# Patient Record
Sex: FEMALE | Race: WHITE | NOT HISPANIC OR LATINO | Employment: OTHER
[De-identification: names, ages, dates, MRNs, and addresses within clinical notes are randomized per-mention and may not be internally consistent; named-entity substitution may affect disease eponyms.]

---

## 2016-06-23 LAB — HBA1C MFR BLD: 6.9 % (ref 0–5.6)

## 2017-08-08 ENCOUNTER — RECORDS - HEALTHEAST (OUTPATIENT)
Dept: ADMINISTRATIVE | Facility: OTHER | Age: 62
End: 2017-08-08

## 2017-08-08 LAB — HBA1C MFR BLD: 6.9 % (ref 0–5.6)

## 2017-08-23 ENCOUNTER — RECORDS - HEALTHEAST (OUTPATIENT)
Dept: ADMINISTRATIVE | Facility: OTHER | Age: 62
End: 2017-08-23

## 2018-01-15 ENCOUNTER — RECORDS - HEALTHEAST (OUTPATIENT)
Dept: ADMINISTRATIVE | Facility: OTHER | Age: 63
End: 2018-01-15

## 2018-02-22 ENCOUNTER — RECORDS - HEALTHEAST (OUTPATIENT)
Dept: ADMINISTRATIVE | Facility: OTHER | Age: 63
End: 2018-02-22

## 2018-03-12 ENCOUNTER — RECORDS - HEALTHEAST (OUTPATIENT)
Dept: ADMINISTRATIVE | Facility: OTHER | Age: 63
End: 2018-03-12

## 2018-03-12 LAB — HBA1C MFR BLD: 7.1 % (ref 0–5.6)

## 2018-03-26 ENCOUNTER — RECORDS - HEALTHEAST (OUTPATIENT)
Dept: ADMINISTRATIVE | Facility: OTHER | Age: 63
End: 2018-03-26

## 2018-03-27 ENCOUNTER — RECORDS - HEALTHEAST (OUTPATIENT)
Dept: ADMINISTRATIVE | Facility: OTHER | Age: 63
End: 2018-03-27

## 2018-03-28 ENCOUNTER — RECORDS - HEALTHEAST (OUTPATIENT)
Dept: ADMINISTRATIVE | Facility: OTHER | Age: 63
End: 2018-03-28

## 2018-04-24 ENCOUNTER — RECORDS - HEALTHEAST (OUTPATIENT)
Dept: ADMINISTRATIVE | Facility: OTHER | Age: 63
End: 2018-04-24

## 2018-05-03 ENCOUNTER — RECORDS - HEALTHEAST (OUTPATIENT)
Dept: ADMINISTRATIVE | Facility: OTHER | Age: 63
End: 2018-05-03

## 2018-05-15 ENCOUNTER — RECORDS - HEALTHEAST (OUTPATIENT)
Dept: ADMINISTRATIVE | Facility: OTHER | Age: 63
End: 2018-05-15

## 2018-09-18 ENCOUNTER — RECORDS - HEALTHEAST (OUTPATIENT)
Dept: ADMINISTRATIVE | Facility: OTHER | Age: 63
End: 2018-09-18

## 2018-10-01 ENCOUNTER — RECORDS - HEALTHEAST (OUTPATIENT)
Dept: ADMINISTRATIVE | Facility: OTHER | Age: 63
End: 2018-10-01

## 2018-10-02 ENCOUNTER — RECORDS - HEALTHEAST (OUTPATIENT)
Dept: ADMINISTRATIVE | Facility: OTHER | Age: 63
End: 2018-10-02

## 2018-12-06 ENCOUNTER — RECORDS - HEALTHEAST (OUTPATIENT)
Dept: ADMINISTRATIVE | Facility: OTHER | Age: 63
End: 2018-12-06

## 2018-12-06 LAB
CHLAMYDIA_EXT- HISTORICAL: NEGATIVE
HPV_EXT - HISTORICAL: ABNORMAL
PAP SMEAR - HIM PATIENT REPORTED: NORMAL
SPECIMEN DESCRIPTION_EXT (HISTORICAL CONVERSION): NORMAL

## 2018-12-10 ENCOUNTER — RECORDS - HEALTHEAST (OUTPATIENT)
Dept: ADMINISTRATIVE | Facility: OTHER | Age: 63
End: 2018-12-10

## 2018-12-12 ENCOUNTER — RECORDS - HEALTHEAST (OUTPATIENT)
Dept: ADMINISTRATIVE | Facility: OTHER | Age: 63
End: 2018-12-12

## 2019-01-15 ENCOUNTER — RECORDS - HEALTHEAST (OUTPATIENT)
Dept: ADMINISTRATIVE | Facility: OTHER | Age: 64
End: 2019-01-15

## 2019-01-15 LAB — HBA1C MFR BLD: 8.4 % (ref 0–5.6)

## 2019-01-22 ENCOUNTER — RECORDS - HEALTHEAST (OUTPATIENT)
Dept: ADMINISTRATIVE | Facility: OTHER | Age: 64
End: 2019-01-22

## 2019-01-23 ENCOUNTER — RECORDS - HEALTHEAST (OUTPATIENT)
Dept: ADMINISTRATIVE | Facility: OTHER | Age: 64
End: 2019-01-23

## 2019-02-20 ENCOUNTER — RECORDS - HEALTHEAST (OUTPATIENT)
Dept: ADMINISTRATIVE | Facility: OTHER | Age: 64
End: 2019-02-20

## 2019-02-25 ENCOUNTER — RECORDS - HEALTHEAST (OUTPATIENT)
Dept: ADMINISTRATIVE | Facility: OTHER | Age: 64
End: 2019-02-25

## 2019-02-27 ENCOUNTER — TRANSFERRED RECORDS (OUTPATIENT)
Dept: HEALTH INFORMATION MANAGEMENT | Facility: CLINIC | Age: 64
End: 2019-02-27

## 2019-02-27 ENCOUNTER — RECORDS - HEALTHEAST (OUTPATIENT)
Dept: ADMINISTRATIVE | Facility: OTHER | Age: 64
End: 2019-02-27

## 2019-02-27 LAB — HBA1C MFR BLD: 7.9 % (ref 0–5.6)

## 2019-02-28 ENCOUNTER — RECORDS - HEALTHEAST (OUTPATIENT)
Dept: ADMINISTRATIVE | Facility: OTHER | Age: 64
End: 2019-02-28

## 2019-04-15 ENCOUNTER — RECORDS - HEALTHEAST (OUTPATIENT)
Dept: HEALTH INFORMATION MANAGEMENT | Facility: CLINIC | Age: 64
End: 2019-04-15

## 2019-04-23 ENCOUNTER — RECORDS - HEALTHEAST (OUTPATIENT)
Dept: ADMINISTRATIVE | Facility: OTHER | Age: 64
End: 2019-04-23

## 2019-05-16 ENCOUNTER — RECORDS - HEALTHEAST (OUTPATIENT)
Dept: ADMINISTRATIVE | Facility: OTHER | Age: 64
End: 2019-05-16

## 2019-06-14 ENCOUNTER — RECORDS - HEALTHEAST (OUTPATIENT)
Dept: ADMINISTRATIVE | Facility: OTHER | Age: 64
End: 2019-06-14

## 2019-07-01 ENCOUNTER — RECORDS - HEALTHEAST (OUTPATIENT)
Dept: ADMINISTRATIVE | Facility: OTHER | Age: 64
End: 2019-07-01

## 2019-08-30 ENCOUNTER — RECORDS - HEALTHEAST (OUTPATIENT)
Dept: HEALTH INFORMATION MANAGEMENT | Facility: CLINIC | Age: 64
End: 2019-08-30

## 2019-09-23 ENCOUNTER — OFFICE VISIT - HEALTHEAST (OUTPATIENT)
Dept: INTERNAL MEDICINE | Facility: CLINIC | Age: 64
End: 2019-09-23

## 2019-09-23 ENCOUNTER — COMMUNICATION - HEALTHEAST (OUTPATIENT)
Dept: SCHEDULING | Facility: CLINIC | Age: 64
End: 2019-09-23

## 2019-09-23 DIAGNOSIS — E11.8 TYPE 2 DIABETES MELLITUS WITH COMPLICATION, WITH LONG-TERM CURRENT USE OF INSULIN (H): ICD-10-CM

## 2019-09-23 DIAGNOSIS — Z79.4 TYPE 2 DIABETES MELLITUS WITH COMPLICATION, WITH LONG-TERM CURRENT USE OF INSULIN (H): ICD-10-CM

## 2019-09-23 DIAGNOSIS — J44.89 CHRONIC OBSTRUCTIVE AIRWAY DISEASE WITH ASTHMA (H): ICD-10-CM

## 2019-09-23 DIAGNOSIS — M54.42 CHRONIC BILATERAL LOW BACK PAIN WITH LEFT-SIDED SCIATICA: ICD-10-CM

## 2019-09-23 DIAGNOSIS — F31.81 BIPOLAR 2 DISORDER (H): ICD-10-CM

## 2019-09-23 DIAGNOSIS — L84 CALLUS OF FOOT: ICD-10-CM

## 2019-09-23 DIAGNOSIS — N31.8 HYPERACTIVITY OF BLADDER: ICD-10-CM

## 2019-09-23 DIAGNOSIS — Z00.00 HEALTH MAINTENANCE EXAMINATION: ICD-10-CM

## 2019-09-23 DIAGNOSIS — G89.29 CHRONIC BILATERAL LOW BACK PAIN WITH LEFT-SIDED SCIATICA: ICD-10-CM

## 2019-09-23 DIAGNOSIS — R87.820 CERVICAL LOW RISK HUMAN PAPILLOMAVIRUS (HPV) DNA TEST POSITIVE: ICD-10-CM

## 2019-09-23 DIAGNOSIS — H40.039 ANATOMICAL NARROW ANGLE GLAUCOMA: ICD-10-CM

## 2019-09-23 DIAGNOSIS — I10 ESSENTIAL HYPERTENSION, BENIGN: ICD-10-CM

## 2019-09-23 DIAGNOSIS — E78.2 MIXED HYPERLIPIDEMIA: ICD-10-CM

## 2019-09-23 LAB
ALBUMIN SERPL-MCNC: 4.1 G/DL (ref 3.5–5)
ALP SERPL-CCNC: 89 U/L (ref 45–120)
ALT SERPL W P-5'-P-CCNC: 62 U/L (ref 0–45)
ANION GAP SERPL CALCULATED.3IONS-SCNC: 7 MMOL/L (ref 5–18)
AST SERPL W P-5'-P-CCNC: 50 U/L (ref 0–40)
BILIRUB SERPL-MCNC: 0.4 MG/DL (ref 0–1)
BUN SERPL-MCNC: 13 MG/DL (ref 8–22)
CALCIUM SERPL-MCNC: 10.5 MG/DL (ref 8.5–10.5)
CHLORIDE BLD-SCNC: 104 MMOL/L (ref 98–107)
CO2 SERPL-SCNC: 29 MMOL/L (ref 22–31)
CREAT SERPL-MCNC: 0.69 MG/DL (ref 0.6–1.1)
CREAT UR-MCNC: 33.5 MG/DL
ERYTHROCYTE [DISTWIDTH] IN BLOOD BY AUTOMATED COUNT: 12.3 % (ref 11–14.5)
GFR SERPL CREATININE-BSD FRML MDRD: >60 ML/MIN/1.73M2
GLUCOSE BLD-MCNC: 135 MG/DL (ref 70–125)
HBA1C MFR BLD: 7.4 % (ref 3.5–6)
HCT VFR BLD AUTO: 38 % (ref 35–47)
HGB BLD-MCNC: 12.8 G/DL (ref 12–16)
LDLC SERPL CALC-MCNC: 48 MG/DL
MCH RBC QN AUTO: 31.8 PG (ref 27–34)
MCHC RBC AUTO-ENTMCNC: 33.6 G/DL (ref 32–36)
MCV RBC AUTO: 95 FL (ref 80–100)
MICROALBUMIN UR-MCNC: <0.5 MG/DL (ref 0–1.99)
MICROALBUMIN/CREAT UR: NORMAL MG/G{CREAT}
PLATELET # BLD AUTO: 265 THOU/UL (ref 140–440)
PMV BLD AUTO: 8.7 FL (ref 7–10)
POTASSIUM BLD-SCNC: 4.3 MMOL/L (ref 3.5–5)
PROT SERPL-MCNC: 7.3 G/DL (ref 6–8)
RBC # BLD AUTO: 4.02 MILL/UL (ref 3.8–5.4)
SODIUM SERPL-SCNC: 140 MMOL/L (ref 136–145)
WBC: 5.9 THOU/UL (ref 4–11)

## 2019-09-23 ASSESSMENT — MIFFLIN-ST. JEOR: SCORE: 1289.78

## 2019-09-25 ENCOUNTER — COMMUNICATION - HEALTHEAST (OUTPATIENT)
Dept: INTERNAL MEDICINE | Facility: CLINIC | Age: 64
End: 2019-09-25

## 2019-09-26 ENCOUNTER — COMMUNICATION - HEALTHEAST (OUTPATIENT)
Dept: INTERNAL MEDICINE | Facility: CLINIC | Age: 64
End: 2019-09-26

## 2019-09-27 ENCOUNTER — COMMUNICATION - HEALTHEAST (OUTPATIENT)
Dept: INTERNAL MEDICINE | Facility: CLINIC | Age: 64
End: 2019-09-27

## 2019-09-30 ENCOUNTER — AMBULATORY - HEALTHEAST (OUTPATIENT)
Dept: INTERNAL MEDICINE | Facility: CLINIC | Age: 64
End: 2019-09-30

## 2019-09-30 ENCOUNTER — COMMUNICATION - HEALTHEAST (OUTPATIENT)
Dept: INTERNAL MEDICINE | Facility: CLINIC | Age: 64
End: 2019-09-30

## 2019-09-30 DIAGNOSIS — J44.9 CHRONIC OBSTRUCTIVE PULMONARY DISEASE, UNSPECIFIED COPD TYPE (H): ICD-10-CM

## 2019-09-30 DIAGNOSIS — E11.8 TYPE 2 DIABETES MELLITUS WITH COMPLICATION, WITH LONG-TERM CURRENT USE OF INSULIN (H): ICD-10-CM

## 2019-09-30 DIAGNOSIS — Z79.4 TYPE 2 DIABETES MELLITUS WITH COMPLICATION, WITH LONG-TERM CURRENT USE OF INSULIN (H): ICD-10-CM

## 2019-10-01 ENCOUNTER — COMMUNICATION - HEALTHEAST (OUTPATIENT)
Dept: SCHEDULING | Facility: CLINIC | Age: 64
End: 2019-10-01

## 2019-10-01 DIAGNOSIS — J44.9 CHRONIC OBSTRUCTIVE PULMONARY DISEASE, UNSPECIFIED COPD TYPE (H): ICD-10-CM

## 2019-10-02 ENCOUNTER — COMMUNICATION - HEALTHEAST (OUTPATIENT)
Dept: INTERNAL MEDICINE | Facility: CLINIC | Age: 64
End: 2019-10-02

## 2019-10-21 ENCOUNTER — OFFICE VISIT - HEALTHEAST (OUTPATIENT)
Dept: PHYSICAL THERAPY | Facility: REHABILITATION | Age: 64
End: 2019-10-21

## 2019-10-21 DIAGNOSIS — M79.605 LEFT LEG PAIN: ICD-10-CM

## 2019-10-21 DIAGNOSIS — M54.42 CHRONIC LEFT-SIDED LOW BACK PAIN WITH LEFT-SIDED SCIATICA: ICD-10-CM

## 2019-10-21 DIAGNOSIS — G89.29 CHRONIC LEFT-SIDED LOW BACK PAIN WITH LEFT-SIDED SCIATICA: ICD-10-CM

## 2019-10-29 ENCOUNTER — OFFICE VISIT - HEALTHEAST (OUTPATIENT)
Dept: PHYSICAL THERAPY | Facility: REHABILITATION | Age: 64
End: 2019-10-29

## 2019-10-29 DIAGNOSIS — M54.42 CHRONIC LEFT-SIDED LOW BACK PAIN WITH LEFT-SIDED SCIATICA: ICD-10-CM

## 2019-10-29 DIAGNOSIS — M79.605 LEFT LEG PAIN: ICD-10-CM

## 2019-10-29 DIAGNOSIS — G89.29 CHRONIC LEFT-SIDED LOW BACK PAIN WITH LEFT-SIDED SCIATICA: ICD-10-CM

## 2019-10-31 ENCOUNTER — COMMUNICATION - HEALTHEAST (OUTPATIENT)
Dept: INTERNAL MEDICINE | Facility: CLINIC | Age: 64
End: 2019-10-31

## 2019-11-04 ENCOUNTER — RECORDS - HEALTHEAST (OUTPATIENT)
Dept: ADMINISTRATIVE | Facility: OTHER | Age: 64
End: 2019-11-04

## 2019-11-04 LAB — RETINOPATHY: NEGATIVE

## 2019-11-05 ENCOUNTER — AMBULATORY - HEALTHEAST (OUTPATIENT)
Dept: OTHER | Facility: CLINIC | Age: 64
End: 2019-11-05

## 2019-11-14 ENCOUNTER — COMMUNICATION - HEALTHEAST (OUTPATIENT)
Dept: INTERNAL MEDICINE | Facility: CLINIC | Age: 64
End: 2019-11-14

## 2019-11-14 DIAGNOSIS — E11.9 TYPE 2 DIABETES MELLITUS WITHOUT COMPLICATION, WITH LONG-TERM CURRENT USE OF INSULIN (H): ICD-10-CM

## 2019-11-14 DIAGNOSIS — Z79.4 TYPE 2 DIABETES MELLITUS WITHOUT COMPLICATION, WITH LONG-TERM CURRENT USE OF INSULIN (H): ICD-10-CM

## 2019-11-15 ENCOUNTER — RECORDS - HEALTHEAST (OUTPATIENT)
Dept: RADIOLOGY | Facility: CLINIC | Age: 64
End: 2019-11-15

## 2019-11-19 ENCOUNTER — COMMUNICATION - HEALTHEAST (OUTPATIENT)
Dept: INTERNAL MEDICINE | Facility: CLINIC | Age: 64
End: 2019-11-19

## 2019-11-19 DIAGNOSIS — Z79.4 TYPE 2 DIABETES MELLITUS WITHOUT COMPLICATION, WITH LONG-TERM CURRENT USE OF INSULIN (H): ICD-10-CM

## 2019-11-19 DIAGNOSIS — E11.9 TYPE 2 DIABETES MELLITUS WITHOUT COMPLICATION, WITH LONG-TERM CURRENT USE OF INSULIN (H): ICD-10-CM

## 2019-11-25 ENCOUNTER — HOSPITAL ENCOUNTER (OUTPATIENT)
Dept: MAMMOGRAPHY | Facility: CLINIC | Age: 64
Discharge: HOME | End: 2019-11-25
Attending: INTERNAL MEDICINE

## 2019-11-25 ENCOUNTER — TRANSFERRED RECORDS (OUTPATIENT)
Dept: HEALTH INFORMATION MANAGEMENT | Facility: CLINIC | Age: 64
End: 2019-11-25
Payer: COMMERCIAL

## 2019-11-25 DIAGNOSIS — Z12.31 VISIT FOR SCREENING MAMMOGRAM: ICD-10-CM

## 2019-11-25 LAB — NEGATIVE: NORMAL

## 2019-11-26 ENCOUNTER — OFFICE VISIT - HEALTHEAST (OUTPATIENT)
Dept: INTERNAL MEDICINE | Facility: CLINIC | Age: 64
End: 2019-11-26

## 2019-11-26 ENCOUNTER — TRANSFERRED RECORDS (OUTPATIENT)
Dept: HEALTH INFORMATION MANAGEMENT | Facility: CLINIC | Age: 64
End: 2019-11-26

## 2019-11-26 DIAGNOSIS — F31.81 BIPOLAR 2 DISORDER (H): ICD-10-CM

## 2019-11-26 DIAGNOSIS — Z79.4 TYPE 2 DIABETES MELLITUS WITH COMPLICATION, WITH LONG-TERM CURRENT USE OF INSULIN (H): ICD-10-CM

## 2019-11-26 DIAGNOSIS — Z00.00 HEALTH MAINTENANCE EXAMINATION: ICD-10-CM

## 2019-11-26 DIAGNOSIS — Z11.59 ENCOUNTER FOR SCREENING FOR OTHER VIRAL DISEASES: ICD-10-CM

## 2019-11-26 DIAGNOSIS — B36.9 FUNGAL SKIN DISEASE: ICD-10-CM

## 2019-11-26 DIAGNOSIS — I10 ESSENTIAL HYPERTENSION, BENIGN: ICD-10-CM

## 2019-11-26 DIAGNOSIS — R79.89 ELEVATED LIVER FUNCTION TESTS: ICD-10-CM

## 2019-11-26 DIAGNOSIS — N31.8 HYPERACTIVITY OF BLADDER: ICD-10-CM

## 2019-11-26 DIAGNOSIS — J44.9 CHRONIC OBSTRUCTIVE PULMONARY DISEASE, UNSPECIFIED COPD TYPE (H): ICD-10-CM

## 2019-11-26 DIAGNOSIS — J44.89 CHRONIC OBSTRUCTIVE AIRWAY DISEASE WITH ASTHMA (H): ICD-10-CM

## 2019-11-26 DIAGNOSIS — Z12.11 SCREEN FOR COLON CANCER: ICD-10-CM

## 2019-11-26 DIAGNOSIS — E11.8 TYPE 2 DIABETES MELLITUS WITH COMPLICATION, WITH LONG-TERM CURRENT USE OF INSULIN (H): ICD-10-CM

## 2019-11-26 LAB
ALT SERPL W P-5'-P-CCNC: 93 U/L (ref 0–45)
AST SERPL W P-5'-P-CCNC: 90 U/L (ref 0–40)
HBA1C MFR BLD: 8.4 % (ref 3.5–6)

## 2019-11-26 ASSESSMENT — MIFFLIN-ST. JEOR: SCORE: 1294.32

## 2019-11-27 ENCOUNTER — COMMUNICATION - HEALTHEAST (OUTPATIENT)
Dept: INTERNAL MEDICINE | Facility: CLINIC | Age: 64
End: 2019-11-27

## 2019-11-27 LAB
HBV SURFACE AG SERPL QL IA: NEGATIVE
HCV AB SERPL QL IA: NEGATIVE

## 2019-12-09 ENCOUNTER — COMMUNICATION - HEALTHEAST (OUTPATIENT)
Dept: INTERNAL MEDICINE | Facility: CLINIC | Age: 64
End: 2019-12-09

## 2019-12-09 ENCOUNTER — COMMUNICATION - HEALTHEAST (OUTPATIENT)
Dept: SCHEDULING | Facility: CLINIC | Age: 64
End: 2019-12-09

## 2019-12-09 DIAGNOSIS — L30.9 ECZEMA, UNSPECIFIED TYPE: ICD-10-CM

## 2019-12-09 DIAGNOSIS — Z79.4 TYPE 2 DIABETES MELLITUS WITHOUT COMPLICATION, WITH LONG-TERM CURRENT USE OF INSULIN (H): ICD-10-CM

## 2019-12-09 DIAGNOSIS — Z79.4 TYPE 2 DIABETES MELLITUS WITH COMPLICATION, WITH LONG-TERM CURRENT USE OF INSULIN (H): ICD-10-CM

## 2019-12-09 DIAGNOSIS — E11.8 TYPE 2 DIABETES MELLITUS WITH COMPLICATION, WITH LONG-TERM CURRENT USE OF INSULIN (H): ICD-10-CM

## 2019-12-09 DIAGNOSIS — E11.9 TYPE 2 DIABETES MELLITUS WITHOUT COMPLICATION, WITH LONG-TERM CURRENT USE OF INSULIN (H): ICD-10-CM

## 2019-12-10 ENCOUNTER — COMMUNICATION - HEALTHEAST (OUTPATIENT)
Dept: SCHEDULING | Facility: CLINIC | Age: 64
End: 2019-12-10

## 2019-12-16 ENCOUNTER — OFFICE VISIT - HEALTHEAST (OUTPATIENT)
Dept: INTERNAL MEDICINE | Facility: CLINIC | Age: 64
End: 2019-12-16

## 2019-12-16 ENCOUNTER — TRANSFERRED RECORDS (OUTPATIENT)
Dept: HEALTH INFORMATION MANAGEMENT | Facility: CLINIC | Age: 64
End: 2019-12-16

## 2019-12-16 DIAGNOSIS — K75.81 NASH (NONALCOHOLIC STEATOHEPATITIS): ICD-10-CM

## 2019-12-16 DIAGNOSIS — F31.81 BIPOLAR 2 DISORDER (H): ICD-10-CM

## 2019-12-16 DIAGNOSIS — L84 CALLUS OF FOOT: ICD-10-CM

## 2019-12-16 DIAGNOSIS — G89.29 CHRONIC BILATERAL LOW BACK PAIN WITH LEFT-SIDED SCIATICA: ICD-10-CM

## 2019-12-16 DIAGNOSIS — Z00.00 HEALTH MAINTENANCE EXAMINATION: ICD-10-CM

## 2019-12-16 DIAGNOSIS — E11.9 TYPE 2 DIABETES MELLITUS WITHOUT COMPLICATION, WITH LONG-TERM CURRENT USE OF INSULIN (H): ICD-10-CM

## 2019-12-16 DIAGNOSIS — M54.42 CHRONIC BILATERAL LOW BACK PAIN WITH LEFT-SIDED SCIATICA: ICD-10-CM

## 2019-12-16 DIAGNOSIS — Z01.411 ENCOUNTER FOR GYNECOLOGICAL EXAMINATION (GENERAL) (ROUTINE) WITH ABNORMAL FINDINGS: ICD-10-CM

## 2019-12-16 DIAGNOSIS — Z79.4 TYPE 2 DIABETES MELLITUS WITHOUT COMPLICATION, WITH LONG-TERM CURRENT USE OF INSULIN (H): ICD-10-CM

## 2019-12-16 DIAGNOSIS — Z00.00 ROUTINE HISTORY AND PHYSICAL EXAMINATION OF ADULT: ICD-10-CM

## 2019-12-16 ASSESSMENT — MIFFLIN-ST. JEOR: SCORE: 1294.32

## 2019-12-17 LAB
HPV SOURCE: ABNORMAL
HUMAN PAPILLOMA VIRUS 16 DNA: NEGATIVE
HUMAN PAPILLOMA VIRUS 18 DNA: POSITIVE
HUMAN PAPILLOMA VIRUS FINAL DIAGNOSIS: ABNORMAL
HUMAN PAPILLOMA VIRUS OTHER HR: NEGATIVE
SPECIMEN DESCRIPTION: ABNORMAL

## 2019-12-20 ENCOUNTER — COMMUNICATION - HEALTHEAST (OUTPATIENT)
Dept: INTERNAL MEDICINE | Facility: CLINIC | Age: 64
End: 2019-12-20

## 2019-12-20 DIAGNOSIS — R87.810 CERVICAL HIGH RISK HPV (HUMAN PAPILLOMAVIRUS) TEST POSITIVE: ICD-10-CM

## 2019-12-24 ENCOUNTER — RECORDS - HEALTHEAST (OUTPATIENT)
Dept: HEALTH INFORMATION MANAGEMENT | Facility: CLINIC | Age: 64
End: 2019-12-24

## 2019-12-26 ENCOUNTER — COMMUNICATION - HEALTHEAST (OUTPATIENT)
Dept: INTERNAL MEDICINE | Facility: CLINIC | Age: 64
End: 2019-12-26

## 2019-12-26 ENCOUNTER — COMMUNICATION - HEALTHEAST (OUTPATIENT)
Dept: SCHEDULING | Facility: CLINIC | Age: 64
End: 2019-12-26

## 2019-12-26 DIAGNOSIS — Z79.4 TYPE 2 DIABETES MELLITUS WITH COMPLICATION, WITH LONG-TERM CURRENT USE OF INSULIN (H): ICD-10-CM

## 2019-12-26 DIAGNOSIS — E11.8 TYPE 2 DIABETES MELLITUS WITH COMPLICATION, WITH LONG-TERM CURRENT USE OF INSULIN (H): ICD-10-CM

## 2020-01-03 ENCOUNTER — COMMUNICATION - HEALTHEAST (OUTPATIENT)
Dept: SCHEDULING | Facility: CLINIC | Age: 65
End: 2020-01-03

## 2020-01-03 DIAGNOSIS — J44.9 CHRONIC OBSTRUCTIVE PULMONARY DISEASE, UNSPECIFIED COPD TYPE (H): ICD-10-CM

## 2020-01-06 ENCOUNTER — MEDICAL CORRESPONDENCE (OUTPATIENT)
Dept: HEALTH INFORMATION MANAGEMENT | Facility: CLINIC | Age: 65
End: 2020-01-06

## 2020-01-06 ENCOUNTER — HOSPITAL ENCOUNTER (OUTPATIENT)
Dept: ULTRASOUND IMAGING | Facility: CLINIC | Age: 65
Discharge: HOME | End: 2020-01-06
Attending: INTERNAL MEDICINE

## 2020-01-06 ENCOUNTER — TRANSFERRED RECORDS (OUTPATIENT)
Dept: HEALTH INFORMATION MANAGEMENT | Facility: CLINIC | Age: 65
End: 2020-01-06

## 2020-01-06 DIAGNOSIS — Z79.4 TYPE 2 DIABETES MELLITUS WITHOUT COMPLICATION, WITH LONG-TERM CURRENT USE OF INSULIN (H): ICD-10-CM

## 2020-01-06 DIAGNOSIS — E11.9 TYPE 2 DIABETES MELLITUS WITHOUT COMPLICATION, WITH LONG-TERM CURRENT USE OF INSULIN (H): ICD-10-CM

## 2020-01-07 ENCOUNTER — COMMUNICATION - HEALTHEAST (OUTPATIENT)
Dept: SCHEDULING | Facility: CLINIC | Age: 65
End: 2020-01-07

## 2020-01-07 ENCOUNTER — COMMUNICATION - HEALTHEAST (OUTPATIENT)
Dept: INTERNAL MEDICINE | Facility: CLINIC | Age: 65
End: 2020-01-07

## 2020-01-10 ENCOUNTER — TELEPHONE (OUTPATIENT)
Dept: NEUROSURGERY | Facility: CLINIC | Age: 65
End: 2020-01-10

## 2020-01-10 NOTE — TELEPHONE ENCOUNTER
Phone call to patient in response to in-basket message requesting call back in regards to surgical procedure..     No answer at given number.  Left VM with contact info and instructions to return call at patient's convenience.     Background/Plan: Patient with questions regarding superion back surgery (Veriflex system).  [facility] does not use the implant at this time.   Will await return call from patient.

## 2020-01-13 ENCOUNTER — TELEPHONE (OUTPATIENT)
Dept: NEUROSURGERY | Facility: CLINIC | Age: 65
End: 2020-01-13

## 2020-01-13 ENCOUNTER — AMBULATORY - HEALTHEAST (OUTPATIENT)
Dept: INTERNAL MEDICINE | Facility: CLINIC | Age: 65
End: 2020-01-13

## 2020-01-13 DIAGNOSIS — J44.9 CHRONIC OBSTRUCTIVE PULMONARY DISEASE, UNSPECIFIED COPD TYPE (H): ICD-10-CM

## 2020-01-13 NOTE — TELEPHONE ENCOUNTER
Phone call to patient in response to voice mail message requesting call back in regards to appointment scheduling.     No answer at given number.  Left VM with contact info and instructions to return call at patient's convenience.     Background/Plan:  See previous note dated 1/10/2020.    Will await return call from patient.

## 2020-01-14 ENCOUNTER — COMMUNICATION - HEALTHEAST (OUTPATIENT)
Dept: INTERNAL MEDICINE | Facility: CLINIC | Age: 65
End: 2020-01-14

## 2020-01-14 DIAGNOSIS — J41.0 SIMPLE CHRONIC BRONCHITIS (H): ICD-10-CM

## 2020-01-15 ENCOUNTER — COMMUNICATION - HEALTHEAST (OUTPATIENT)
Dept: INTERNAL MEDICINE | Facility: CLINIC | Age: 65
End: 2020-01-15

## 2020-01-17 ENCOUNTER — COMMUNICATION - HEALTHEAST (OUTPATIENT)
Dept: PHARMACY | Facility: CLINIC | Age: 65
End: 2020-01-17

## 2020-01-17 DIAGNOSIS — Z79.4 TYPE 2 DIABETES MELLITUS WITH COMPLICATION, WITH LONG-TERM CURRENT USE OF INSULIN (H): ICD-10-CM

## 2020-01-17 DIAGNOSIS — E11.8 TYPE 2 DIABETES MELLITUS WITH COMPLICATION, WITH LONG-TERM CURRENT USE OF INSULIN (H): ICD-10-CM

## 2020-01-27 ENCOUNTER — APPOINTMENT (OUTPATIENT)
Dept: LAB | Facility: CLINIC | Age: 65
End: 2020-01-27
Payer: COMMERCIAL

## 2020-01-27 ENCOUNTER — OFFICE VISIT (OUTPATIENT)
Dept: FAMILY MEDICINE | Facility: CLINIC | Age: 65
End: 2020-01-27
Payer: COMMERCIAL

## 2020-01-27 VITALS
OXYGEN SATURATION: 94 % | SYSTOLIC BLOOD PRESSURE: 131 MMHG | DIASTOLIC BLOOD PRESSURE: 84 MMHG | WEIGHT: 174.3 LBS | BODY MASS INDEX: 30.88 KG/M2 | HEIGHT: 63 IN | HEART RATE: 85 BPM

## 2020-01-27 DIAGNOSIS — E11.65 TYPE 2 DIABETES MELLITUS WITH HYPERGLYCEMIA, WITH LONG-TERM CURRENT USE OF INSULIN (H): ICD-10-CM

## 2020-01-27 DIAGNOSIS — K80.20 GALLSTONES: ICD-10-CM

## 2020-01-27 DIAGNOSIS — R87.810 CERVICAL HIGH RISK HUMAN PAPILLOMAVIRUS (HPV) DNA TEST POSITIVE: ICD-10-CM

## 2020-01-27 DIAGNOSIS — Z76.89 ENCOUNTER TO ESTABLISH CARE: Primary | ICD-10-CM

## 2020-01-27 DIAGNOSIS — Z79.4 TYPE 2 DIABETES MELLITUS WITH HYPERGLYCEMIA, WITH LONG-TERM CURRENT USE OF INSULIN (H): ICD-10-CM

## 2020-01-27 LAB
ALBUMIN SERPL-MCNC: 4.2 G/DL (ref 3.4–5)
ALP SERPL-CCNC: 93 U/L (ref 40–150)
ALT SERPL W P-5'-P-CCNC: 111 U/L (ref 0–50)
ANION GAP SERPL CALCULATED.3IONS-SCNC: 6 MMOL/L (ref 3–14)
AST SERPL W P-5'-P-CCNC: 176 U/L (ref 0–45)
BILIRUB SERPL-MCNC: 0.5 MG/DL (ref 0.2–1.3)
BUN SERPL-MCNC: 7 MG/DL (ref 7–30)
CALCIUM SERPL-MCNC: 9.9 MG/DL (ref 8.5–10.1)
CHLORIDE SERPL-SCNC: 103 MMOL/L (ref 94–109)
CO2 SERPL-SCNC: 29 MMOL/L (ref 20–32)
CREAT SERPL-MCNC: 0.52 MG/DL (ref 0.52–1.04)
GFR SERPL CREATININE-BSD FRML MDRD: >90 ML/MIN/{1.73_M2}
GLUCOSE SERPL-MCNC: 104 MG/DL (ref 70–99)
HBA1C MFR BLD: 8.4 % (ref 0–5.6)
POTASSIUM SERPL-SCNC: 3.9 MMOL/L (ref 3.4–5.3)
PROT SERPL-MCNC: 7.7 G/DL (ref 6.8–8.8)
SODIUM SERPL-SCNC: 138 MMOL/L (ref 133–144)

## 2020-01-27 RX ORDER — LAMOTRIGINE 150 MG/1
150 TABLET ORAL
COMMUNITY
Start: 2018-08-01 | End: 2022-12-06 | Stop reason: ALTCHOICE

## 2020-01-27 RX ORDER — MULTIVITAMIN WITH IRON
1 TABLET ORAL DAILY
COMMUNITY
End: 2022-12-06

## 2020-01-27 RX ORDER — BIOTIN 10000 MCG
1 CAPSULE ORAL DAILY
COMMUNITY
End: 2024-02-13

## 2020-01-27 RX ORDER — BLOOD-GLUCOSE METER
EACH MISCELLANEOUS
COMMUNITY
Start: 2020-01-03 | End: 2022-12-08

## 2020-01-27 RX ORDER — SODIUM CHLORIDE 5 %
OINTMENT (GRAM) OPHTHALMIC (EYE)
COMMUNITY
Start: 2019-11-05 | End: 2023-05-14

## 2020-01-27 RX ORDER — MONTELUKAST SODIUM 10 MG/1
10 TABLET ORAL AT BEDTIME
COMMUNITY
Start: 2018-07-28 | End: 2023-01-10

## 2020-01-27 RX ORDER — LOSARTAN POTASSIUM 25 MG/1
25 TABLET ORAL
COMMUNITY
Start: 2018-08-01 | End: 2021-07-14

## 2020-01-27 RX ORDER — ARIPIPRAZOLE 30 MG/1
30 TABLET ORAL
COMMUNITY
Start: 2018-08-01 | End: 2021-07-16

## 2020-01-27 RX ORDER — KETOCONAZOLE 20 MG/G
CREAM TOPICAL
COMMUNITY
Start: 2019-11-26 | End: 2023-01-09

## 2020-01-27 RX ORDER — METFORMIN HCL 500 MG
2000 TABLET, EXTENDED RELEASE 24 HR ORAL
COMMUNITY
Start: 2019-12-09 | End: 2021-08-13

## 2020-01-27 RX ORDER — BLOOD SUGAR DIAGNOSTIC
STRIP MISCELLANEOUS
COMMUNITY
Start: 2020-01-03 | End: 2021-07-16

## 2020-01-27 RX ORDER — BENZOCAINE/MENTHOL 6 MG-10 MG
1 LOZENGE MUCOUS MEMBRANE
COMMUNITY
End: 2022-12-06

## 2020-01-27 RX ORDER — ASCORBIC ACID 125 MG
TABLET,CHEWABLE ORAL
COMMUNITY
End: 2022-12-06

## 2020-01-27 RX ORDER — ROSUVASTATIN CALCIUM 10 MG/1
10 TABLET, COATED ORAL
COMMUNITY
Start: 2019-11-26 | End: 2023-01-05

## 2020-01-27 RX ORDER — NYSTATIN 100000 U/G
1 OINTMENT TOPICAL
COMMUNITY
Start: 2019-08-08 | End: 2022-12-06

## 2020-01-27 RX ORDER — MUPIROCIN CALCIUM 20 MG/G
1 CREAM TOPICAL 3 TIMES DAILY
COMMUNITY
End: 2023-01-09

## 2020-01-27 RX ORDER — METRONIDAZOLE 7.5 MG/G
1 GEL TOPICAL
COMMUNITY
End: 2022-12-06

## 2020-01-27 RX ORDER — LANCETS
EACH MISCELLANEOUS
COMMUNITY
Start: 2020-01-03 | End: 2022-12-08

## 2020-01-27 RX ORDER — MULTIVITAMIN
1 TABLET ORAL DAILY
COMMUNITY

## 2020-01-27 RX ORDER — TIOTROPIUM BROMIDE 18 UG/1
CAPSULE ORAL; RESPIRATORY (INHALATION)
COMMUNITY
Start: 2020-01-16 | End: 2022-12-06 | Stop reason: ALTCHOICE

## 2020-01-27 RX ORDER — ALBUTEROL SULFATE 90 UG/1
2 AEROSOL, METERED RESPIRATORY (INHALATION) EVERY 4 HOURS PRN
COMMUNITY
Start: 2019-11-26 | End: 2023-03-29

## 2020-01-27 RX ORDER — GLIPIZIDE 10 MG/1
10 TABLET ORAL
COMMUNITY
Start: 2018-08-01 | End: 2022-12-06

## 2020-01-27 RX ORDER — PEN NEEDLE, DIABETIC 32GX 5/32"
NEEDLE, DISPOSABLE MISCELLANEOUS
COMMUNITY
Start: 2020-01-21 | End: 2023-01-17

## 2020-01-27 ASSESSMENT — PAIN SCALES - GENERAL: PAINLEVEL: MODERATE PAIN (5)

## 2020-01-27 ASSESSMENT — MIFFLIN-ST. JEOR: SCORE: 1309.75

## 2020-01-27 NOTE — PROGRESS NOTES
HPI       Annel Stoddard is a 64 year old female with PMHx of type 2 diabetes, positive HPV, bipolar disorder, low back pain with left sided sciatica, hyperlipidemia, chronically elevated ALT/AST who presents for   Chief Complaint   Patient presents with     Establish Care     Would like to discuss several items at establish care visit:     Type 2 diabetes: diagnosed several years ago. Has been monitoring glucose at home and has noted improvement with recent dietary changes. Is on Trulicity, tresiba, metformin and glipizide to control. Last A1c was in 11/2019 at [facility] and was 8.4%. Had previously been referred to see diabetic education within [facility], did not go and is opting to establish care here at  of [facility] clinic. Has not made significant dietary changes up until the last few days and does not believe there will be significant difference with her A1c.     Positive HPV on last pap; last pap completed within [facility] system 12/2019 and positive for HPV-18 with negative cervical cytology. Has had positive HPV for the past 9 years and colposcopy last year in 2019 that was negative for malignancy. Would like OB/GYN referral for this. Recently moved to [place] from [place], [place] and does not have established OB/GYN provider in the [facility] area. Prefer's to have a female provider. Denies any abnormal bleeding. No vaginal irritation, itching, or drainage. Is not currently sexually active.     Bipolar disorder: diagnosed several years ago and endorses in remission today with current medications. Controlled with lamictal and Abilify. Has not been had to establish with a therapist or a psychiatrist in [place] since moving here 5 months ago and does not eel     Persistently elevated ALT/AST: believed in part to be from chronic gallstones. Is without abdominal pain, nausea, or vomiting. Does have possible fatty liver disease as well, uncertain if it is attributing to her ongoing levation LFTs. Has been on statin  therapy for several years and denies any myalgias, does not believe it is contributory.     Endorses history of chronic low back pain with left sided sciatica that radiates down into her leg/left foot.     Has 's care plan. Is NOT an insurance plan but a forgiveness plan. [facility] clinical pharmacy will need to be called for refill on [facility] when it is due.     Problem, Medication and Allergy Lists were       Current Outpatient Medications   Medication Sig Dispense Refill     ACCU-CHEK GUIDE test strip        albuterol (VENTOLIN HFA) 108 (90 Base) MCG/ACT inhaler Inhale 1 puff into the lungs       ARIPiprazole (ABILIFY) 30 MG tablet Take 30 mg by mouth       Aspirin-Calcium Carbonate  MG TABS Take 81 mg by mouth       BD GERARDO U/F 32G X 4 MM insulin pen needle        Biotin 10 MG CAPS Take 1 capsule by mouth       blood glucose (FREESTYLE INSULINX) test strip 1 each       blood glucose monitoring (ACCU-CHEK FASTCLIX) lancets TESTING TID       Blood Glucose Monitoring Suppl (ACCU-CHEK GUIDE) w/Device KIT USE AS DIRECTD       dulaglutide (TRULICITY) 1.5 MG/0.5ML pen Inject 1.5 mg Subcutaneous       glipiZIDE (GLUCOTROL) 10 MG tablet Take 10 mg by mouth       hydrocortisone (CORTAID) 1 % external cream Apply 1 Application topically       insulin degludec (TRESIBA) 100 UNIT/ML pen Inject 60 Units Subcutaneous       insulin pen needle (BD GERARDO U/F) 32G X 4 MM miscellaneous U WITH INSULIN PEN ONCE D       ketoconazole (NIZORAL) 2 % external cream Twice a day for two weeks       lamoTRIgine (LAMICTAL) 150 MG tablet Take 150 mg by mouth       losartan (COZAAR) 25 MG tablet Take 25 mg by mouth       magnesium 250 MG tablet Take 1 tablet by mouth daily       Magnesium Citrate 125 MG CAPS        metFORMIN (GLUCOPHAGE-XR) 500 MG 24 hr tablet Take 2,000 mg by mouth       metroNIDAZOLE (METROGEL) 0.75 % external gel Apply 1 Application topically       montelukast (SINGULAIR) 10 MG tablet Take 10 mg by mouth        multivitamin (ONE-DAILY) tablet Take 1 tablet by mouth       mupirocin (BACTROBAN) 2 % external cream Apply 1 Application topically       nystatin (MYCOSTATIN) 694393 UNIT/GM external ointment Apply 1 Application topically       rosuvastatin (CRESTOR) 10 MG tablet Take 10 mg by mouth       sodium chloride (MILAN 128) 5 % ophthalmic ointment APPLY A SMALL AMOUNT IN OU QPM       SPIRIVA HANDIHALER 18 MCG inhaled capsule            Allergies   Allergen Reactions     Desmopressin Swelling     LE  LE  LE       Estrogens Other (See Comments)     Hydrochlorothiazide Other (See Comments) and Unknown     Patient reports can't take this med due to increased urine output  Patient reports can't take this med due to increased urine output  Patient reports can't take this med due to increased urine output  Patient reports can't take this med due to increased urine output       Hydrocodone-Acetaminophen      Justin change  Justin change  Justin change  Justin change       Lithium Diarrhea     Diabetes insipidus  Diabetes insipidus  Causing DI  Causing DI  Causing DI       Penicillins Unknown     Risperidone      Shellfish-Derived Products      Latex Other (See Comments) and Rash     rash       Valacyclovir Rash     Patient is a new patient to this clinic and so  I reviewed/updated the Past Medical History, the Family History and the Social History .         Review of Systems:   Review of Systems   Constitutional: Negative for activity change, fatigue and fever.   HENT: Negative for congestion.    Respiratory: Negative for cough, choking, shortness of breath and wheezing.    Cardiovascular: Negative for chest pain, palpitations and leg swelling.   Gastrointestinal: Negative for diarrhea, nausea and vomiting.   Endocrine: Negative for polydipsia, polyphagia and polyuria.   Musculoskeletal: Positive for back pain, gait problem and myalgias.   Skin: Negative for color change, pallor, rash and wound.   Neurological: Negative for  "dizziness, tremors, seizures, weakness, numbness and headaches.   All other systems reviewed and are negative.           Physical Exam:     Vitals:    01/27/20 0823   BP: 131/84   Pulse: 85   SpO2: 94%   Weight: 79.1 kg (174 lb 4.8 oz)   Height: 1.6 m (5' 3\")     Body mass index is 30.88 kg/m .  Vitals were reviewed and were normal     Physical Exam  Vitals signs and nursing note reviewed.   Constitutional:       General: She is not in acute distress.     Appearance: Normal appearance. She is obese. She is not ill-appearing, toxic-appearing or diaphoretic.   HENT:      Head: Normocephalic and atraumatic.      Right Ear: Tympanic membrane, ear canal and external ear normal. There is no impacted cerumen.      Left Ear: Tympanic membrane, ear canal and external ear normal. There is no impacted cerumen.      Nose: Nose normal.      Mouth/Throat:      Mouth: Mucous membranes are moist.      Pharynx: Oropharynx is clear. No oropharyngeal exudate or posterior oropharyngeal erythema.   Eyes:      General:         Right eye: No discharge.         Left eye: No discharge.      Conjunctiva/sclera: Conjunctivae normal.   Neck:      Musculoskeletal: Normal range of motion and neck supple. No neck rigidity or muscular tenderness.   Cardiovascular:      Rate and Rhythm: Normal rate and regular rhythm.      Pulses: Normal pulses.      Heart sounds: Normal heart sounds. No murmur. No friction rub. No gallop.    Pulmonary:      Effort: Pulmonary effort is normal. No respiratory distress.      Breath sounds: Normal breath sounds. No stridor. No wheezing, rhonchi or rales.   Chest:      Chest wall: No tenderness.   Abdominal:      General: Abdomen is flat. Bowel sounds are normal. There is no distension.      Palpations: Abdomen is soft. There is no mass.      Tenderness: There is no abdominal tenderness. There is no right CVA tenderness, left CVA tenderness, guarding or rebound.      Hernia: No hernia is present.   Lymphadenopathy:     "  Cervical: No cervical adenopathy.   Skin:     General: Skin is warm and dry.      Capillary Refill: Capillary refill takes less than 2 seconds.      Coloration: Skin is not jaundiced or pale.      Findings: No bruising, erythema, lesion or rash.   Neurological:      General: No focal deficit present.      Mental Status: She is alert and oriented to person, place, and time.   Psychiatric:         Mood and Affect: Mood normal.         Thought Content: Thought content normal.           Results:      Results from this visit  Results for orders placed or performed in visit on 01/27/20   Comprehensive metabolic panel - Results > 1hr     Status: Abnormal   Result Value Ref Range    Sodium 138 133 - 144 mmol/L    Potassium 3.9 3.4 - 5.3 mmol/L    Chloride 103 94 - 109 mmol/L    Carbon Dioxide 29 20 - 32 mmol/L    Anion Gap 6 3 - 14 mmol/L    Glucose 104 (H) 70 - 99 mg/dL    Urea Nitrogen 7 7 - 30 mg/dL    Creatinine 0.52 0.52 - 1.04 mg/dL    GFR Estimate >90 >60 mL/min/[1.73_m2]    GFR Estimate If Black >90 >60 mL/min/[1.73_m2]    Calcium 9.9 8.5 - 10.1 mg/dL    Bilirubin Total 0.5 0.2 - 1.3 mg/dL    Albumin 4.2 3.4 - 5.0 g/dL    Protein Total 7.7 6.8 - 8.8 g/dL    Alkaline Phosphatase 93 40 - 150 U/L     (H) 0 - 50 U/L     (H) 0 - 45 U/L   Hemoglobin A1c     Status: Abnormal   Result Value Ref Range    Hemoglobin A1C 8.4 (H) 0 - 5.6 %       Assessment and Plan        1. Encounter to establish care  - Hemoglobin A1c    2. Type 2 diabetes mellitus with hyperglycemia, with long-term current use of insulin (H)  Chronic condition: Longstanding history of diabetes that has not been at goal. Last PCP increased Tresiba from 54 units to 60 units at last A1c check and has had no change in results. Did not follow through with diabetic education.   - Hemoglobin A1c (AP P NP CLINIC)  - AMBULATORY ADULT DIABETES EDUCATOR REFERRAL  - Comprehensive metabolic panel - Results > 1hr    3. Gallstones  Annel brought with her  today ultrasound from [facility] on 1/7/2020 that demonstrated presence of non-obstructing gallstones. Annel believes presence of stones is attributing to her ongoing elevated ALT/AST.   - GENERAL SURG ADULT REFERRAL    4. Cervical high risk human papillomavirus (HPV) DNA test positive  Chronic condition: Annel reports ongoing presence of HPV for the past several years. Had colposcopy while living in [place] which was negative. Is interested in seeking OB/GYN referral to see what can be done as she is tired of having ongoing positive findings and annual paps.   - OB/GYN REFERRAL       There are no discontinued medications.    Options for treatment and follow-up care were reviewed with the patient. Annel Stoddard  engaged in the decision making process and verbalized understanding of the options discussed and agreed with the final plan.    RIVKA Cho CNP

## 2020-01-27 NOTE — PATIENT INSTRUCTIONS
[facility] Medication Refill Request Information:  * Please contact your pharmacy regarding ANY request for medication refills.  ** NP Clinic Prescription Fax = [phone]  * Please allow 3 business days for routine medication refills.  * Please allow 5 business days for controlled substance medication refills.     [facility] Test Result notification information:  *You will be notified with in 7-10 days of your appointment day regarding the results of your test.  If you are on MyChart you will be notified as soon as the provider has reviewed the results and signed off on them.    [facility]: [phone]       Angelica Mclaughlin NP is a full time provider that can continue to see you here at the [facility] after I leave. Please follow up with her regarding your  Back and next steps.     I will contact you about your DEXA scan and what imaging will look like for that.

## 2020-01-27 NOTE — NURSING NOTE
"64 year old  Chief Complaint   Patient presents with     Establish Care       Blood pressure 131/84, pulse 85, height 1.6 m (5' 3\"), weight 79.1 kg (174 lb 4.8 oz), SpO2 94 %. Body mass index is 30.88 kg/m .  BP completed using cuff size:      Morena Burton, A  January 27, 2020 8:30 AM  "

## 2020-01-28 ENCOUNTER — TELEPHONE (OUTPATIENT)
Dept: FAMILY MEDICINE | Facility: CLINIC | Age: 65
End: 2020-01-28

## 2020-01-28 DIAGNOSIS — Z13.820 SCREENING FOR OSTEOPOROSIS: Primary | ICD-10-CM

## 2020-01-28 NOTE — TELEPHONE ENCOUNTER
"Called Annel and reviewed with her lab results from yesterday's office visit. Informed A1c remains at 8.4% and provider would like for her to meet with clinical pharmacist Franchesca Mathew, PharmD to review medications and next best steps for managing her diabetes with her current medications. Annel agreed, informed her to bring all of her current medications, and glucometer with fasting and post-prandial readings with her.     Informed Annel DEXA scan is not similar to MRI where she will be scanned in a \"tube\", would like to proceed with osteoporosis screening. Order placed and patient given phone number to schedule.   Patricia Pearce, APRN CNP  January 28, 2020    "

## 2020-01-30 ENCOUNTER — MEDICAL CORRESPONDENCE (OUTPATIENT)
Dept: HEALTH INFORMATION MANAGEMENT | Facility: CLINIC | Age: 65
End: 2020-01-30

## 2020-01-30 ASSESSMENT — ENCOUNTER SYMPTOMS
WOUND: 0
COLOR CHANGE: 0
CHOKING: 0
HEADACHES: 0
DIZZINESS: 0
MYALGIAS: 1
DIARRHEA: 0
WHEEZING: 0
VOMITING: 0
PALPITATIONS: 0
ACTIVITY CHANGE: 0
WEAKNESS: 0
FATIGUE: 0
SHORTNESS OF BREATH: 0
POLYDIPSIA: 0
NUMBNESS: 0
SEIZURES: 0
NAUSEA: 0
COUGH: 0
TREMORS: 0
FEVER: 0
BACK PAIN: 1
POLYPHAGIA: 0

## 2020-01-31 ENCOUNTER — DOCUMENTATION ONLY (OUTPATIENT)
Dept: CARE COORDINATION | Facility: CLINIC | Age: 65
End: 2020-01-31

## 2020-02-04 ENCOUNTER — OFFICE VISIT (OUTPATIENT)
Dept: OBGYN | Facility: CLINIC | Age: 65
End: 2020-02-04
Attending: NURSE PRACTITIONER
Payer: MEDICARE

## 2020-02-04 VITALS
SYSTOLIC BLOOD PRESSURE: 127 MMHG | BODY MASS INDEX: 30.21 KG/M2 | HEIGHT: 63 IN | HEART RATE: 76 BPM | WEIGHT: 170.5 LBS | DIASTOLIC BLOOD PRESSURE: 74 MMHG

## 2020-02-04 DIAGNOSIS — N87.0 MILD DYSPLASIA OF CERVIX: Primary | ICD-10-CM

## 2020-02-04 ASSESSMENT — MIFFLIN-ST. JEOR: SCORE: 1292.51

## 2020-02-04 NOTE — PROGRESS NOTES
"[facility]  Gynecology Consult     HPI:    Annel Stoddard is a 64 year old  here to discuss HPV 18 positive pap smear. Cotesting was performed on 2019 HPV18 positive, cytology NILM. Reports ongoing pap smears that have been positive for HPV but negative cytology. Most of these pap smears were performed in [place].    - HPV positive negative cytology   - HPV negative   - HPV positive, negative cytology.   Reports a colposcopy with biopsies while living in [place] in  that was negative.   She is getting tired of having HPV positive test results. She would like to get  and is worried about passing it along to her future partner. Has a latex allergy and cannot use condoms. Someone told her she may need a hysterectomy, but she does not want one.     Medical history is pertinent for type 2 diabetes, bipolar disorder, low back pain with sciatica, chronically elevated LFTs.      GYN History  - Menopause at age 40 years old  - Pap Smears: See above  - Sexual Activity: not currently    OBHx     x4 at full term  One vaginal  delivery with infant demise, reports she had botulism during this pregnancy and this caused the  delivery and infant death     PMHx:   Past Medical History:   Diagnosis Date     Bipolar disorder (H)      Cervical high risk HPV (human papillomavirus) test positive      Colon polyps      Diabetes type 2, controlled (H)      Gallstones      Hyperlipidemia      Hypertension      Low back pain with left-sided sciatica      Urinary incontinence        PSHx:   Past Surgical History:   Procedure Laterality Date     ABDOMINOPLASTY       EYE SURGERY      bilateral with flap     HAND SURGERY Left     \"chipped off bone\"      trachestomy       TUBAL LIGATION         Meds:   Current Outpatient Medications   Medication     ACCU-CHEK GUIDE test strip     albuterol (VENTOLIN HFA) 108 (90 Base) MCG/ACT inhaler     ARIPiprazole (ABILIFY) 30 MG tablet     Aspirin-Calcium " Carbonate  MG TABS     BD GERARDO U/F 32G X 4 MM insulin pen needle     Biotin 10 MG CAPS     blood glucose (FREESTYLE INSULINX) test strip     blood glucose monitoring (ACCU-CHEK FASTCLIX) lancets     Blood Glucose Monitoring Suppl (ACCU-CHEK GUIDE) w/Device KIT     dulaglutide (TRULICITY) 1.5 MG/0.5ML pen     glipiZIDE (GLUCOTROL) 10 MG tablet     hydrocortisone (CORTAID) 1 % external cream     insulin degludec (TRESIBA) 100 UNIT/ML pen     insulin pen needle (BD GERARDO U/F) 32G X 4 MM miscellaneous     ketoconazole (NIZORAL) 2 % external cream     lamoTRIgine (LAMICTAL) 150 MG tablet     losartan (COZAAR) 25 MG tablet     magnesium 250 MG tablet     Magnesium Citrate 125 MG CAPS     metFORMIN (GLUCOPHAGE-XR) 500 MG 24 hr tablet     metroNIDAZOLE (METROGEL) 0.75 % external gel     montelukast (SINGULAIR) 10 MG tablet     multivitamin (ONE-DAILY) tablet     mupirocin (BACTROBAN) 2 % external cream     nystatin (MYCOSTATIN) 831479 UNIT/GM external ointment     rosuvastatin (CRESTOR) 10 MG tablet     sodium chloride (MILAN 128) 5 % ophthalmic ointment     SPIRIVA HANDIHALER 18 MCG inhaled capsule     No current facility-administered medications for this visit.        Allergies:    Allergies   Allergen Reactions     Desmopressin Swelling     LE  LE  LE       Estrogens Other (See Comments)     Hydrochlorothiazide Other (See Comments) and Unknown     Patient reports can't take this med due to increased urine output  Patient reports can't take this med due to increased urine output  Patient reports can't take this med due to increased urine output  Patient reports can't take this med due to increased urine output       Hydrocodone-Acetaminophen      Justin change  Justin change  Justin change  Justin change       Lithium Diarrhea     Diabetes insipidus  Diabetes insipidus  Causing DI  Causing DI  Causing DI       Penicillins Unknown     Risperidone      Shellfish-Derived Products      Latex Other (See Comments) and Rash      "rash       Valacyclovir Rash         SocHx: Retired violinist. No tobacco, alcohol or recreational drug use.    ROS: A 14 point review of systems was completed and was negative except for points mentioned in the HPI.     Physical Exam  /74   Pulse 76   Ht 1.6 m (5' 3\")   Wt 77.3 kg (170 lb 8 oz)   BMI 30.20 kg/m    Gen: Well-appearing, NAD  HEENT: Normocephalic, atraumatic  CV:  Well perfused  Pulm: No increased work of breathing  Ext: No edema, extremities warm and well perfused    --Ideal BMI: 18.5-24.9  Current BMI: Body mass index is 30.2 kg/m .  --Underweight = <18.5  --Normal weight = 18.5-24.9  --Overweight = 25-29.9  --Obesity = >30    Assessment/Plan  Annel Stoddard is a 64 year old  female here for a consult for persistent HPV positive cotesting. No records from [place] available for review. Cotesting results and colposcopy are based on patient report. Based on HPV 18 positive cotesting in 12/2019, we recommend colposcopy with cervical biopsies. Based on those results, we can make better recommendations. If the repeat cotesting and colposcopies are bothersome, we could consider a LEEP. Annel was agreeable to this plan. She requested antibiotics for colposcopy due to concern for healing in the setting of type 2 diabetes. Explained that antibiotics are not indicated. She was accepting of this recommendation.     Follow Up: Colposcopy     Discussed with Dr. Te Smith MD PhD  Ob/Gyn PGY-4  2/4/2020 3:11 PM    The Patient was seen in [facility] by ODALIS SMITH.  I reviewed the history & exam. Assessment and plan were jointly made.    Trisha Tiwari MD          "

## 2020-02-14 ENCOUNTER — TELEPHONE (OUTPATIENT)
Dept: SURGERY | Facility: CLINIC | Age: 65
End: 2020-02-14

## 2020-02-14 NOTE — TELEPHONE ENCOUNTER
"Pre Visit Call and Assessment    Date of call:  02/14/2020    Phone numbers:  Home number on file [phone] (home)    Reached patient/confirmed appointment:  No - left message:   on voicemail  Patient care team/Primary provider:  No primary care provider on file.  Preferred outpatient pharmacy:    [facility] - [place], [place] - [address]  [address]  [address]  [place] [address]  Phone: [phone] Fax: [phone]    Referred to:  Dr. Travis Davey    Reason for visit:  Gallstone consult    Problem List:  There is no problem list on file for this patient.    Allergies:     Allergies   Allergen Reactions     Desmopressin Swelling     LE  LE  LE       Estrogens Other (See Comments)     Hydrochlorothiazide Other (See Comments) and Unknown     Patient reports can't take this med due to increased urine output  Patient reports can't take this med due to increased urine output  Patient reports can't take this med due to increased urine output  Patient reports can't take this med due to increased urine output       Hydrocodone-Acetaminophen      Justin change  Justin change  Justin change  Justin change       Lithium Diarrhea     Diabetes insipidus  Diabetes insipidus  Causing DI  Causing DI  Causing DI       Penicillins Unknown     Risperidone      Shellfish-Derived Products      Latex Other (See Comments) and Rash     rash       Valacyclovir Rash     History:    Past Medical History:   Diagnosis Date     Bipolar disorder (H)      Cervical high risk HPV (human papillomavirus) test positive      Colon polyps      Diabetes type 2, controlled (H)      Gallstones      Hyperlipidemia      Hypertension      Low back pain with left-sided sciatica      Urinary incontinence      Past Surgical History:   Procedure Laterality Date     ABDOMINOPLASTY       EYE SURGERY      bilateral with flap     HAND SURGERY Left     \"chipped off bone\"      trachestomy       TUBAL LIGATION       Family History "   Problem Relation Age of Onset     Other - See Comments Mother         severe edema in leg, millroid's disease     Cerebrovascular Disease Father      No Known Problems Maternal Grandmother      No Known Problems Maternal Grandfather      Cerebrovascular Disease Other      Lung Cancer Half-Sister         smoking related     Myocardial Infarction Paternal Half-Brother      Social History     Tobacco Use     Smoking status: Never Smoker     Smokeless tobacco: Never Used   Substance Use Topics     Alcohol use: Not Currently     Patient instructions:    *  Bring outside medical records, images/disc, and/or studies

## 2020-03-09 ENCOUNTER — COMMUNICATION - HEALTHEAST (OUTPATIENT)
Dept: INTERNAL MEDICINE | Facility: CLINIC | Age: 65
End: 2020-03-09

## 2020-03-09 ENCOUNTER — AMBULATORY - HEALTHEAST (OUTPATIENT)
Dept: INTERNAL MEDICINE | Facility: CLINIC | Age: 65
End: 2020-03-09

## 2020-03-09 DIAGNOSIS — T14.8XXA LOCAL INFECTION OF WOUND: ICD-10-CM

## 2020-03-09 DIAGNOSIS — L08.9 LOCAL INFECTION OF WOUND: ICD-10-CM

## 2020-04-02 ENCOUNTER — COMMUNICATION - HEALTHEAST (OUTPATIENT)
Dept: SCHEDULING | Facility: CLINIC | Age: 65
End: 2020-04-02

## 2020-04-02 ENCOUNTER — COMMUNICATION - HEALTHEAST (OUTPATIENT)
Dept: INTERNAL MEDICINE | Facility: CLINIC | Age: 65
End: 2020-04-02

## 2020-04-03 ENCOUNTER — AMBULATORY - HEALTHEAST (OUTPATIENT)
Dept: INTERNAL MEDICINE | Facility: CLINIC | Age: 65
End: 2020-04-03

## 2020-04-03 ENCOUNTER — COMMUNICATION - HEALTHEAST (OUTPATIENT)
Dept: PHARMACY | Facility: CLINIC | Age: 65
End: 2020-04-03

## 2020-04-03 DIAGNOSIS — E11.9 TYPE 2 DIABETES MELLITUS WITHOUT COMPLICATION, WITH LONG-TERM CURRENT USE OF INSULIN (H): ICD-10-CM

## 2020-04-03 DIAGNOSIS — Z79.4 TYPE 2 DIABETES MELLITUS WITH COMPLICATION, WITH LONG-TERM CURRENT USE OF INSULIN (H): ICD-10-CM

## 2020-04-03 DIAGNOSIS — B37.31 YEAST VAGINITIS: ICD-10-CM

## 2020-04-03 DIAGNOSIS — Z79.4 TYPE 2 DIABETES MELLITUS WITHOUT COMPLICATION, WITH LONG-TERM CURRENT USE OF INSULIN (H): ICD-10-CM

## 2020-04-03 DIAGNOSIS — E11.8 TYPE 2 DIABETES MELLITUS WITH COMPLICATION, WITH LONG-TERM CURRENT USE OF INSULIN (H): ICD-10-CM

## 2020-04-06 ENCOUNTER — COMMUNICATION - HEALTHEAST (OUTPATIENT)
Dept: INTERNAL MEDICINE | Facility: CLINIC | Age: 65
End: 2020-04-06

## 2020-04-06 DIAGNOSIS — E11.8 TYPE 2 DIABETES MELLITUS WITH COMPLICATION, WITH LONG-TERM CURRENT USE OF INSULIN (H): ICD-10-CM

## 2020-04-06 DIAGNOSIS — Z79.4 TYPE 2 DIABETES MELLITUS WITH COMPLICATION, WITH LONG-TERM CURRENT USE OF INSULIN (H): ICD-10-CM

## 2020-04-13 ENCOUNTER — COMMUNICATION - HEALTHEAST (OUTPATIENT)
Dept: PHARMACY | Facility: CLINIC | Age: 65
End: 2020-04-13

## 2020-04-22 ENCOUNTER — RECORDS - HEALTHEAST (OUTPATIENT)
Dept: ADMINISTRATIVE | Facility: OTHER | Age: 65
End: 2020-04-22

## 2020-04-22 ENCOUNTER — COMMUNICATION - HEALTHEAST (OUTPATIENT)
Dept: INTERNAL MEDICINE | Facility: CLINIC | Age: 65
End: 2020-04-22

## 2020-04-28 ENCOUNTER — RECORDS - HEALTHEAST (OUTPATIENT)
Dept: ADMINISTRATIVE | Facility: OTHER | Age: 65
End: 2020-04-28

## 2020-04-28 ENCOUNTER — COMMUNICATION - HEALTHEAST (OUTPATIENT)
Dept: INTERNAL MEDICINE | Facility: CLINIC | Age: 65
End: 2020-04-28

## 2020-04-28 ENCOUNTER — COMMUNICATION - HEALTHEAST (OUTPATIENT)
Dept: SCHEDULING | Facility: CLINIC | Age: 65
End: 2020-04-28

## 2020-04-29 ENCOUNTER — OFFICE VISIT - HEALTHEAST (OUTPATIENT)
Dept: INTERNAL MEDICINE | Facility: CLINIC | Age: 65
End: 2020-04-29

## 2020-04-29 DIAGNOSIS — Z72.51 HIGH RISK HETEROSEXUAL BEHAVIOR: ICD-10-CM

## 2020-04-29 DIAGNOSIS — Z11.3 SCREEN FOR STD (SEXUALLY TRANSMITTED DISEASE): ICD-10-CM

## 2020-04-29 DIAGNOSIS — Z11.59 ENCOUNTER FOR SCREENING FOR OTHER VIRAL DISEASES: ICD-10-CM

## 2020-04-29 DIAGNOSIS — Z00.00 HEALTH MAINTENANCE EXAMINATION: ICD-10-CM

## 2020-04-29 DIAGNOSIS — F31.81 BIPOLAR 2 DISORDER (H): ICD-10-CM

## 2020-04-29 DIAGNOSIS — Z79.4 TYPE 2 DIABETES MELLITUS WITHOUT COMPLICATION, WITH LONG-TERM CURRENT USE OF INSULIN (H): ICD-10-CM

## 2020-04-29 DIAGNOSIS — E11.9 TYPE 2 DIABETES MELLITUS WITHOUT COMPLICATION, WITH LONG-TERM CURRENT USE OF INSULIN (H): ICD-10-CM

## 2020-05-05 ENCOUNTER — COMMUNICATION - HEALTHEAST (OUTPATIENT)
Dept: INTERNAL MEDICINE | Facility: CLINIC | Age: 65
End: 2020-05-05

## 2020-05-06 ENCOUNTER — AMBULATORY - HEALTHEAST (OUTPATIENT)
Dept: LAB | Facility: CLINIC | Age: 65
End: 2020-05-06

## 2020-05-06 DIAGNOSIS — Z11.3 SCREEN FOR STD (SEXUALLY TRANSMITTED DISEASE): ICD-10-CM

## 2020-05-06 DIAGNOSIS — Z79.4 TYPE 2 DIABETES MELLITUS WITHOUT COMPLICATION, WITH LONG-TERM CURRENT USE OF INSULIN (H): ICD-10-CM

## 2020-05-06 DIAGNOSIS — Z11.59 ENCOUNTER FOR SCREENING FOR OTHER VIRAL DISEASES: ICD-10-CM

## 2020-05-06 DIAGNOSIS — Z72.51 HIGH RISK HETEROSEXUAL BEHAVIOR: ICD-10-CM

## 2020-05-06 DIAGNOSIS — E11.9 TYPE 2 DIABETES MELLITUS WITHOUT COMPLICATION, WITH LONG-TERM CURRENT USE OF INSULIN (H): ICD-10-CM

## 2020-05-06 LAB
ALBUMIN SERPL-MCNC: 4 G/DL (ref 3.5–5)
ALP SERPL-CCNC: 109 U/L (ref 45–120)
ALT SERPL W P-5'-P-CCNC: 106 U/L (ref 0–45)
ANION GAP SERPL CALCULATED.3IONS-SCNC: 12 MMOL/L (ref 5–18)
AST SERPL W P-5'-P-CCNC: 106 U/L (ref 0–40)
BILIRUB SERPL-MCNC: 0.4 MG/DL (ref 0–1)
BUN SERPL-MCNC: 13 MG/DL (ref 8–22)
CALCIUM SERPL-MCNC: 10 MG/DL (ref 8.5–10.5)
CHLORIDE BLD-SCNC: 99 MMOL/L (ref 98–107)
CO2 SERPL-SCNC: 27 MMOL/L (ref 22–31)
CREAT SERPL-MCNC: 0.78 MG/DL (ref 0.6–1.1)
CREAT UR-MCNC: 88.3 MG/DL
GFR SERPL CREATININE-BSD FRML MDRD: >60 ML/MIN/1.73M2
GLUCOSE BLD-MCNC: 215 MG/DL (ref 70–125)
HBA1C MFR BLD: 8.6 % (ref 3.5–6)
HIV 1+2 AB+HIV1 P24 AG SERPL QL IA: NEGATIVE
MICROALBUMIN UR-MCNC: 1.06 MG/DL (ref 0–1.99)
MICROALBUMIN/CREAT UR: 12 MG/G
POTASSIUM BLD-SCNC: 4.6 MMOL/L (ref 3.5–5)
PROT SERPL-MCNC: 7.5 G/DL (ref 6–8)
SODIUM SERPL-SCNC: 138 MMOL/L (ref 136–145)

## 2020-05-07 LAB
HBV SURFACE AG SERPL QL IA: NEGATIVE
HCV AB SERPL QL IA: NEGATIVE

## 2020-05-08 ENCOUNTER — COMMUNICATION - HEALTHEAST (OUTPATIENT)
Dept: INTERNAL MEDICINE | Facility: CLINIC | Age: 65
End: 2020-05-08

## 2020-05-08 LAB
C TRACH DNA SPEC QL PROBE+SIG AMP: NEGATIVE
N GONORRHOEA DNA SPEC QL NAA+PROBE: NEGATIVE

## 2020-05-12 ENCOUNTER — COMMUNICATION - HEALTHEAST (OUTPATIENT)
Dept: SCHEDULING | Facility: CLINIC | Age: 65
End: 2020-05-12

## 2020-05-18 ENCOUNTER — OFFICE VISIT - HEALTHEAST (OUTPATIENT)
Dept: INTERNAL MEDICINE | Facility: CLINIC | Age: 65
End: 2020-05-18

## 2020-05-18 DIAGNOSIS — Z79.4 TYPE 2 DIABETES MELLITUS WITHOUT COMPLICATION, WITH LONG-TERM CURRENT USE OF INSULIN (H): ICD-10-CM

## 2020-05-18 DIAGNOSIS — Z79.4 TYPE 2 DIABETES MELLITUS WITH COMPLICATION, WITH LONG-TERM CURRENT USE OF INSULIN (H): ICD-10-CM

## 2020-05-18 DIAGNOSIS — E11.9 TYPE 2 DIABETES MELLITUS WITHOUT COMPLICATION, WITH LONG-TERM CURRENT USE OF INSULIN (H): ICD-10-CM

## 2020-05-18 DIAGNOSIS — J44.89 CHRONIC OBSTRUCTIVE AIRWAY DISEASE WITH ASTHMA (H): ICD-10-CM

## 2020-05-18 DIAGNOSIS — N76.0 ACUTE VAGINITIS: ICD-10-CM

## 2020-05-18 DIAGNOSIS — E11.8 TYPE 2 DIABETES MELLITUS WITH COMPLICATION, WITH LONG-TERM CURRENT USE OF INSULIN (H): ICD-10-CM

## 2020-05-26 ENCOUNTER — COMMUNICATION - HEALTHEAST (OUTPATIENT)
Dept: PHARMACY | Facility: CLINIC | Age: 65
End: 2020-05-26

## 2020-05-27 ENCOUNTER — COMMUNICATION - HEALTHEAST (OUTPATIENT)
Dept: INTERNAL MEDICINE | Facility: CLINIC | Age: 65
End: 2020-05-27

## 2020-05-28 ENCOUNTER — COMMUNICATION - HEALTHEAST (OUTPATIENT)
Dept: INTERNAL MEDICINE | Facility: CLINIC | Age: 65
End: 2020-05-28

## 2020-05-28 ENCOUNTER — COMMUNICATION - HEALTHEAST (OUTPATIENT)
Dept: ADMINISTRATIVE | Facility: CLINIC | Age: 65
End: 2020-05-28

## 2020-06-03 ENCOUNTER — COMMUNICATION - HEALTHEAST (OUTPATIENT)
Dept: PHARMACY | Facility: CLINIC | Age: 65
End: 2020-06-03

## 2020-06-03 ENCOUNTER — COMMUNICATION - HEALTHEAST (OUTPATIENT)
Dept: INTERNAL MEDICINE | Facility: CLINIC | Age: 65
End: 2020-06-03

## 2020-06-03 ENCOUNTER — AMBULATORY - HEALTHEAST (OUTPATIENT)
Dept: EDUCATION SERVICES | Facility: CLINIC | Age: 65
End: 2020-06-03

## 2020-06-03 DIAGNOSIS — J41.0 SIMPLE CHRONIC BRONCHITIS (H): ICD-10-CM

## 2020-06-03 DIAGNOSIS — E11.9 TYPE 2 DIABETES MELLITUS WITHOUT COMPLICATION, WITH LONG-TERM CURRENT USE OF INSULIN (H): ICD-10-CM

## 2020-06-03 DIAGNOSIS — Z79.4 TYPE 2 DIABETES MELLITUS WITHOUT COMPLICATION, WITH LONG-TERM CURRENT USE OF INSULIN (H): ICD-10-CM

## 2020-06-04 ENCOUNTER — COMMUNICATION - HEALTHEAST (OUTPATIENT)
Dept: NURSING | Facility: CLINIC | Age: 65
End: 2020-06-04

## 2020-06-05 ENCOUNTER — COMMUNICATION - HEALTHEAST (OUTPATIENT)
Dept: INTERNAL MEDICINE | Facility: CLINIC | Age: 65
End: 2020-06-05

## 2020-06-05 DIAGNOSIS — Z79.4 TYPE 2 DIABETES MELLITUS WITHOUT COMPLICATION, WITH LONG-TERM CURRENT USE OF INSULIN (H): ICD-10-CM

## 2020-06-05 DIAGNOSIS — E11.9 TYPE 2 DIABETES MELLITUS WITHOUT COMPLICATION, WITH LONG-TERM CURRENT USE OF INSULIN (H): ICD-10-CM

## 2020-06-08 ENCOUNTER — COMMUNICATION - HEALTHEAST (OUTPATIENT)
Dept: INTERNAL MEDICINE | Facility: CLINIC | Age: 65
End: 2020-06-08

## 2020-06-08 DIAGNOSIS — E11.8 TYPE 2 DIABETES MELLITUS WITH COMPLICATION, WITH LONG-TERM CURRENT USE OF INSULIN (H): ICD-10-CM

## 2020-06-08 DIAGNOSIS — Z79.4 TYPE 2 DIABETES MELLITUS WITH COMPLICATION, WITH LONG-TERM CURRENT USE OF INSULIN (H): ICD-10-CM

## 2020-06-10 ENCOUNTER — COMMUNICATION - HEALTHEAST (OUTPATIENT)
Dept: NURSING | Facility: CLINIC | Age: 65
End: 2020-06-10

## 2020-06-10 ASSESSMENT — ACTIVITIES OF DAILY LIVING (ADL): DEPENDENT_IADLS:: INDEPENDENT

## 2020-06-12 ENCOUNTER — COMMUNICATION - HEALTHEAST (OUTPATIENT)
Dept: INTERNAL MEDICINE | Facility: CLINIC | Age: 65
End: 2020-06-12

## 2020-06-12 DIAGNOSIS — Z79.4 TYPE 2 DIABETES MELLITUS WITH COMPLICATION, WITH LONG-TERM CURRENT USE OF INSULIN (H): ICD-10-CM

## 2020-06-12 DIAGNOSIS — E11.8 TYPE 2 DIABETES MELLITUS WITH COMPLICATION, WITH LONG-TERM CURRENT USE OF INSULIN (H): ICD-10-CM

## 2020-06-16 ENCOUNTER — OFFICE VISIT - HEALTHEAST (OUTPATIENT)
Dept: INTERNAL MEDICINE | Facility: CLINIC | Age: 65
End: 2020-06-16

## 2020-06-16 ENCOUNTER — COMMUNICATION - HEALTHEAST (OUTPATIENT)
Dept: ADMINISTRATIVE | Facility: CLINIC | Age: 65
End: 2020-06-16

## 2020-06-16 DIAGNOSIS — R87.810 PAPANICOLAOU SMEAR OF CERVIX WITH POSITIVE HIGH RISK HUMAN PAPILLOMA VIRUS (HPV) TEST: ICD-10-CM

## 2020-06-16 DIAGNOSIS — J43.9 PULMONARY EMPHYSEMA, UNSPECIFIED EMPHYSEMA TYPE (H): ICD-10-CM

## 2020-06-16 DIAGNOSIS — Z79.4 TYPE 2 DIABETES MELLITUS WITH COMPLICATION, WITH LONG-TERM CURRENT USE OF INSULIN (H): ICD-10-CM

## 2020-06-16 DIAGNOSIS — Z12.31 ENCOUNTER FOR SCREENING MAMMOGRAM FOR BREAST CANCER: ICD-10-CM

## 2020-06-16 DIAGNOSIS — E11.8 TYPE 2 DIABETES MELLITUS WITH COMPLICATION, WITH LONG-TERM CURRENT USE OF INSULIN (H): ICD-10-CM

## 2020-06-16 DIAGNOSIS — M85.821 OSTEOPENIA OF BOTH UPPER ARMS: ICD-10-CM

## 2020-06-16 DIAGNOSIS — M85.822 OSTEOPENIA OF BOTH UPPER ARMS: ICD-10-CM

## 2020-06-16 DIAGNOSIS — J41.0 SIMPLE CHRONIC BRONCHITIS (H): ICD-10-CM

## 2020-06-17 ENCOUNTER — COMMUNICATION - HEALTHEAST (OUTPATIENT)
Dept: NURSING | Facility: CLINIC | Age: 65
End: 2020-06-17

## 2020-06-18 ENCOUNTER — COMMUNICATION - HEALTHEAST (OUTPATIENT)
Dept: NURSING | Facility: CLINIC | Age: 65
End: 2020-06-18

## 2020-06-23 ENCOUNTER — COMMUNICATION - HEALTHEAST (OUTPATIENT)
Dept: NURSING | Facility: CLINIC | Age: 65
End: 2020-06-23

## 2020-06-23 ENCOUNTER — COMMUNICATION - HEALTHEAST (OUTPATIENT)
Dept: CARE COORDINATION | Facility: CLINIC | Age: 65
End: 2020-06-23

## 2020-06-23 ENCOUNTER — COMMUNICATION - HEALTHEAST (OUTPATIENT)
Dept: INTERNAL MEDICINE | Facility: CLINIC | Age: 65
End: 2020-06-23

## 2020-06-23 DIAGNOSIS — Z59.9 FINANCIAL DIFFICULTIES: ICD-10-CM

## 2020-06-23 SDOH — ECONOMIC STABILITY - INCOME SECURITY: PROBLEM RELATED TO HOUSING AND ECONOMIC CIRCUMSTANCES, UNSPECIFIED: Z59.9

## 2020-06-24 ENCOUNTER — COMMUNICATION - HEALTHEAST (OUTPATIENT)
Dept: CARE COORDINATION | Facility: CLINIC | Age: 65
End: 2020-06-24

## 2020-06-24 ENCOUNTER — AMBULATORY - HEALTHEAST (OUTPATIENT)
Dept: EDUCATION SERVICES | Facility: CLINIC | Age: 65
End: 2020-06-24

## 2020-06-24 DIAGNOSIS — E11.8 TYPE 2 DIABETES MELLITUS WITH COMPLICATION, WITH LONG-TERM CURRENT USE OF INSULIN (H): ICD-10-CM

## 2020-06-24 DIAGNOSIS — Z79.4 TYPE 2 DIABETES MELLITUS WITH COMPLICATION, WITH LONG-TERM CURRENT USE OF INSULIN (H): ICD-10-CM

## 2020-06-25 ENCOUNTER — AMBULATORY - HEALTHEAST (OUTPATIENT)
Dept: NURSING | Facility: CLINIC | Age: 65
End: 2020-06-25

## 2020-06-29 ENCOUNTER — COMMUNICATION - HEALTHEAST (OUTPATIENT)
Dept: CARE COORDINATION | Facility: CLINIC | Age: 65
End: 2020-06-29

## 2020-06-30 ENCOUNTER — COMMUNICATION - HEALTHEAST (OUTPATIENT)
Dept: EDUCATION SERVICES | Facility: CLINIC | Age: 65
End: 2020-06-30

## 2020-06-30 ENCOUNTER — AMBULATORY - HEALTHEAST (OUTPATIENT)
Dept: EDUCATION SERVICES | Facility: CLINIC | Age: 65
End: 2020-06-30

## 2020-07-06 ENCOUNTER — COMMUNICATION - HEALTHEAST (OUTPATIENT)
Dept: INTERNAL MEDICINE | Facility: CLINIC | Age: 65
End: 2020-07-06

## 2020-07-06 DIAGNOSIS — E11.9 TYPE 2 DIABETES MELLITUS WITHOUT COMPLICATION, WITH LONG-TERM CURRENT USE OF INSULIN (H): ICD-10-CM

## 2020-07-06 DIAGNOSIS — Z79.4 TYPE 2 DIABETES MELLITUS WITHOUT COMPLICATION, WITH LONG-TERM CURRENT USE OF INSULIN (H): ICD-10-CM

## 2020-07-06 DIAGNOSIS — I10 ESSENTIAL HYPERTENSION, BENIGN: ICD-10-CM

## 2020-07-06 DIAGNOSIS — F31.81 BIPOLAR 2 DISORDER (H): ICD-10-CM

## 2020-08-04 ENCOUNTER — RECORDS - HEALTHEAST (OUTPATIENT)
Dept: ADMINISTRATIVE | Facility: OTHER | Age: 65
End: 2020-08-04

## 2020-08-10 ENCOUNTER — RECORDS - HEALTHEAST (OUTPATIENT)
Dept: ADMINISTRATIVE | Facility: OTHER | Age: 65
End: 2020-08-10

## 2020-08-14 ENCOUNTER — RECORDS - HEALTHEAST (OUTPATIENT)
Dept: ADMINISTRATIVE | Facility: OTHER | Age: 65
End: 2020-08-14

## 2020-08-14 LAB
HPV_EXT - HISTORICAL: NORMAL
PAP SMEAR - HIM PATIENT REPORTED: NORMAL

## 2020-08-18 ENCOUNTER — RECORDS - HEALTHEAST (OUTPATIENT)
Dept: ADMINISTRATIVE | Facility: OTHER | Age: 65
End: 2020-08-18

## 2020-08-18 LAB
CHLAMYDIA_EXT- HISTORICAL: NEGATIVE
SPECIMEN DESCRIPTION_EXT (HISTORICAL CONVERSION): NORMAL

## 2020-08-24 ENCOUNTER — COMMUNICATION - HEALTHEAST (OUTPATIENT)
Dept: INTERNAL MEDICINE | Facility: CLINIC | Age: 65
End: 2020-08-24

## 2020-08-24 LAB — OCCULT BLOOD (FIT)_EXT (HISTORICAL CONVERSION): NEGATIVE

## 2020-08-25 ENCOUNTER — RECORDS - HEALTHEAST (OUTPATIENT)
Dept: ADMINISTRATIVE | Facility: OTHER | Age: 65
End: 2020-08-25

## 2020-08-27 ENCOUNTER — TRANSFERRED RECORDS (OUTPATIENT)
Dept: HEALTH INFORMATION MANAGEMENT | Facility: CLINIC | Age: 65
End: 2020-08-27

## 2020-08-28 ENCOUNTER — RECORDS - HEALTHEAST (OUTPATIENT)
Dept: ADMINISTRATIVE | Facility: OTHER | Age: 65
End: 2020-08-28

## 2020-09-10 ENCOUNTER — COMMUNICATION - HEALTHEAST (OUTPATIENT)
Dept: SCHEDULING | Facility: CLINIC | Age: 65
End: 2020-09-10

## 2020-09-21 ENCOUNTER — COMMUNICATION - HEALTHEAST (OUTPATIENT)
Dept: INTERNAL MEDICINE | Facility: CLINIC | Age: 65
End: 2020-09-21

## 2020-09-21 ENCOUNTER — OFFICE VISIT - HEALTHEAST (OUTPATIENT)
Dept: INTERNAL MEDICINE | Facility: CLINIC | Age: 65
End: 2020-09-21

## 2020-09-21 ENCOUNTER — AMBULATORY - HEALTHEAST (OUTPATIENT)
Dept: INTERNAL MEDICINE | Facility: CLINIC | Age: 65
End: 2020-09-21

## 2020-09-21 DIAGNOSIS — Z12.31 ENCOUNTER FOR SCREENING MAMMOGRAM FOR BREAST CANCER: ICD-10-CM

## 2020-09-21 DIAGNOSIS — M81.0 AGE-RELATED OSTEOPOROSIS WITHOUT CURRENT PATHOLOGICAL FRACTURE: ICD-10-CM

## 2020-09-21 DIAGNOSIS — M54.42 ACUTE LEFT-SIDED LOW BACK PAIN WITH LEFT-SIDED SCIATICA: ICD-10-CM

## 2020-09-21 DIAGNOSIS — E11.9 TYPE 2 DIABETES MELLITUS WITHOUT COMPLICATION, WITH LONG-TERM CURRENT USE OF INSULIN (H): ICD-10-CM

## 2020-09-21 DIAGNOSIS — Z86.72 PERSONAL HISTORY OF THROMBOPHLEBITIS: ICD-10-CM

## 2020-09-21 DIAGNOSIS — K75.81 NASH (NONALCOHOLIC STEATOHEPATITIS): ICD-10-CM

## 2020-09-21 DIAGNOSIS — K43.9 VENTRAL HERNIA WITHOUT OBSTRUCTION OR GANGRENE: ICD-10-CM

## 2020-09-21 DIAGNOSIS — M89.9 OSTEOPATHY: ICD-10-CM

## 2020-09-21 DIAGNOSIS — F31.81 BIPOLAR 2 DISORDER (H): ICD-10-CM

## 2020-09-21 DIAGNOSIS — Z79.4 TYPE 2 DIABETES MELLITUS WITHOUT COMPLICATION, WITH LONG-TERM CURRENT USE OF INSULIN (H): ICD-10-CM

## 2020-09-21 DIAGNOSIS — Z12.11 SCREEN FOR COLON CANCER: ICD-10-CM

## 2020-09-21 DIAGNOSIS — J41.0 SIMPLE CHRONIC BRONCHITIS (H): ICD-10-CM

## 2020-09-21 LAB — INR PPP: 0.99 (ref 0.9–1.1)

## 2020-09-21 ASSESSMENT — MIFFLIN-ST. JEOR: SCORE: 1317

## 2020-09-22 ENCOUNTER — COMMUNICATION - HEALTHEAST (OUTPATIENT)
Dept: INTERNAL MEDICINE | Facility: CLINIC | Age: 65
End: 2020-09-22

## 2020-09-23 ENCOUNTER — AMBULATORY - HEALTHEAST (OUTPATIENT)
Dept: INTERNAL MEDICINE | Facility: CLINIC | Age: 65
End: 2020-09-23

## 2020-09-23 ENCOUNTER — COMMUNICATION - HEALTHEAST (OUTPATIENT)
Dept: SCHEDULING | Facility: CLINIC | Age: 65
End: 2020-09-23

## 2020-09-23 DIAGNOSIS — R87.619 ABNORMAL CERVICAL PAPANICOLAOU SMEAR, UNSPECIFIED ABNORMAL PAP FINDING: ICD-10-CM

## 2020-09-23 DIAGNOSIS — E11.8 TYPE 2 DIABETES MELLITUS WITH COMPLICATION, WITH LONG-TERM CURRENT USE OF INSULIN (H): ICD-10-CM

## 2020-09-23 DIAGNOSIS — Z79.4 TYPE 2 DIABETES MELLITUS WITH COMPLICATION, WITH LONG-TERM CURRENT USE OF INSULIN (H): ICD-10-CM

## 2020-09-28 ENCOUNTER — COMMUNICATION - HEALTHEAST (OUTPATIENT)
Dept: INTERNAL MEDICINE | Facility: CLINIC | Age: 65
End: 2020-09-28

## 2020-10-01 ENCOUNTER — COMMUNICATION - HEALTHEAST (OUTPATIENT)
Dept: INTERNAL MEDICINE | Facility: CLINIC | Age: 65
End: 2020-10-01

## 2020-10-01 ENCOUNTER — AMBULATORY - HEALTHEAST (OUTPATIENT)
Dept: EDUCATION SERVICES | Facility: CLINIC | Age: 65
End: 2020-10-01

## 2020-10-01 DIAGNOSIS — E11.9 TYPE 2 DIABETES MELLITUS WITHOUT COMPLICATION, WITH LONG-TERM CURRENT USE OF INSULIN (H): ICD-10-CM

## 2020-10-01 DIAGNOSIS — Z79.4 TYPE 2 DIABETES MELLITUS WITHOUT COMPLICATION, WITH LONG-TERM CURRENT USE OF INSULIN (H): ICD-10-CM

## 2020-10-02 ENCOUNTER — COMMUNICATION - HEALTHEAST (OUTPATIENT)
Dept: INTERNAL MEDICINE | Facility: CLINIC | Age: 65
End: 2020-10-02

## 2020-10-05 ENCOUNTER — COMMUNICATION - HEALTHEAST (OUTPATIENT)
Dept: ADMINISTRATIVE | Facility: CLINIC | Age: 65
End: 2020-10-05

## 2020-10-05 LAB
ALBUMIN SERPL-MCNC: 4.4 G/DL (ref 3.5–5)
ALP SERPL-CCNC: 99 U/L (ref 45–120)
ALT SERPL W P-5'-P-CCNC: 80 U/L (ref 0–45)
ANION GAP SERPL CALCULATED.3IONS-SCNC: 12 MMOL/L (ref 5–18)
AST SERPL W P-5'-P-CCNC: 79 U/L (ref 0–40)
BILIRUB SERPL-MCNC: 0.4 MG/DL (ref 0–1)
BUN SERPL-MCNC: 15 MG/DL (ref 8–22)
CALCIUM SERPL-MCNC: 10.3 MG/DL (ref 8.5–10.5)
CHLORIDE BLD-SCNC: 101 MMOL/L (ref 98–107)
CO2 SERPL-SCNC: 25 MMOL/L (ref 22–31)
CREAT SERPL-MCNC: 0.67 MG/DL (ref 0.6–1.1)
GFR SERPL CREATININE-BSD FRML MDRD: >60 ML/MIN/1.73M2
GLUCOSE BLD-MCNC: 160 MG/DL (ref 70–125)
POTASSIUM BLD-SCNC: 4.4 MMOL/L (ref 3.5–5)
PROT SERPL-MCNC: 7.9 G/DL (ref 6–8)
SODIUM SERPL-SCNC: 138 MMOL/L (ref 136–145)

## 2020-10-06 ENCOUNTER — HOSPITAL ENCOUNTER (OUTPATIENT)
Dept: PHYSICAL MEDICINE AND REHAB | Facility: CLINIC | Age: 65
Discharge: HOME | End: 2020-10-06
Attending: INTERNAL MEDICINE

## 2020-10-06 DIAGNOSIS — M79.18 MYOFASCIAL PAIN: ICD-10-CM

## 2020-10-06 DIAGNOSIS — M54.16 LUMBAR RADICULAR PAIN: ICD-10-CM

## 2020-10-06 DIAGNOSIS — M43.17 SPONDYLOLISTHESIS AT L5-S1 LEVEL: ICD-10-CM

## 2020-10-08 ENCOUNTER — HOSPITAL ENCOUNTER (OUTPATIENT)
Dept: RESPIRATORY THERAPY | Facility: CLINIC | Age: 65
Discharge: HOME | End: 2020-10-08
Attending: INTERNAL MEDICINE

## 2020-10-08 ENCOUNTER — COMMUNICATION - HEALTHEAST (OUTPATIENT)
Dept: INTERNAL MEDICINE | Facility: CLINIC | Age: 65
End: 2020-10-08

## 2020-10-08 DIAGNOSIS — E11.8 TYPE 2 DIABETES MELLITUS WITH COMPLICATION, WITH LONG-TERM CURRENT USE OF INSULIN (H): ICD-10-CM

## 2020-10-08 DIAGNOSIS — J41.0 SIMPLE CHRONIC BRONCHITIS (H): ICD-10-CM

## 2020-10-08 DIAGNOSIS — Z79.4 TYPE 2 DIABETES MELLITUS WITH COMPLICATION, WITH LONG-TERM CURRENT USE OF INSULIN (H): ICD-10-CM

## 2020-10-08 LAB — HGB BLD-MCNC: 11.7 G/DL (ref 12–16)

## 2020-10-12 ENCOUNTER — RECORDS - HEALTHEAST (OUTPATIENT)
Dept: BONE DENSITY | Facility: CLINIC | Age: 65
End: 2020-10-12

## 2020-10-12 ENCOUNTER — RECORDS - HEALTHEAST (OUTPATIENT)
Dept: ADMINISTRATIVE | Facility: OTHER | Age: 65
End: 2020-10-12

## 2020-10-12 ENCOUNTER — COMMUNICATION - HEALTHEAST (OUTPATIENT)
Dept: PHYSICAL MEDICINE AND REHAB | Facility: CLINIC | Age: 65
End: 2020-10-12

## 2020-10-12 DIAGNOSIS — M89.9 DISORDER OF BONE, UNSPECIFIED: ICD-10-CM

## 2020-10-12 DIAGNOSIS — M81.0 AGE-RELATED OSTEOPOROSIS WITHOUT CURRENT PATHOLOGICAL FRACTURE: ICD-10-CM

## 2020-10-14 ENCOUNTER — HOSPITAL ENCOUNTER (OUTPATIENT)
Dept: CT IMAGING | Facility: CLINIC | Age: 65
Discharge: HOME | End: 2020-10-14
Attending: PHYSICAL MEDICINE & REHABILITATION

## 2020-10-14 ENCOUNTER — COMMUNICATION - HEALTHEAST (OUTPATIENT)
Dept: PHYSICAL MEDICINE AND REHAB | Facility: CLINIC | Age: 65
End: 2020-10-14

## 2020-10-14 DIAGNOSIS — M43.17 SPONDYLOLISTHESIS AT L5-S1 LEVEL: ICD-10-CM

## 2020-10-14 DIAGNOSIS — M54.16 LUMBAR RADICULAR PAIN: ICD-10-CM

## 2020-10-14 DIAGNOSIS — M79.18 MYOFASCIAL PAIN: ICD-10-CM

## 2020-10-15 ENCOUNTER — COMMUNICATION - HEALTHEAST (OUTPATIENT)
Dept: PHYSICAL MEDICINE AND REHAB | Facility: CLINIC | Age: 65
End: 2020-10-15

## 2020-10-15 ENCOUNTER — RECORDS - HEALTHEAST (OUTPATIENT)
Dept: ADMINISTRATIVE | Facility: OTHER | Age: 65
End: 2020-10-15

## 2020-10-19 ENCOUNTER — COMMUNICATION - HEALTHEAST (OUTPATIENT)
Dept: INTERNAL MEDICINE | Facility: CLINIC | Age: 65
End: 2020-10-19

## 2020-10-19 ENCOUNTER — RECORDS - HEALTHEAST (OUTPATIENT)
Dept: HEALTH INFORMATION MANAGEMENT | Facility: CLINIC | Age: 65
End: 2020-10-19

## 2020-10-20 ENCOUNTER — HOSPITAL ENCOUNTER (OUTPATIENT)
Dept: PHYSICAL MEDICINE AND REHAB | Facility: CLINIC | Age: 65
Discharge: HOME | End: 2020-10-20
Attending: PHYSICAL MEDICINE & REHABILITATION

## 2020-10-20 DIAGNOSIS — M43.16 SPONDYLOLISTHESIS OF LUMBAR REGION: ICD-10-CM

## 2020-10-20 DIAGNOSIS — M54.16 LUMBAR RADICULAR PAIN: ICD-10-CM

## 2020-10-20 DIAGNOSIS — M79.18 MYOFASCIAL PAIN: ICD-10-CM

## 2020-10-20 DIAGNOSIS — M48.062 SPINAL STENOSIS OF LUMBAR REGION WITH NEUROGENIC CLAUDICATION: ICD-10-CM

## 2020-10-22 ENCOUNTER — COMMUNICATION - HEALTHEAST (OUTPATIENT)
Dept: PHYSICAL MEDICINE AND REHAB | Facility: CLINIC | Age: 65
End: 2020-10-22

## 2020-10-22 DIAGNOSIS — M54.50 LUMBAR SPINE PAIN: ICD-10-CM

## 2020-10-23 ENCOUNTER — COMMUNICATION - HEALTHEAST (OUTPATIENT)
Dept: PHYSICAL MEDICINE AND REHAB | Facility: CLINIC | Age: 65
End: 2020-10-23

## 2020-10-26 ENCOUNTER — COMMUNICATION - HEALTHEAST (OUTPATIENT)
Dept: PHYSICAL MEDICINE AND REHAB | Facility: CLINIC | Age: 65
End: 2020-10-26

## 2020-10-29 ENCOUNTER — COMMUNICATION - HEALTHEAST (OUTPATIENT)
Dept: PHYSICAL MEDICINE AND REHAB | Facility: CLINIC | Age: 65
End: 2020-10-29

## 2020-10-29 ENCOUNTER — AMBULATORY - HEALTHEAST (OUTPATIENT)
Dept: EDUCATION SERVICES | Facility: CLINIC | Age: 65
End: 2020-10-29

## 2020-10-29 ENCOUNTER — COMMUNICATION - HEALTHEAST (OUTPATIENT)
Dept: ADMINISTRATIVE | Facility: CLINIC | Age: 65
End: 2020-10-29

## 2020-10-29 DIAGNOSIS — Z79.4 TYPE 2 DIABETES MELLITUS WITH COMPLICATION, WITH LONG-TERM CURRENT USE OF INSULIN (H): ICD-10-CM

## 2020-10-29 DIAGNOSIS — M54.50 LUMBAR SPINE PAIN: ICD-10-CM

## 2020-10-29 DIAGNOSIS — E11.8 TYPE 2 DIABETES MELLITUS WITH COMPLICATION, WITH LONG-TERM CURRENT USE OF INSULIN (H): ICD-10-CM

## 2020-11-03 ENCOUNTER — COMMUNICATION - HEALTHEAST (OUTPATIENT)
Dept: INTERNAL MEDICINE | Facility: CLINIC | Age: 65
End: 2020-11-03

## 2020-11-04 ENCOUNTER — COMMUNICATION - HEALTHEAST (OUTPATIENT)
Dept: INTERNAL MEDICINE | Facility: CLINIC | Age: 65
End: 2020-11-04

## 2020-11-04 ENCOUNTER — COMMUNICATION - HEALTHEAST (OUTPATIENT)
Dept: ADMINISTRATIVE | Facility: CLINIC | Age: 65
End: 2020-11-04

## 2020-11-04 DIAGNOSIS — J41.0 SIMPLE CHRONIC BRONCHITIS (H): ICD-10-CM

## 2020-11-04 DIAGNOSIS — E11.8 TYPE 2 DIABETES MELLITUS WITH COMPLICATION, WITH LONG-TERM CURRENT USE OF INSULIN (H): ICD-10-CM

## 2020-11-04 DIAGNOSIS — Z79.4 TYPE 2 DIABETES MELLITUS WITH COMPLICATION, WITH LONG-TERM CURRENT USE OF INSULIN (H): ICD-10-CM

## 2020-11-05 ENCOUNTER — COMMUNICATION - HEALTHEAST (OUTPATIENT)
Dept: INTERNAL MEDICINE | Facility: CLINIC | Age: 65
End: 2020-11-05

## 2020-11-05 DIAGNOSIS — E11.8 TYPE 2 DIABETES MELLITUS WITH COMPLICATION, WITH LONG-TERM CURRENT USE OF INSULIN (H): ICD-10-CM

## 2020-11-05 DIAGNOSIS — Z79.4 TYPE 2 DIABETES MELLITUS WITH COMPLICATION, WITH LONG-TERM CURRENT USE OF INSULIN (H): ICD-10-CM

## 2020-11-06 ENCOUNTER — COMMUNICATION - HEALTHEAST (OUTPATIENT)
Dept: INTERNAL MEDICINE | Facility: CLINIC | Age: 65
End: 2020-11-06

## 2020-11-06 DIAGNOSIS — Z79.4 TYPE 2 DIABETES MELLITUS WITH COMPLICATION, WITH LONG-TERM CURRENT USE OF INSULIN (H): ICD-10-CM

## 2020-11-06 DIAGNOSIS — E11.8 TYPE 2 DIABETES MELLITUS WITH COMPLICATION, WITH LONG-TERM CURRENT USE OF INSULIN (H): ICD-10-CM

## 2020-11-16 ENCOUNTER — OFFICE VISIT - HEALTHEAST (OUTPATIENT)
Dept: PHYSICAL THERAPY | Facility: REHABILITATION | Age: 65
End: 2020-11-16

## 2020-11-16 ENCOUNTER — COMMUNICATION - HEALTHEAST (OUTPATIENT)
Dept: PHYSICAL MEDICINE AND REHAB | Facility: CLINIC | Age: 65
End: 2020-11-16

## 2020-11-16 DIAGNOSIS — M79.605 LEFT LEG PAIN: ICD-10-CM

## 2020-11-16 DIAGNOSIS — M54.42 CHRONIC LEFT-SIDED LOW BACK PAIN WITH LEFT-SIDED SCIATICA: ICD-10-CM

## 2020-11-16 DIAGNOSIS — G89.29 CHRONIC LEFT-SIDED LOW BACK PAIN WITH LEFT-SIDED SCIATICA: ICD-10-CM

## 2020-11-16 DIAGNOSIS — M54.50 LUMBAR SPINE PAIN: ICD-10-CM

## 2020-11-18 ENCOUNTER — RECORDS - HEALTHEAST (OUTPATIENT)
Dept: HEALTH INFORMATION MANAGEMENT | Facility: CLINIC | Age: 65
End: 2020-11-18

## 2020-11-18 ENCOUNTER — OFFICE VISIT - HEALTHEAST (OUTPATIENT)
Dept: INTERNAL MEDICINE | Facility: CLINIC | Age: 65
End: 2020-11-18

## 2020-11-18 DIAGNOSIS — Z79.4 TYPE 2 DIABETES MELLITUS WITH COMPLICATION, WITH LONG-TERM CURRENT USE OF INSULIN (H): ICD-10-CM

## 2020-11-18 DIAGNOSIS — Z76.89 ENCOUNTER TO ESTABLISH CARE: ICD-10-CM

## 2020-11-18 DIAGNOSIS — R87.619 ABNORMAL CERVICAL PAPANICOLAOU SMEAR, UNSPECIFIED ABNORMAL PAP FINDING: ICD-10-CM

## 2020-11-18 DIAGNOSIS — E66.811 CLASS 1 OBESITY WITH SERIOUS COMORBIDITY AND BODY MASS INDEX (BMI) OF 31.0 TO 31.9 IN ADULT, UNSPECIFIED OBESITY TYPE: ICD-10-CM

## 2020-11-18 DIAGNOSIS — M81.0 AGE-RELATED OSTEOPOROSIS WITHOUT CURRENT PATHOLOGICAL FRACTURE: ICD-10-CM

## 2020-11-18 DIAGNOSIS — K43.9 VENTRAL HERNIA WITHOUT OBSTRUCTION OR GANGRENE: ICD-10-CM

## 2020-11-18 DIAGNOSIS — J44.9 CHRONIC OBSTRUCTIVE PULMONARY DISEASE, UNSPECIFIED COPD TYPE (H): ICD-10-CM

## 2020-11-18 DIAGNOSIS — E11.8 TYPE 2 DIABETES MELLITUS WITH COMPLICATION, WITH LONG-TERM CURRENT USE OF INSULIN (H): ICD-10-CM

## 2020-11-18 DIAGNOSIS — Z86.72 PERSONAL HISTORY OF THROMBOPHLEBITIS: ICD-10-CM

## 2020-11-18 DIAGNOSIS — K75.81 NASH (NONALCOHOLIC STEATOHEPATITIS): ICD-10-CM

## 2020-11-18 DIAGNOSIS — I10 ESSENTIAL HYPERTENSION, BENIGN: ICD-10-CM

## 2020-11-18 DIAGNOSIS — F31.81 BIPOLAR 2 DISORDER (H): ICD-10-CM

## 2020-11-18 LAB
ALBUMIN SERPL-MCNC: 4.3 G/DL (ref 3.5–5)
ALP SERPL-CCNC: 96 U/L (ref 45–120)
ALT SERPL W P-5'-P-CCNC: 54 U/L (ref 0–45)
ANION GAP SERPL CALCULATED.3IONS-SCNC: 9 MMOL/L (ref 5–18)
AST SERPL W P-5'-P-CCNC: 38 U/L (ref 0–40)
BASOPHILS # BLD AUTO: 0.1 THOU/UL (ref 0–0.2)
BASOPHILS NFR BLD AUTO: 1 % (ref 0–2)
BILIRUB SERPL-MCNC: 0.4 MG/DL (ref 0–1)
BUN SERPL-MCNC: 12 MG/DL (ref 8–22)
CALCIUM SERPL-MCNC: 9.7 MG/DL (ref 8.5–10.5)
CHLORIDE BLD-SCNC: 103 MMOL/L (ref 98–107)
CHOLEST SERPL-MCNC: 115 MG/DL
CO2 SERPL-SCNC: 27 MMOL/L (ref 22–31)
CREAT SERPL-MCNC: 0.65 MG/DL (ref 0.6–1.1)
CREAT UR-MCNC: 20.8 MG/DL
EOSINOPHIL # BLD AUTO: 0.3 THOU/UL (ref 0–0.4)
EOSINOPHIL NFR BLD AUTO: 4 % (ref 0–6)
ERYTHROCYTE [DISTWIDTH] IN BLOOD BY AUTOMATED COUNT: 11.5 % (ref 11–14.5)
FASTING STATUS PATIENT QL REPORTED: YES
GFR SERPL CREATININE-BSD FRML MDRD: >60 ML/MIN/1.73M2
GLUCOSE BLD-MCNC: 148 MG/DL (ref 70–125)
HBA1C MFR BLD: 7.2 %
HCT VFR BLD AUTO: 37.1 % (ref 35–47)
HDLC SERPL-MCNC: 48 MG/DL
HGB BLD-MCNC: 12.7 G/DL (ref 12–16)
LDLC SERPL CALC-MCNC: 47 MG/DL
LYMPHOCYTES # BLD AUTO: 2.1 THOU/UL (ref 0.8–4.4)
LYMPHOCYTES NFR BLD AUTO: 30 % (ref 20–40)
MCH RBC QN AUTO: 32.2 PG (ref 27–34)
MCHC RBC AUTO-ENTMCNC: 34.1 G/DL (ref 32–36)
MCV RBC AUTO: 94 FL (ref 80–100)
MICROALBUMIN UR-MCNC: 1.26 MG/DL (ref 0–1.99)
MICROALBUMIN/CREAT UR: 60.6 MG/G
MONOCYTES # BLD AUTO: 0.6 THOU/UL (ref 0–0.9)
MONOCYTES NFR BLD AUTO: 8 % (ref 2–10)
NEUTROPHILS # BLD AUTO: 4 THOU/UL (ref 2–7.7)
NEUTROPHILS NFR BLD AUTO: 57 % (ref 50–70)
PLATELET # BLD AUTO: 242 THOU/UL (ref 140–440)
PMV BLD AUTO: 8.6 FL (ref 7–10)
POTASSIUM BLD-SCNC: 4.4 MMOL/L (ref 3.5–5)
PROT SERPL-MCNC: 7.5 G/DL (ref 6–8)
RBC # BLD AUTO: 3.93 MILL/UL (ref 3.8–5.4)
SODIUM SERPL-SCNC: 139 MMOL/L (ref 136–145)
TRIGL SERPL-MCNC: 102 MG/DL
WBC: 6.9 THOU/UL (ref 4–11)

## 2020-11-18 ASSESSMENT — MIFFLIN-ST. JEOR: SCORE: 1321.53

## 2020-11-19 ENCOUNTER — COMMUNICATION - HEALTHEAST (OUTPATIENT)
Dept: INTERNAL MEDICINE | Facility: CLINIC | Age: 65
End: 2020-11-19

## 2020-11-19 DIAGNOSIS — E11.8 TYPE 2 DIABETES MELLITUS WITH COMPLICATION, WITH LONG-TERM CURRENT USE OF INSULIN (H): ICD-10-CM

## 2020-11-19 DIAGNOSIS — Z79.4 TYPE 2 DIABETES MELLITUS WITH COMPLICATION, WITH LONG-TERM CURRENT USE OF INSULIN (H): ICD-10-CM

## 2020-11-20 ENCOUNTER — OFFICE VISIT - HEALTHEAST (OUTPATIENT)
Dept: INTERNAL MEDICINE | Facility: CLINIC | Age: 65
End: 2020-11-20

## 2020-11-20 DIAGNOSIS — Z01.82 ENCOUNTER FOR ALLERGY TESTING: ICD-10-CM

## 2020-11-20 DIAGNOSIS — E11.8 TYPE 2 DIABETES MELLITUS WITH COMPLICATION, WITH LONG-TERM CURRENT USE OF INSULIN (H): ICD-10-CM

## 2020-11-20 DIAGNOSIS — Z79.4 TYPE 2 DIABETES MELLITUS WITH COMPLICATION, WITH LONG-TERM CURRENT USE OF INSULIN (H): ICD-10-CM

## 2020-11-20 DIAGNOSIS — Z98.890 HISTORY OF TRACHEOSTOMY: ICD-10-CM

## 2020-11-20 DIAGNOSIS — Z00.00 ROUTINE GENERAL MEDICAL EXAMINATION AT A HEALTH CARE FACILITY: ICD-10-CM

## 2020-11-20 LAB
ATRIAL RATE - MUSE: 87 BPM
DIASTOLIC BLOOD PRESSURE - MUSE: NORMAL
INTERPRETATION ECG - MUSE: NORMAL
P AXIS - MUSE: 48 DEGREES
PR INTERVAL - MUSE: 202 MS
QRS DURATION - MUSE: 86 MS
QT - MUSE: 368 MS
QTC - MUSE: 442 MS
R AXIS - MUSE: 9 DEGREES
SYSTOLIC BLOOD PRESSURE - MUSE: NORMAL
T AXIS - MUSE: 32 DEGREES
VENTRICULAR RATE- MUSE: 87 BPM

## 2020-11-20 ASSESSMENT — MIFFLIN-ST. JEOR: SCORE: 1318.81

## 2020-11-23 ENCOUNTER — COMMUNICATION - HEALTHEAST (OUTPATIENT)
Dept: INTERNAL MEDICINE | Facility: CLINIC | Age: 65
End: 2020-11-23

## 2020-11-25 ENCOUNTER — HOSPITAL ENCOUNTER (OUTPATIENT)
Dept: MAMMOGRAPHY | Facility: CLINIC | Age: 65
Discharge: HOME | End: 2020-11-25
Attending: INTERNAL MEDICINE

## 2020-11-25 ENCOUNTER — COMMUNICATION - HEALTHEAST (OUTPATIENT)
Dept: INTERNAL MEDICINE | Facility: CLINIC | Age: 65
End: 2020-11-25

## 2020-11-25 ENCOUNTER — COMMUNICATION - HEALTHEAST (OUTPATIENT)
Dept: PHYSICAL MEDICINE AND REHAB | Facility: CLINIC | Age: 65
End: 2020-11-25

## 2020-11-25 DIAGNOSIS — Z12.31 ENCOUNTER FOR SCREENING MAMMOGRAM FOR BREAST CANCER: ICD-10-CM

## 2020-11-25 DIAGNOSIS — Z79.4 TYPE 2 DIABETES MELLITUS WITH COMPLICATION, WITH LONG-TERM CURRENT USE OF INSULIN (H): ICD-10-CM

## 2020-11-25 DIAGNOSIS — E11.8 TYPE 2 DIABETES MELLITUS WITH COMPLICATION, WITH LONG-TERM CURRENT USE OF INSULIN (H): ICD-10-CM

## 2020-11-30 ENCOUNTER — COMMUNICATION - HEALTHEAST (OUTPATIENT)
Dept: INTERNAL MEDICINE | Facility: CLINIC | Age: 65
End: 2020-11-30

## 2020-11-30 ENCOUNTER — OFFICE VISIT - HEALTHEAST (OUTPATIENT)
Dept: PHYSICAL THERAPY | Facility: REHABILITATION | Age: 65
End: 2020-11-30

## 2020-11-30 DIAGNOSIS — G89.29 CHRONIC LEFT-SIDED LOW BACK PAIN WITH LEFT-SIDED SCIATICA: ICD-10-CM

## 2020-11-30 DIAGNOSIS — Z79.4 TYPE 2 DIABETES MELLITUS WITH COMPLICATION, WITH LONG-TERM CURRENT USE OF INSULIN (H): ICD-10-CM

## 2020-11-30 DIAGNOSIS — E11.8 TYPE 2 DIABETES MELLITUS WITH COMPLICATION, WITH LONG-TERM CURRENT USE OF INSULIN (H): ICD-10-CM

## 2020-11-30 DIAGNOSIS — M79.605 LEFT LEG PAIN: ICD-10-CM

## 2020-11-30 DIAGNOSIS — M54.42 CHRONIC LEFT-SIDED LOW BACK PAIN WITH LEFT-SIDED SCIATICA: ICD-10-CM

## 2020-12-01 ENCOUNTER — COMMUNICATION - HEALTHEAST (OUTPATIENT)
Dept: INTERNAL MEDICINE | Facility: CLINIC | Age: 65
End: 2020-12-01

## 2020-12-03 ENCOUNTER — OFFICE VISIT - HEALTHEAST (OUTPATIENT)
Dept: PHYSICAL THERAPY | Facility: REHABILITATION | Age: 65
End: 2020-12-03

## 2020-12-03 DIAGNOSIS — M54.42 CHRONIC LEFT-SIDED LOW BACK PAIN WITH LEFT-SIDED SCIATICA: ICD-10-CM

## 2020-12-03 DIAGNOSIS — M79.605 LEFT LEG PAIN: ICD-10-CM

## 2020-12-03 DIAGNOSIS — G89.29 CHRONIC LEFT-SIDED LOW BACK PAIN WITH LEFT-SIDED SCIATICA: ICD-10-CM

## 2020-12-07 ENCOUNTER — OFFICE VISIT - HEALTHEAST (OUTPATIENT)
Dept: PHYSICAL THERAPY | Facility: REHABILITATION | Age: 65
End: 2020-12-07

## 2020-12-07 DIAGNOSIS — M54.42 CHRONIC BILATERAL LOW BACK PAIN WITH LEFT-SIDED SCIATICA: ICD-10-CM

## 2020-12-07 DIAGNOSIS — G89.29 CHRONIC BILATERAL LOW BACK PAIN WITH LEFT-SIDED SCIATICA: ICD-10-CM

## 2020-12-08 ENCOUNTER — OFFICE VISIT - HEALTHEAST (OUTPATIENT)
Dept: OTOLARYNGOLOGY | Facility: CLINIC | Age: 65
End: 2020-12-08

## 2020-12-08 DIAGNOSIS — H91.90 HEARING DIFFICULTY, UNSPECIFIED LATERALITY: ICD-10-CM

## 2020-12-08 DIAGNOSIS — K21.9 LPRD (LARYNGOPHARYNGEAL REFLUX DISEASE): ICD-10-CM

## 2020-12-08 DIAGNOSIS — J44.9 CHRONIC OBSTRUCTIVE PULMONARY DISEASE, UNSPECIFIED COPD TYPE (H): ICD-10-CM

## 2020-12-09 ENCOUNTER — OFFICE VISIT - HEALTHEAST (OUTPATIENT)
Dept: INTERNAL MEDICINE | Facility: CLINIC | Age: 65
End: 2020-12-09

## 2020-12-09 DIAGNOSIS — Z79.4 TYPE 2 DIABETES MELLITUS WITH COMPLICATION, WITH LONG-TERM CURRENT USE OF INSULIN (H): ICD-10-CM

## 2020-12-09 DIAGNOSIS — E11.8 TYPE 2 DIABETES MELLITUS WITH COMPLICATION, WITH LONG-TERM CURRENT USE OF INSULIN (H): ICD-10-CM

## 2020-12-09 ASSESSMENT — MIFFLIN-ST. JEOR: SCORE: 1344.21

## 2020-12-11 ENCOUNTER — COMMUNICATION - HEALTHEAST (OUTPATIENT)
Dept: ADMINISTRATIVE | Facility: CLINIC | Age: 65
End: 2020-12-11

## 2020-12-11 ENCOUNTER — COMMUNICATION - HEALTHEAST (OUTPATIENT)
Dept: INTERNAL MEDICINE | Facility: CLINIC | Age: 65
End: 2020-12-11

## 2020-12-11 ENCOUNTER — OFFICE VISIT - HEALTHEAST (OUTPATIENT)
Dept: EDUCATION SERVICES | Facility: CLINIC | Age: 65
End: 2020-12-11

## 2020-12-11 DIAGNOSIS — E11.9 DIABETES MELLITUS, TYPE 2 (H): ICD-10-CM

## 2020-12-14 ENCOUNTER — COMMUNICATION - HEALTHEAST (OUTPATIENT)
Dept: ADMINISTRATIVE | Facility: CLINIC | Age: 65
End: 2020-12-14

## 2020-12-15 ENCOUNTER — OFFICE VISIT - HEALTHEAST (OUTPATIENT)
Dept: PHYSICAL THERAPY | Facility: REHABILITATION | Age: 65
End: 2020-12-15

## 2020-12-15 DIAGNOSIS — M79.605 LEFT LEG PAIN: ICD-10-CM

## 2020-12-15 DIAGNOSIS — G89.29 CHRONIC LEFT-SIDED LOW BACK PAIN WITH LEFT-SIDED SCIATICA: ICD-10-CM

## 2020-12-15 DIAGNOSIS — M54.42 CHRONIC LEFT-SIDED LOW BACK PAIN WITH LEFT-SIDED SCIATICA: ICD-10-CM

## 2020-12-15 DIAGNOSIS — G89.29 CHRONIC BILATERAL LOW BACK PAIN WITH LEFT-SIDED SCIATICA: ICD-10-CM

## 2020-12-15 DIAGNOSIS — M54.42 CHRONIC BILATERAL LOW BACK PAIN WITH LEFT-SIDED SCIATICA: ICD-10-CM

## 2020-12-16 ENCOUNTER — COMMUNICATION - HEALTHEAST (OUTPATIENT)
Dept: INTERNAL MEDICINE | Facility: CLINIC | Age: 65
End: 2020-12-16

## 2020-12-16 DIAGNOSIS — J44.89 CHRONIC OBSTRUCTIVE AIRWAY DISEASE WITH ASTHMA (H): ICD-10-CM

## 2020-12-17 ENCOUNTER — OFFICE VISIT - HEALTHEAST (OUTPATIENT)
Dept: ALLERGY | Facility: CLINIC | Age: 65
End: 2020-12-17

## 2020-12-17 DIAGNOSIS — T78.40XD ALLERGIC REACTION, SUBSEQUENT ENCOUNTER: ICD-10-CM

## 2020-12-17 ASSESSMENT — MIFFLIN-ST. JEOR: SCORE: 1344.21

## 2020-12-21 ENCOUNTER — OFFICE VISIT - HEALTHEAST (OUTPATIENT)
Dept: EDUCATION SERVICES | Facility: CLINIC | Age: 65
End: 2020-12-21

## 2020-12-21 ENCOUNTER — OFFICE VISIT - HEALTHEAST (OUTPATIENT)
Dept: PHYSICAL THERAPY | Facility: REHABILITATION | Age: 65
End: 2020-12-21

## 2020-12-21 DIAGNOSIS — M54.42 CHRONIC BILATERAL LOW BACK PAIN WITH LEFT-SIDED SCIATICA: ICD-10-CM

## 2020-12-21 DIAGNOSIS — E11.8 TYPE 2 DIABETES MELLITUS WITH COMPLICATION, WITH LONG-TERM CURRENT USE OF INSULIN (H): ICD-10-CM

## 2020-12-21 DIAGNOSIS — Z79.4 TYPE 2 DIABETES MELLITUS WITH COMPLICATION, WITH LONG-TERM CURRENT USE OF INSULIN (H): ICD-10-CM

## 2020-12-21 DIAGNOSIS — M54.42 CHRONIC LEFT-SIDED LOW BACK PAIN WITH LEFT-SIDED SCIATICA: ICD-10-CM

## 2020-12-21 DIAGNOSIS — G89.29 CHRONIC LEFT-SIDED LOW BACK PAIN WITH LEFT-SIDED SCIATICA: ICD-10-CM

## 2020-12-21 DIAGNOSIS — G89.29 CHRONIC BILATERAL LOW BACK PAIN WITH LEFT-SIDED SCIATICA: ICD-10-CM

## 2020-12-23 ENCOUNTER — COMMUNICATION - HEALTHEAST (OUTPATIENT)
Dept: PHYSICAL MEDICINE AND REHAB | Facility: CLINIC | Age: 65
End: 2020-12-23

## 2020-12-23 ENCOUNTER — OFFICE VISIT - HEALTHEAST (OUTPATIENT)
Dept: INTERNAL MEDICINE | Facility: CLINIC | Age: 65
End: 2020-12-23

## 2020-12-23 DIAGNOSIS — I10 ESSENTIAL HYPERTENSION, BENIGN: ICD-10-CM

## 2020-12-23 DIAGNOSIS — F31.81 BIPOLAR 2 DISORDER (H): ICD-10-CM

## 2020-12-23 DIAGNOSIS — E11.8 TYPE 2 DIABETES MELLITUS WITH COMPLICATION, WITH LONG-TERM CURRENT USE OF INSULIN (H): ICD-10-CM

## 2020-12-23 DIAGNOSIS — M54.50 LUMBAR SPINE PAIN: ICD-10-CM

## 2020-12-23 DIAGNOSIS — Z79.4 TYPE 2 DIABETES MELLITUS WITH COMPLICATION, WITH LONG-TERM CURRENT USE OF INSULIN (H): ICD-10-CM

## 2020-12-29 ENCOUNTER — COMMUNICATION - HEALTHEAST (OUTPATIENT)
Dept: PHYSICAL MEDICINE AND REHAB | Facility: CLINIC | Age: 65
End: 2020-12-29

## 2020-12-30 ENCOUNTER — OFFICE VISIT - HEALTHEAST (OUTPATIENT)
Dept: FAMILY MEDICINE | Facility: CLINIC | Age: 65
End: 2020-12-30

## 2020-12-30 DIAGNOSIS — E11.8 TYPE 2 DIABETES MELLITUS WITH COMPLICATION, WITH LONG-TERM CURRENT USE OF INSULIN (H): ICD-10-CM

## 2020-12-30 DIAGNOSIS — F31.81 BIPOLAR 2 DISORDER (H): ICD-10-CM

## 2020-12-30 DIAGNOSIS — J43.9 PULMONARY EMPHYSEMA, UNSPECIFIED EMPHYSEMA TYPE (H): ICD-10-CM

## 2020-12-30 DIAGNOSIS — R60.0 LOWER EXTREMITY EDEMA: ICD-10-CM

## 2020-12-30 DIAGNOSIS — E78.2 MIXED HYPERLIPIDEMIA: ICD-10-CM

## 2020-12-30 DIAGNOSIS — Z76.89 ESTABLISHING CARE WITH NEW DOCTOR, ENCOUNTER FOR: ICD-10-CM

## 2020-12-30 DIAGNOSIS — I10 ESSENTIAL HYPERTENSION, BENIGN: ICD-10-CM

## 2020-12-30 DIAGNOSIS — Z79.4 TYPE 2 DIABETES MELLITUS WITH COMPLICATION, WITH LONG-TERM CURRENT USE OF INSULIN (H): ICD-10-CM

## 2020-12-30 DIAGNOSIS — Z98.890 HISTORY OF TRACHEOSTOMY: ICD-10-CM

## 2020-12-30 LAB
ANION GAP SERPL CALCULATED.3IONS-SCNC: 9 MMOL/L (ref 5–18)
BASOPHILS # BLD AUTO: 0.1 THOU/UL (ref 0–0.2)
BASOPHILS NFR BLD AUTO: 1 % (ref 0–2)
BUN SERPL-MCNC: 17 MG/DL (ref 8–22)
CALCIUM SERPL-MCNC: 10.4 MG/DL (ref 8.5–10.5)
CHLORIDE BLD-SCNC: 101 MMOL/L (ref 98–107)
CO2 SERPL-SCNC: 29 MMOL/L (ref 22–31)
CREAT SERPL-MCNC: 0.69 MG/DL (ref 0.6–1.1)
EOSINOPHIL # BLD AUTO: 0.2 THOU/UL (ref 0–0.4)
EOSINOPHIL NFR BLD AUTO: 3 % (ref 0–6)
ERYTHROCYTE [DISTWIDTH] IN BLOOD BY AUTOMATED COUNT: 11.7 % (ref 11–14.5)
GFR SERPL CREATININE-BSD FRML MDRD: >60 ML/MIN/1.73M2
GLUCOSE BLD-MCNC: 122 MG/DL (ref 70–125)
HCT VFR BLD AUTO: 37.8 % (ref 35–47)
HGB BLD-MCNC: 12.8 G/DL (ref 12–16)
LYMPHOCYTES # BLD AUTO: 2.3 THOU/UL (ref 0.8–4.4)
LYMPHOCYTES NFR BLD AUTO: 34 % (ref 20–40)
MCH RBC QN AUTO: 32.3 PG (ref 27–34)
MCHC RBC AUTO-ENTMCNC: 33.9 G/DL (ref 32–36)
MCV RBC AUTO: 95 FL (ref 80–100)
MONOCYTES # BLD AUTO: 0.5 THOU/UL (ref 0–0.9)
MONOCYTES NFR BLD AUTO: 8 % (ref 2–10)
NEUTROPHILS # BLD AUTO: 3.7 THOU/UL (ref 2–7.7)
NEUTROPHILS NFR BLD AUTO: 55 % (ref 50–70)
PLATELET # BLD AUTO: 251 THOU/UL (ref 140–440)
PMV BLD AUTO: 7.7 FL (ref 7–10)
POTASSIUM BLD-SCNC: 4.3 MMOL/L (ref 3.5–5)
RBC # BLD AUTO: 3.97 MILL/UL (ref 3.8–5.4)
SODIUM SERPL-SCNC: 139 MMOL/L (ref 136–145)
TSH SERPL DL<=0.005 MIU/L-ACNC: 0.95 UIU/ML (ref 0.3–5)
WBC: 6.8 THOU/UL (ref 4–11)

## 2020-12-31 ENCOUNTER — COMMUNICATION - HEALTHEAST (OUTPATIENT)
Dept: OTOLARYNGOLOGY | Facility: CLINIC | Age: 65
End: 2020-12-31

## 2020-12-31 LAB — BNP SERPL-MCNC: <10 PG/ML (ref 0–106)

## 2021-01-02 ENCOUNTER — COMMUNICATION - HEALTHEAST (OUTPATIENT)
Dept: FAMILY MEDICINE | Facility: CLINIC | Age: 66
End: 2021-01-02

## 2021-01-04 ENCOUNTER — OFFICE VISIT - HEALTHEAST (OUTPATIENT)
Dept: EDUCATION SERVICES | Facility: CLINIC | Age: 66
End: 2021-01-04

## 2021-01-04 ENCOUNTER — COMMUNICATION - HEALTHEAST (OUTPATIENT)
Dept: OTOLARYNGOLOGY | Facility: CLINIC | Age: 66
End: 2021-01-04

## 2021-01-04 ENCOUNTER — COMMUNICATION - HEALTHEAST (OUTPATIENT)
Dept: ADMINISTRATIVE | Facility: CLINIC | Age: 66
End: 2021-01-04

## 2021-01-04 ENCOUNTER — COMMUNICATION - HEALTHEAST (OUTPATIENT)
Dept: INTERNAL MEDICINE | Facility: CLINIC | Age: 66
End: 2021-01-04

## 2021-01-04 DIAGNOSIS — J44.9 CHRONIC OBSTRUCTIVE PULMONARY DISEASE, UNSPECIFIED COPD TYPE (H): ICD-10-CM

## 2021-01-04 DIAGNOSIS — K21.9 LPRD (LARYNGOPHARYNGEAL REFLUX DISEASE): ICD-10-CM

## 2021-01-04 DIAGNOSIS — E11.9 DIABETES MELLITUS, TYPE 2 (H): ICD-10-CM

## 2021-01-05 ENCOUNTER — OFFICE VISIT - HEALTHEAST (OUTPATIENT)
Dept: PHYSICAL THERAPY | Facility: REHABILITATION | Age: 66
End: 2021-01-05

## 2021-01-05 DIAGNOSIS — M54.42 CHRONIC LEFT-SIDED LOW BACK PAIN WITH LEFT-SIDED SCIATICA: ICD-10-CM

## 2021-01-05 DIAGNOSIS — G89.29 CHRONIC BILATERAL LOW BACK PAIN WITH LEFT-SIDED SCIATICA: ICD-10-CM

## 2021-01-05 DIAGNOSIS — G89.29 CHRONIC LEFT-SIDED LOW BACK PAIN WITH LEFT-SIDED SCIATICA: ICD-10-CM

## 2021-01-05 DIAGNOSIS — M54.42 CHRONIC BILATERAL LOW BACK PAIN WITH LEFT-SIDED SCIATICA: ICD-10-CM

## 2021-01-05 DIAGNOSIS — M79.605 LEFT LEG PAIN: ICD-10-CM

## 2021-01-08 ENCOUNTER — OFFICE VISIT - HEALTHEAST (OUTPATIENT)
Dept: FAMILY MEDICINE | Facility: CLINIC | Age: 66
End: 2021-01-08

## 2021-01-08 ENCOUNTER — COMMUNICATION - HEALTHEAST (OUTPATIENT)
Dept: ADMINISTRATIVE | Facility: CLINIC | Age: 66
End: 2021-01-08

## 2021-01-08 DIAGNOSIS — Z02.89 ENCOUNTER FOR COMPLETION OF FORM WITH PATIENT: ICD-10-CM

## 2021-01-11 ENCOUNTER — COMMUNICATION - HEALTHEAST (OUTPATIENT)
Dept: FAMILY MEDICINE | Facility: CLINIC | Age: 66
End: 2021-01-11

## 2021-01-19 ENCOUNTER — COMMUNICATION - HEALTHEAST (OUTPATIENT)
Dept: FAMILY MEDICINE | Facility: CLINIC | Age: 66
End: 2021-01-19

## 2021-01-19 DIAGNOSIS — Z59.71 INSURANCE COVERAGE PROBLEMS: ICD-10-CM

## 2021-01-20 ENCOUNTER — AMBULATORY - HEALTHEAST (OUTPATIENT)
Dept: EDUCATION SERVICES | Facility: CLINIC | Age: 66
End: 2021-01-20

## 2021-01-20 ENCOUNTER — COMMUNICATION - HEALTHEAST (OUTPATIENT)
Dept: NURSING | Facility: CLINIC | Age: 66
End: 2021-01-20

## 2021-01-20 DIAGNOSIS — E11.9 DIABETES MELLITUS, TYPE 2 (H): ICD-10-CM

## 2021-01-20 DIAGNOSIS — E11.9 DIABETES MELLITUS (H): ICD-10-CM

## 2021-01-21 ENCOUNTER — OFFICE VISIT - HEALTHEAST (OUTPATIENT)
Dept: PULMONOLOGY | Facility: OTHER | Age: 66
End: 2021-01-21

## 2021-01-21 ENCOUNTER — COMMUNICATION - HEALTHEAST (OUTPATIENT)
Dept: PHYSICAL MEDICINE AND REHAB | Facility: CLINIC | Age: 66
End: 2021-01-21

## 2021-01-21 DIAGNOSIS — J98.4 RESTRICTIVE LUNG DISEASE: ICD-10-CM

## 2021-01-21 DIAGNOSIS — R06.02 SOB (SHORTNESS OF BREATH): ICD-10-CM

## 2021-01-21 ASSESSMENT — MIFFLIN-ST. JEOR: SCORE: 1346.93

## 2021-01-25 ENCOUNTER — COMMUNICATION - HEALTHEAST (OUTPATIENT)
Dept: NURSING | Facility: CLINIC | Age: 66
End: 2021-01-25

## 2021-01-25 ENCOUNTER — COMMUNICATION - HEALTHEAST (OUTPATIENT)
Dept: PHYSICAL MEDICINE AND REHAB | Facility: CLINIC | Age: 66
End: 2021-01-25

## 2021-01-25 ENCOUNTER — COMMUNICATION - HEALTHEAST (OUTPATIENT)
Dept: CARE COORDINATION | Facility: CLINIC | Age: 66
End: 2021-01-25

## 2021-01-25 DIAGNOSIS — M79.18 MYOFASCIAL MUSCLE PAIN: ICD-10-CM

## 2021-01-25 ASSESSMENT — ACTIVITIES OF DAILY LIVING (ADL): DEPENDENT_IADLS:: INDEPENDENT

## 2021-01-26 ENCOUNTER — OFFICE VISIT - HEALTHEAST (OUTPATIENT)
Dept: FAMILY MEDICINE | Facility: CLINIC | Age: 66
End: 2021-01-26

## 2021-01-26 DIAGNOSIS — Z79.4 TYPE 2 DIABETES MELLITUS WITHOUT COMPLICATION, WITH LONG-TERM CURRENT USE OF INSULIN (H): ICD-10-CM

## 2021-01-26 DIAGNOSIS — T33.832A: ICD-10-CM

## 2021-01-26 DIAGNOSIS — E11.9 TYPE 2 DIABETES MELLITUS WITHOUT COMPLICATION, WITH LONG-TERM CURRENT USE OF INSULIN (H): ICD-10-CM

## 2021-01-26 DIAGNOSIS — R06.09 DYSPNEA ON EXERTION: ICD-10-CM

## 2021-01-27 ENCOUNTER — COMMUNICATION - HEALTHEAST (OUTPATIENT)
Dept: FAMILY MEDICINE | Facility: CLINIC | Age: 66
End: 2021-01-27

## 2021-01-28 ENCOUNTER — COMMUNICATION - HEALTHEAST (OUTPATIENT)
Dept: FAMILY MEDICINE | Facility: CLINIC | Age: 66
End: 2021-01-28

## 2021-01-28 ENCOUNTER — COMMUNICATION - HEALTHEAST (OUTPATIENT)
Dept: PULMONOLOGY | Facility: OTHER | Age: 66
End: 2021-01-28

## 2021-01-29 ENCOUNTER — COMMUNICATION - HEALTHEAST (OUTPATIENT)
Dept: FAMILY MEDICINE | Facility: CLINIC | Age: 66
End: 2021-01-29

## 2021-02-01 ENCOUNTER — COMMUNICATION - HEALTHEAST (OUTPATIENT)
Dept: NURSING | Facility: CLINIC | Age: 66
End: 2021-02-01

## 2021-02-01 ENCOUNTER — NURSE TRIAGE (OUTPATIENT)
Dept: NURSING | Facility: CLINIC | Age: 66
End: 2021-02-01

## 2021-02-01 ENCOUNTER — COMMUNICATION - HEALTHEAST (OUTPATIENT)
Dept: SCHEDULING | Facility: CLINIC | Age: 66
End: 2021-02-01

## 2021-02-01 NOTE — TELEPHONE ENCOUNTER
Triage Call:    Pt had a COVID vaccine on Friday. Initially she was fine. Saturday night, over 24 hours after injection, patient began to have SOB. Pt states she was up every hour due to her breathing.  Patient states she has breathing problems and always has some shortness of breath.   Breathing went back to baseline on Sunday.     Patient states SOB has been worse since her pulmonologist took her off some medications. Pt states she has restarted these mediation but SOB is still worse than normal.     Patient is wondering if she should take the second shot next month or not. RN will transfer information to HE chart so RN can route to PCP, Dr. Ibrahim's care team for second level triage. RN will close this encounter and will follow up with information in HE chart.     Adele Tavera RN 02/01/21 2:17 PM  [facility] Nurse Advisor    Reason for Disposition    COVID-19 vaccine, systemic reactions (e.g., fatigue, fever, muscle aches), questions about    [1] Typical COVID-19 symptoms AND [2] symptoms that are NOT expected from vaccine (e.g., cough, difficulty breathing, loss of taste or smell, runny nose, sore throat)    MILD difficulty breathing (e.g., minimal/no SOB at rest, SOB with walking, pulse <100)    Additional Information    Negative: [1] Difficulty breathing or swallowing AND [2] starts within 2 hours after injection    Negative: Sounds like a life-threatening emergency to the triager    Negative: Fever > 104 F (40 C)    Negative: Sounds like a severe, unusual reaction to the triager    Negative: [1] Redness or red streak around the injection site AND [2] started > 48 hours after getting vaccine AND [3] fever    Negative: [1] Fever > 101 F (38.3 C) AND [2] age > 60 AND [3] started > 48 hours after getting vaccine    Negative: [1] Fever > 100.0 F (37.8 C) AND [2] bedridden (e.g., nursing home patient, CVA, chronic illness, recovering from surgery) AND [3] started > 48 hours after getting vaccine     Negative: [1] Fever > 100.0 F (37.8 C) AND [2] diabetes mellitus or weak immune system (e.g., HIV positive, cancer chemo, splenectomy, organ transplant, chronic steroids) AND [3] started > 48 hours after getting vaccine    Negative: [1] Redness or red streak around the injection site AND [2] started > 48 hours after getting vaccine AND [3] no fever  (Exception: red area < 1 inch or 2.5 cm wide)    Negative: [1] Pain, tenderness, or swelling at the injection site AND [2] over 3 days (72 hours) since vaccine AND [3] getting worse    Negative: Fever > 100.0 F (37.8 C) present > 3 days (72 hours)    Negative: [1] Fever > 100.0 F (37.8 C) AND [2] healthcare worker    Negative: [1] Pain, tenderness, or swelling at the injection site AND [2] lasts > 7 days    Negative: [1] Requesting COVID-19 vaccine AND [2] healthcare worker (e.g., EMS first responders, doctors, nurses)    Negative: [1] Requesting COVID-19 vaccine AND [2] resident of a long-term care facility (e.g., nursing home)    Negative: [1] Requesting COVID-19 vaccine AND [2] vaccine available in the community for this patient group    Negative: COVID-19 vaccine, injection site reaction (e.g., pain, redness, swelling), question about    Negative: SEVERE difficulty breathing (e.g., struggling for each breath, speaks in single words)    Negative: [1] COVID-19 exposure AND [2] no symptoms    Negative: [1] Lives with someone known to have influenza (flu test positive) AND [2] flu-like symptoms (e.g., cough, runny nose, sore throat, SOB; with or without fever)    Negative: [1] Adult with possible COVID-19 symptoms AND [2] triager concerned about severity of symptoms or other causes    Negative: COVID-19 vaccine reaction suspected (e.g., fever, headache, muscle aches) occurring during days 1-3 after getting vaccine    Negative: COVID-19 vaccine, questions about    Negative: COVID-19 and breastfeeding, questions about    Negative: Difficult to awaken or acting confused  (e.g., disoriented, slurred speech)    Negative: Bluish (or gray) lips or face now    Negative: Shock suspected (e.g., cold/pale/clammy skin, too weak to stand, low BP, rapid pulse)    Negative: Sounds like a life-threatening emergency to the triager    Negative: SEVERE or constant chest pain or pressure (Exception: mild central chest pain, present only when coughing)    Negative: MODERATE difficulty breathing (e.g., speaks in phrases, SOB even at rest, pulse 100-120)    Negative: Chest pain or pressure    Negative: [1] Headache AND [2] stiff neck (can't touch chin to chest)    Protocols used: CORONAVIRUS (COVID-19) VACCINE QUESTIONS AND LUZMOQOYD-R-PD 1.3, CORONAVIRUS (COVID-19) DIAGNOSED OR YOSHGVQBG-R-LC 1.3

## 2021-02-02 ENCOUNTER — COMMUNICATION - HEALTHEAST (OUTPATIENT)
Dept: NURSING | Facility: CLINIC | Age: 66
End: 2021-02-02

## 2021-02-03 ENCOUNTER — COMMUNICATION - HEALTHEAST (OUTPATIENT)
Dept: OTOLARYNGOLOGY | Facility: CLINIC | Age: 66
End: 2021-02-03

## 2021-02-03 DIAGNOSIS — K21.9 LPRD (LARYNGOPHARYNGEAL REFLUX DISEASE): ICD-10-CM

## 2021-02-04 ENCOUNTER — COMMUNICATION - HEALTHEAST (OUTPATIENT)
Dept: NURSING | Facility: CLINIC | Age: 66
End: 2021-02-04

## 2021-02-06 ENCOUNTER — COMMUNICATION - HEALTHEAST (OUTPATIENT)
Dept: PHYSICAL MEDICINE AND REHAB | Facility: CLINIC | Age: 66
End: 2021-02-06

## 2021-02-06 DIAGNOSIS — M54.50 LUMBAR SPINE PAIN: ICD-10-CM

## 2021-02-09 ENCOUNTER — OFFICE VISIT - HEALTHEAST (OUTPATIENT)
Dept: AUDIOLOGY | Facility: CLINIC | Age: 66
End: 2021-02-09

## 2021-02-09 ENCOUNTER — COMMUNICATION - HEALTHEAST (OUTPATIENT)
Dept: NURSING | Facility: CLINIC | Age: 66
End: 2021-02-09

## 2021-02-09 ENCOUNTER — OFFICE VISIT - HEALTHEAST (OUTPATIENT)
Dept: OTOLARYNGOLOGY | Facility: CLINIC | Age: 66
End: 2021-02-09

## 2021-02-09 ENCOUNTER — COMMUNICATION - HEALTHEAST (OUTPATIENT)
Dept: INTERNAL MEDICINE | Facility: CLINIC | Age: 66
End: 2021-02-09

## 2021-02-09 DIAGNOSIS — H90.3 SENSORINEURAL HEARING LOSS (SNHL) OF BOTH EARS: ICD-10-CM

## 2021-02-09 DIAGNOSIS — H90.3 SENSORINEURAL HEARING LOSS, BILATERAL: ICD-10-CM

## 2021-02-09 DIAGNOSIS — K21.9 LPRD (LARYNGOPHARYNGEAL REFLUX DISEASE): ICD-10-CM

## 2021-02-10 ENCOUNTER — COMMUNICATION - HEALTHEAST (OUTPATIENT)
Dept: FAMILY MEDICINE | Facility: CLINIC | Age: 66
End: 2021-02-10

## 2021-02-10 DIAGNOSIS — E11.9 DIABETES MELLITUS, TYPE 2 (H): ICD-10-CM

## 2021-02-12 ENCOUNTER — COMMUNICATION - HEALTHEAST (OUTPATIENT)
Dept: NURSING | Facility: CLINIC | Age: 66
End: 2021-02-12

## 2021-02-15 ENCOUNTER — COMMUNICATION - HEALTHEAST (OUTPATIENT)
Dept: NURSING | Facility: CLINIC | Age: 66
End: 2021-02-15

## 2021-02-17 ENCOUNTER — HOSPITAL ENCOUNTER (OUTPATIENT)
Dept: CT IMAGING | Facility: HOSPITAL | Age: 66
Discharge: HOME | End: 2021-02-17
Attending: INTERNAL MEDICINE

## 2021-02-17 ENCOUNTER — AMBULATORY - HEALTHEAST (OUTPATIENT)
Dept: LAB | Facility: HOSPITAL | Age: 66
End: 2021-02-17

## 2021-02-17 ENCOUNTER — COMMUNICATION - HEALTHEAST (OUTPATIENT)
Dept: NURSING | Facility: CLINIC | Age: 66
End: 2021-02-17

## 2021-02-17 DIAGNOSIS — R06.02 SOB (SHORTNESS OF BREATH): ICD-10-CM

## 2021-02-17 DIAGNOSIS — J43.9 PULMONARY EMPHYSEMA (H): ICD-10-CM

## 2021-02-17 DIAGNOSIS — J98.4 RESTRICTIVE LUNG DISEASE: ICD-10-CM

## 2021-02-17 LAB
BASE EXCESS BLDA CALC-SCNC: 2.8 MMOL/L
COHGB MFR BLD: 98 % (ref 96–97)
HCO3, ARTERIAL CALC - HISTORICAL: 26.7 MMOL/L (ref 23–29)
O2/TOTAL GAS SETTING VFR VENT: ABNORMAL %
OXYHEMOGLOBIN - HISTORICAL: 96.6 % (ref 96–97)
PCO2 BLD: 41 MM HG (ref 35–45)
PH BLD: 7.43 [PH] (ref 7.37–7.44)
PO2 BLD: 91 MM HG (ref 80–90)
TEMPERATURE: 37 DEGREES C
VENTILATION MODE: ABNORMAL

## 2021-02-18 ENCOUNTER — COMMUNICATION - HEALTHEAST (OUTPATIENT)
Dept: NURSING | Facility: CLINIC | Age: 66
End: 2021-02-18

## 2021-02-19 ENCOUNTER — COMMUNICATION - HEALTHEAST (OUTPATIENT)
Dept: PULMONOLOGY | Facility: OTHER | Age: 66
End: 2021-02-19

## 2021-02-22 ENCOUNTER — COMMUNICATION - HEALTHEAST (OUTPATIENT)
Dept: NURSING | Facility: CLINIC | Age: 66
End: 2021-02-22

## 2021-02-24 ENCOUNTER — AMBULATORY - HEALTHEAST (OUTPATIENT)
Dept: EDUCATION SERVICES | Facility: CLINIC | Age: 66
End: 2021-02-24

## 2021-02-24 ENCOUNTER — COMMUNICATION - HEALTHEAST (OUTPATIENT)
Dept: EDUCATION SERVICES | Facility: CLINIC | Age: 66
End: 2021-02-24

## 2021-02-24 ENCOUNTER — COMMUNICATION - HEALTHEAST (OUTPATIENT)
Dept: FAMILY MEDICINE | Facility: CLINIC | Age: 66
End: 2021-02-24

## 2021-02-24 DIAGNOSIS — Z79.4 TYPE 2 DIABETES MELLITUS WITH COMPLICATION, WITH LONG-TERM CURRENT USE OF INSULIN (H): ICD-10-CM

## 2021-02-24 DIAGNOSIS — E11.9 DIABETES MELLITUS, TYPE 2 (H): ICD-10-CM

## 2021-02-24 DIAGNOSIS — E11.8 TYPE 2 DIABETES MELLITUS WITH COMPLICATION, WITH LONG-TERM CURRENT USE OF INSULIN (H): ICD-10-CM

## 2021-02-24 DIAGNOSIS — E11.9 DIABETES MELLITUS (H): ICD-10-CM

## 2021-02-24 LAB — HBA1C MFR BLD: 7.5 %

## 2021-02-25 ENCOUNTER — COMMUNICATION - HEALTHEAST (OUTPATIENT)
Dept: CARE COORDINATION | Facility: CLINIC | Age: 66
End: 2021-02-25

## 2021-02-25 ENCOUNTER — COMMUNICATION - HEALTHEAST (OUTPATIENT)
Dept: FAMILY MEDICINE | Facility: CLINIC | Age: 66
End: 2021-02-25

## 2021-02-25 ENCOUNTER — COMMUNICATION - HEALTHEAST (OUTPATIENT)
Dept: NURSING | Facility: CLINIC | Age: 66
End: 2021-02-25

## 2021-02-26 ENCOUNTER — COMMUNICATION - HEALTHEAST (OUTPATIENT)
Dept: FAMILY MEDICINE | Facility: CLINIC | Age: 66
End: 2021-02-26

## 2021-02-26 ENCOUNTER — COMMUNICATION - HEALTHEAST (OUTPATIENT)
Dept: INTERNAL MEDICINE | Facility: CLINIC | Age: 66
End: 2021-02-26

## 2021-02-26 DIAGNOSIS — Z79.4 TYPE 2 DIABETES MELLITUS WITH COMPLICATION, WITH LONG-TERM CURRENT USE OF INSULIN (H): ICD-10-CM

## 2021-02-26 DIAGNOSIS — E11.8 TYPE 2 DIABETES MELLITUS WITH COMPLICATION, WITH LONG-TERM CURRENT USE OF INSULIN (H): ICD-10-CM

## 2021-02-26 DIAGNOSIS — K21.9 LPRD (LARYNGOPHARYNGEAL REFLUX DISEASE): ICD-10-CM

## 2021-03-04 ENCOUNTER — COMMUNICATION - HEALTHEAST (OUTPATIENT)
Dept: FAMILY MEDICINE | Facility: CLINIC | Age: 66
End: 2021-03-04

## 2021-03-04 DIAGNOSIS — K21.9 LPRD (LARYNGOPHARYNGEAL REFLUX DISEASE): ICD-10-CM

## 2021-03-05 ENCOUNTER — COMMUNICATION - HEALTHEAST (OUTPATIENT)
Dept: FAMILY MEDICINE | Facility: CLINIC | Age: 66
End: 2021-03-05

## 2021-03-05 DIAGNOSIS — K21.9 LPRD (LARYNGOPHARYNGEAL REFLUX DISEASE): ICD-10-CM

## 2021-03-09 ENCOUNTER — OFFICE VISIT - HEALTHEAST (OUTPATIENT)
Dept: FAMILY MEDICINE | Facility: CLINIC | Age: 66
End: 2021-03-09

## 2021-03-09 DIAGNOSIS — F31.81 BIPOLAR 2 DISORDER (H): ICD-10-CM

## 2021-03-09 DIAGNOSIS — R60.0 LOWER EXTREMITY EDEMA: ICD-10-CM

## 2021-03-09 DIAGNOSIS — I10 ESSENTIAL HYPERTENSION, BENIGN: ICD-10-CM

## 2021-03-09 DIAGNOSIS — J43.9 PULMONARY EMPHYSEMA, UNSPECIFIED EMPHYSEMA TYPE (H): ICD-10-CM

## 2021-03-09 DIAGNOSIS — R06.09 DYSPNEA ON EXERTION: ICD-10-CM

## 2021-03-09 DIAGNOSIS — E11.9 TYPE 2 DIABETES MELLITUS WITHOUT COMPLICATION, WITH LONG-TERM CURRENT USE OF INSULIN (H): ICD-10-CM

## 2021-03-09 DIAGNOSIS — Z79.4 TYPE 2 DIABETES MELLITUS WITHOUT COMPLICATION, WITH LONG-TERM CURRENT USE OF INSULIN (H): ICD-10-CM

## 2021-03-09 DIAGNOSIS — T33.832D: ICD-10-CM

## 2021-03-10 ENCOUNTER — COMMUNICATION - HEALTHEAST (OUTPATIENT)
Dept: CARE COORDINATION | Facility: CLINIC | Age: 66
End: 2021-03-10

## 2021-03-10 ENCOUNTER — COMMUNICATION - HEALTHEAST (OUTPATIENT)
Dept: FAMILY MEDICINE | Facility: CLINIC | Age: 66
End: 2021-03-10

## 2021-03-15 ENCOUNTER — HOSPITAL ENCOUNTER (OUTPATIENT)
Dept: RESPIRATORY THERAPY | Facility: CLINIC | Age: 66
Discharge: HOME | End: 2021-03-15
Attending: INTERNAL MEDICINE

## 2021-03-15 DIAGNOSIS — J98.4 RESTRICTIVE LUNG DISEASE: ICD-10-CM

## 2021-03-15 DIAGNOSIS — R06.02 SOB (SHORTNESS OF BREATH): ICD-10-CM

## 2021-03-21 ENCOUNTER — COMMUNICATION - HEALTHEAST (OUTPATIENT)
Dept: PHYSICAL MEDICINE AND REHAB | Facility: CLINIC | Age: 66
End: 2021-03-21

## 2021-03-21 DIAGNOSIS — M54.50 LUMBAR SPINE PAIN: ICD-10-CM

## 2021-03-23 ENCOUNTER — COMMUNICATION - HEALTHEAST (OUTPATIENT)
Dept: PHYSICAL MEDICINE AND REHAB | Facility: CLINIC | Age: 66
End: 2021-03-23

## 2021-03-23 DIAGNOSIS — M79.18 MYOFASCIAL MUSCLE PAIN: ICD-10-CM

## 2021-03-24 ENCOUNTER — AMBULATORY - HEALTHEAST (OUTPATIENT)
Dept: EDUCATION SERVICES | Facility: CLINIC | Age: 66
End: 2021-03-24

## 2021-03-24 DIAGNOSIS — Z79.4 TYPE 2 DIABETES MELLITUS WITH COMPLICATION, WITH LONG-TERM CURRENT USE OF INSULIN (H): ICD-10-CM

## 2021-03-24 DIAGNOSIS — E11.8 TYPE 2 DIABETES MELLITUS WITH COMPLICATION, WITH LONG-TERM CURRENT USE OF INSULIN (H): ICD-10-CM

## 2021-03-26 ENCOUNTER — COMMUNICATION - HEALTHEAST (OUTPATIENT)
Dept: NURSING | Facility: CLINIC | Age: 66
End: 2021-03-26

## 2021-03-29 ENCOUNTER — OFFICE VISIT - HEALTHEAST (OUTPATIENT)
Dept: PULMONOLOGY | Facility: OTHER | Age: 66
End: 2021-03-29

## 2021-03-29 DIAGNOSIS — J45.20 MILD INTERMITTENT ASTHMA WITHOUT COMPLICATION: ICD-10-CM

## 2021-03-29 DIAGNOSIS — G47.30 SLEEP-DISORDERED BREATHING: ICD-10-CM

## 2021-03-29 DIAGNOSIS — R06.09 DYSPNEA ON EXERTION: ICD-10-CM

## 2021-03-29 ASSESSMENT — MIFFLIN-ST. JEOR: SCORE: 1366.89

## 2021-04-06 ENCOUNTER — OFFICE VISIT - HEALTHEAST (OUTPATIENT)
Dept: OTOLARYNGOLOGY | Facility: CLINIC | Age: 66
End: 2021-04-06

## 2021-04-06 DIAGNOSIS — Z97.4 WEARS HEARING AID IN BOTH EARS: ICD-10-CM

## 2021-04-06 DIAGNOSIS — K21.9 LPRD (LARYNGOPHARYNGEAL REFLUX DISEASE): ICD-10-CM

## 2021-04-09 ENCOUNTER — COMMUNICATION - HEALTHEAST (OUTPATIENT)
Dept: CARE COORDINATION | Facility: CLINIC | Age: 66
End: 2021-04-09

## 2021-04-12 ENCOUNTER — COMMUNICATION - HEALTHEAST (OUTPATIENT)
Dept: PULMONOLOGY | Facility: OTHER | Age: 66
End: 2021-04-12

## 2021-04-12 DIAGNOSIS — J45.909 ASTHMA: ICD-10-CM

## 2021-04-23 ENCOUNTER — COMMUNICATION - HEALTHEAST (OUTPATIENT)
Dept: CARE COORDINATION | Facility: CLINIC | Age: 66
End: 2021-04-23

## 2021-04-24 ENCOUNTER — COMMUNICATION - HEALTHEAST (OUTPATIENT)
Dept: PHYSICAL MEDICINE AND REHAB | Facility: CLINIC | Age: 66
End: 2021-04-24

## 2021-04-24 DIAGNOSIS — M79.18 MYOFASCIAL MUSCLE PAIN: ICD-10-CM

## 2021-04-26 ENCOUNTER — COMMUNICATION - HEALTHEAST (OUTPATIENT)
Dept: FAMILY MEDICINE | Facility: CLINIC | Age: 66
End: 2021-04-26

## 2021-04-26 ENCOUNTER — COMMUNICATION - HEALTHEAST (OUTPATIENT)
Dept: NURSING | Facility: CLINIC | Age: 66
End: 2021-04-26

## 2021-04-28 ENCOUNTER — COMMUNICATION - HEALTHEAST (OUTPATIENT)
Dept: NURSING | Facility: CLINIC | Age: 66
End: 2021-04-28

## 2021-04-29 ENCOUNTER — COMMUNICATION - HEALTHEAST (OUTPATIENT)
Dept: PULMONOLOGY | Facility: OTHER | Age: 66
End: 2021-04-29

## 2021-05-03 ENCOUNTER — COMMUNICATION - HEALTHEAST (OUTPATIENT)
Dept: FAMILY MEDICINE | Facility: CLINIC | Age: 66
End: 2021-05-03

## 2021-05-03 DIAGNOSIS — Z79.4 TYPE 2 DIABETES MELLITUS WITH COMPLICATION, WITH LONG-TERM CURRENT USE OF INSULIN (H): ICD-10-CM

## 2021-05-03 DIAGNOSIS — E11.8 TYPE 2 DIABETES MELLITUS WITH COMPLICATION, WITH LONG-TERM CURRENT USE OF INSULIN (H): ICD-10-CM

## 2021-05-04 ENCOUNTER — COMMUNICATION - HEALTHEAST (OUTPATIENT)
Dept: FAMILY MEDICINE | Facility: CLINIC | Age: 66
End: 2021-05-04

## 2021-05-05 ENCOUNTER — OFFICE VISIT - HEALTHEAST (OUTPATIENT)
Dept: FAMILY MEDICINE | Facility: CLINIC | Age: 66
End: 2021-05-05

## 2021-05-05 ENCOUNTER — AMBULATORY - HEALTHEAST (OUTPATIENT)
Dept: EDUCATION SERVICES | Facility: CLINIC | Age: 66
End: 2021-05-05

## 2021-05-05 DIAGNOSIS — Z79.4 TYPE 2 DIABETES MELLITUS WITH COMPLICATION, WITH LONG-TERM CURRENT USE OF INSULIN (H): ICD-10-CM

## 2021-05-05 DIAGNOSIS — E11.8 TYPE 2 DIABETES MELLITUS WITH COMPLICATION, WITH LONG-TERM CURRENT USE OF INSULIN (H): ICD-10-CM

## 2021-05-05 DIAGNOSIS — J45.909 ASTHMA: ICD-10-CM

## 2021-05-05 DIAGNOSIS — R60.0 LOWER EXTREMITY EDEMA: ICD-10-CM

## 2021-05-05 DIAGNOSIS — Z02.89 ENCOUNTER FOR COMPLETION OF FORM WITH PATIENT: ICD-10-CM

## 2021-05-05 LAB
ANION GAP SERPL CALCULATED.3IONS-SCNC: 13 MMOL/L (ref 5–18)
BUN SERPL-MCNC: 11 MG/DL (ref 8–22)
CALCIUM SERPL-MCNC: 9.8 MG/DL (ref 8.5–10.5)
CHLORIDE BLD-SCNC: 100 MMOL/L (ref 98–107)
CO2 SERPL-SCNC: 26 MMOL/L (ref 22–31)
CREAT SERPL-MCNC: 0.67 MG/DL (ref 0.6–1.1)
GFR SERPL CREATININE-BSD FRML MDRD: >60 ML/MIN/1.73M2
GLUCOSE BLD-MCNC: 128 MG/DL (ref 70–125)
HBA1C MFR BLD: 7.9 %
POTASSIUM BLD-SCNC: 4.4 MMOL/L (ref 3.5–5)
SODIUM SERPL-SCNC: 139 MMOL/L (ref 136–145)

## 2021-05-06 ENCOUNTER — COMMUNICATION - HEALTHEAST (OUTPATIENT)
Dept: FAMILY MEDICINE | Facility: CLINIC | Age: 66
End: 2021-05-06

## 2021-05-07 ENCOUNTER — COMMUNICATION - HEALTHEAST (OUTPATIENT)
Dept: FAMILY MEDICINE | Facility: CLINIC | Age: 66
End: 2021-05-07

## 2021-05-14 ENCOUNTER — COMMUNICATION - HEALTHEAST (OUTPATIENT)
Dept: FAMILY MEDICINE | Facility: CLINIC | Age: 66
End: 2021-05-14

## 2021-05-14 ENCOUNTER — COMMUNICATION - HEALTHEAST (OUTPATIENT)
Dept: PHYSICAL MEDICINE AND REHAB | Facility: CLINIC | Age: 66
End: 2021-05-14

## 2021-05-14 DIAGNOSIS — M54.42 CHRONIC BILATERAL LOW BACK PAIN WITH LEFT-SIDED SCIATICA: ICD-10-CM

## 2021-05-14 DIAGNOSIS — G89.29 CHRONIC BILATERAL LOW BACK PAIN WITH LEFT-SIDED SCIATICA: ICD-10-CM

## 2021-05-17 ENCOUNTER — COMMUNICATION - HEALTHEAST (OUTPATIENT)
Dept: PHYSICAL MEDICINE AND REHAB | Facility: CLINIC | Age: 66
End: 2021-05-17

## 2021-05-17 DIAGNOSIS — M79.18 MYOFASCIAL MUSCLE PAIN: ICD-10-CM

## 2021-05-21 ENCOUNTER — COMMUNICATION - HEALTHEAST (OUTPATIENT)
Dept: FAMILY MEDICINE | Facility: CLINIC | Age: 66
End: 2021-05-21

## 2021-05-21 DIAGNOSIS — G89.29 CHRONIC BILATERAL LOW BACK PAIN WITH LEFT-SIDED SCIATICA: ICD-10-CM

## 2021-05-21 DIAGNOSIS — M54.42 CHRONIC BILATERAL LOW BACK PAIN WITH LEFT-SIDED SCIATICA: ICD-10-CM

## 2021-05-24 ENCOUNTER — COMMUNICATION - HEALTHEAST (OUTPATIENT)
Dept: CARE COORDINATION | Facility: CLINIC | Age: 66
End: 2021-05-24

## 2021-05-26 VITALS — SYSTOLIC BLOOD PRESSURE: 118 MMHG | DIASTOLIC BLOOD PRESSURE: 70 MMHG

## 2021-05-27 VITALS
OXYGEN SATURATION: 91 % | SYSTOLIC BLOOD PRESSURE: 129 MMHG | TEMPERATURE: 98.6 F | WEIGHT: 187 LBS | BODY MASS INDEX: 33.13 KG/M2 | HEART RATE: 86 BPM | DIASTOLIC BLOOD PRESSURE: 75 MMHG

## 2021-05-27 VITALS — BODY MASS INDEX: 33.57 KG/M2 | WEIGHT: 189.5 LBS

## 2021-05-28 ENCOUNTER — COMMUNICATION - HEALTHEAST (OUTPATIENT)
Dept: NURSING | Facility: CLINIC | Age: 66
End: 2021-05-28

## 2021-05-28 ASSESSMENT — ASTHMA QUESTIONNAIRES
ACT_TOTALSCORE: 11
ACT_TOTALSCORE: 5

## 2021-06-01 NOTE — TELEPHONE ENCOUNTER
Who is calling:  Patient  Reason for Call:  Patient wanted to let the clinic know the doxycycline does not work for her. She has had it in the past for a tooth infection. Please add this to her chart.   Date of last appointment with primary care: 9/23/19  Okay to leave a detailed message: Yes

## 2021-06-01 NOTE — TELEPHONE ENCOUNTER
Patient notified. Medication added to allergy list and Dr Treviño added as PCP.    Barbara Tobias, CMA

## 2021-06-01 NOTE — TELEPHONE ENCOUNTER
PT came into clinic to drop off letter to Dr. Treviño, also wanted to see if forms she left on 9/24 were completed for .    Handing note off to Dalila

## 2021-06-01 NOTE — TELEPHONE ENCOUNTER
Who is calling:  Patient Annel  Reason for Call:  Patient has about 5 boxes of unopened freestyle lancets and wondering if you could use them. Patient is requesting if Nurse Humaira could call her back.  Date of last appointment with primary care: [id number]  Okay to leave a detailed message: Yes, if leaving a message , please speak loudly per patient.

## 2021-06-01 NOTE — TELEPHONE ENCOUNTER
Triage call:   Went to [facility] and picked up her inhaler- was not the right prescription- she states that she was previously on the Spiriva respimat inhaler- 2.5 mcg 2 puffs by mouth daily. Patient wants to get this updated. Called pharmacy and confirmed this prescription and they confirmed the above.     [facility] states that this is already ready for her and they will call her and talk to her about it- refills were still available from her previous MD in [place] when Rx was transferred.     She also indicates that her Albuterol was supposed to be ordered 2 puffs in the evening and every 4 hours as needed.      Please have PCP review and update medication lists if appropriate.     Signed a release and living at [facility]- advised that if she wants to have information shared with the  and the nurse at her building that she needs to update her consent form- patient will do so tomorrow.      Humaira Dang RN BSBA [facility] Triage/Med Refill 10/1/2019 4:33 PM    Reason for Disposition    Nursing judgment    Protocols used: NO PROTOCOL AVAILABLE - INFORMATION ONLY-A-[place]

## 2021-06-01 NOTE — TELEPHONE ENCOUNTER
Who is calling:  patient  Reason for Call:  Patient was calling back regarding survey request she received after her recent visit.  Patient states she tried to complete the survey but was unable to send it back; also she was unable to answer the phone call as well.    Patient wanted to let the clinic know is was very happy with her experience and with Dr. Treviño. Stating she was a wonderful provider.    Date of last appointment with primary care: 09/23/19  Okay to leave a detailed message: Yes

## 2021-06-01 NOTE — PROGRESS NOTES
Office Visit - Physical   Annel Stoddard   64 y.o.  female    Date of visit: 9/23/2019  Physician: Gaye Treviño MD     Assessment and Plan   1. Health maintenance examination  Pap 2018  Colon 2015 negative    mammo jan 2019    dexa remote history . Will wait till she is age 65   vaccinations all up to date .         2. Type 2 diabetes mellitus with complication, with long-term current use of insulin (H)  Fair control . Appears to have no complications . Last diabetic eye exam in 2018 showed no retinopathy . Has no neuropathy.  She is on ARB and and statin.  Blood pressure is well controlled diabetic foot exam done today will refer to the diabetes education program to help monitor readings in between doctor visits.  Also to see the eye doctor.  - dulaglutide (TRULICITY) 1.5 mg/0.5 mL PnIj; Inject 1.5 mg under the skin every 7 days.  Dispense: 12 Syringe; Refill: 1  - Ambulatory referral to Diabetes Education Program (Existing Diagnosis)  - Ambulatory referral to Ophthalmology  - Glycosylated Hemoglobin A1c  - Comprehensive Metabolic Panel  - HM2(CBC w/o Differential)  - Microalbumin, Random Urine    3. Anatomical narrow angle glaucoma  She says she thinks she had this diagnosis and had some surgery done.  But she says it is fine right now    4. Hyperactivity of bladder  She has incontinence both stress and urgency.  However she declines medications.  She said she was given desmopressin because she was incontinent at night but she said it did not suit her and she does not want to take any medications.  She has tried pelvic floor exercises  5. Chronic bilateral low back pain with left-sided sciatica  She has chronic back pain with left sided she Edgar, no history of falls she has not had steroid shots she says physical therapy helped so we will refer her for that  - Ambulatory referral to Adult PT- Internal    6. Chronic obstructive airway disease with asthma (H)  She was told she had asthma and is on  "Singulair and Spiriva.  She says that she has never smoked.  It appears that she is allergic to steroid inhaler she says it gives her \"measles\".  - montelukast (SINGULAIR) 10 mg tablet; Take 1 tablet (10 mg total) by mouth daily.  Dispense: 90 tablet; Refill: 1    7. Essential hypertension, benign  Well-controlled on ARB  - losartan (COZAAR) 25 MG tablet; Take 1 tablet (25 mg total) by mouth daily.  Dispense: 90 tablet; Refill: 1    8. Cervical low risk human papillomavirus (HPV) DNA test positive  She had what appears to be 2 Pap smears in 2018 which was positive for HPV in the second in December 2018 which was negative.  Will recheck in December when she comes for her annual physical she will need annual Pap smears but if 2 consecutive are negative we could consider stopping screening.    9. Bipolar 2 disorder (H)  She said she was diagnosed as an adult very recently and is on disability because of this.  She also gets survivorship money from her dad and ex-.  She is really well versed in facilities and and  and is established with a program called/which helps with financial management and support.  She had recently moved and is very happy with her move to [place].  - lamoTRIgine (LAMICTAL) 150 MG tablet; Take 1 tablet (150 mg total) by mouth daily.  Dispense: 90 tablet; Refill: 1    10. Callus of foot  She has calluses on the foot nothing no significant other problems could consider diabetic shoes in the future    11. Mixed hyperlipidemia    - LDL Cholesterol, Direct      She was congratulated on keeping up her health care maintenance.      Time: total time spent with the patient was 40 minutes of which >50% was spent in counseling and coordination of care  Return in about 3 months (around 12/23/2019) for Annual physical.       Patient Profile   Social History     Patient does not qualify to have social determinant information on file (likely too young).   Social History Narrative    Moved " from [place].  4 times . Has 5 children ,9 grandchildren . Her daughters were taken away from her by biological dad when she was ill with botulism . All adults now . All of them are in [facility]. Youngest daughter is 38 . Initially estranged but has a closer relationship .Hadnt worked for a long time . Is a violinist and plays in [place] . Wasn't diagnosed with bipolar much later in life . 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 Did different jobs also like a CNA, . Is on disability due to  ipolar .         Past Medical History   Past Medical History:   Diagnosis Date     Botulism     1980     Pneumonia        Patient Active Problem List    Diagnosis Date Noted     Health maintenance examination 09/23/2019     Overview Note:     Pap 2018  Colon 2015 negative    mammo jan 2019    dexa         Type 2 diabetes mellitus, with long-term current use of insulin (H) 09/23/2019     Anatomical narrow angle glaucoma 09/23/2019     Hyperactivity of bladder 09/23/2019     Bilateral low back pain with left-sided sciatica 09/23/2019     Overview Note:     Only had XRays , mainstay of treatment is PT        Bipolar 2 disorder (H) 09/23/2019     Overview Note:     Was hospitalized . In [place]        Callus of foot 09/23/2019     Mixed hyperlipidemia 09/23/2019           Past Surgical History  She has no past surgical history on file.     History of Present Illness   This 64 y.o. old pleasant woman with bipolar disorder, type 2 diabetes is here to establish care.  She has moved from [place] mostly because of better facilities in the health care and services for her in [place].  She does not have any family here.  But she feels very happy hair she has no new complaints    Review of Systems: A comprehensive review of systems was negative except as noted.     Medications and Allergies   Current Outpatient Medications   Medication Sig Dispense Refill     ARIPiprazole (ABILIFY) 30 MG tablet Take 1  tablet by mouth daily.       aspirin-calcium carbonate 81 mg-300 mg calcium(777 mg) Tab Take 81 mg by mouth daily.       biotin 10,000 mcg cap Take 1 capsule by mouth daily.       blood glucose test (FREESTYLE INSULINX) strips Use 1 each As Directed 3 (three) times a day. Dispense brand per patient's insurance at pharmacy discretion.       cyanocobalamin (VITAMIN B-12) 500 MCG tablet Take 500 mcg by mouth daily.       dulaglutide (TRULICITY) 1.5 mg/0.5 mL PnIj Inject 1.5 mg under the skin every 7 days. 12 Syringe 1     flaxseed oil 1,000 mg cap Take 3 g by mouth daily.       FREESTYLE LANCETS MISC Use 1 Device As Directed 3 (three) times a day.       glipiZIDE (GLUCOTROL) 10 MG tablet Take 1 tablet by mouth 2 (two) times a day.       hydrocortisone 1 % cream Apply 1 application topically as needed.       insulin degludec (TRESIBA FLEXTOUCH U-100) 100 unit/mL (3 mL) InPn Inject 50 Units under the skin daily with supper.       LACTOBACILLUS ACIDOPHILUS ORAL Take 1 tablet by mouth daily.       lamoTRIgine (LAMICTAL) 150 MG tablet Take 1 tablet (150 mg total) by mouth daily. 90 tablet 1     losartan (COZAAR) 25 MG tablet Take 1 tablet (25 mg total) by mouth daily. 90 tablet 1     metFORMIN (GLUMETZA) 500 MG (MOD) 24 hr tablet Take 2,000 mg by mouth daily with supper.       metroNIDAZOLE (METROGEL) 0.75 % gel Apply 1 application topically 2 (two) times a day.       montelukast (SINGULAIR) 10 mg tablet Take 1 tablet (10 mg total) by mouth daily. 90 tablet 1     multivitamin (MULTIVITAMIN) per tablet Take 1 tablet by mouth daily.       mupirocin (BACTROBAN) 2 % cream Apply 1 application topically 3 (three) times a day.       nystatin (MYCOSTATIN) ointment Apply 1 application topically 4 (four) times a day.       rosuvastatin (CRESTOR) 10 MG tablet Take 10 mg by mouth at bedtime.       tiotropium (SPIRIVA) 18 mcg inhalation capsule Place 18 mcg into inhaler and inhale daily.       VENTOLIN HFA 90 mcg/actuation inhaler  "Inhale 1 puff every 4 (four) hours as needed.       No current facility-administered medications for this visit.      Allergies   Allergen Reactions     Desmopressin Swelling     LE  LE       Antihistamines - Alkylamine Other (See Comments)     Chest pains     Cephalexin      rash  rash  rash       Codeine Headache     Headache  Headache  Headache       Diazepam Dizziness and Other (See Comments)     Diphenhydramine Hcl      Chest tightness  Chest tightness  Chest tightness       Erythromycin Base      rash  rash       Estrogens Headache     Hydrochlorothiazide Other (See Comments) and Unknown     Patient reports can't take this med due to increased urine output  Patient reports can't take this med due to increased urine output  Patient reports can't take this med due to increased urine output       Hydrocodone-Acetaminophen      Justin change  Justin change  Justin change       Invokamet [Canagliflozin-Metformin] Itching     Lithium Diarrhea     Diabetes insipidus  Diabetes insipidus  Causing DI  Causing DI       Penicillins Unknown     Risperidone      Beclomethasone Dipropionate Rash     qvar  qvar       Latex Other (See Comments) and Rash     Valacyclovir Rash        Family and Social History   No family history on file.     Social History     Tobacco Use     Smoking status: Never Smoker     Smokeless tobacco: Never Used   Substance Use Topics     Alcohol use: Not on file     Drug use: Not on file          Physical Exam   General Appearance:       /64 (Patient Site: Left Arm, Patient Position: Sitting, Cuff Size: Adult Large)   Pulse 72   Ht 5' 3\" (1.6 m)   Wt 171 lb (77.6 kg)   SpO2 95%   BMI 30.29 kg/m      NECK: Neck appearance was normal. There were no neck masses and the thyroid was not enlarged.  RESPIRATORY: Breathing pattern was normal and the chest moved symmetrically.  Percussion/auscultatory percussion was normal.  Lung sounds were normal and there were no abnormal sounds.  CARDIOVASCULAR: " Heart rate and rhythm were normal.  S1 and S2 were normal and there were no extra sounds or murmurs. Peripheral pulses in arms and legs were normal.  Jugular venous pressure was normal.  There was no peripheral edema.  GASTROINTESTINAL: The abdomen was normal in contour.  Bowel sounds were present.  Percussion detected no organ enlargement or tenderness.  Palpation detected no tenderness, mass, or enlarged organs.   MUSCULOSKELETAL: Skeletal configuration was normal and muscle mass was normal for age. Joint appearance was overall normal.  PSYCHIATRIC:  Mood and affect were normal and the patient had normal recent and remote memory. The patient's judgment and insight were normal.  Diabetic foot exam monofilament testing is negative 2 small calluses one between the fourth and fifth digit on the right foot and 1 next to the first toe on the left foot no pedal edema.  No other deformity no pedal edema     Additional Information        Gaye Treviño MD  Internal Medicine  Contact me at None

## 2021-06-01 NOTE — TELEPHONE ENCOUNTER
Would you like this added to her allergy list? Not sure where else to add. Thank you.    Barbara Tobias, CMA

## 2021-06-01 NOTE — TELEPHONE ENCOUNTER
Dr. Treviño,    [facility] only.    Dulce MCMULLEN LPN .......... 8:24 AM  09/26/19

## 2021-06-01 NOTE — TELEPHONE ENCOUNTER
Spoke with the patient and let her know that we are unable to accept the boxes of lancet even though they are unopened, but thanked the patient for the thoughtful gesture.  She verbalized understanding and had no further questions at this time.  Melanie JIMENEZ, ROSALINA/CMT....................1:50 PM

## 2021-06-02 NOTE — PROGRESS NOTES
[facility] Daily Progress     Patient Name: Annel Stoddard  Date: 10/29/2019  Visit #: 2/12  PTA visit #:    Referral Diagnosis: Chronic bilateral low back pain with left-sided sciatica  Referring provider: Gaye Treviño MD  Visit Diagnosis:     ICD-10-CM    1. Chronic left-sided low back pain with left-sided sciatica M54.42     G89.29    2. Left leg pain M79.605          Assessment:   From Evaluation: Pt is a 64 y.o. year old female with chronic left-sided sciatica that started in August 2018.  Patient has difficulty with walking, sitting and bending secondary to posterior leg pain.   Patient appears motivated to participate in Physical Therapy and present with a good Physical Therapy prognosis for resolution of activities limitations.     Patient demonstrates understanding/independence with home program.  Patient is benefitting from skilled physical therapy and is making steady progress toward functional goals.  Patient is appropriate to continue with skilled physical therapy intervention, as indicated by initial plan of care.    Goal Status: NOT MET   Pt. will demonstrate/verbalize independence in self-management of condition in : in other weeks  [place] Self Management Progress Towards Goal Other: 10 weeks  Pt. will be independent with home exercise program in : 6 weeks  Pt. will report decreased intensity, frequency of : Pain;Comment  Comment:: in left calf   Pt. will have improved quality of sleep: waking less times/night;Comment  Comment:: <2x a night in 10 weeks   Pt. will be able to walk : 30 minutes;with less pain;for community mobility;Comment  Comment:: in 10 weeks     No data recorded    Plan / Patient Education:     Continue with initial plan of care.  Progress with home program as tolerated.     Plan for next session: ask about traction (stay in room due to anxiety)   Progress HEP   Hip flexor stretching for home - and s/l in the clinic   Therapeutic ball ex's ?    Quadruped  with leg extension print HEP   STM to L calf   Subjective:   Pain in calf predominantly, pain in posterior LE and buttock.  Spoke to  in the gym at [facility].  Recumbent bike 3x - 5 min.   Pain Ratin     Gym: treadmills, therapeutic balls, weights  Pt performed a session of PT summer 2019 before she moved to [place].      Objective:     Exercises:  Exercise #1: NG: sciatic nerve glide in supine  Comment #1: Ex's from past PT: SKTC, DKTC, HS stretch in supine, clams, bridge, standing hip abd with band, hooklying hip adduction with DF, UE with light weights.   Exercise #2: pelvic tilts 5 second hold, TA with march   Comment #2: standing hip abd and ext (trialed abd with YTB discontinued due to form)   Exercise #3: gastroc stretch 30 seconds   *added OTB to clams   trialed quadruped - cat/cow and leg extension     SLR on 70 bilaterally negative     MMT: pain bilaterally on L hip abdcution   Extension 2/5 - limited hip extension in supine 5 degrees     Treatment Today     TREATMENT MINUTES COMMENTS   Evaluation     Self-care/ Home management     Manual therapy     Neuromuscular Re-education     Therapeutic Activity     Therapeutic Exercises 38 See ex's flow sheet    Gait training     Modality__________________ 15 min plus set up  Mechanical Lumbar Traction  Progressive  Progressive step 2 & Regressive step 2  Hold Time 60 sec  Rest Time 10 sec with 80% rest force  Hold force: 40# increased to 50# (will increase in 60# next session)   Angle: 24                Total 55    Blank areas are intentional and mean the treatment did not include these items.       Pam Candelaria, PT, DPT   10/29/2019    [facility] Discharge Summary  Patient Name: Annel Stoddard  Date: 2019  Referral Diagnosis: Chronic bilateral low back pain with left-sided sciatica  Referring provider: Gaye Treviño MD  Visit Diagnosis:   1. Chronic left-sided low back pain with left-sided sciatica     2. Left leg pain          Goals: NOT MET   Pt. will demonstrate/verbalize independence in self-management of condition in : in other weeks  [place] Self Management Progress Towards Goal Other: 10 weeks  Pt. will be independent with home exercise program in : 6 weeks  Pt. will report decreased intensity, frequency of : Pain;Comment  Comment:: in left calf   Pt. will have improved quality of sleep: waking less times/night;Comment  Comment:: <2x a night in 10 weeks   Pt. will be able to walk : 30 minutes;with less pain;for community mobility;Comment  Comment:: in 10 weeks     No data recorded    Patient was seen for  visits for 2 visits from 10/21/19 to 10/29/19 with 0 missed appointments.  The patient declined therapy because she could not afford to continue to come .    Therapy will be discontinued at this time.  The patient will need a new referral to resume.    Thank you for your referral.  Pam Candelaria, PT, DPT   11/19/2019  2:28 PM

## 2021-06-02 NOTE — TELEPHONE ENCOUNTER
EMMANUEL - Status Update  Who is Calling: Patient  Update: Patient wanted to let Dr Treviño know that she needed to cancel certain upcoming appointments. She just received a bill for $40.00 for a previous appointment, and cannot afford those co-pays - so she canceled her appointment with the Diabetes educator, and is also canceling her appointments with Physical therapy. Where she lives at [facility] there is a Diabetes educator that provides group sessions there so she will participate in that program. She will keep her mammogram appointment, and then also her Wellness visit with you in December.  Okay to leave a detailed message?:  No return call needed

## 2021-06-02 NOTE — PROGRESS NOTES
[facility]   [place]-Pelvic Initial Evaluation    Patient Name: Annel Stoddard  Date of evaluation: 10/21/2019  Referral Diagnosis: Chronic bilateral low back pain with left-sided sciatica  Referring provider: Gaye Treviño MD **  Visit Diagnosis:     ICD-10-CM    1. Chronic left-sided low back pain with left-sided sciatica M54.42     G89.29    2. Left leg pain M79.605      Precautions:   Type 2 diabetes mellitus, with long-term current use of insulin (H) 09/23/2019      Anatomical narrow angle glaucoma 09/23/2019     Hyperactivity of bladder 09/23/2019     Bilateral low back pain with left-sided sciatica 09/23/2019                 Bipolar 2 disorder (H) 09/23/2019       Overview Note:       Was [place] . In [place]         Callus of foot 09/23/2019     Mixed hyperlipidemia          Assessment:   Pt is a 64 y.o. year old female with chronic left-sided sciatica that started in August 2018.  Patient has difficulty with walking, sitting and bending secondary to posterior leg pain.   Patient appears motivated to participate in Physical Therapy and present with a good Physical Therapy prognosis for resolution of activities limitations.        Pt. is appropriate for skilled PT intervention as outlined in the Plan of Care (POC).  Pt. is a good candidate for skilled PT services to improve pain levels and function.  Plan of care and goals were established in collaboration with patient.     Goals:  Pt. will demonstrate/verbalize independence in self-management of condition in : in other weeks  [place] Self Management Progress Towards Goal Other: 10 weeks  Pt. will be independent with home exercise program in : 6 weeks  Pt. will report decreased intensity, frequency of : Pain;Comment  Comment:: in left calf   Pt. will have improved quality of sleep: waking less times/night;Comment  Comment:: <2x a night in 10 weeks   Pt. will be able to walk : 30 minutes;with less pain;for community  mobility;Comment  Comment:: in 10 weeks       Patient's expectations/goals are realistic.    Barriers to Learning or Achieving Goals:  Chronicity of the problem.       Plan / Patient Instructions:        Plan of Care:   Authorization / Certification Start Date: 10/21/19  Authorization / Certification End Date: 12/30/19  Authorization / Certification Number of Visits: 10   Communication with: Referral Source  Patient Related Instruction: Nature of Condition;Treatment plan and rationale;Self Care instruction;Basis of treatment;Next steps;Expected outcome  Times per Week: 1  Number of Weeks: 10  Number of Visits: 10  Discharge Planning: self management of symptoms   Therapeutic Exercise: ROM;Stretching;Strengthening  Neuromuscular Reeducation: kinesio tape;posture;balance/proprioception;core  Manual Therapy: soft tissue mobilization;myofascial release;joint mobilization  Modalities: traction;TENS  Gait Training: normalize without use of AD  Equipment: theraband      Plan for next visit: review HEP again, ask about gym in building      Subjective:         Social information:   Living Situation[place]    Occupation:disability due to Bipolar disorder    Work Status:NA   Equipment Available: Pt uses [employer] cart     Bipolar 2 disorder (H)  She said she was diagnosed as an adult very recently and is on disability because of this.  She also gets survivorship money from her dad and ex-.  She is really well versed in facilities and and  and is established with a program called/which helps with financial management and support.  She had recently moved and is very happy with her move to [place].    History of Present Illness:    Annel is a 64 y.o. female who presents to therapy today with complaints of left sciatica. Date of onset/duration of symptoms is August 2018. Onset was sudden, pt thought she over did walking. Symptoms are intermittent. She denies history of similar symptoms. She  "describes their previous level of function as not limited    Pain Ratin  Pain rating at best: 0  Pain rating at worst: 10  Pain description: pain starts in left buttock, travels down posterior leg and into calf. Pain is worse in calf.   When it first started pt reports numbness/tingling but now it \"just hurts\".     Functional limitations are described as occurring with:   Stand 30 min - sometimes ok if she is not holding anything   Sit 30 min - at times sitting is painful   Walk 15 min - pain with walking   sit to stand, sleeping (cramps in legs - has been going on since she was a kid), bending, stairs, lifting, kneeling   Pt walks miles everyday because she does not have a car.     Patient reports benefit from:  exercises she was given in PT       She has chronic back pain with left sided she [place], no history of falls she has not had steroid shots she says physical therapy helped so we will refer her for that    Objective:      Note: Items left blank indicates the item was not performed or not indicated at the time of the evaluation.    Patient Outcome Measures :    Modified Oswestry Low Back Pain Disablity Questionnaire  in %: 60     Scores range from 0-100%, where a score of 0% represents minimal pain and maximal function. The minimal clinically important difference is a score reduction of 12%.    Examination  1. Chronic left-sided low back pain with left-sided sciatica     2. Left leg pain       Involved side: Left  Posture Observation:      General sitting posture is  fair.    Lumbar ROM:    Date: 10/21/2019     *Indicate scale AROM AROM AROM   Lumbar Flexion 14\" fingertip to floor   Some increase in pain L buttock and L calf     Lumbar Extension 75%   Sensation in L calf       Right Left Right Left Right Left   Lumbar Sidebending WFL WFL       Lumbar Rotation WFL WFL       Thoracic Flexion      Thoracic Extension      Thoracic Sidebending         Thoracic Rotation           Lower Extremity Strength:   "   Date: 10/21/2019     LE strength/5 Right Left Right Left Right Left   Hip Flexion (L1-3) P in right hip 4/5 but unable to perform through full ROM 4/5        Hip Extension (L5-S1)         Hip Abduction (L4-5)         Hip Adduction (L2-3)         Hip External Rotation         Hip Internal Rotation         Knee Extension (L3-4) 4+ 4+       Knee Flexion 4+ 4+       Ankle Dorsiflexion (L4-5) 4+ 4+       Great Toe Extension (L5) 4+ 4+       Ankle Plantar flexion (S1) 5x  4x unable to lift through full range        Abdominals        Sensation           Reflex Testing  Lumbar Dermatomes Right Left UE Reflexes Right Left   Iliac Crest and Groin (L1)   Biceps (C5-6)     Anterior Medial Thigh (L2)   Brachioradialis (C5-6)     Anterior Thigh, Medial Epicondyle Femur (L3)   Triceps (C7-8)     Lateral Thigh, Anterior Knee, Medial Leg/Malleolus (L4)   Colton s test (SC compression)   Flick middle finger = thumb flexion     Lateral Leg, Dorsal Foot (L5)   LE Reflexes     Lateral Foot (S1)   Patellar (L3-4) 2+ 2+   Posterior Leg (S2)   Achilles (S1-2)     Other:   Babinski Response       Palpation: pain to gastrocnemius on L     Lumbar Special Tests:     Lumbar Special Tests Right Left SI Tests Right  Left   Quadrant test   SI Compression- side lying      Straight leg raise   SI Distraction - supine     Distraction    POSH Test - thigh thrust      Slump - - Sacral Thrust     Sit-up test  Gaenslen's test (hip flex and hip ext - press)      Prone instability test  FADIR     Trunk extensor endurance test  Femoral Nerve Tension (prone)       Pubic shotgun  Resisted Abduction in supine       FADER       Repeated Motion Testing:  Does not centralize  Does not peripheralize    Passive Mobility - Joint Integrity: Spring Testing  gentle grade II mobs with in increase in pain    LE Screen:  hip ROM: IR limited on R>L     Hip PROM around 110   Hip ER 30-40 degrees     Exercises:  Exercise #1: NG: sciatic nerve glide in supine  Comment #1:  Ex's from past PT: SKTC, DKTC, HS stretch in supine, clams, bridge, standing hip abd with band, UE with light weights.     There is a gym where she lives.  Has access to [employer], can use for free but reports she is too busy.  Walks every where.     Treatment Today     TREATMENT MINUTES COMMENTS   Evaluation 24 -lumbar spine   Self-care/ Home management     Manual therapy     Neuromuscular Re-education     Therapeutic Activity     Therapeutic Exercises 23 -see exercise flow sheet  -educated on POC, diagnosis, relevant anatomy, basis for treatment and HEP   Gait training     Modality__________________                Total 47    Blank areas are intentional and mean the treatment did not include these items.     PT Evaluation Code: (Please list factors)  Patient History/Comorbidities: see above  Examination: see above  Clinical Presentation: stable  Clinical Decision Making: low     Patient History/  Comorbidities Examination  (body structures and functions, activity limitations, and/or participation restrictions) Clinical Presentation Clinical Decision Making (Complexity)   No documented Comorbidities or personal factors 1-2 Elements Stable and/or uncomplicated Low   1-2 documented comorbidities or personal factor 3 Elements Evolving clinical presentation with changing characteristics Moderate   3-4 documented comorbidities or personal factors 4 or more Unstable and unpredictable High                Pam Candelaria, PT, DPT  10/21/2019  3:01 PM

## 2021-06-03 VITALS
HEIGHT: 63 IN | HEART RATE: 72 BPM | SYSTOLIC BLOOD PRESSURE: 136 MMHG | BODY MASS INDEX: 30.3 KG/M2 | WEIGHT: 171 LBS | DIASTOLIC BLOOD PRESSURE: 64 MMHG | OXYGEN SATURATION: 95 %

## 2021-06-03 NOTE — TELEPHONE ENCOUNTER
RN cannot approve Refill Request    RN can NOT refill this medication historical medication requested. Last office visit: 9/23/2019 Gaye Treviño MD Last Physical: Visit date not found Last MTM visit: Visit date not found Last visit same specialty: 9/23/2019 Gaye Treviño MD.  Next visit within 3 mo: Visit date not found  Next physical within 3 mo: Visit date not found      Caroline Cox, [facility] Triage/Med Refill 11/14/2019    Requested Prescriptions   Pending Prescriptions Disp Refills     metFORMIN (GLUMETZA) 500 MG (MOD) 24 hr tablet  0     Sig: Take 4 tablets (2,000 mg total) by mouth daily with supper.       Metformin Refill Protocol Passed - 11/14/2019  9:07 AM        Passed - Blood pressure in last 12 months     BP Readings from Last 1 Encounters:   09/23/19 136/64             Passed - LFT or AST or ALT in last 12 months     Albumin   Date Value Ref Range Status   09/23/2019 4.1 3.5 - 5.0 g/dL Final     Bilirubin, Total   Date Value Ref Range Status   09/23/2019 0.4 0.0 - 1.0 mg/dL Final     Alkaline Phosphatase   Date Value Ref Range Status   09/23/2019 89 45 - 120 U/L Final     AST   Date Value Ref Range Status   09/23/2019 50 (H) 0 - 40 U/L Final     ALT   Date Value Ref Range Status   09/23/2019 62 (H) 0 - 45 U/L Final     Protein, Total   Date Value Ref Range Status   09/23/2019 7.3 6.0 - 8.0 g/dL Final                Passed - GFR or Serum Creatinine in last 6 months     GFR MDRD Non Af Amer   Date Value Ref Range Status   09/23/2019 >60 >60 mL/min/1.73m2 Final     GFR MDRD Af Amer   Date Value Ref Range Status   09/23/2019 >60 >60 mL/min/1.73m2 Final             Passed - Visit with PCP or prescribing provider visit in last 6 months or next 3 months     Last office visit with prescriber/PCP: 9/23/2019 OR same dept: 9/23/2019 Gaye Trevñio MD OR same specialty: 9/23/2019 Gaye Treviño MD Last physical: Visit date not found Last MTM visit: Visit date not found         Next  appt within 3 mo: Visit date not found  Next physical within 3 mo: Visit date not found  Prescriber OR PCP: Gaye Treviño MD  Last diagnosis associated with med order: There are no diagnoses linked to this encounter.   If protocol passes may refill for 12 months if within 3 months of last provider visit (or a total of 15 months).           Passed - A1C in last 6 months     Hemoglobin A1c   Date Value Ref Range Status   09/23/2019 7.4 (H) 3.5 - 6.0 % Final               Passed - Microalbumin in last year      Microalbumin, Random Urine   Date Value Ref Range Status   09/23/2019 <0.50 0.00 - 1.99 mg/dL Final

## 2021-06-03 NOTE — TELEPHONE ENCOUNTER
CA called and spoke with patient.  She stated understanding and that she would like to try dietary changes for a couple of weeks to see if her BS results will improve. Pt states she will contact provider in a couple of weeks if no improvement to have provider adjust an order for 6 unit a day increase.  Pt also stated she would like to inform provider that she has brought all of her CDs for previous testing to [facility] radiology should provider wish to review any previous testing in the past.  Pt thanked for call and wished CA a happy Thanksgiving. Dalila Shipley CMA ([facility]) 11:16 AM

## 2021-06-03 NOTE — PROGRESS NOTES
Office Visit - Follow Up   Annel Stoddard   64 y.o. female    Date of Visit: 11/26/2019         Assessment and Plan   1. Screen for colon cancer  She is due in 2020 order has been placed she had a polyp removed in 2015  - Ambulatory referral for Colonoscopy    2. Bipolar 2 disorder (H)  She is doing really well controlled well-controlled on Lamictal and Abilify very proactive participate socially mood is and affect is excellent.  - lamoTRIgine (LAMICTAL) 150 MG tablet; Take 1 tablet (150 mg total) by mouth daily.  Dispense: 90 tablet; Refill: 3  - ARIPiprazole (ABILIFY) 30 MG tablet; Take 1 tablet (30 mg total) by mouth daily.  Dispense: 90 tablet; Refill: 2    3. Chronic obstructive airway disease with asthma (H)  She has had no flareup of her COPD her oxygen saturation is excellent  - montelukast (SINGULAIR) 10 mg tablet; Take 1 tablet (10 mg total) by mouth daily.  Dispense: 90 tablet; Refill: 2  - albuterol (PROVENTIL) 2.5 mg /3 mL (0.083 %) nebulizer solution; Take 3 mL (2.5 mg total) by nebulization every 6 (six) hours as needed for wheezing.  Dispense: 6 vial; Refill: 3  - tiotropium bromide 2.5 mcg/actuation Mist; Inhale 1 puff daily. Inhale 1 puffs by mouth daily  Dispense: 50 g; Refill: 3    4. Chronic obstructive pulmonary disease, unspecified COPD type (H)    - VENTOLIN HFA 90 mcg/actuation inhaler; Inhale 1 puff every 4 (four) hours as needed.  Dispense: 3 Inhaler; Refill: 3    5. Essential hypertension, benign  Blood pressures in excellent control she is compliant with all her medications  - losartan (COZAAR) 25 MG tablet; Take 1 tablet (25 mg total) by mouth daily.  Dispense: 90 tablet; Refill: 2    6. Type 2 diabetes mellitus with complication, with long-term current use of insulin (H)  We will do her A1c today.  She has had her annual eye exam blood pressure is controlled she is on a statin and aspirin diabetic foot exam was done today.  She is up-to-date in her urine microalbumin.  And she is  "on an ARB she admits to not being compliant with her diet and her random blood sugars are higher but she is compliant with all her medications  - dulaglutide (TRULICITY) 1.5 mg/0.5 mL PnIj; Inject 1.5 mg under the skin every 7 days.  Dispense: 12 Syringe; Refill: 1  - BD ULTRA-FINE GERARDO PEN NEEDLE 32 gauge x 5/32\" Ndle; U WITH INSULIN PEN ONCE D; Refill: 0  - blood glucose test (FREESTYLE INSULINX) strips; Use 1 each As Directed 3 (three) times a day. Dispense brand per patient's insurance at pharmacy discretion.; Refill: 0  - glipiZIDE (GLUCOTROL) 10 MG tablet; Take 1 tablet (10 mg total) by mouth 2 (two) times a day.  Dispense: 180 tablet; Refill: 2  - insulin degludec (TRESIBA FLEXTOUCH U-100) 100 unit/mL (3 mL) InPn; Inject 50 Units under the skin daily with supper.  Dispense: 6 Syringe; Refill: 3  - rosuvastatin (CRESTOR) 10 MG tablet; Take 1 tablet (10 mg total) by mouth at bedtime.  Dispense: 90 tablet; Refill: 2  - Glycosylated Hemoglobin A1c  - AST (SGOT)  - ALT (SGPT)    7. Hyperactivity of bladder  She antimuscarinic agents are contraindicated in her because of her ankle \"acute angle glaucoma    8. Health maintenance examination  She is up-to-date in her health maintenance she is due for a Pap in the next month she will come back for that.    9. Elevated liver function tests  She did have a history of elevated liver function tests because of fatty liver.  She never got her ultrasound done this year she said she had it done in [place] so will do it next year most likely she has Flanagan we will do hep B and hep C screening  - Hepatitis B Surface Antigen (HBsAG)  - Hepatitis C Antibody (Anti-HCV)    10. Encounter for screening for other viral diseases     - Hepatitis B Surface Antigen (HBsAG)    11. Fungal skin disease  At the skin lesions do look like tenia .  Trial of ketoconazole cream otherwise it may be a form of eczema.- ketoconazole (NIZORAL) 2 % cream; Twice a day for two weeks  Dispense: 15 g; " "Refill: 0    We talked about her low back pain I did say that we reviewed the MRI and there is really no treatment for her back pain other than physical therapy which she is doing when it gets intractable we may consider repeat MRI and referral to neurosurgery she completely declined steroid shots and she even wants to not get an MRI done however there is no way to do an cecilia assessment without an MRI imaging or CT myelogram.  Currently she does not have any signs of severe radiculopathy on exam or intractable pain so we will tell her to continue conservative care.        Return in about 4 months (around 3/26/2020).     History of Present Illness   This 64 y.o. old   Chief Complaint   Patient presents with     Medication Problem     med concerns and questions about metformin Rx and refill         Review of Systems: A comprehensive review of systems was negative except as noted.     Medications, Allergies and Problem List   Reviewed, reconciled and updated  Patient Active Problem List   Diagnosis     Health maintenance examination     Type 2 diabetes mellitus, with long-term current use of insulin (H)     Anatomical narrow angle glaucoma     Hyperactivity of bladder     Bilateral low back pain with left-sided sciatica     Bipolar 2 disorder (H)     Callus of foot     Mixed hyperlipidemia        Physical Exam   General Appearance:       /72 (Patient Site: Left Arm)   Pulse 80   Ht 5' 3\" (1.6 m)   Wt 172 lb (78 kg)   SpO2 96%   BMI 30.47 kg/m      General appearance - alert, well appearing, and in no distress  Mental status - alert, oriented to person, place, and time  Neck - supple, no significant adenopathy  Lymphatics - no palpable lymphadenopathy, no hepatosplenomegaly  Chest - clear to auscultation, no wheezes, rales or rhonchi, symmetric air entry  Heart - normal rate, regular rhythm, normal S1, S2, no murmurs, rubs, clicks or gallops  AMusculoskeletal - no joint tenderness, deformity or swelling " "straight leg raising is negative she has normal symmetrical power of her legs.  Reflexes are symmetrical and brisk  Extremities - peripheral pulses normal, no pedal edema, no clubbing or cyanosis  Skin - 2 oval discrete shapes with no erythema patches and above right groin away from the pubic hair with no central clearing was clear or scaling there is no intertriginous lesions.    Foot exam, inspection is normal she has mild calluses nails look healthy peripheral pulses are normal monofilament testing is negative                                                                                   Additional Information   Current Outpatient Medications   Medication Sig Dispense Refill     albuterol (PROVENTIL) 2.5 mg /3 mL (0.083 %) nebulizer solution Take 3 mL (2.5 mg total) by nebulization every 6 (six) hours as needed for wheezing. 6 vial 3     ARIPiprazole (ABILIFY) 30 MG tablet Take 1 tablet (30 mg total) by mouth daily. 90 tablet 2     aspirin-calcium carbonate 81 mg-300 mg calcium(777 mg) Tab Take 81 mg by mouth daily.       BD ULTRA-FINE GERARDO PEN NEEDLE 32 gauge x 5/32\" Ndle U WITH INSULIN PEN ONCE D  0     biotin 10,000 mcg cap Take 1 capsule by mouth daily.       blood glucose test (FREESTYLE INSULINX) strips Use 1 each As Directed 3 (three) times a day. Dispense brand per patient's insurance at pharmacy discretion.  0     dulaglutide (TRULICITY) 1.5 mg/0.5 mL PnIj Inject 1.5 mg under the skin every 7 days. 12 Syringe 1     flaxseed oil 1,000 mg cap Take 3 g by mouth daily.       FREESTYLE LANCETS MISC Use 1 Device As Directed 3 (three) times a day.       glipiZIDE (GLUCOTROL) 10 MG tablet Take 1 tablet (10 mg total) by mouth 2 (two) times a day. 180 tablet 2     hydrocortisone 1 % cream Apply 1 application topically as needed.       insulin degludec (TRESIBA FLEXTOUCH U-100) 100 unit/mL (3 mL) InPn Inject 50 Units under the skin daily with supper. 6 Syringe 3     LACTOBACILLUS ACIDOPHILUS ORAL Take 1 tablet " by mouth daily.       lamoTRIgine (LAMICTAL) 150 MG tablet Take 1 tablet (150 mg total) by mouth daily. 90 tablet 3     losartan (COZAAR) 25 MG tablet Take 1 tablet (25 mg total) by mouth daily. 90 tablet 2     magnesium citrate 125 mg cap Take by mouth.       MAGNESIUM CITRATE ORAL Take 500 mg by mouth daily before breakfast.       metFORMIN (GLUCOPHAGE XR) 500 MG 24 hr tablet Take 4 tablets (2,000 mg total) by mouth daily with breakfast. (Patient taking differently: Take 2,000 mg by mouth daily with supper. ) 360 tablet 11     metroNIDAZOLE (METROGEL) 0.75 % gel Apply 1 application topically 2 (two) times a day.       montelukast (SINGULAIR) 10 mg tablet Take 1 tablet (10 mg total) by mouth daily. 90 tablet 2     multivitamin (MULTIVITAMIN) per tablet Take 1 tablet by mouth daily.       mupirocin (BACTROBAN) 2 % cream Apply 1 application topically 3 (three) times a day.       nystatin (MYCOSTATIN) ointment Apply 1 application topically 4 (four) times a day.       rosuvastatin (CRESTOR) 10 MG tablet Take 1 tablet (10 mg total) by mouth at bedtime. 90 tablet 2     tiotropium bromide 2.5 mcg/actuation Mist Inhale 1 puff daily. Inhale 1 puffs by mouth daily 50 g 3     VENTOLIN HFA 90 mcg/actuation inhaler Inhale 1 puff every 4 (four) hours as needed. 3 Inhaler 3     vitamin B complex (B COMPLEX-VITAMIN B12 ORAL) Take 1,000 mcg by mouth daily.       ketoconazole (NIZORAL) 2 % cream Twice a day for two weeks 15 g 0     MILAN 128 5 % ophthalmic ointment APPLY A SMALL AMOUNT IN OU QPM  3     No current facility-administered medications for this visit.      Allergies   Allergen Reactions     Desmopressin Swelling     LE  LE       Antihistamines - Alkylamine Other (See Comments)     Chest pains     Cephalexin      rash  rash  rash       Codeine Headache     Headache  Headache  Headache       Diazepam Dizziness and Other (See Comments)     Diphenhydramine Hcl      Chest tightness  Chest tightness  Chest tightness        Doxycycline      Does not work. Ineffective per patient.     Erythromycin Base      rash  rash       Estrogens Headache     Hydrochlorothiazide Other (See Comments) and Unknown     Patient reports can't take this med due to increased urine output  Patient reports can't take this med due to increased urine output  Patient reports can't take this med due to increased urine output       Hydrocodone-Acetaminophen      Justin change  Justin change  Justin change       Invokamet [Canagliflozin-Metformin] Itching     Lithium Diarrhea     Diabetes insipidus  Diabetes insipidus  Causing DI  Causing DI       Penicillins Unknown     Risperidone      Beclomethasone Dipropionate Rash     qvar  qvar       Latex Rash and Other (See Comments)     rash     Valacyclovir Rash     Social History     Social History Narrative    Moved from [place].  4 times . Has 5 children ,9 grandchildren . Her daughters were taken away from her by biological dad when she was ill with botulism . All adults now . All of them are in [facility]. Youngest daughter is 38 . Initially estranged but has a closer relationship .Hadnt worked for a long time . Is a violinist and plays in [employer] . Wasn't diagnosed with bipolar much later in life . 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 Did different jobs also like a CNA, . Is on disability due to  ipolar .       reports that she has never smoked. She has never used smokeless tobacco. No history on file for alcohol and drug.   Past Medical History:   Diagnosis Date     Botulism     1980     Pneumonia            Time: total time spent with the patient was 25 minutes of which >50% was spent in counseling and coordination of care     Gaye Treviño MD

## 2021-06-03 NOTE — TELEPHONE ENCOUNTER
----- Message from Gaye Treviño MD sent at 11/27/2019 10:37 AM CST -----  The liver tests are still elevated but the hepatitis tests are negative . The diabetic aic test is unfortunately higher than last time and in the poor control range . As discussed at visit , she needs to watch her diet , we can increase the tresiba ( insulin )  up by 6 units a day . Please let me know so I can change order

## 2021-06-03 NOTE — TELEPHONE ENCOUNTER
"Medication Question or Clarification  Who is calling: Patient  What medication are you calling about? (include dose and sig) Metformin 500 mg (Modified) 24 hour tablet.  Take 4 tablets with supper.  Who prescribed the medication?: Gaye Treviño MD   What is your question/concern?: Patient has not had \"modified\" version in the past.  Is this correct?  Also, patient requested 90 day supply.  Pharmacy: [facility] #[id number]  Okay to leave a detailed message?: Yes  Site CMT - Please call the pharmacy to obtain any additional needed information.  "

## 2021-06-03 NOTE — TELEPHONE ENCOUNTER
Per chart review provider has left msg for patient and currently pt has appt scheduled with PCP on 11/26. Dalila Shipley CMA ([facility]) 2:46 PM

## 2021-06-03 NOTE — TELEPHONE ENCOUNTER
Pt came in stating she got the wrong medication sent to pharmacy it was suppose to be for Metformin  mg 4 at dinner, she received plain not XR, so needs that called in. Also would like to talk to Dr. Treviño about her blood sugars have been high since on the plain Metformin please call [phone].

## 2021-06-03 NOTE — TELEPHONE ENCOUNTER
Left message to call back for: Annel  Information to relay to patient:  Please relay message below from Dr. Treviño and help the patient schedule an appointment as requested.  Melanie JIMENEZ CMA/MASOUD....................12:26 PM

## 2021-06-03 NOTE — TELEPHONE ENCOUNTER
Duplicate Please see other ENC for this date. Dalila Shipley, ROSALINA ([facility]) 11:17 AM

## 2021-06-03 NOTE — TELEPHONE ENCOUNTER
Called pt and left message . Will try and call her back but ideally she should come in for a visit to discuss her blood sugars

## 2021-06-04 ENCOUNTER — COMMUNICATION - HEALTHEAST (OUTPATIENT)
Dept: FAMILY MEDICINE | Facility: CLINIC | Age: 66
End: 2021-06-04

## 2021-06-04 VITALS
HEART RATE: 72 BPM | DIASTOLIC BLOOD PRESSURE: 64 MMHG | OXYGEN SATURATION: 94 % | BODY MASS INDEX: 30.48 KG/M2 | SYSTOLIC BLOOD PRESSURE: 120 MMHG | WEIGHT: 172 LBS | HEIGHT: 63 IN

## 2021-06-04 VITALS
SYSTOLIC BLOOD PRESSURE: 120 MMHG | WEIGHT: 172 LBS | HEIGHT: 63 IN | OXYGEN SATURATION: 96 % | BODY MASS INDEX: 30.48 KG/M2 | HEART RATE: 80 BPM | DIASTOLIC BLOOD PRESSURE: 72 MMHG

## 2021-06-04 VITALS — BODY MASS INDEX: 31.18 KG/M2 | WEIGHT: 176 LBS

## 2021-06-04 DIAGNOSIS — R06.09 DYSPNEA ON EXERTION: ICD-10-CM

## 2021-06-04 NOTE — TELEPHONE ENCOUNTER
Covering for Dr. Treviño,    Insurance question about OBGYN has been handled by specialty scheduling and pt.     Elevated blood sugars. A1C was elevated and now the blood sugar are in the 350's. Please contact patient at [phone] about blood sugar levels.     Patient is scheduled with GILBERT Olson at [facility] on 1/28/20. No sooner appointment at a location on public transportation.

## 2021-06-04 NOTE — TELEPHONE ENCOUNTER
Called pt.   Dr Treviño referred her to [facility] but she cant go there due to her insurance.  She will call back with names of places she can go when she returns home  Yoselyn Aguirre CMA

## 2021-06-04 NOTE — TELEPHONE ENCOUNTER
Triage call:   1. Rash on her abdomen- cream given by PCP is not helping- 2 spots that seem to be getting bigger- no drainage      11. Fungal skin disease  At the skin lesions do look like tenia .  Trial of ketoconazole cream otherwise it may be a form of eczema.- ketoconazole (NIZORAL) 2 % cream; Twice a day for two weeks  Dispense: 15 g; Refill: 0    She reports that she feels like the rash is getting bigger - located over the area of her tummy tuck scar.     Patient is wondering if PCP could prescribe something else for her symptoms.      2. Reports that she takes refresh eye drops and ointment- using this and helping- she wants these added to her medication list    3. Taking her metformin at dinner time- doesn't take at breakfast as it is written in the med list- would like this updated.   She reports that she has been feeling more stress lately- BG was 220 and she states that she gaining weight- she also had a cold sore on her lip- provided reassurance and emotional support- talked about health lifestyle choices and she state that she is going to the gym and that she is going to see a friend today.     PCP please advise.     Pharmacy and allergies verified.     Humaira Dang RN BSBA [facility] Triage/Med Refill 12/9/2019 10:16 AM     Reason for Disposition    Localized rash present > 7 days    Protocols used: RASH OR REDNESS - [id number]

## 2021-06-04 NOTE — TELEPHONE ENCOUNTER
Medication Question or Clarification  Who is calling: Patient  What medication are you calling about? (include dose and sig)   tiotropium bromide 2.5 mcg/actuation Mist 50 g 3 11/26/2019     Sig - Route: Inhale 1 puff daily. Inhale 1 puffs by mouth daily - Inhalation    Sent to pharmacy as: tiotropium bromide 2.5 mcg/actuation mist for inhalation    E-Prescribing Status: Receipt confirmed by pharmacy (11/26/2019 11:35 AM CST)        Who prescribed the medication?: Gaye Treviño MD    What is your question/concern?: Insurance coverage has changed does not cover this and it will cost well over a hundred. Asking for another medication  Pharmacy: [facility] [id number]  Okay to leave a detailed message?: Yes  Site CMT - Please call the pharmacy to obtain any additional needed information.

## 2021-06-04 NOTE — TELEPHONE ENCOUNTER
Please confirm that she is using 56 units daily of insulin.  If so, and her sugars are running in the 350's, she can increase to 60 units but really needs to be seen by the diabetic educator to work on other options for insulin.  Thanks.

## 2021-06-04 NOTE — TELEPHONE ENCOUNTER
FYI - Status Update  Who is Calling: Patient  Update: Patient had neglected to inform the PCP  tht she does have a history of gallstones, that her liver enzymes have been elevated since her 30's, and that she will be having an US of the liver.  Okay to leave a detailed message?:  No return call needed

## 2021-06-04 NOTE — TELEPHONE ENCOUNTER
The pap smear showed normal cells, but with the abnormal HPV which Dr. Treviño discussed with her, she still needs the gynecology/colposcopy visit which Dr. Treviño recommended.  Please let her know.  Thanks.

## 2021-06-04 NOTE — TELEPHONE ENCOUNTER
Spoke with the patient and relayed message below from Dr. Treviño.  Patient verbalized understanding and had no further questions at this time.  Melanie JIMENEZ, ROSALINA/MASOUD....................4:31 PM

## 2021-06-04 NOTE — TELEPHONE ENCOUNTER
Medication Question or Clarification    Who is calling: Patient     What medication are you calling about? (include dose and sig)    Disp Refills Start End    insulin degludec (TRESIBA FLEXTOUCH U-100) 100 unit/mL (3 mL) InPn 6 Syringe 3 11/26/2019     Sig - Route: Inject 50 Units under the skin daily with supper.         Who prescribed the medication?: PCP    What is your question/concern?:   Patient states she is unable to manage her blood sugars with diet and has increase her dose to 56unit/ml. Need dosing adjusted on med list.    Pharmacy: [id number] - [place], [place] - [address] AT [facility] [address]    Okay to leave a detailed message?: Yes  Site CMT - Please call the pharmacy to obtain any additional needed information.    Please edit dosing on sig. line and send new direction to patients pharmacy.

## 2021-06-04 NOTE — TELEPHONE ENCOUNTER
Left detailed message for the patient relaying the message below from Dr. Treviño.  Asked that she call with any further questions.  Melanie JIMENEZ CMA/MASOUD....................9:06 AM

## 2021-06-04 NOTE — TELEPHONE ENCOUNTER
Type of referral:  Gyn  What provider or facility are you being referred to?  [facility]  Do you have an appointment scheduled?  Yes  What is your question?  Patient's insurnace will be changing to [facility] and [facility] is not in their network, will need a different referral/clinic that is in network please  Okay to leave a detailed message: Yes

## 2021-06-04 NOTE — TELEPHONE ENCOUNTER
Medication Question or Clarification  Who is calling: Patient     What medication are you calling about? (include dose and sig)   metFORMIN (GLUCOPHAGE XR) 500 MG 24 hr tablet 360 tablet 11 11/20/2019     Sig - Route: Take 4 tablets (2,000 mg total) by mouth daily with breakfast        Who prescribed the medication?: PCP    What is your question/concern?:   Patient is requesting direction change of this med. She states she takes the med at dinner, not in the AM and would like that change with correct instructions.    Pharmacy: [facility] #[id number] - [place], [place] - [address] [place] [address] N AT [facility] [address]    Okay to leave a detailed message?: Yes  Site CMT - Please call the pharmacy to obtain any additional needed information.    Please correct dosing instruction per patient.

## 2021-06-04 NOTE — TELEPHONE ENCOUNTER
I have strenght a prescription strenght sterid cream . If not better we might need to do a biopsy

## 2021-06-05 VITALS — HEART RATE: 80 BPM | OXYGEN SATURATION: 99 % | HEIGHT: 63 IN | WEIGHT: 183 LBS | BODY MASS INDEX: 32.43 KG/M2

## 2021-06-05 VITALS
HEART RATE: 77 BPM | RESPIRATION RATE: 16 BRPM | TEMPERATURE: 97.4 F | SYSTOLIC BLOOD PRESSURE: 121 MMHG | WEIGHT: 185 LBS | DIASTOLIC BLOOD PRESSURE: 69 MMHG | BODY MASS INDEX: 32.77 KG/M2

## 2021-06-05 VITALS
TEMPERATURE: 98.2 F | SYSTOLIC BLOOD PRESSURE: 128 MMHG | WEIGHT: 177 LBS | OXYGEN SATURATION: 95 % | HEIGHT: 63 IN | DIASTOLIC BLOOD PRESSURE: 60 MMHG | HEART RATE: 80 BPM | BODY MASS INDEX: 31.36 KG/M2

## 2021-06-05 VITALS
WEIGHT: 177.4 LBS | SYSTOLIC BLOOD PRESSURE: 126 MMHG | DIASTOLIC BLOOD PRESSURE: 62 MMHG | OXYGEN SATURATION: 97 % | HEIGHT: 63 IN | BODY MASS INDEX: 31.43 KG/M2 | HEART RATE: 73 BPM | TEMPERATURE: 97.6 F

## 2021-06-05 VITALS
HEART RATE: 93 BPM | OXYGEN SATURATION: 99 % | WEIGHT: 183 LBS | SYSTOLIC BLOOD PRESSURE: 135 MMHG | DIASTOLIC BLOOD PRESSURE: 79 MMHG | BODY MASS INDEX: 32.42 KG/M2 | RESPIRATION RATE: 16 BRPM

## 2021-06-05 VITALS
DIASTOLIC BLOOD PRESSURE: 62 MMHG | BODY MASS INDEX: 32.53 KG/M2 | OXYGEN SATURATION: 95 % | SYSTOLIC BLOOD PRESSURE: 130 MMHG | HEART RATE: 87 BPM | WEIGHT: 183.6 LBS | HEIGHT: 63 IN

## 2021-06-05 VITALS
WEIGHT: 183 LBS | OXYGEN SATURATION: 96 % | HEIGHT: 63 IN | SYSTOLIC BLOOD PRESSURE: 132 MMHG | DIASTOLIC BLOOD PRESSURE: 74 MMHG | HEART RATE: 79 BPM | BODY MASS INDEX: 32.43 KG/M2 | RESPIRATION RATE: 22 BRPM | TEMPERATURE: 97.9 F

## 2021-06-05 VITALS
DIASTOLIC BLOOD PRESSURE: 74 MMHG | HEART RATE: 86 BPM | BODY MASS INDEX: 32.77 KG/M2 | RESPIRATION RATE: 16 BRPM | WEIGHT: 185 LBS | OXYGEN SATURATION: 99 % | SYSTOLIC BLOOD PRESSURE: 122 MMHG

## 2021-06-05 VITALS
BODY MASS INDEX: 33.31 KG/M2 | SYSTOLIC BLOOD PRESSURE: 120 MMHG | DIASTOLIC BLOOD PRESSURE: 62 MMHG | HEART RATE: 75 BPM | HEIGHT: 63 IN | OXYGEN SATURATION: 94 % | WEIGHT: 188 LBS

## 2021-06-05 VITALS — WEIGHT: 186.4 LBS | BODY MASS INDEX: 33.02 KG/M2

## 2021-06-05 VITALS
HEIGHT: 63 IN | HEART RATE: 70 BPM | WEIGHT: 178 LBS | SYSTOLIC BLOOD PRESSURE: 164 MMHG | DIASTOLIC BLOOD PRESSURE: 66 MMHG | BODY MASS INDEX: 31.54 KG/M2

## 2021-06-05 NOTE — TELEPHONE ENCOUNTER
Who is calling:  Annel  Reason for Call:  The prescription for Spirva is in need of a Clinical Exception.  Please have Dr. Treviño the [facility] to do a Clinical Exception.  The co-pay is $47.00 but if the doctor calls to get a Clinical Exception, the co-pay will go down.  The phone number Steve is [phone].  Date of last appointment with primary care: 12/16/19  Okay to leave a detailed message: Yes, she is hard of hearing, so please speak loud and slow if you leave a message.

## 2021-06-05 NOTE — TELEPHONE ENCOUNTER
Insurance states medication is covered without a prior authorization.  Called pharmacy and medication went through the insurance with a copay of $47.00.  This team only does the prior authorizations. We do not call the patient's insurance for alternative medication coverage checks and copayment costs.

## 2021-06-05 NOTE — TELEPHONE ENCOUNTER
Who is calling:  Patient   Reason for Call: Patient called today that she is moving Clinics, she will no longer be a [facility] patient. Patient is really concerned of the way her care was with her medication and her insurance was handled here, she was lost in communication and she just decided to move clinics.    Date of last appointment with primary care: N/A  Okay to leave a detailed message: No Call back needed.

## 2021-06-05 NOTE — TELEPHONE ENCOUNTER
Dr. Treviño,  Should we send this to the PA team to see if they will be able to get this covered for the patient?  Please advise.  Thank you.  Melanie JIMENEZ, ROSALINA/MASOUD....................3:34 PM

## 2021-06-05 NOTE — TELEPHONE ENCOUNTER
Who is calling:  [facility]ara [facility] Prescription Assistance  Reason for Call:  She is waiting to receive information for this patient for application for the program. Was told someone at the clinic had faxed, but she has not received anything yet. Information requested is patient's 2018 Federal Tax form. Please call her.  Date of last appointment with primary care: n/a  Okay to leave a detailed message: Yes

## 2021-06-05 NOTE — TELEPHONE ENCOUNTER
Dr. Treviño,  Did you want to send new prescription for another inhaler to [facility] as requested by the patient?  Otherwise, we can suggest the patient contact Sara with  prescription assistance program at [phone] to see if she qualifies for their assistance.    Please advise.  Thank you.  Melanie JIMENEZ CMA/MASOUD....................2:29 PM

## 2021-06-05 NOTE — TELEPHONE ENCOUNTER
Medication Question or Clarification  Who is calling: Patient  What medication are you calling about (include dose and sig)?:    Disp Refills Start End    umeclidinium (INCRUSE ELLIPTA) 62.5 mcg/actuation DsDv inhaler 1 each 12 1/7/2020     Sig - Route: Inhale 1 puff daily. - Inhalation    Sent to pharmacy as: umeclidinium 62.5 mcg/actuation blister powder for inhalation (INCRUSE ELLIPTA)    Notes to Pharmacy: spiriva not covered . Please see if this is . Or start PA    E-Prescribing Status: Receipt confirmed by pharmacy (1/7/2020 10:39 AM CST)        Who prescribed the medication?: Gaye Treviño MD    What is your question/concern?: This medication is equally as expensive as the previous medication that had been ordered.  The patient contacted her insurance company and was told to have the PCP's office contact the [facility] Review via [phone], to help with getting a cost effective medication for the patient.  Requested Pharmacy: [facility] # [id number]  Okay to leave a detailed message?: Yes

## 2021-06-05 NOTE — TELEPHONE ENCOUNTER
Prior Authorization Request  Who s requesting:  Patient  Pharmacy Name and Location: [id number] - [place], [place] - [address] [place] [address] N [facility]  [address]  Medication Name: umeclidinium 62.5 mcg/actuation blister powder for inhalation (INCRUSE ELLIPTA)  Insurance Plan: [employer]  Insurance Member ID Number:  [id number]  CoverMyMeds Key: N/A  Informed patient that prior authorizations can take up to 10 business days for response:   No  Okay to leave a detailed message: No     Also can someone call the [facility] team for a list of equivalent meds, The patient contacted her insurance company and was told to have the PCP's office contact the [facility] via [phone],

## 2021-06-05 NOTE — TELEPHONE ENCOUNTER
FYI~ I have approved Annel for $500 of the Pharmacy Assistance Fund to be filled at the [facility].    Mary Garrison  [facility] [facility]   Pharmacy Assistance  [phone]

## 2021-06-05 NOTE — TELEPHONE ENCOUNTER
Spoke with Dr. Treviño who states that she did speak to the patient's insurance company and was told that the $47 co-pay is the ayers.  Dr. Treviño had to re-start the prior authorization for Spiriva again since they tried an alternative inhaler.      Left message for the patient letting her know that we are starting the prior authorization on Spiriva again per the patient's insurance company.  Asked that she call with any further questions.  Melanie JIMENEZ CMA/MASOUD....................2:23 PM          Please start PA for spiriva per verbal from Dr. Treviño.  Melanie JIMENEZ CMA/MASOUD....................2:23 PM

## 2021-06-05 NOTE — TELEPHONE ENCOUNTER
Left message to call back for: Annel  Information to relay to patient:  Please relay message below from Barbara Garsia with the prior authorization team.  Thank you.  Melanie JIMENEZ CMA/MASOUD....................9:47 AM

## 2021-06-05 NOTE — TELEPHONE ENCOUNTER
Left message for the patient relaying message below from Dr. Treviño.  Asked that she call with any further questions.  Melanie JIMENEZ CMA/MASOUD....................11:05 AM

## 2021-06-05 NOTE — TELEPHONE ENCOUNTER
Could this be sent as a my chart message to pt . She has apparently 'fired ' me even though I did try and get her help with her meds but I think she should know this

## 2021-06-05 NOTE — TELEPHONE ENCOUNTER
Left message for the patient relaying message below from Dr. Treviño.  Asked that she call with any further questions.  Melanie JIMENEZ CMA/MASOUD....................10:45 AM

## 2021-06-05 NOTE — TELEPHONE ENCOUNTER
Central PA team  [phone]  Pool: [facility] PA MED ([id number])          PA has been initiated.       PA form completed and faxed insurance via Cover My Meds     Key:   [id number]     Medication:  Spiriva Respimat 2.5MCG/ACT aerosol    Insurance:  [employer]        Response will be received via fax and may take up to 5-10 business days depending on plan

## 2021-06-05 NOTE — TELEPHONE ENCOUNTER
Yes send to PA team . Also can someone call the [employer] review team for a list of equivalent meds ?

## 2021-06-05 NOTE — TELEPHONE ENCOUNTER
I have sent in alterantive but we cannot tell if it will be covered . Pharmacy will tell her or she can call her insurance . We can also do a PA referral for her

## 2021-06-06 NOTE — TELEPHONE ENCOUNTER
Dr. Treviño,  Spoke with the patient and relayed message below.  She verbalized understanding, but states that she needs something to help prevent sunburn on her nose this spring.  States that regular sunscreen is not enough because she is very fair skinned.  Patient also states that she uses bactroban for cuts to heal faster.  Asked that patient if she has seen a dermatologist for this and she states that she has not.    Patient also states that the other provider that she went to see is not accepting new patient's so she would like to keep you as her primary.  Please advise.  Thank you.  Melanie JIMENEZ, ROSALINA/MASOUD....................12:05 PM

## 2021-06-06 NOTE — TELEPHONE ENCOUNTER
Medication Question or Clarification    Who is calling:   Pharmacy    What medication are you calling about (include dose and sig)?:   mupirocin (BACTROBAN) 2 % cream  30 g  0  3/9/2020  3/16/2020    Apply to affected area 3 times daily        Who prescribed the medication?:   PCP    What is your question/concern?:   Cream is not cover, however Ointment is.  Requesting to change to ointment.    Requested Pharmacy: [facility] [address] [place] [address] [place].      Okay to leave a detailed message?: Yes    Please call pharmacy with script change.

## 2021-06-06 NOTE — TELEPHONE ENCOUNTER
Pt dropped in and requesting  RX for Bactroban creme for keeps her nose from getting sunburn call into downstairs pharmacy, and uses for cuts to heal faster. If questions call her at [phone]

## 2021-06-06 NOTE — TELEPHONE ENCOUNTER
bactroban Sent to [facility] . There is no prescription sunblock available . They are OTC

## 2021-06-06 NOTE — TELEPHONE ENCOUNTER
bactroban is not for sunburn . It is a potent antibacterial ointment . Also this patient informed us she is changing primaries and needs to clarify .

## 2021-06-06 NOTE — TELEPHONE ENCOUNTER
Dr. Treviño,    Okay to change to ointment as cream is not covered by insurance?    Dulce MCMULLEN LPN .......... 4:15 PM  03/09/20

## 2021-06-06 NOTE — TELEPHONE ENCOUNTER
Spoke with the patient and relayed message below from Dr. Treviño.  Patient verbalized understanding and had no further questions at this time.  Melanie JIMENEZ, ROSALINA/MASOUD....................4:12 PM

## 2021-06-07 NOTE — TELEPHONE ENCOUNTER
I can order cologard. She just has to know that if it is positive then she has to get a colonoscopy done anyway . So it is not really a replacement for a colonoscopy . It is only useful if it is negative .

## 2021-06-07 NOTE — TELEPHONE ENCOUNTER
Please place order for Cologuard.    Patient does not have an order for Cologuard. Order was placed for a colonoscopy back in November 2019.

## 2021-06-07 NOTE — TELEPHONE ENCOUNTER
Please call patient that I have sent in a prescription for difucan and changed her insulin per request of Suhas out pharmacist due to cost of tresiba .

## 2021-06-07 NOTE — TELEPHONE ENCOUNTER
Annel is calling and states that this weekend was vomiting and now is better.  Vomiting on Friday.  Blood sugars have been high.  This am was 174.  Annel was recently treated for a yeast infection.  Annel is gaining weight and stomach is getting bigger.  Annel is wanting an STD testing and HPV and Annel is going to get a pregnancy test herself.  Annel has astma.  Annel has an appointment tomorrow with MD Treviño.  Currently has shortness of breath and cough but is normal for her.  Slight shortness of breath at rest.  Annel went and was seen by OB and was very emotional and cried for one hour straight but now is better and states that she will not have a hysterectomy.  Annel also states that she will phone her eye MD.  Annel states that she has an appointment with MD Treviño tomorrow and will inform her also of these updates.      COVID 19 Nurse Triage Plan/Patient Instructions    Please be aware that novel coronavirus (COVID-19) may be circulating in the community. If you develop symptoms such as fever, cough, or SOB or if you have concerns about the presence of another infection including coronavirus (COVID-19), please contact your health care provider or visit [email or web address].     Disposition/Instructions    Patient to have scheduled Telephone Visit with a provider. Follow System Ambulatory Workflow for COVID 19.     The clinic staff will assist you to schedule an appointment to complete the Telephone Visit with a provider during normal clinic hours.       Call Back If: Your symptoms worsen before you are able to complete your Telephone Visit with a provider.        Thank you for limiting contact with others, wearing a simple mask to cover your cough, practice good hand hygiene habits and accessing our virtual services where possible to limit the spread of this virus.    For more information about COVID19 and options for caring for yourself at home, please visit the CDC website at  https://www.cdc.gov/coronavirus/2019-ncov/about/steps-when-sick.html  For more options for care at [facility], please visit our website at [email or web address]    For more information, please use the [place] Department of Health COVID-19 Website: [email or web address].[email or web address]./diseases/coronavirus/index.html  [place] Department of Health ([facility]) COVID-19 Hotlines (Interpreters available):      Health questions: Phone Number: [phone] or [phone] and Hours: 7 a.m. to 7 p.m.    Schools and  questions: Phone Number: [phone] or [phone] and Hours 7 a.m. to 7 p.m.                      Reason for Disposition    [1] MILD asthma attack (e.g., no SOB at rest, mild SOB with walking, speaks normally in sentences, mild wheezing) AND [2]  persists > 24 hours on appropriate treatment    Protocols used: ASTHMA ATTACK-A-AH

## 2021-06-07 NOTE — TELEPHONE ENCOUNTER
Dr. Treviño,  Spoke with the patient and relayed message below.  Patient states that she has cancelled her colonoscopy already due to Covid, but would like to know if you would like her to do the cologuard test instead.  Please advise.  Thank you.  Melanie JIMENEZ CMA/MASOUD....................10:23 AM

## 2021-06-07 NOTE — TELEPHONE ENCOUNTER
Dr. Treviño-ok to wait until Friday    I called and left patient a detailed message that we would call her back on Friday.  She is wondering if she can get a rx.

## 2021-06-07 NOTE — TELEPHONE ENCOUNTER
Referral Request  Type of referral: podiatry   Who s requesting: Patient  Why the request: Patient states she is diabetic she get callus on foot .  Have you been seen for this request: N/A  Does patient have a preference on a group/provider? Patient [facility] to [facility] .  Okay to leave a detailed message?  No

## 2021-06-07 NOTE — TELEPHONE ENCOUNTER
Who is calling:  Patient    Reason for Call:  Requested refill for insulin degludec (TRESIBA FLEXTOUCH U-100) 100 unit/mL (3 mL) InPn.  Informed patient script ws sent to [facility], she will call at have it transfer to [facility].    Date of last appointment with primary care: 02/04/2020    Okay to leave a detailed message: Yes

## 2021-06-07 NOTE — PROGRESS NOTES
"Annel Stoddard is a 64 y.o. female who is being evaluated via a billable telephone visit.      The patient has been notified of following:     \"This telephone visit will be conducted via a call between you and your physician/provider. We have found that certain health care needs can be provided without the need for a physical exam.  This service lets us provide the care you need with a short phone conversation.  If a prescription is necessary we can send it directly to your pharmacy.  If lab work is needed we can place an order for that and you can then stop by our lab to have the test done at a later time.    Telephone visits are billed at different rates depending on your insurance coverage. During this emergency period, for some insurers they may be billed the same as an in-person visit.  Please reach out to your insurance provider with any questions.    If during the course of the call the physician/provider feels a telephone visit is not appropriate, you will not be charged for this service.\"  Chief Complaint   Patient presents with     GI Problem     Stomach bloated, gaining weight, is going to do a at home pregnancy test     Exposure to STD     Would like to get STD testing, know she has HPV, had intercourse few months ago     Shortness of Breath     Using inhalers regulary     Follow-up     Still hasn't received cologard test in the mail     Diabetes     BS are out of control (202 this AM), will be starting Lantus in week or so     Goes to the grocery store and pharmacy . Stomach is getting bigger and she is gaining weight . Admits that it is a 'crazy' but will do a home regnancy test . Was worried about persistent positive HPV , was offered colposcopy but when she went for the appointment she 'broke down' and started crying . Is supposed to start lantus after finishing tresiba. Is taking 60 units . She does not want to see a psychiatrist r therapist at this time and uses  as good support " resources.   Patient has given verbal consent to a Telephone visit? Yes    Patient would like to receive their AVS by AVS Preference: [facility].    Additional provider notes: review her social history   Social History     Social History Narrative    Moved from [place].  4 times . Has 5 children ,9 grandchildren . Her daughters were taken away from her by biological dad when she was ill with botulism . All adults now . All of them are in [place] [facility] [place]. Youngest daughter is 38 . Initially estranged but has a closer relationship .Hadnt worked for a long time . Is a violinist and plays in [employer] . Wasn't diagnosed with bipolar much later in life . 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 Did different jobs also like a CNA, . Is on disability due to  ipolar .        Assessment/Plan:  1. Type 2 diabetes mellitus without complication, with long-term current use of insulin (H)  She thinks her control is poor . The last exams were. Will check her markers today . She is on an ARB , aspirin and statin and bp has been controlled . She does have fatty liver which has cause persistent elevation in liver function tests .   Last  aic is 8.4 in January . BS range between 180 and 200   - Microalbumin, Random Urine; Future  - Glycosylated Hemoglobin A1c; Future  - Comprehensive Metabolic Panel; Future    2. Screen for STD (sexually transmitted disease)  She had unprotected sex a few months ago . I did reassure her that it is unlikely but we will order labs . Will do urine STD testing .   - HIV Antigen/Antibody Screening Cascade; Future  - Hepatitis B Surface Antigen (HBsAG); Future  - Hepatitis C Antibody (Anti-HCV); Future  - Chlamydia trachomatis & Neisseria gonorrhoeae, Amplified Detection; Future    3. High risk heterosexual behavior     - HIV Antigen/Antibody Screening Cascade; Future    4. Encounter for screening for other viral diseases     - Hepatitis B Surface Antigen (HBsAG); Future    5. Bipolar 2  disorder (H)  She has been compliant with her meds but the Covid crisies has been exacerbating her     6. Health maintenance examination  Counseled against elective screening at this time . If her cologard is positive she still has to get colonosocpy done as cologard does not replace colonosocpy . It is only helpful if it is negative .         Phone call duration:  20 minutes    Yoselyn Aguirre CMA

## 2021-06-07 NOTE — TELEPHONE ENCOUNTER
Left message to call back for: Annel  Information to relay to patient:  Will discuss at appt tomorrow.

## 2021-06-07 NOTE — TELEPHONE ENCOUNTER
Dr. Treviño,  Spoke with the patient and relayed message below.  She states that she really does not want to do the colonoscopy as the prep is too hard on her because she is diabetic and already has problems with constipation.  States that she has to do twice the prep and it is too hard for her to do that.  Patient would prefer to do the cologard.  Please advise.  Thank you.  Melanie JIMENEZ CMA/MASOUD....................1:58 PM

## 2021-06-07 NOTE — TELEPHONE ENCOUNTER
Left message for the patient to call and let us know if she would like the cologard test ordered.  Melanie JIMENEZ, ROSALINA/CMT....................9:38 AM

## 2021-06-07 NOTE — TELEPHONE ENCOUNTER
"Pt calling to report she has vaginal itching, states her boyfriend has a yeast infection and she thinks she has the same. Pt denies fever, sores, vaginal discharge or any other acute symptoms. See full assessment below.     Advised pt on OTC treatments available and general home care per Care Advice. Call back protocol reviewed with pt.    Pt requests message to PCP to prescribe if possible as co-pay would be cheaper for her than cost at pharmacy.     Reason for Disposition    [1] Symptoms of a yeast infection (i.e., itchy, white discharge, not bad smelling) AND    [2] feels like prior vaginal yeast infections    Answer Assessment - Initial Assessment Questions  1. SYMPTOM: \"What's the main symptom you're concerned about?\" (e.g., rash, itching, swelling, dryness)      Vaginal itching, inner labia   2. LOCATION: \"Where is the  *No Answer* located?\" (e.g., inside/outside, left/right)     Inner labia  3. ONSET: \"When did the itching  start?\"      Two days ago   4. PAIN: \"Is there any pain?\" If so, ask: \"How bad is it?\" (Scale: 1-10; mild, moderate, severe)    -  MILD (1-3): doesn't interfere with normal activities     -  MODERATE (4-7): interferes with normal activities (e.g., work or school) or awakens from sleep      -  SEVERE (8-10): excruciating pain, unable to do any normal activities      Stings in morning when showering  5. CAUSE: \"What do you think is causing the symptoms?\"      Boyfriend has yeast infection, pt thinks same  6. OTHER SYMPTOMS: \"Do you have any other symptoms?\" (e.g., fever, vaginal bleeding, pain with urination)     Pt denies  7. PREGNANCY: \"Is there any chance you are pregnant?\" \"When was your last menstrual period?\"     n/a    Protocols used: VULVAR SYMPTOMS-A-AH      "

## 2021-06-07 NOTE — TELEPHONE ENCOUNTER
No point in doing cologard, colonoscopy is a better test and as long as she gets it this year she is good . . We reserve the cologard for those patients who refuse to do a colonoscopy

## 2021-06-07 NOTE — TELEPHONE ENCOUNTER
Left detailed message for the patient relaying message below from Dr. Treviño.  Asked that she call with any further questions.  Melanie JIMENEZ CMA/MASOUD....................11:17 AM

## 2021-06-07 NOTE — TELEPHONE ENCOUNTER
Who is calling:  Patient   Reason for Call:  Calling to state : She has not received Cologaurd Kit in the mail after several weeks. Please advise   Date of last appointment with primary care:   [id number]  Okay to leave a detailed message: Yes

## 2021-06-07 NOTE — TELEPHONE ENCOUNTER
Please clarify with patient why she is getting the colonoscopy . All elective procedures should be deferred for the covid crisis . The prep advise is all over the counter and has to be directed by the GI providers who is doing the procedure . They usually send information in the mail .

## 2021-06-07 NOTE — TELEPHONE ENCOUNTER
LEFT MESSAGE FOR PATIENT REGARDING PODIATRY - [facility] - [phone]  PATIENT CAN MAKE HER OWN APPT -

## 2021-06-08 ENCOUNTER — DOCUMENTATION ONLY (OUTPATIENT)
Dept: OTHER | Facility: CLINIC | Age: 66
End: 2021-06-08

## 2021-06-08 NOTE — TELEPHONE ENCOUNTER
Spoke with Ebony at [facility] and relayed message from Dr. Treviño that the office visit note had been updated by Dr. Treviño.  She verbalized understanding and had no further questions at this time.  Melanie JIMENEZ CMA/MASOUD....................1:00 PM

## 2021-06-08 NOTE — PROGRESS NOTES
Clinic Care Coordination Contact  Community Health Worker Initial Outreach    CHW Initial Information Gathering:  Referral Source: PCP  Preferred Hospital: [facility]  [phone]  Current living arrangement:: I live alone  Type of residence:: Apartment  Supplies used at home:: Nebulizer tubing, Diabetic Supplies  Informal Support system:: Family    Patient accepts CC: Yes     RN Assessment 6/10/2020

## 2021-06-08 NOTE — TELEPHONE ENCOUNTER
Patient Returning Call  Reason for call:  Call back  Information relayed to patient:  Writer relayed message to Pt: I saw the request from the pharmacist . I do not recommend starting short acting insulin at this time . Firstly we did raise lantus and change the time and we need to sure if that works Also,She needs an RD appointment first because it needs a lot of education on how much insulin to use with each type of meal . She needs to learn about sliding sale and glycemic index and that needs at least an hour of education .reassure her that though I understand her concern  It is not  urgent to take it right now and can wait till after the RD visit . I will order the RD visit   Pt agrees, and understands.  Pt states she is taking 70 units at night like discussed and at that time provider wanted to add short acting and Pt did not.  Pt also states she has a telephone visit with RD 28th and With her Primary in September. Pt states [place] to the 16th.  Patient has additional questions:  No  If YES, what are your questions/concerns:  N/A  Okay to leave a detailed message?: No call back needed

## 2021-06-08 NOTE — TELEPHONE ENCOUNTER
I saw the request from the pharmacist . I do not recommend starting short acting insulin at this time . Firstly we did raise lantus and change the time and we need to sure if that works Also,She needs an RD appointment first because it needs a lot of education on how much insulin to use with each type of meal . She needs to learn about sliding sale and glycemic index and that needs at least an hour of education .reassure her that though I understand her concern  It is not  urgent to take it right now and can wait till after the RD visit . I will order the RD visit

## 2021-06-08 NOTE — TELEPHONE ENCOUNTER
Called and will increase lantus to 76 units a day . I do not want to increase her glipizide . Max dose is 20mg a day .   will talk about options at her 6/16 2020 visit

## 2021-06-08 NOTE — TELEPHONE ENCOUNTER
Patient Returning Call  Reason for call:  Patient called back.  Information relayed to patient:  n/a  Patient has additional questions:  Yes  If YES, what are your questions/concerns:  States she has had eye surgery and states she needs the fast acting insulin due to she does not want to have eye surgery again.  Please advise.  Okay to leave a detailed message?: Yes

## 2021-06-08 NOTE — TELEPHONE ENCOUNTER
Spoke with the patient and relayed message below from Dr. Treviño.  Patient verbalized understanding and had no further questions at this time.  Melanie JIMENEZ, ROSALINA/MASOUD....................9:28 AM

## 2021-06-08 NOTE — TELEPHONE ENCOUNTER
Left message to call back for: Annel  Information to relay to patient:  Please relay message below from Dr. Treviño.  Thank you.  Melanie JIMENEZ, ROSALINA/CMT....................2:02 PM

## 2021-06-08 NOTE — PROGRESS NOTES
"Diabetes Educator has received verbal consent for a telephone visit for the patient./45 minutes    Assessment:   --telephone visit for Diabetes assessment  --planned trip to [place] the week of 6/9/20  --patient states she was diagnosed with Diabetes in 2004 and had managed glucose fairly well until a few years back when she was off her medications for a four month period.  --she is exercising daily: walking 1-2 miles, even in the winter, she can use the skyway when necessary  --she has noticed an increase in thirst, especially at bedtime  --states she is on a budget for meals.   --she is trying to stick with 1800 calories/day and \"watching her carb intake\"  Three meals/ plus snacks:  Am: toast, 1/4 c bran cereal, raisins, yogurt  Snack: yogurt/fruit  Lunch: 1/2 burger or sandwich, vegetables   Snack: yogurt/fruit  Dinner: chili/crackers or chicken/vegetables  --consuming water, tea/coffee, has grape juice, is trying to limit  -- no tobacco use, no ETOH intake  --patient states sleeping is often difficult due to breathing issues  --eye exam completed in nov/dec/2019, dental exam was pending for May 2020    Blood glucose record 5/29-6/3:  Educator did not see the meter, numbers were given over the phone by patient  FBS: 185,195,197,185,184,224  Pre meal: 326,311,234  Post meal: 256,195,252    Medications prescribed:  Lantus 70 units  Glipizide 10 mg twice daily  Metformin 500 mg (4) once daily     Education:  --reviewed glucose readings, goals for BG/A1C  --discussed CHO counting, goals for meals/snacks, hydration, reducing intake of sugary beverages, benefits of exercise  --discussed healthy snack options, preventative care: eyes, teeth       Plan:   1. Educator to consult with MD regarding medication options to meet glycemic goals.  2. Patient to test glucose three times/day: fasting, two hours post meal and bedtime  3. Patient to limit CHO at meals to 45 grams and snacks 15 grams.  4. Patient to reduce or eliminate " fruit juice intake.  5. Patient to continue walking 1-2 miles/daily.  6. Follow up with educator on 20.   7. Referral made to [facility] to assist with resources.     Subjective and Objective:      Annel Stoddard is referred by Gaye Treviño for Diabetes Education.     Lab Results   Component Value Date    HGBA1C 8.6 (H) 2020             Education:     Monitoring   Meter (per above goals): meter not seen by educator, numbers given by patient over the phone  Monitoring: Assessed and Discussed  BG goals: Assessed and Discussed    Nutrition Management  Nutrition Management: Assessed, Discussed and Literature provided  Weight: Assessed  Portions/Balance: Assessed, Discussed and Literature provided  Carb ID/Count: Assessed, Discussed and Literature provided  Label Reading: Assessed, Discussed and Literature provided  Heart Healthy Fats: Assessed  Menu Planning: Assessed and Discussed  Dining Out: Not addressed  Physical Activity: Assessed, Discussed and Literature provided    Orals: Assessed and Discussed  Injected Medications: Assessed and Discussed   Storage/Exp:Not addressed   Site Rotation: Not addressed   Sites Assessed: no    Diabetes Disease Process: Assessed, Discussed and Literature provided    Acute Complications: Prevent, Detect, Treat:  Hypoglycemia: Assessed, Discussed and Literature provided  Hyperglycemia: Assessed, Discussed and Literature provided  Sick Days: Not addressed  Driving: uses public transportation    Chronic Complications  Foot Care:Not addressed  Skin Care: Not addressed  Eye: Assessed and Discussed  ABC: Assessed  Teeth:Assessed and Discussed  Goal Setting and Problem Solving: Assessed and Discussed  Barriers: Assessed  Psychosocial Adjustments: Assessed      Time spent with the patient: 45 minutes for diabetes education and counseling.   Previous Education: yes  Visit Type:MNT  Hours Remainin, DSMT 2 and MNT 1.25      Johana Evans  6/3/2020

## 2021-06-08 NOTE — TELEPHONE ENCOUNTER
Dr. Treviño,  Spoke with Suhas at the pharmacy who states that they need the prescription for the duo neb to state 30 vials.  Prescription has been set up for you to review.  They also have the neb machines there for her to .  Prescription has been sent to the [facility] pharmacy.    Melanie JIMENEZ CMA/MASOUD....................2:54 PM

## 2021-06-08 NOTE — PATIENT INSTRUCTIONS - HE
1. Test glucose three times daily:  Fasting, two hours after one of your meals and bedtime.  2. Limit carbohydrate at meals to 45 grams and 15 grams for snacks.  3. Reduce and/or eliminate fruit juice consumption.  4. Follow up with educator on 6/24/20.

## 2021-06-08 NOTE — TELEPHONE ENCOUNTER
Spoke with patient (she called the clinic back line) and she states she is going to be leaving town. She met with with the Diabetic Education and states after meeting with them you were going to adjust insulin. She is wondering what she should be doing? She said she was going to be in and out all day and that it was ok to leave a message. She asked that you speak loudly on the message because she has been having trouble hearing. Has a f/u appt with you 6/16/20 and DM ED on 6/24/20.     Felicity Nuno [facility]    Patient can be reached at [phone]

## 2021-06-08 NOTE — PROGRESS NOTES
"Annel Stoddard is a 64 y.o. female who is being evaluated via a billable telephone visit.      The patient has been notified of following:     \"This telephone visit will be conducted via a call between you and your physician/provider. We have found that certain health care needs can be provided without the need for a physical exam.  This service lets us provide the care you need with a short phone conversation.  If a prescription is necessary we can send it directly to your pharmacy.  If lab work is needed we can place an order for that and you can then stop by our lab to have the test done at a later time.    Telephone visits are billed at different rates depending on your insurance coverage. During this emergency period, for some insurers they may be billed the same as an in-person visit.  Please reach out to your insurance provider with any questions.    If during the course of the call the physician/provider feels a telephone visit is not appropriate, you will not be charged for this service.\"    Patient has given verbal consent to a Telephone visit? Yes    What phone number would you like to be contacted at? [phone]    Patient would like to receive their AVS by mail.   Chief Complaint   Patient presents with     Follow-up     Getting Nebulizer - Discuss colposcopy and Bone density - Discuss BS   BS still high , fasting is 180 . Has been on a trip when to [place] . Is set up for olya to see RD . And has care coordination .  Has a narrow angle glaucoma and was worried her Blood sugars will affect that . She says she does not have retinopathy . Reassured her that retinopathy is diabetes related . Most improtant thing is to improve diet and get trulicty approved . Short acting insulin is the lasst resort . She is in the process of getting medicaid and prescription plan . Could no longer afford spiriva . .has a hard time with inhalers . I do feel she will benefit clinically from a nebulizer with atrovent " solution .     Additional provider notes:   Assessment/Plan:  1. Type 2 diabetes mellitus with complication, with long-term current use of insulin (H)  suboptimal control . Working on diet and getting back on trulicity .   - blood glucose test (ACCU-CHEK GUIDE TEST STRIPS) strips; USE TEST STRIPS THREE TIMES DAILY  Dispense: 300 strip; Refill: 3    2. 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 She broke down in tears at the last gyn visit and now wants to see another gyn . Nothing to do with the doctor but her own mental health . Will refer back . She was counseled at length what a colposcopy is . 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  - Ambulatory referral to Gynecology    3. Encounter for screening mammogram for breast cancer  Is due   - Mammo Screening Bilateral; Future    4. Osteopenia of both upper arms  Is due   - DXA Bone Density Scan; Future    5. Pulmonary emphysema, unspecified emphysema type (H)  spiriva discontinued . Not able to use inhaler properly will swithc to ipratropium nebulized .    Can come in fall for her annual wellness visit . We can give her [id number] then   Do labs . Will send in     Phone call duration:  24 minutes    SHAN

## 2021-06-08 NOTE — TELEPHONE ENCOUNTER
RN cannot approve Refill Request    RN can NOT refill this medication PCP please review - different brand than listed on med list requested. Last office visit: 11/26/2019 Gaye Treviño MD Last Physical: 12/16/2019 Last MTM visit: Visit date not found Last visit same specialty: 11/26/2019 Gaye Treviño MD.  Next visit within 3 mo: Visit date not found  Next physical within 3 mo: Visit date not found      Humaira WHELAN [place], [facility] Triage/Med Refill 6/10/2020    Requested Prescriptions   Pending Prescriptions Disp Refills     ACCU-CHEK GUIDE TEST STRIPS strips [Pharmacy Med Name: ACCU-CHEK GUIDE TEST STRIPS 100S] 100 strip 0     Sig: USE TEST STRIPS THREE TIMES DAILY       Diabetic Supplies Refill Protocol Passed - 6/8/2020  9:49 AM        Passed - Visit with PCP or prescribing provider visit in last 6 months     Last office visit with prescriber/PCP: 11/26/2019 Gaye Treviño MD OR same dept: 11/26/2019 Gaey Treviño MD OR same specialty: 11/26/2019 Gaye Treviño MD  Last physical: 12/16/2019 Last MTM visit: Visit date not found   Next visit within 3 mo: Visit date not found  Next physical within 3 mo: Visit date not found  Prescriber OR PCP: Gaye Treviño MD  Last diagnosis associated with med order: 1. Type 2 diabetes mellitus with complication, with long-term current use of insulin (H)  - ACCU-CHEK GUIDE TEST STRIPS strips [Pharmacy Med Name: ACCU-CHEK GUIDE TEST STRIPS 100S]; USE TEST STRIPS THREE TIMES DAILY  Dispense: 100 strip; Refill: 0    If protocol passes may refill for 12 months if within 3 months of last provider visit (or a total of 15 months).             Passed - A1C in last 6 months     Hemoglobin A1c   Date Value Ref Range Status   05/06/2020 8.6 (H) 3.5 - 6.0 % Final

## 2021-06-08 NOTE — TELEPHONE ENCOUNTER
Please call pt and let her know that once she visits with diabetic educator we can start short acting insulin

## 2021-06-08 NOTE — TELEPHONE ENCOUNTER
Patient Returning Call  Reason for call:  Call back  Information relayed to patient:  No message to relay.  Please call Pt again.  Patient has additional questions:  No  If YES, what are your questions/concerns:  Pt has an appointment with Diabetes educator 05/28/20 and will also be going to the pharmacy in the AM.  Okay to leave a detailed message?: No

## 2021-06-08 NOTE — PROGRESS NOTES
Clinic Care Coordination Contact  [facility]/Voicemail       Clinical Data: Care Coordinator Outreach  Outreach attempted x 1.  Left message on patient's voicemail with call back information and requested return call.  Plan:   Care Coordinator will try to reach patient again in 1-2 business days.

## 2021-06-08 NOTE — TELEPHONE ENCOUNTER
Who is calling:  Ebony with [facility], [phone]  Reason for Call:    Patient came in to [facility] today to  Nebulizer.  Ebony is questioning status of below message.    Please reach out to Ebony and advise.  Thank you  Date of last appointment with primary care: 5/18/2020  Okay to leave a detailed message: Yes

## 2021-06-08 NOTE — TELEPHONE ENCOUNTER
"Triage Call  Patient calls with concerns of high blood sugar, reports sugars are \"always high\"   Diabetes type II  Takes Metformin and glipizide  Started Lantus 60 units  Blood sugar this morning at 0530 was 232  Asking if she should adjust her dose and what to  Reports dizziness with vomiting a couple weeks ago which resolved \"so I am not concerned about the virus\" - thinks sugars may have been higher than usual during this time  Reports blood sugars 223-332 in the morning  Reports blood sugars 300's in the afternoon  Asking about lab results from last visit, reviewed with patient    Disposition  Will send information to Dr Treviño's support pool. Please contact patient and advise if adjustments to Lantus is recomended.  Educated per care advice, verbalized understanding. Will Scheduled a follow up with Dr Treviño 05/18/20 at 1300    Reason for Disposition    Caller has NON-URGENT medication question about med that PCP prescribed and triager unable to answer question    Protocols used: DIABETES - HIGH BLOOD SUGAR-A-OH    Morena Epperson, RN [facility] 5/12/2020 7:29 AM      "

## 2021-06-08 NOTE — PROGRESS NOTES
"Annel Stoddard is a 64 y.o. female who is being evaluated via a billable telephone visit.      The patient has been notified of following:     \"This telephone visit will be conducted via a call between you and your physician/provider. We have found that certain health care needs can be provided without the need for a physical exam.  This service lets us provide the care you need with a short phone conversation.  If a prescription is necessary we can send it directly to your pharmacy.  If lab work is needed we can place an order for that and you can then stop by our lab to have the test done at a later time.    Telephone visits are billed at different rates depending on your insurance coverage. During this emergency period, for some insurers they may be billed the same as an in-person visit.  Please reach out to your insurance provider with any questions.    If during the course of the call the physician/provider feels a telephone visit is not appropriate, you will not be charged for this service.\"    Patient has given verbal consent to a Telephone visit? Yes    What phone number would you like to be contacted at? [phone]    Patient would like to receive their AVS by AVS Preference: [place].       Chief Complaint   Patient presents with     Diabetes     BS are running high throughout the day, thinks she needs Lantus increased     Vaginal Itching     Still having vaginal itching, diflucan helped but now back again, questions about Herpes       Additional provider notes:  BS in the 200-300 range . Has been trying to control her diet but struggles with that a little . Takes the lantus at evening time with supper . Was tested for stds and was negative as had unprotected sex with a new partner a few months ago . No sores or visible ulcers .     Assessment/Plan:  1. Acute vaginitis  Continue topical antifungal   - ketoconazole (NIZORAL) 2 % cream; applly externally twice daily for two weeks  Dispense: 15 g; Refill: " 0    2. Type 2 diabetes mellitus without complication, with long-term current use of insulin (H)  Increase lantus to 70 units and to take at bedtime instead of dinner time . Referral to RD . She cannot take invokana and trulicty/victoza not covered . ( insurance will only cover spiriva and not other meds ) consider short acting insulin with meals   Will defer for now and see her response to lantus increase  Also will need education on calculating glycemic index of meals      - LANTUS U-100 INSULIN 100 unit/mL vial; 70 units sc daily before supper 11.65 Type 2 with hyperglycemia  Dispense: 10 mL; Refill: PRN  - Ambulatory referral to Diabetes Education Program (Existing Diagnosis)    3. Chronic obstructive airway disease with asthma (H)  Cannot afford spiriva anymore . Will switch to atrovent nebulized solution . She is unable to adequately used regular inhaler and I would prefer she get nebulized solution as she is stopping her more effective treatemnent .     4. Type 2 diabetes mellitus with complication, with long-term current use of insulin (H)          Phone call duration:  13minutes    SHAN NATHAN

## 2021-06-08 NOTE — TELEPHONE ENCOUNTER
Attn Dr Treviño & Team,    Patient was suppose to have a phone consult with Johana, the diabetes educator. Unfortunately, protest have begun near the clinic that Johana is working out of and had to cancel appointment. Pt's diabetes appt has been rescheduled for 06/03.      Pt is concerned about her sugars and eye.    Avg blood sugars for the days.  with 7 days, 242  with 14 days, 257  With 30 days, 264     june 9 to the 16th, she will be on vacation. Please call her back @ [phone] or friend jose's , if we are unable to reach her at first number.

## 2021-06-08 NOTE — TELEPHONE ENCOUNTER
Who is calling:  The patient   Reason for Call:  The patient is requesting the patient's face to face note signed per Gaye Treviño MD for the nebulizer machine. The patient states [facility] equipment can obtain the note from The patient's chart or to call [facility] # [phone]  Date of last appointment with primary care: 5/18/20  Okay to leave a detailed message: Yes

## 2021-06-08 NOTE — TELEPHONE ENCOUNTER
Forms Request  Name of form/paperwork: Other:  UBALDO  Have you been seen for this request: N/A  Do we have the form: Yes- in clinic She wanted a consent to share but writer stated that can not be mailed that is for face to face sugnature in clinic.  Please send her an UBALDO instead  of the consent to communicate.  When is form needed by: asap  How would you like the form returned: Mail  Address on file was verified by writer.  Patient Notified form requests are processed in 3-5 business days: No    Okay to leave a detailed message? No

## 2021-06-08 NOTE — TELEPHONE ENCOUNTER
I sent in a prescription and DME order . She would need to go to the supply please for the neb machine and we have to then fax it there . Also I thought patient assisstance was covering the spriva? If they are not would they cover trulicity ?

## 2021-06-08 NOTE — TELEPHONE ENCOUNTER
Patient Returning Call  Reason for call:  Patient stated she can be reached back at [phone] or at her friend's phone number, Jose, at [phone].    Patient stated her message below is to let Gaye Treviño MD know that she has an appointment with the diabetes educator tomorrow from 2-3 PM, in case Gaye Treviño MD called.  Information relayed to patient:  n/a  Patient has additional questions:  No  If YES, what are your questions/concerns:  n/a  Okay to leave a detailed message?: No

## 2021-06-08 NOTE — PROGRESS NOTES
Programs Applying For: None    Outreach:   6/17/20: SHA reviewed referral, pt is over income for medical assistance. She has Medicare and is interested in programs that would cover her prescriptions and supplies. Pt should look into medicare programs or be connected with Rx assistance programs. SHA sent a message back to the CCC team to schedule with SW.       Health Insurance Information:   Medicare A and B     Referral:   Patient would like to know if she is eligible for MA. He current income is $1299 per month. She stated has has a hard time making ends meet related to paying out of pocket for medications/supplies. If she is not eligible for MA, she would like to know if there are other insurance programs she may be eligible for. Patient is on quarantine at home this week at home. She would appreciate a call this week if possible.

## 2021-06-08 NOTE — TELEPHONE ENCOUNTER
Dr. Treviño,  Spoke with [facility] and they do not have in the chart notes the required documentation for patient's nebulizer to be covered by insurance.  Okay to schedule a phone visit with the patient or would you prefer to addend the note from your visit on 5/18/2020?  Please advise.  Thank you.  Melanie JIMENEZ CMA/MASOUD....................4:20 PM

## 2021-06-09 NOTE — TELEPHONE ENCOUNTER
Encounter was sent to pool with following message. I was unable to forward to you.    [facility] RN spoke with patient today. Patient stated her blood pressure and been elevated for the past few days. She stated her BP on 6/20/20 was 148/80 with a pulse in the 70s and on 6/21/20 her BP was 150/80 with a pulse in the 70s. Patient stated her BP never usually never gets higher than 125/70. She stated she has been taking her losartan daily as prescribed She denied any changes in her health and denied any pain. Patient denied any symptoms related to elevated BP. She requested writer pass this along to PCP for any advice.

## 2021-06-09 NOTE — PROGRESS NOTES
"Clinic Care Coordination Contact    Follow Up Progress Note      Assessment: Patient worked with FRW and is not eligible for MA. She is enrolled with [employer] through [employer] but this does not cover dental, hearing aides, prescriptions. She has \"extra help\" through Medicare to assist with her premiums for Medicare.     She has been working with [employer] and is completing applications for any [facility] services and is filling out form for long term care services.  Writer is unsure what county benefits she would qualify for as she does not want a waiver as she is looking only for help with paying for some of the out of pocket costs for her prescriptions.  She will complete and will be contacted by [facility] to assist her. She will turn 65 in August.    Also worked with [employer] plans but is seeking any other options for paying for insulin and other medications.  Assured her that she is doing all she can and that writer has no other suggestions, other than consulting with MTM to see if there are any other pharmaceutical patient assistance programs she may be eligible for.  She agreed to talk with MTM and learn options.  Provided her with the name of the MTM supporting this clinic, Marlys Sanchez.    W is sending resources for low cost dental and hearing aides.  She is taking her blood pressures and had readings this week of 148, 142 and 150. Does not feel her blood pressures are going down as she would like. Will update RN CC.          Goals addressed this encounter:   Goals Addressed                 This Visit's Progress       Patient Stated      Financial Wellbeing (pt-stated)   On track     Goal Statement: I would like to know if I am eligible for Medical Assistance in the next 2 months.   Date Goal set: 6/10/20  Barriers: Patient currently is finding it difficult to make ends meet related to out of pocket medical expenses.   Strengths: Patient has a strong willingness to do what she has " to do to make her current situation better.   Date to Achieve By: 8/10/20  Patient expressed understanding of goal: Yes  Action steps to achieve this goal:  1. I understand the [facility] RN, Ron will send a referral to the [facility] to discuss eligibility for Medical Assistance.    2. I understand [employer] will contact me directly.   3. I will provide [employer] with any necessary documentation and will complete any paperwork associated with this goal in a timely manner.            Improve chronic symptoms (pt-stated)   On track     Goal Statement: I would like to know if there are resources for lower cost hearing aides in the next two months.   Date Goal set: 6/10/20  Barriers: Limited income.  Strengths: Strong advocate herself. Willing to do what she has to do in order to improve her current situation.   Date to Achieve By: 8/10/20  Patient expressed understanding of goal: Yes  Action steps to achieve this goal:  1. I understand the [facility] Community Health WorkerVaishali look into resources for low cost hearing aides and will contact me with available resources.   2. I will look over the resources and determine which resource/program meets my needs.   3. I will contact the hearing aide resource directly.           Medical (pt-stated)   On track     Goal Statement: I would like to getting my diabetes under better control in the next 6 months,including getting my A1C at for below 7.0  Date Goal set: 6/10/20  Strengths: Patient has a strong willingness to make changes to improve her health.   Date to Achieve By: 12/10/20  Patient expressed understanding of goal: Yes  Action steps to achieve this goal:  1. I will continue to take my diabetic medications daily as directed and will check my blood glucose level at least 4 times a day.   2. I will continue to attend all scheduled appointments with my PCP, Dr. Treviño and my diabetic educator, Johana  3. I will adhere to my diabetic diet as best as I can.   4. I will follow up with the  [facility] RN on a monthly basis to discuss my progress with this goal.          Psychosocial (pt-stated)   On track     Goal Statement: I would like to have resources for a low cost dental clinic in the next 2 months.   Date Goal set: 6/10/20  Barriers: Limited income. No dental insurance.   Strengths: Patient is a strong advocate for herself. She stated she wants to stay on top of her health.   Date to Achieve By: 8/10/20  Patient expressed understanding of goal: Yes  Action steps to achieve this goal:  1. I understand the [facility] Community Health Worker, Vaishali will be contacting me directly with resources for low cost dental clinics.   2. I will look over the resources and decide which resource best suits my dental needs.                Intervention/Education provided during outreach: Reviewed options through [facility] and explained MTM service.            Plan:   Will order MTM. Will update RN CC. ASHLEY CC available as needs arise, but will not schedule additional outreach.    Patricia Yen, [facility]  Clinic Care Coordinator  [phone]

## 2021-06-09 NOTE — TELEPHONE ENCOUNTER
Kevin Treviño,      Patient called today and spoke with [facility] RN. She stated she is no long able to check her blood pressure with her friend's home monitoring kit. She said she had a disagreement with him. Patient went to the [facility] today, but they are not doing blood pressure checks because of COVID. Insurance will not cover a BP monitoring kit according to patient. Patient currently does not have the funds to purchase one on her own. Can patient come to the [facility] to have her BP checked? Would this require an nurse appointment? Please advise.        Thanks,      Dave Myhre, RN  [facility] RN

## 2021-06-09 NOTE — PROGRESS NOTES
[facility] RN spoke with patient today. Patient stated her blood pressure and been elevated for the past few days. She stated her BP on 6/20/20 was 148/80 with a pulse in the 70s and on 6/21/20 her BP was 150/80 with a pulse in the 70s. Patient stated her BP never usually never gets higher than 125/70. She stated she has been taking her losartan daily as prescribed She denied any changes in her health and denied any pain. Patient denied any symptoms related to elevated BP. She requested writer pass this along to PCP for any advice.

## 2021-06-09 NOTE — PROGRESS NOTES
I met with Annel Stoddard at the request of Dr. Treviño to recheck her blood pressure.  Blood pressure medications on the MAR were reviewed with patient.    Patient has taken all medications as per usual regimen: Yes  Patient reports tolerating them without any issues or concerns: Yes    There were no vitals filed for this visit.    Blood pressure was taken, previous encounter was reviewed, recorded blood pressure below 140/90.  Patient was discharged and the note will be sent to the provider for final review.      BP today 118/70

## 2021-06-09 NOTE — PROGRESS NOTES
I received a call on my work phone yesterday from patient. She stated she was changing to [facility] and was switching all of her care to an [facility]. She asked thait all appointments with  [facility] be cancelled as she will no longer be coming to [facility]. I dis-enrolled patient from [facility].

## 2021-06-09 NOTE — TELEPHONE ENCOUNTER
I called Annel to let her know about Dr. Treviño's recommendation to increase Lantus to 80 units/day. Dr. Treviño also stated that she was ok with patient starting on Trulicity when she officially switched over to [employer], as patient had stated that [facility] would cover this medication.    Patient did let me know that she would be changing over to  [facility] for her PCP and health care needs due to coverage provided through [facility].  She has an appointment scheduled with a new PCP for 7/20/20.    Patient stated that she would increase Lantus to 80 units starting today and then follow up with new PCP regarding potentially starting Trulicity.  We did discuss the need for medication changes to address her elevated post meal readings, either trying a GLP1 or adding in meal time insulin.  Patient will address these questions on 7/20/20 with her PCP.  She will follow up with Diabetes Education through the [facility] system. .    Johana Evans RD

## 2021-06-09 NOTE — PROGRESS NOTES
[facility] RN spoke with patient today. Patient stated her blood pressure and been elevated for the past few days. She stated her BP on 6/20/20 was 148/80 with a pulse in the 70s and on 6/21/20 her BP was 150/80 with a pulse in the 70s. Patient stated her BP never usually never gets higher than 125/70. She stated she has been taking her losartan daily as prescribed She denied any changes in her health and denied any pain. Patient denied any symptoms related to elevated BP. She requested writer pass this along to PCP for any advice.

## 2021-06-09 NOTE — TELEPHONE ENCOUNTER
Called pt and told her what Dr Treviño suggested.  She will call Friday and schedule phone visit if still > 140/90  Yoselyn Aguirre CMA

## 2021-06-09 NOTE — PROGRESS NOTES
Kevin Treviño,     Patient called today and spoke with [facility] RN. She stated she is no long able to check her blood pressure with her friend's home monitoring kit. She said she had a disagreement with him. Patient went to the [facility] today, but they are not doing blood pressure checks because of COVID. Insurance will not cover a BP monitoring kit according to patient. Patient currently does not have the funds to purchase one on her own. Can patient come to [facility] to have her BP checked? Would this require an nurse appointment? Please advise.       Thanks,     Dave Myhre, RN  [facility] RN

## 2021-06-09 NOTE — TELEPHONE ENCOUNTER
Refill Request  Did you contact pharmacy: Yes.  Date: Today  Medication name:   Requested Prescriptions     Pending Prescriptions Disp Refills     lamoTRIgine (LAMICTAL) 150 MG tablet 90 tablet 3     Sig: Take 1 tablet (150 mg total) by mouth daily.     metFORMIN (GLUCOPHAGE XR) 500 MG 24 hr tablet 360 tablet 1     Sig: Take 4 tablets (2,000 mg total) by mouth daily with supper.     nystatin (MYCOSTATIN) ointment 30 g 0     Sig: Apply 1 application topically 4 (four) times a day.     metroNIDAZOLE (METROGEL) 0.75 % gel 45 g 0     Sig: Apply 1 application topically 2 (two) times a day.     Who prescribed the medication:    Requested Pharmacy: [facility]  Is patient out of medication: No.  14 days left  Patient notified refills processed in 3 business days:  yes  Okay to leave a detailed message: yes

## 2021-06-09 NOTE — TELEPHONE ENCOUNTER
LMOM that she can schedule an CSS appt or if she want to see Dr Treviño, she could see her next week  Yoselyn Aguirre CMA

## 2021-06-09 NOTE — PROGRESS NOTES
Diabetes Educator has received verbal consent for a telephone visit for the patient./ 36 minutes    Assessment:   --follow up visit with Annel  --patient has concerned to walk daily: 1-2 miles, inside [employer] or outside  --she is on a budget for her meals  Consuming three meals/day with snacks:  Am: toast/canned fruit/coffee or tea  Lunch: toast/jam, canned fruit or 1/2 sandwich/vegetables  Dinner: chili/crackers or chicken/vegetables  Snack: fruit/yogurt  --beverages: coffee/tea/water, she not longer is buying fruit juice for home    Blood glucose record  6/16/-6/24:  Educator did not see the meter  FBS: 196,170,215,230,203,194,195,210,216  Post am: 290,318,258,267  Pre meal: 238,251,137,262  Post dinner:240,256,319,313,252,243     Medications prescribed:  Lantus 76 units daily  Glipizide 10 mg twice daily  Metformin 500 mg(4) daily    Education:  --reviewed glucose readings, goals for BG/A1C  --discussed nutrition, each meal, CHO intake, carb counting, goals for 45 grams with meals, 15 grams with snacks, portions, hydration, exercise  --discussed preventative care: eyes, teeth( sent out list of clinics to call for coverage under her insurance)      Plan:   1. Educator to consult with MD regarding medication changes to meet glycemic goals.  2. Patient to call [employer] assistance program for help with coverage for DM medications.   3. Patient to check BG: fasting and post meal daily  4. Limit CHO at meals to 45 grams and snacks to 15 grams.   5. Follow up with Diabetes educator in 4-6 weeks.     Subjective and Objective:      Annel Stoddard is referred by Annel Stoddard for Diabetes Education.     Lab Results   Component Value Date    HGBA1C 8.6 (H) 05/06/2020             Education:     Monitoring   Meter (per above goals): meter not seen by educator, virtual visit  Monitoring: Assessed and Discussed  BG goals: Assessed and Discussed    Nutrition Management  Nutrition Management: Assessed and  Discussed  Weight: Assessed and Discussed  Portions/Balance: Assessed and Discussed  Carb ID/Count: Assessed and Discussed  Label Reading: Assessed and Discussed  Heart Healthy Fats: Assessed  Menu Planning: Assessed and Discussed  Dining Out: Assessed  Physical Activity: Assessed and Discussed    Orals: Assessed and Discussed  Injected Medications: Assessed and Discussed   Storage/Exp:Assessed and Discussed   Site Rotation: Not addressed   Sites Assessed: virtual visit    Diabetes Disease Process: Assessed and Discussed    Acute Complications: Prevent, Detect, Treat:  Hypoglycemia: Assessed  Hyperglycemia: Assessed and Discussed  Sick Days: Assessed  Driving: not driving    Chronic Complications  Foot Care:Assessed and Discussed  Skin Care: Assessed and Discussed  Eye: Nov/Dec 2019  ABC: Assessed  Teeth:difficulty with affording dental care  Goal Setting and Problem Solving: Assessed and Discussed  Barriers: Assessed and Discussed  Psychosocial Adjustments: Assessed      Time spent with the patient: 36 minutes for diabetes education and counseling.   Previous Education: yes  Visit Type:DSMT  Hours Remainin, DSMT 1.5 and MNT 1.25      Johana Evans  2020

## 2021-06-09 NOTE — TELEPHONE ENCOUNTER
Yes would require a nurse only appointment . I can also see her next week if she wants or after she has some more readings .

## 2021-06-09 NOTE — PROGRESS NOTES
CHW reviewed RN Assessment  CHW delegations:    [facility] CHW will provide resources on for low cost dental clinics and possible resources for low cost hearing aides.  CHW emailed resources to patient     Next outreach due: 7/2/2020

## 2021-06-10 ENCOUNTER — COMMUNICATION - HEALTHEAST (OUTPATIENT)
Dept: FAMILY MEDICINE | Facility: CLINIC | Age: 66
End: 2021-06-10

## 2021-06-10 ENCOUNTER — COMMUNICATION - HEALTHEAST (OUTPATIENT)
Dept: PHYSICAL MEDICINE AND REHAB | Facility: CLINIC | Age: 66
End: 2021-06-10

## 2021-06-10 ENCOUNTER — COMMUNICATION - HEALTHEAST (OUTPATIENT)
Dept: CARE COORDINATION | Facility: CLINIC | Age: 66
End: 2021-06-10

## 2021-06-10 DIAGNOSIS — M54.50 LUMBAR SPINE PAIN: ICD-10-CM

## 2021-06-10 DIAGNOSIS — M79.18 MYOFASCIAL MUSCLE PAIN: ICD-10-CM

## 2021-06-10 NOTE — TELEPHONE ENCOUNTER
Who is requesting the letter?  Patient  Why is the letter needed? Patient is requesting the lab results letter dated 5/8/2020 to be printed and mailed to her home.   How would you like this letter returned?  mailed to her home.   Okay to leave a detailed message? Yes

## 2021-06-11 NOTE — TELEPHONE ENCOUNTER
Question following Office Visit  When did you see your provider: 9/21/2020  What is your question:   Patient states she had a Colposcopy done last month at [facility].  Patient was scheduled for a LEAP procedure but had to cancel as she is with [facility] now.  Does Provider want patient to have the LEAP procedure?  Please reach out to patient and advise.  Thank you.  Okay to leave a detailed message: Yes

## 2021-06-11 NOTE — PROGRESS NOTES
.    Office Visit - Physical   Annel Stoddard   65 y.o.  female    Date of visit: 9/21/2020  Physician: Gaye Treviño MD     Assessment and Plan   1. Bipolar 2 disorder (H)    - lamoTRIgine (LAMICTAL) 150 MG tablet; Take 1 tablet (150 mg total) by mouth daily.  Dispense: 90 tablet; Refill: 3  - ARIPiprazole (ABILIFY) 30 MG tablet; Take 1 tablet (30 mg total) by mouth daily.  Dispense: 90 tablet; Refill: 2    2. Simple chronic bronchitis (H)  She feels she is more shortness of breath . I do not feel she should be on a long acting bronchodilator given that we really do not have evidence that she has obstructive airway disease . I also feel it will not add to any more than the nebs she is taking . In additon , the added expense of incruse could be used for her diabetes . Recommend evaluation of lung function   - ipratropium-albuteroL (DUO-NEB) 0.5-2.5 mg/3 mL nebulizer; Take 3 mL by nebulization every 6 (six) hours as needed.  Dispense: 30 vial; Refill: 2  - COVID-19 Virus (Coronavirus) Antibody & Titer Reflex; Future  - PFT Complete; Future  - COVID-19 Virus (Coronavirus) Antibody & Titer Reflex    3. Screen for colon cancer  She just did an occult blood test . Consider cologard next year . Declines colonoscopies     4. Ventral hernia without obstruction or gangrene  Not symptomatic . At any rate do not advise surgical treatment until her diabetes is better control     5. VALE (nonalcoholic steatohepatitis)  Significant VALE . She does not drink alcohol . Has seen GI who advised a fibrosis scan . She has not had that done .  At this point would monitor labs   - INR    6. Type 2 diabetes mellitus without complication, with long-term current use of insulin (H)  Will refill lisprol . She is concerned about prior authorization requested by [facility] but [facility] . They can also initiate prior auth. sanchez increas ellliliaus to 100 units but she may need a more concentrated long acting  insulin . Is splitting it into two and giving herself two shots . Is seeing an eye doctor . Too early for aic . Recommend aic in 2 months after adjustments.   - insulin lispro protamin-lispro 100 unit/mL (50-50) Susp; Inject 34 Units under the skin 3 (three) times a day before meals.  Dispense: 8 vial; Refill: 5  - insulin glargine (LANTUS SOLOSTAR PEN) 100 unit/mL (3 mL) pen; Inject 100 Units under the skin at bedtime. 11.65 Type 2 with hyperglycemia  Dispense: 10 mL; Refill: 0  - Comprehensive Metabolic Panel    7. Personal history of thrombophlebitis  superficail did not get US no longer has symptoms     8. Acute left-sided low back pain with left-sided sciatica  Continues to have sciatica and would like to see a spine care specialist   - Ambulatory referral to [facility]    9. Osteopathy  Is due for dexa scan   - DXA Bone Density Scan; Future    10. Encounter for screening mammogram for breast cancer    - Mammo Screening Bilateral; Future    11. Age-related osteoporosis without current pathological fracture     - DXA Bone Density Scan; Future  Also has HPV positivity and needed colposcopy which she has not done as of ceci . Will reviews.           Time:   Return for Diabetes.       Patient Profile   Social History     Social History Narrative    Moved from [place].  4 times . Has 5 children ,9 grandchildren . Her daughters were taken away from her by biological dad when she was ill with botulism . All adults now . All of them are in [facility]. Youngest daughter is 38 . Initially estranged but has a closer relationship .Hadnt worked for a long time . Is a violinist and plays in [employer] . Wasn't diagnosed with bipolar much later in life . 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 Did different jobs also like a CNA, . Is on disability due to  ipolar .         Past Medical History   Past Medical History:   Diagnosis Date     Botulism     1980     Pneumonia        Patient Active Problem List     Diagnosis Date Noted     Pulmonary emphysema (H) 06/16/2020     Simple chronic bronchitis (H) 01/14/2020     Health maintenance examination 09/23/2019     Overview Note:     Pap 2018 has a h/o HPV so may need annual paps   Colon 2015 negative    mammo jan 2019    dexa         Type 2 diabetes mellitus with complication, with long-term current use of insulin (H) 09/23/2019     Anatomical narrow angle glaucoma 09/23/2019     Hyperactivity of bladder 09/23/2019     Overview Note:     cannot take antimuscuranics because of angle closure glaucoma        Bilateral low back pain with left-sided sciatica 09/23/2019     Overview Note:     Only had XRays , mainstay of treatment is PT        Bipolar 2 disorder (H) 09/23/2019     Overview Note:     Was hospitalized . In [place]        Callus of foot 09/23/2019     Mixed hyperlipidemia 09/23/2019           Past Surgical History  She has no past surgical history on file.     History of Present Illness   This 65 y.o. old patient with type 2 diabetes and bipolar disorder is here to re-establish care .  She had switched to [facility] . Last aic over the past one year have been going up from the 7 range to 9 . She attributes it to stress . She had been seeing a diabetic educator . Insurance will not cover victoza  And she had a reaction to invokana . She wants to increase her insulin further .  She has been going through a bad time  with her significant other and they are no longer romantically involved .     Review of Systems: A comprehensive review of systems was negative except as noted.     Medications and Allergies   Current Outpatient Medications   Medication Sig Dispense Refill     ACCU-CHEK FASTCLIX LANCET DRUM USE THREE TIMES DAILY 306 each 3     albuterol (PROVENTIL) 2.5 mg /3 mL (0.083 %) nebulizer solution Take 3 mL (2.5 mg total) by nebulization every 6 (six) hours as needed for wheezing. 6 vial 3     aspirin-calcium carbonate 81 mg-300 mg calcium(777 mg) Tab Take 81 mg  "by mouth daily.       biotin 10,000 mcg cap Take 1 capsule by mouth daily.       blood glucose test (ACCU-CHEK GUIDE TEST STRIPS) strips USE TEST STRIPS THREE TIMES DAILY 300 strip 3     flaxseed oil 1,000 mg cap Take 3 g by mouth daily.       hydrocortisone 1 % cream Apply 1 application topically as needed.       hydrocortisone 2.5 % ointment applly twice a day for two weeks 30 g 0     insulin lispro protamin-lispro 100 unit/mL (50-50) Susp Inject 34 Units under the skin 3 (three) times a day before meals. 8 vial 5     ketoconazole (NIZORAL) 2 % cream applly externally twice daily for two weeks 15 g 0     LACTOBACILLUS ACIDOPHILUS ORAL Take 1 tablet by mouth daily.       losartan (COZAAR) 25 MG tablet Take 1 tablet (25 mg total) by mouth daily. (Patient taking differently: Take 50 mg by mouth daily before breakfast. ) 90 tablet 2     magnesium citrate 125 mg cap Take by mouth.       MAGNESIUM CITRATE ORAL Take 500 mg by mouth daily before breakfast.       metFORMIN (GLUCOPHAGE XR) 500 MG 24 hr tablet Take 4 tablets (2,000 mg total) by mouth daily with supper. 360 tablet 1     metroNIDAZOLE (METROGEL) 0.75 % gel Apply 1 application topically 2 (two) times a day. 45 g 0     montelukast (SINGULAIR) 10 mg tablet Take 1 tablet (10 mg total) by mouth daily. 90 tablet 2     multivitamin (MULTIVITAMIN) per tablet Take 1 tablet by mouth daily.       mupirocin (BACTROBAN) 2 % cream Apply 1 application topically 3 (three) times a day.       MILAN 128 5 % ophthalmic ointment APPLY A SMALL AMOUNT IN OU QPM  3     nystatin (MYCOSTATIN) ointment Apply 1 application topically 4 (four) times a day. 30 g 0     pen needle, diabetic (BD ULTRA-FINE MICRO PEN NEEDLE) 32 gauge x 1/4\" Ndle Use 1 Device As Directed 3 (three) times a day.       rosuvastatin (CRESTOR) 10 MG tablet Take 1 tablet (10 mg total) by mouth at bedtime. 90 tablet 2     VENTOLIN HFA 90 mcg/actuation inhaler Inhale 1 puff every 4 (four) hours as needed. 3 Inhaler 3     " vitamin B complex (B COMPLEX-VITAMIN B12 ORAL) Take 1,000 mcg by mouth daily.       ARIPiprazole (ABILIFY) 30 MG tablet Take 1 tablet (30 mg total) by mouth daily. 90 tablet 2     insulin glargine (LANTUS SOLOSTAR PEN) 100 unit/mL (3 mL) pen Inject 100 Units under the skin at bedtime. 11.65 Type 2 with hyperglycemia 10 mL 0     ipratropium-albuteroL (DUO-NEB) 0.5-2.5 mg/3 mL nebulizer Take 3 mL by nebulization every 6 (six) hours as needed. 30 vial 2     lamoTRIgine (LAMICTAL) 150 MG tablet Take 1 tablet (150 mg total) by mouth daily. 90 tablet 3     No current facility-administered medications for this visit.      Allergies   Allergen Reactions     Desmopressin Swelling     LE  LE       Antihistamines - Alkylamine Other (See Comments)     Chest pains     Cephalexin      rash  rash  rash       Codeine Headache     Headache  Headache  Headache       Diazepam Dizziness and Other (See Comments)     Diphenhydramine Hcl      Chest tightness  Chest tightness  Chest tightness       Doxycycline      Does not work. Ineffective per patient.     Erythromycin Base      rash  rash       Estrogens Headache     Hydrochlorothiazide Other (See Comments) and Unknown     Patient reports can't take this med due to increased urine output  Patient reports can't take this med due to increased urine output  Patient reports can't take this med due to increased urine output       Hydrocodone-Acetaminophen      Justin change  Justin change  Justin change       Invokamet [Canagliflozin-Metformin] Itching     Lithium Diarrhea     Diabetes insipidus  Diabetes insipidus  Causing DI  Causing DI       Penicillins Unknown     Risperidone      Beclomethasone Dipropionate Rash     qvar  qvar       Latex Rash and Other (See Comments)     rash     Valacyclovir Rash        Family and Social History   Family History   Problem Relation Age of Onset     Stroke Father      Esophageal cancer Sister      Heart attack Brother         Social History     Tobacco Use      Smoking status: Never Smoker     Smokeless tobacco: Never Used   Substance Use Topics     Alcohol use: Not on file     Drug use: Not on file             Gaye Treviño MD  Internal Medicine  Contact me at [phone]

## 2021-06-11 NOTE — TELEPHONE ENCOUNTER
9/11/2020 LMTCB - pt was scheduled for Wed 10/7 however the appointment is not virtual.  Dr. Treviño is virtual appt on Wed and Friday(s).  Seeking permission to update appt to virtual video and establish MyChart or reschedule to a F2F appt day M, T or Th

## 2021-06-11 NOTE — TELEPHONE ENCOUNTER
Annel dropped off some forms this morning for Dr. Treviño in a blue folder. She would like to note that she was supposed to have a LEEP procedure this morning through [facility] but due to insurance purposes, she has to have the procedure through [facility]. Please enter referral.

## 2021-06-11 NOTE — TELEPHONE ENCOUNTER
FYI - Status Update  Who is Calling: Patient  Update: Patient stated the Colposcopy was done by Dr. Montiel at [place].  Okay to leave a detailed message?:  No

## 2021-06-11 NOTE — TELEPHONE ENCOUNTER
Medication Request  Medication name: Victoza  [facility]ed [facility]  Reason for request: Diabetes  When did you use medication last?:  N/a , patient can now afford the victoza has new insurance .  Patient offered appointment:  patient declined  Okay to leave a detailed message: yes         Patient states that she's still waiting for the Diabetic educator to give give a call.

## 2021-06-11 NOTE — TELEPHONE ENCOUNTER
Who is calling:  Patient   Reason for Call:  Patient is questioning if she should have [facility] come to her home to do her Medicare Wellness/Cognitive test or should she schedule an appointment with Gaye Treviño MD. Patient would like a call back.  Date of last appointment with primary care: n/a  Okay to leave a detailed message: Yes  [phone]

## 2021-06-11 NOTE — TELEPHONE ENCOUNTER
Should I get this prescription up? Does she need to speak with diabetes ed first? Thank you.    Barbara Tobias, CMA

## 2021-06-11 NOTE — TELEPHONE ENCOUNTER
Who is calling:  Patient  Reason for Call:  Patient had to switch to [facility] clinics due to insurance reasons and is going to have another insurance change in October and would like to start seeing Dr Treviño again at that time. Writer advised patient that provider is not listed as PCP, patient is asking if Dr Treviño can take her on as patient again or if she should schedule an establish care visit to make that change. Please advise patient.  Date of last appointment with primary care:   Okay to leave a detailed message: Yes, patient asked that you speak loudly when leaving a message as sometimes it is hard to hear voicemail.

## 2021-06-11 NOTE — TELEPHONE ENCOUNTER
1. What is the nature of the form? 3 forms for gift cards - mammogram, A1c, DM urine protein    2. Has patient signed form if applicable? Yes    3. Where should completed form go? mailed to address provided    4. When was patient's last appointment with [facility]? Within one year    5. If further questions or when form is completed, is it okay to leave detailed message on patient's phone? YES     Forms in Dr. Treviño's inbox

## 2021-06-11 NOTE — TELEPHONE ENCOUNTER
Yes ok to have her labs done on same day of appointment . Will monitor her tests so she shouldn't worry

## 2021-06-11 NOTE — TELEPHONE ENCOUNTER
No need for an establish care visit but she should schedule a diabetic visit with ne depending on how long her last visit was in 4-6 months

## 2021-06-11 NOTE — TELEPHONE ENCOUNTER
LMOM that Dr marie will look at the forms tomorrow.  She did order the LEEP, Diabetic ed and Victoza.  Said that someone will be getting a hold of her for all of these.    Yoselyn Aguirre, CMA

## 2021-06-11 NOTE — TELEPHONE ENCOUNTER
I have ordered a new referral for the LEEP procedure and ordered victoza and the diabetic educator referral . She can help her initiate the victoza and titrate it up

## 2021-06-11 NOTE — TELEPHONE ENCOUNTER
Pt is calling back.    She is wondering if ok to set up a visit. Waiting on a call back.  I advised her that Dr. Treviño said that that would be ok. We can get an appointment set up for her. We do take [facility]. She stated that she is switching her PCP, as [facility] does not take her insurance, and she needs to be seen in October.  Transferred to scheduling to make that appointment.    Teresa Dia RN   [facility] Nurse Advisor  09/10/20 at 6:54 PM

## 2021-06-11 NOTE — TELEPHONE ENCOUNTER
She can have a home cognitive test and we can scan that in the [facility] . We do them in out annual wellness visits and she may have had that done already this year .

## 2021-06-11 NOTE — TELEPHONE ENCOUNTER
Who is calling:  Patient   Reason for Call:      1. Patient stated she started Victoza this morning and so far it so good. Patient stated that her only concern is that she has history of low sodium and one of the side effect for this medication is low sodium.     2. Patient stated that she noticed some white things in her stool sometimes and not sure if they are pins.    3. Patient is questioning if it would be ok for patient to come in to have her lab drawn on the same day of her appointment in the morning so she can come back home and eat.     Date of last appointment with primary care: n/a  Okay to leave a detailed message: Yes  [phone]

## 2021-06-12 NOTE — TELEPHONE ENCOUNTER
Left detailed message for the patient relaying message below from Dr. Treviño regarding refill requested.  Asked that she call with any further questions.  Melanie JIMENEZ CMA/MASOUD....................9:59 AM

## 2021-06-12 NOTE — TELEPHONE ENCOUNTER
Patient calling to inform PSP that she had a DEXA scan completed on Monday at [facility]. Results were sent to her PCP, the ordering provider. Explained PSP is out of office, but he will be informed of this.     Patient does have a follow up with PSP on 10/20/20.

## 2021-06-12 NOTE — TELEPHONE ENCOUNTER
"Received voicemail from pt on nurse navigation line. Pt states that the naproxen prescription she was given helps \"some but I'm still having some buttock and calf pain\". She inquires if she could be given a stronger dose of this medication. She currently takes naproxen 500 mg 1 tablet 2 times daily.     Pt also states that she will be CT Lumbar Spine on Wednesday. She wonders if Dr. Santana could put in an order \"to scan my tendons and ligaments in my buttock and calf because I think I pulled something many years ago\".     Pt states she is unavailable to take return call but to leave detailed voicemail for her with provider's response.     Please advise.   "

## 2021-06-12 NOTE — TELEPHONE ENCOUNTER
"RN cannot approve Refill Request    RN can NOT refill this medication historical medication requested. Last office visit: 9/21/2020 Gaye Treviño MD Last Physical: 12/16/2019 Last MTM visit: Visit date not found Last visit same specialty: 9/21/2020 Gaye Treviño MD.  Next visit within 3 mo: Visit date not found  Next physical within 3 mo: Visit date not found      Jayashree Meza, [facility] Triage/Med Refill 10/9/2020    Requested Prescriptions   Pending Prescriptions Disp Refills     pen needle, diabetic (BD ULTRA-FINE MICRO PEN NEEDLE) 32 gauge x 1/4\" Ndle  0     Sig: Use 1 Device As Directed 3 (three) times a day.       Diabetic Supplies Refill Protocol Passed - 10/8/2020  7:08 AM        Passed - Visit with PCP or prescribing provider visit in last 6 months     Last office visit with prescriber/PCP: 9/21/2020 Gaye Treviño MD OR same dept: 9/21/2020 Gaye Treviño MD OR same specialty: 9/21/2020 Gaye Treviño MD  Last physical: 12/16/2019 Last MTM visit: Visit date not found   Next visit within 3 mo: Visit date not found  Next physical within 3 mo: Visit date not found  Prescriber OR PCP: Gaye Treviño MD  Last diagnosis associated with med order: There are no diagnoses linked to this encounter.  If protocol passes may refill for 12 months if within 3 months of last provider visit (or a total of 15 months).             Passed - A1C in last 6 months     Hemoglobin A1c   Date Value Ref Range Status   05/06/2020 8.6 (H) 3.5 - 6.0 % Final                     "

## 2021-06-12 NOTE — TELEPHONE ENCOUNTER
Tizanidine can also cause elevated liver enzymes as can baclofen.  I recommend trial of methocarbamol.  I am not comfortable with providing tizanidine for her.

## 2021-06-12 NOTE — TELEPHONE ENCOUNTER
Test Results  Who is calling?:  Patient   Who ordered the test:  Gaye Treviño MD   Type of test: Imaging and Other: a breathing test  Date of test:  Last week  Where was the test performed:  [facility]  What are your questions/concerns?:  Patient stated I am waiting on Gaye Treviño MD to give me results on a breathing test and DEXA scan. Please follow up with patient today.  Okay to leave a detailed message?:  Yes

## 2021-06-12 NOTE — TELEPHONE ENCOUNTER
"Patient had called and left message on nurse navigation line at 810 AM. Reports \"The methocarbamol is better than the first one, but it is not holding it. I am taking an aspirin. Can I take Ibuprofen in the morning and evening too? Also want to let him know I am now taking Calcium Citrate.\" Returned her call to discuss. Left message to return call.   "

## 2021-06-12 NOTE — TELEPHONE ENCOUNTER
----- Message from Napoleon Santana DO sent at 10/14/2020 11:41 AM CDT -----  CT scan reviewed showing degenerative changes L4-5 L5-S1.  This results in moderate spinal stenosis at L4-5 and also what appears to be right lateral recess stenosis at L5-S1.    Recommendations: It is been a year since her last physical therapy and I can offer her additional physical therapy and she can return to clinic as planned next week for reexamination and discussion of treatment options.

## 2021-06-12 NOTE — TELEPHONE ENCOUNTER
Refill Approved    Rx renewed per Medication Renewal Policy. Medication was last renewed on 9/21/20.    Ana More, [facility] Triage/Med Refill 11/6/2020     Requested Prescriptions   Pending Prescriptions Disp Refills     ipratropium-albuteroL (DUO-NEB) 0.5-2.5 mg/3 mL nebulizer 30 vial 2     Sig: Take 3 mL by nebulization every 6 (six) hours as needed.       Asthma Medications Refill Protocol Passed - 11/4/2020  1:07 PM        Passed - PCP or prescribing provider visit in last year     Last office visit with prescriber/PCP: 9/21/2020 Gaye Treviño MD OR same dept: 9/21/2020 Gaye Treviño MD OR same specialty: 9/21/2020 Gaye Treviño MD  Last physical: 12/16/2019 Last MTM visit: Visit date not found    Next appt within 3 mo: Visit date not found Next physical within 3 mo: Visit date not found  Prescriber OR PCP: Gaye Treviño MD  Last diagnosis associated with med order: 1. Simple chronic bronchitis (H)  - ipratropium-albuteroL (DUO-NEB) 0.5-2.5 mg/3 mL nebulizer; Take 3 mL by nebulization every 6 (six) hours as needed.  Dispense: 30 vial; Refill: 2    If protocol passes may refill for 6 months if within 3 months of last provider visit (or a total of 9 months).

## 2021-06-12 NOTE — PROGRESS NOTES
Assessment/Plan:      Diagnoses and all orders for this visit:    Lumbar radicular pain  -     CT Lumbar Spine Without Contrast; Future; Expected date: 10/06/2020  -     naproxen (NAPROSYN) 500 MG tablet; Take 1 tablet (500 mg total) by mouth 2 (two) times a day as needed (for pain.  Take with food.).  Dispense: 30 tablet; Refill: 0    Spondylolisthesis at L5-S1 level  -     CT Lumbar Spine Without Contrast; Future; Expected date: 10/06/2020  -     naproxen (NAPROSYN) 500 MG tablet; Take 1 tablet (500 mg total) by mouth 2 (two) times a day as needed (for pain.  Take with food.).  Dispense: 30 tablet; Refill: 0    Myofascial pain  -     CT Lumbar Spine Without Contrast; Future; Expected date: 10/06/2020  -     naproxen (NAPROSYN) 500 MG tablet; Take 1 tablet (500 mg total) by mouth 2 (two) times a day as needed (for pain.  Take with food.).  Dispense: 30 tablet; Refill: 0        Assessment: Pleasant 65 y.o. female with a history of diabetes mellitus, hypertension, hyperlipidemia, bipolar, anxiety, emphysema with:    1.  Chronic progressive 9-year history of low back pain with 3-year history of right lower extremity radicular pain in an S1 distribution.  She has a mild grade 1 L5 on S1 spondylolisthesis on plain films from 2019.    2.  Myofascial pain right gluteal region piriformis.    3.  Mild decreased hip range of motion bilaterally unknown etiology might be myofascial.      Discussion:    1. I discussed the diagnosis and treatment options.  Discussed the options for further imaging.  She has been through physical therapy 2 visits in [place] and several visits prior to that in [place].  She also takes ibuprofen and aspirin.    2.  Recommend further imaging.  We will obtain a CT scan of the lumbar spine.  She is not wanting an MRI due to claustrophobia and tells me she cannot take sedation such as diazepam.    3.  Trial naproxen in place of ibuprofen to see if that will help her pain.    4.  Follow-up a few  days after the CT scan in person to discuss imaging and recommendations.*      It was our pleasure caring for your patient today, if there any questions or concerns please do not hesitate to contact us.      Subjective:   Patient ID: Annel Stoddard is a 65 y.o. female.    History of Present Illness: Patient presents at the request of Dr. Treviño for an evaluation of left lower extremity pain and gluteal and low back pain.  She reports having pain for at least the past 9 years.  She lived in [place] until about 1 year ago.  About 9 years ago she tripped on a curb and fell on her knees and had some low back pain.  Her back is waxed and waned since then and even 3 years ago she had a flare with a move to [place] and then back to [place].  With this flare 3 years ago she began having pain in the low back left gluteal pain and down the back of the left leg to the ankle and bottom of the left foot.  This is significantly flared 1 month ago.  She has numbness and tingling in the bottom of the foot and the toes intermittently.  Her back and leg pain are worse with walking better with sitting and laying down.  She feels that her leg is weak at times and will feel as if it will give out on her.  She has taken aspirin and ibuprofen.  She is quite concerned about imaging.  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  She tells me she has been to physical therapy.  I did review the notes and she has been to 2 visits at [facility] in October of last year.  Prior to that she tells me she had several visits in [place] over a year ago.  She has not had any injections and is not wanting injections.  She rates her pain a 10/10 today and at worst an 8/10 at best.      Imaging: Plain films lumbar spine personally reviewed.  Normal  "segmentation and alignment.  No fractures noted.  Facet arthropathy in the lower lumbar spine results in mild grade 1 spondylolisthesis L5 on S1 cannot exclude pars defect.  Hips appear unremarkable/minimal degenerative changes of the visualized portions of the hips.    Prior interventions:  Physical therapy    Review of Systems: She has numerous positive review of systems including weight gain, sexual dysfunction, cough with shortness of breath and wheeze related to her emphysema.  She has joint pain muscle pain and \"sciatica \".  She also has anxiety.  She occasionally gets ringing in the ears difficulty swallowing leg swelling, frequent urination but no bowel or bladder incontinence.  She has had some falls and imbalance with dizziness with turning her head.  She denies fevers, change in vision, chest pain, abdominal pain nausea vomiting, bowel or bladder incontinence, rashes/skin issues. Remainder of 12 point review systems negative unless listed above.    Past Medical History:   Diagnosis Date     Anxiety      Botulism     1980     COPD (chronic obstructive pulmonary disease) (H)      Depression      Diabetes mellitus (H)      Hyperlipidemia      Hypertension      Pneumonia        Family History   Problem Relation Age of Onset     Stroke Father      Esophageal cancer Sister      Heart attack Brother          Social History     Socioeconomic History     Marital status: Single     Spouse name: None     Number of children: None     Years of education: None     Highest education level: None   Occupational History     None   Social Needs     Financial resource strain: None     Food insecurity     Worry: None     Inability: None     Transportation needs     Medical: None     Non-medical: None   Tobacco Use     Smoking status: Never Smoker     Smokeless tobacco: Never Used   Substance and Sexual Activity     Alcohol use: Not Currently     Drug use: None     Sexual activity: None   Lifestyle     Physical activity     " Days per week: None     Minutes per session: None     Stress: None   Relationships     Social connections     Talks on phone: None     Gets together: None     Attends Taoist service: None     Active member of club or organization: None     Attends meetings of clubs or organizations: None     Relationship status: None     Intimate partner violence     Fear of current or ex partner: None     Emotionally abused: None     Physically abused: None     Forced sexual activity: None   Other Topics Concern     None   Social History Narrative    Moved from [place].  4 times . Has 5 children ,9 grandchildren . Her daughters were taken away from her by biological dad when she was ill with botulism . All adults now . All of them are in [facility]. Youngest daughter is 38 . Initially estranged but has a closer relationship .Hadnt worked for a long time . Is a violinist and plays in [employer] . Wasn't diagnosed with bipolar much later in life . 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 Did different jobs also like a CNA, . Is on disability due to  ipolar .      Social history: .  Retired.  No tobacco or alcohol.    The following portions of the patient's history were reviewed and updated as appropriate: allergies, current medications, past family history, past medical history, past social history, past surgical history and problem list.      WHO 5: 16    ISSA Score: 52      Objective:   Physical Exam:    Vitals:    10/06/20 0823   BP: 150/71   Pulse: 71     There is no height or weight on file to calculate BMI.      General:  Well-appearing female in no acute distress.  Pleasant, cooperative, and interactive throughout the examination and interview.  CV: No lower extremity edema on inspection or paltation.  Lymphatics: No cervical lymphadenopathy palpated. Eyes: sclera clear. Skin: No rashes or lesions seen over the head/neck, hairline, arms, legs, back.  Respirations unlabored.  MSK: Gait is  nonantalgic.  Left hip externally rotated with ambulation..  Able to heel-toe walk without difficulty.  Negative Romberg.  Spine: normal AP curves of the C, T, and L spine.  Moderately reduced range of motion lumbar spine and finger to floor testing.  Palpation: Tenderness to palpation over left SI joint piriformis sciatic notch greater trochanter.  Minimal tenderness on the right.  Extremities: Full range of motion of the elbows, and wrists with no effusions or tenderness to palpation.  Mild decreased range of motion of the hips in internal and external rotation from seated and supine.  No significant pain.  Full range of motion of the knees, and ankles with no effusions or tenderness to palpation.  Mild positive scour maneuver and Juvenal's test on the right for hip pain.  Negative left.  Y . No hypermobility of the upper or lower extremities.  Neurologic exam: Mental status: Patient is alert and oriented with normal affect.  Attention, knowledge, memory, and language are intact.  Normal coordination throughout the examination.  Reflexes are 1+ and symmetric biceps, triceps, brachioradialis, patellar, and 0 Achilles with down-going toes and Negative Colton's.  Sensation is intact to light touch throughout the upper and lower extremities bilaterally.  Manual muscle testing reveals 5 out of 5 in the hip flexors, knee flexors/extensors, ankle plantar flexors, ankle  dorsiflexors, and EHL.  Upper extremities: Grossly normal strength . Normal muscle bulk and tone in the arms and legs.    Negative seated and supine straight leg raise bilaterally.

## 2021-06-12 NOTE — TELEPHONE ENCOUNTER
"PSP:  Dr. Santana  Last clinic visit:  10/20/2020  Reason for call: Status Update  Clinical information:  Pt calling to report that she is still in a lot of pain with the new medication she was prescribed (nabumetone). Pt wondering if there is something else she could try instead. She does add \"I looked on the nabumetone prescription and it says not to take with elevated liver enzymes which I have, so maybe he could prescribe something that won't interfere with that\".   Pt scheduled to begin PT on 11/16. She is scheduled for steroid injection on 12/1. She reports she scheduled out this far as she will be having surgery on 11/10. She states they will be prescribing her Vicodin after this and would like this mentioned to Dr. Santana \"just so that he doesn't recommend something that might not go well with it\".   Advice given to patient: Informed pt that provider will be updated with her status and she will be called back with provider's response. Pharmacy verified.   Provider to address: Medication recommendations    "

## 2021-06-12 NOTE — PATIENT INSTRUCTIONS - HE
1. Start PT    2. Lumbar epidural steroid injection with Dr Arriaza    3. Try nabumetone 500mg twice daily as needed in place of Naproxen for pain

## 2021-06-12 NOTE — TELEPHONE ENCOUNTER
Central PA team  [phone]  Pool: HE PA MED ([id number])          PA has been initiated.       PA form completed and faxed insurance via Cover My Meds     Key:  [id number]     Medication:  Methocarbamol 500MG tablets    Insurance:  [employer]         Response will be received via fax and may take up to 5-10 business days depending on plan

## 2021-06-12 NOTE — TELEPHONE ENCOUNTER
"Called and relayed below message to patient. She states she was asking because when she had the PFT\"S the tech stated her wheezing is due to her windpipe closing. Thoughts on this? She also wants you to know that she was called to audition at the [place] [employer]. She also states she eats 5-6 times daily and is checking her BS and taking insulin that many times per day too.    Barbara Tobias, [facility]    "

## 2021-06-12 NOTE — PROGRESS NOTES
Assessment/Plan:      Diagnoses and all orders for this visit:    Lumbar radicular pain  -     OPS TFESI Lumbar Sacral Unilateral; Future; Expected date: 10/20/2020  -     nabumetone (RELAFEN) 500 MG tablet; Take 1 tablet (500 mg total) by mouth 2 (two) times a day as needed for pain.  Dispense: 60 tablet; Refill: 2  -     Ambulatory referral to Physical Therapy    Spinal stenosis of lumbar region with neurogenic claudication  -     OPS TFESI Lumbar Sacral Unilateral; Future; Expected date: 10/20/2020  -     Ambulatory referral to Physical Therapy    Spondylolisthesis of lumbar region  -     OPS TFESI Lumbar Sacral Unilateral; Future; Expected date: 10/20/2020  -     Ambulatory referral to Physical Therapy    Myofascial pain  -     Ambulatory referral to Physical Therapy        Assessment: Pleasant 65 y.o. female  with a history of diabetes mellitus, hypertension, hyperlipidemia, bipolar, anxiety, emphysema with:     1.  Chronic progressive 9-year history of low back pain with 3-year history of right lower extremity radicular pain in an S1 distribution.    She has degenerative disc disease with broad-based disc bulge at L4-5 with moderate spinal stenosis and lateral recess stenosis and a spondylolisthesis L5 and S1 with severe facet arthropathy and what appears to be lateral recess stenosis on CT scan.  Likely compression of the left S1 nerve in the lateral recess versus L5 nerve resulting in her symptoms.     2.  Myofascial pain right gluteal region piriformis.             Discussion:    1.  We discussed the diagnosis CT scan and treatment options.  She has been through physical therapy in the past has not been that helpful and she is looking for management of her pain.  Naproxen has not been helpful.  We discussed option of the further medication such as other anti-inflammatories.  We also discussed the options of lumbar epidural or surgical referral.      2.  I recommend starting physical therapy for lumbar core  strengthening stabilization and a referral was placed.    3.  We discussed the option of a lumbar epidural.  I would recommend a left L5-S1 and S1-S2 transforaminal epidural steroid injection.  Initially she is somewhat reluctant as her sister-in-law developed cancer and she had coincidentally had steroid injections.  Reassurance provided that 1 injection is safe as the other options are surgical referral.  She is going to proceed with a  lumbar transforaminal epidural steroid injection with Dr. Arriaza.  She has had some rash with an inhaled steroid but no allergy to injected steroid to her knowledge, no allergy to dye.  She does have mild intolerance to large doses of lidocaine at the dentist.    4.  Stop naproxen and trial nabumetone as needed for pain.    5.  She does have a LEEP procedure scheduled 2 to 3 weeks from now and will schedule the injection for after that procedure.  Can follow-up with me 2 to 4 weeks after injection.      It was our pleasure caring for your patient today, if there any questions or concerns please do not hesitate to contact us.    25 minutes were spent with this patient in addition to any procedure with greater than 50% in counseling and coordination of care.    Subjective:   Patient ID: Annel Stoddard is a 65 y.o. female.    History of Present Illness:Patient presents for follow-up of left gluteal and lower extremity radicular pain to the heel.  Symptoms have not changed in fact may have worsened slightly.  Pain is a 10/10 at worst 9/10 today and at best.  Worse with any walking and she gets cramping in her left calf.  Prolonged sitting causes pain as well and she cannot sit for periods of time playing the violin.  Nothing really makes it better other than rest.  Has been taken naproxen without much benefit.  Wakes her up at night.  Has had CT scan of the lumbar spine which is personally reviewed.  She has several questions today regarding potential treatments for her radicular  "pain has a LEEP procedure coming in the next few weeks.  Did have physical therapy within the last year here as well as in [place].  She reports having a DVT in her left leg where she \"tore her vein \".  She shows me this is superficial above her knee and the left medial thigh.    Imaging: CT report and images were personally reviewed and discussed with the patient.  A plastic model was utilized during the discussion.  CT of the lumbar spine personally reviewed.  This shows degenerative changes L4-5 L5-S1.  Severe facet arthropathy L5-S1 with mild spondylolisthesis mild right foraminal stenosis and left foraminal stenosis.  There appears to be lateral recess stenosis on my review bilaterally at least moderate.  L4-5 moderate spinal stenosis with lateral recess stenosis.  Degenerative disc disease broad-based disc bulge.    Review of Systems: Pertinent positives: Numbness tingling weakness left leg..  Pertinent negatives:   No bowel or bladder incontinence.  No urinary retention.  No fevers, unintentional weight loss, balance changes, headaches, frequent falling, difficulty swallowing, or coordination difficulties.  All others reviewed are negative.           Past Medical History:   Diagnosis Date     Anxiety      Botulism     1980     COPD (chronic obstructive pulmonary disease) (H)      Depression      Diabetes mellitus (H)      Hyperlipidemia      Hypertension      Pneumonia        The following portions of the patient's history were reviewed and updated as appropriate: allergies, current medications, past family history, past medical history, past social history, past surgical history and problem list.           Objective:   Physical Exam:    Vitals:    10/20/20 0906   BP: 142/63   Pulse: 80     There is no height or weight on file to calculate BMI.      General: Alert and oriented with normal affect. Attention, knowledge, memory, and language are intact. No acute distress.   Eyes: Sclerae are clear.  " Respirations: Unlabored. CV: No lower extremity edema.  Skin: No rashes seen.    Gait:  Nonantalgic    Sensation is intact to light touch throughout the  lower extremities.  Reflexes are  2+ patellar and Achilles  .    Manual muscle testing reveals:  Right /Left out of 5     5/5 hip flexors  5/5 knee flexors  5/5 knee extensors  5/5 ankle plantar flexors  5/5 ankle dorsiflexors  5/5  EHL

## 2021-06-12 NOTE — TELEPHONE ENCOUNTER
Patient Returning Call  Reason for call:  Patient returning call  Information relayed to patient:  Message below  Patient has additional questions:  Yes  If YES, what are your questions/concerns:  Patient wondering if referral is needed for the narrowing of her wind pipe?  Patient also wondering if she should be taking iron medication? HGB is 11.7  Please advise   Okay to leave a detailed message?: Yes

## 2021-06-12 NOTE — TELEPHONE ENCOUNTER
Alexandra Contreras for medication change requested below due to insurance?  Please clarify sig as requested below.  Thank you.  Melanie JIMENEZ, ROSALINA/MASOUD....................12:11 PM

## 2021-06-12 NOTE — TELEPHONE ENCOUNTER
Refill Request  Did you contact pharmacy: Yes  Medication name:   Requested Prescriptions     Pending Prescriptions Disp Refills     ipratropium-albuteroL (DUO-NEB) 0.5-2.5 mg/3 mL nebulizer 30 vial 2     Sig: Take 3 mL by nebulization every 6 (six) hours as needed.     Who prescribed the medication:   ADONAY KEYES  Requested Pharmacy: [facility] [id number]  Is patient out of medication: No.  2 days left  Patient notified refills processed in 3 business days:  yes  Okay to leave a detailed message: yes      Patient is requesting 60 vials (two boxes) each fill with 2 refills.  Patient uses two 30 vial boxes each month.    Thank you.

## 2021-06-12 NOTE — TELEPHONE ENCOUNTER
I left vociemail . Also I think had set a result note on her .   1. dexa shows no oseoporsis . Some osteopenia but no need for medication at this point   2. PFTS do not show COPD/emphysema or any response to bronchodilator  Some restriction in lung movement but that is not something that can be fixed with inhalers . Improving exercise will help

## 2021-06-12 NOTE — TELEPHONE ENCOUNTER
"Medication Request  Medication name:   pen needle, diabetic (BD ULTRA-FINE MICRO PEN NEEDLE) 32 gauge x 1/4\" Ndle  0 10/9/2020     Sig - Route: Use 1 Device As Directed 3 (three) times a day. - Miscellaneous    Class: OTC        Requested Pharmacy[id number]  Reason for request:   Patient request for above pen needle to be prescribed.  Patient uses three boxes a month.  When did you use medication last?:    Currently using.  Patient offered appointment:  patient declined  Okay to leave a detailed message: yes        "

## 2021-06-12 NOTE — TELEPHONE ENCOUNTER
No she doesn't need to take iron . We will see what it is at her next visit . Her last iron test was normal . The PFTS were primarily one to see if she needed the incruse inhaler  Which she doesn't . I am happy to refer her to a lung specialist but at this stage there is no need

## 2021-06-12 NOTE — TELEPHONE ENCOUNTER
I think the lumbar epidural was really was going to help her pain.  I can provide a different anti-inflammatory for her, etodolac, which she can take twice a day but would need to follow the instructions of her surgeon as to when to stop anti-inflammatories prior to her upcoming procedure.      Please see what doses of nabumetone and ibuprofen she is taking and make sure she does not take to anti-inflammatories together.

## 2021-06-12 NOTE — TELEPHONE ENCOUNTER
Medication Request  Medication name: liraglutide (VICTOZA) 0.6 mg/0.1 mL (18 mg/3 mL) injection; insulin lispro protamin-lispro 100 unit/mL (50-50) Susp  [facility]  Reason for request: Pt needs new prescriptions for both medications the Lispro Pt states she eats 4-5 times daily sand the Victoza Pt states she takes 1.2 mg.  Please resend prescriptions.  When did you use medication last?:  N/A  Patient offered appointment:  patient declined  Okay to leave a detailed message: no         Who is calling:  Patient  Reason for Call:  Pt wanted to let provider know that she has been taking both etodolac (LODINE) 200 MG capsule;methocarbamoL (ROBAXIN) 500 MG tablet which has increased her blood sugar, however she needs to take these due to pain. Please advise.   Date of last appointment with primary care:  N/A  Has the patient been recently seen:  N/A  Okay to leave a detailed message: No

## 2021-06-12 NOTE — TELEPHONE ENCOUNTER
Call to pt with provider's response. Pt stated understanding.     Pt states she has had issues with Valium in the past. Informed the pt that Valium and Methocarbamol are in different classes of medications. Pt stated understanding and that she would like to try this.     Order prepped for provider review/approval.

## 2021-06-12 NOTE — TELEPHONE ENCOUNTER
Who is calling:  Annel  Reason for Call:  She wants the PharmD and Dr. Treviño to be aware that she is taking Humalog on a sliding scale.  She takes Humalog 34 units with every meal.  If blood sugar is about 150, she will take additional 2 units for every 50 points.    Example:   Blood sugar of 151-200, additional 2 units.   201-250, additional 4 units  251-300, additional 6 units   Date of last appointment with primary care: Yesterday  Okay to leave a detailed message: No need to call back.

## 2021-06-12 NOTE — TELEPHONE ENCOUNTER
Patient calling as she thought she had missed a call from us. Explained we had not called her. Did inform her the prescription of the Methocarbamol had been sent in to her pharmacy now. Stated understanding.

## 2021-06-12 NOTE — TELEPHONE ENCOUNTER
Call to pt with provider's results and recommendations. Pt stated understanding. Pt has follow-up appt with Dr. Santana on 10/20/2020.

## 2021-06-12 NOTE — TELEPHONE ENCOUNTER
I would recommend a trial of methocarbamol which can be helpful for muscle pain but can cause some drowsiness.  Would recommend 500 mg twice a day as needed for pain, #30.

## 2021-06-12 NOTE — TELEPHONE ENCOUNTER
Pt returning call. Pt reports she takes a daily baby aspirin. Advised her it is ok to take this with the methocarbamol. She could also try adding ibuprofen as well to see if this helps. Discussed alternating the ibuprofen and methocarbamol to see if this gives her better pain coverage. She states with her current schedule it is easier to take things at one time.

## 2021-06-12 NOTE — TELEPHONE ENCOUNTER
Drug Change Request  Who is calling?:  Patient  What is the current medication?:    insulin lispro protamin-lispro 100 unit/mL (50-50) Susp 8 vial 5 9/21/2020     Sig - Route: Inject 34 Units under the skin 3 (three) times a day before meals. - Subcutaneous    Sent to pharmacy as: insulin lispro protamine-lispro 100 unit/mL (50-50) subcutaneous susp    Notes to Pharmacy: She wants a pen    E-Prescribing Status: Receipt confirmed by pharmacy (9/21/2020 10:11 AM CDT)        [facility]  Patient is on a sliding scale  150 and under 35 units  151-200  Plus 2 units  201-250  Plus 4 units  251-300  Plus 6 units  301-350  Plus 8 units  Please advise parameters.  Why the request to change?:    Patient insurance will cover Humalog  Requested Pharmacy?: [id number]  Okay to leave a detailed message?:  Yes

## 2021-06-12 NOTE — TELEPHONE ENCOUNTER
If the pain is tolerable, my hope is that she can continue with naproxen until she has the CT scan.  I suspect she has a pinched nerve in her back giving her the right leg pain.  If the CT scan confirms this finding, we can potentially do a steroid injection to help her pain further and potentially start physical therapy.  A pinched nerve can make it feel as if she has soft tissue injury in her leg as well.

## 2021-06-12 NOTE — TELEPHONE ENCOUNTER
Called pt. No answer. LM informing if she does have a low BG to eat 4 glucose tablets or about 15 grams of carb. If not feeling better or BG is not going up within 15 minutes to repeat another 4 tablets or 15g of carb. Asked pt to call back with any further questions/concerns.

## 2021-06-12 NOTE — TELEPHONE ENCOUNTER
Patient called. She is wondering how many glucose tablet she is supposed to take if her blood sugar would to drop really low.    Annel @ [phone]

## 2021-06-12 NOTE — TELEPHONE ENCOUNTER
Methocarbamol is not covered by patient plan.  The preferred alternative is Tizanidine.  Please change. Can not do PA for this medcation.

## 2021-06-12 NOTE — TELEPHONE ENCOUNTER
"PSP:  Dr. Santana  Last clinic visit:  10/20/20  Reason for call: status update and refill by 11/6  Clinical information:  Patient had called this AM leaving a message on nurse line. Returned her call. She reports she does not feel the Nabumetone and Ibuprofen are working for her. \"I'm not sure if I should change anything before my LEEP procedure on 11/10. If he wants me to keep taking it, I will be out 11/6.\" Patient not able to take Tylenol due to elevated liver enzymes.  Advice given to patient: PSP will be updated. She will be called with any further recommendations.   Provider to address: Status on medications, any new recommendations    "

## 2021-06-12 NOTE — TELEPHONE ENCOUNTER
Patient has not been seen by PharmD. Perhaps she was referring to diabetes education, who she just saw yesterday. Forwarding to that team.

## 2021-06-12 NOTE — TELEPHONE ENCOUNTER
Who is calling:  The patient   Reason for Call:  The patient is checking on the status of the refill for the Victoza. See previus messages requesting the refill.  The patient states she will be out of the medication in 2 days.  Date of last appointment with primary care:  Okay to leave a detailed message: Yes

## 2021-06-12 NOTE — TELEPHONE ENCOUNTER
Prior Authorization Request  Who s requesting:  Pharmacy  Ph[facility] Name and Location[facility]  Medication Name: Methocarbamol 500  mg  Insurance Plan:   Insurance Member ID Number:    CoverMyMeds Key: N/A  Informed patient that prior authorizations can take up to 10 business days for response:   Yes  Okay to leave a detailed message: Yes

## 2021-06-12 NOTE — TELEPHONE ENCOUNTER
Patient had a telephone visit with Johana this morning and forgot to mention that she is  still taking metformin along with her other medications, and she eats at least 4 times a day sometimes 5 depending on what she is doing.     Annel @ [phone]

## 2021-06-12 NOTE — TELEPHONE ENCOUNTER
Dr. Treviño,  Please clarify sig of medication listed below.  Thank you.  Melanie JIMENEZ, CMA/CMT....................11:57 AM

## 2021-06-12 NOTE — TELEPHONE ENCOUNTER
Patient would like a call from Johana to discuss changes to her Diabetes Medications.     Annel @ [phone] or [phone]

## 2021-06-13 NOTE — PROGRESS NOTES
[facility] Daily Progress     Patient Name: Annel Stoddard  Date: 12/15/2020  Visit #: 5  PTA visit #: 1  Referral Diagnosis:Lumbar radicular pain [M54.16], Spinal stenosis of lumbar region with neurogenic claudication [M48.062], Spondylolisthesis of lumbar region [M43.16], Myofascial pain [M79.18]   Referring provider: Napoleon Santana DO  Visit Diagnosis:     ICD-10-CM    1. Chronic bilateral low back pain with left-sided sciatica  M54.42     G89.29    2. Left leg pain  M79.605    3. Chronic left-sided low back pain with left-sided sciatica  M54.42     G89.29          Assessment:     HEP/POC compliance is  good .  Patient demonstrates understanding/independence with home program.  Patient is benefitting from skilled physical therapy and is making steady progress toward functional goals.  Patient is appropriate to continue with skilled physical therapy intervention, as indicated by initial plan of care.   Reports relief with exercises  Compliant with HEP  Cues needed for HEP/posture    From eval on 11/16/2020-   Annel Stoddard is a 65 y.o. female who presents to therapy today with chief complaints of LBP w/ L LE pn. Onset date of sx was 7 years ago after fell off a curb.  Pt reported h/o LBP, L LE pn.  Pain symptoms are constant 8-10/10.  Functional impairments include sitting, standing, walking, sleeping, transfers, stairs, bending, yard work, carry items.  Pt demo's signs and sx consistent with LBP w/ radicular sxs.     Goal Status:  Progressing   Pt. will be independent with home exercise program in : 4 weeks;6 weeks  Pt. will have improved quality of sleep: with less pain;waking less times/night;in 4 weeks;in 6 weeks  Pt. will be able to walk : 30 minutes;on even surfaces;on uneven/inclined surfaces;with less pain;with less difficulty;for community mobility;in 4 weeks;in 6 weeks  Patient will sit: 30 minutes;for eating;for watching TV;with less pain;with less difficultty;in 4 weeks;in 6  "weeks  Patient will transfer: for in/out of bed;supine/sit;sit/stand;for in/out of chair;with less pain;with less difficulty;in 4 weeks;in 6 weeks;Comment  Comment: log roll for back precautions and d/t hernia    Patient will decrease : ISSA score;by _ points;for improved quality of function;for improved quality of life;in 4 weeks;in 6 weeks  by ___ points: 6 or more      Plan / Patient Education:     Continue with initial plan of care.  Progress with home program as tolerated.  Review sit to stand, seated nerve glide   Glute/ABD strength, hamstring strength, bridging,   TA activation w/ movement.   ?MT - L piriformis, glutes    Subjective:     Pain Ratin/10 left buttocks right now, was a little pain this morning    Sits on tennis ball - \"feel good hurt\" - helps a lot  Got TENS unit - did it a couple times and it helps but it's too hard to get back to buttocks. \"More trouble than it's worth\"    Olive oil and lemon juice in AM - helping with gall bladder/liver cleanse, numbers improving for first time since 31 yo    Pain medications  3x/day  Exercises 5x/day  No cortisone shot because doing so well!         Objective:     TTP - L iliacus, mild at L psoas (more stretching feeling)  Tolerating exercises well, no increase in pain.  Felt B quads some after sit to stand  Tired after sit to stand     Tolerated treatment well, more fatigued today    Exercises:  Exercise #1: ADD w/ ball seated  Comment #1: Seated, gently squeeze ball b/w knees. Hold 3-5 sec and relax. Do 10-20  Exercise #2: TA activation in sitting  Comment #2: Seated - gently lift lower abs and hold as continue to breath 3-5 breaths.  Exercise #3: Glute set - seated  Comment #3: Seated - glute set, 3-5 sec holds. 10-20 reps.  Exercise #4: Seated nerve glide with knee extension 5x alia  Comment #4: Nerve glide  - sitting on edge of chair, leg out straight, pull toes up. If feel any nerve tightness or pain, slide back a little in your chair. Then pull toes " up again to check nerve tension. If none, pump ankle until fatigue (or 30 sec). Do on other side.  Exercise #5: Standing psoas stretch  Comment #5: Stand w/ leg behind you (at counter for balance) - tuck tailbone under (squeeze glutes) to feel stretch at front of that hip. Hold until stretch relaxes. Repeat on opposite side.  Exercise #6: Kneeling psoas stretch (quad stretch)  Comment #6: Kneel on pillow (L leg) - tuck tailbone under (squeeze glutes) to feel stretch on L hip/quad. Keep gentle. Hold in comfortable stretch position until stretch relaxes. Repeat on opp side.  Exercise #7: sit to stand 8x  Comment #7: practiced squats to pick u items off floor 5x  Exercise #8: instruction in self massage with tennis ball      Treatment Today     TREATMENT MINUTES COMMENTS   Evaluation     Self-care/ Home management     Manual therapy     Neuromuscular Re-education     Therapeutic Activity     Therapeutic Exercises 25 Sit to stand w/ pillow b/w knees, use glutes to control standing up and descent. Improved technique and muscle focus but still feels quads more than glutes.  Supine TA/Glutes/hamstrings w/ tailbone curl - cue to add more motion but keep painfree (pre-bridge)  Review standing hip flexor stretch vs gastroc/soleus stretch  Added Hip ABD/EXT L/B w/ cues on technique (toe pointed forward, lead w/ heel, keep leg to side and/or back rather than forward)   Gait training     Modality___E-stim/TENS____ 5 Cue pt to try TENS on B low back, rather than L buttock, for ease of placement and addressing root cause. Will try and report back              Total 30    Blank areas are intentional and mean the treatment did not include these items.       Carlene Xie PT, DPT  12/15/2020

## 2021-06-13 NOTE — TELEPHONE ENCOUNTER
Patient came in today for PT and has several questions.    1. Needs Etodolac refill.    2. PT wants to know if she's safe to use a  TENs Unit.  Patient had her LEAP  preocedure but not not sure if results are back.    3. Will steroid injection give her a reaction because she uses QVAR which is a oral steroid and she breaks out in rashes on her face. Epidural schedule for 12/1.

## 2021-06-13 NOTE — TELEPHONE ENCOUNTER
Patient called back. She was hoping to hear from Ebony, but if she isn't available she is willing to speak with any available  Diabetes Educator today.   She would like to start on the medication asap.  She is aware, she may have to wait until Monday when Ebony is back in the office.    She would also like to know if she should continue taking Metformin when she starts the U-500.    Annel @ [phone]

## 2021-06-13 NOTE — TELEPHONE ENCOUNTER
Phone call to patient. Information from Dr. Santana shared with patient. Stated understanding and appreciation for call back.

## 2021-06-13 NOTE — PROGRESS NOTES
"  Assessment and Plan:   1. Type 2 diabetes mellitus with complication, with long-term current use of insulin (H)  - insulin lispro protamine-insulin lispro (HUMALOG MIX 50-50 KWIKPEN) 100 unit/mL (50-50) InPn pen; Patient is on a sliding scale  150 and under 35 units  151-200  Plus 2 units  201-250  Plus 4 units  251-300  Plus 6 units  301-350  Plus 8 units  Dispense: 15 mL; Refill: 3    2. Encounter for allergy testing  - Ambulatory referral to Allergy    3. Routine general medical examination at a health care facility  - Electrocardiogram Perform and Read    4. History of tracheostomy  - Ambulatory referral to ENT        The patient's current medical problems were reviewed.    The following health maintenance schedule was reviewed with the patient and provided in printed form in the after visit summary:   Health Maintenance   Topic Date Due     COPD ACTION PLAN  1955     DIABETIC FOOT EXAM  12/16/2020     A1C  05/18/2021     COLORECTAL CANCER SCREENING  09/01/2021     DIABETIC EYE EXAM  11/09/2021     BMP  11/18/2021     LIPID  11/18/2021     MICROALBUMIN  11/18/2021     MEDICARE ANNUAL WELLNESS VISIT  11/20/2021     FALL RISK ASSESSMENT  11/20/2021     MAMMOGRAM  11/25/2021     DXA SCAN  10/12/2022     Pneumococcal Vaccine: 65+ Years (2 of 2 - PPSV23) 11/17/2023     ADVANCE CARE PLANNING  12/16/2024     TD 18+ HE  09/05/2029     HEPATITIS C SCREENING  Completed     HIV SCREENING  Completed     SPIROMETRY  Completed     INFLUENZA VACCINE RULE BASED  Completed     TDAP ADULT ONE TIME DOSE  Completed     ZOSTER VACCINES  Completed     Pneumococcal Vaccine: Pediatrics (0 to 5 Years) and At-Risk Patients (6 to 64 Years)  Aged Out        Subjective:   Chief Complaint: Annel Stoddard is an 65 y.o. female here for a Welcome to Medicare visit.   HPI:      Recent procedure and now has some bleeding which started today and will follow up with gyn.    At night, \"gasp for air\" and while at [facility] saw ENT and was " "advised \"my windpipe narrowing\".     Review of Systems:    Please see above.  The rest of the review of systems are negative for all systems.    Patient Care Team:  Eileen Sanchez CNP as PCP - General (Nurse Practitioner)  Gaye Treviño MD as Assigned PCP     Patient Active Problem List   Diagnosis     Health maintenance examination     Type 2 diabetes mellitus with complication, with long-term current use of insulin (H)     Anatomical narrow angle glaucoma     Hyperactivity of bladder     Bilateral low back pain with left-sided sciatica     Bipolar 2 disorder (H)     Callus of foot     Mixed hyperlipidemia     Simple chronic bronchitis (H)     Pulmonary emphysema (H)     Past Medical History:   Diagnosis Date     Anxiety      Botulism     1980     COPD (chronic obstructive pulmonary disease) (H)      Depression      Diabetes mellitus (H)      Hyperlipidemia      Hypertension      Pneumonia       No past surgical history on file.   Family History   Problem Relation Age of Onset     Stroke Father      Esophageal cancer Sister      Heart attack Brother       Social History     Socioeconomic History     Marital status: Single     Spouse name: Not on file     Number of children: Not on file     Years of education: Not on file     Highest education level: Not on file   Occupational History     Not on file   Social Needs     Financial resource strain: Not on file     Food insecurity     Worry: Not on file     Inability: Not on file     Transportation needs     Medical: Not on file     Non-medical: Not on file   Tobacco Use     Smoking status: Never Smoker     Smokeless tobacco: Never Used   Substance and Sexual Activity     Alcohol use: Not Currently     Drug use: Not on file     Sexual activity: Not on file   Lifestyle     Physical activity     Days per week: Not on file     Minutes per session: Not on file     Stress: Not on file   Relationships     Social connections     Talks on phone: Not on file     " Gets together: Not on file     Attends Confucianist service: Not on file     Active member of club or organization: Not on file     Attends meetings of clubs or organizations: Not on file     Relationship status: Not on file     Intimate partner violence     Fear of current or ex partner: Not on file     Emotionally abused: Not on file     Physically abused: Not on file     Forced sexual activity: Not on file   Other Topics Concern     Not on file   Social History Narrative    Originally from [place]. Moved from [place].  4 times . Has 5 children ,9 grandchildren . Her daughters were taken away from her by biological dad when she was ill with botulism . All adults now . All of them are in [facility]. Youngest daughter is 38 . Initially estranged but has a closer relationship .Hadnt worked for a long time . Is a violinist and plays in [employer] . Wasn't diagnosed with bipolar much later in life . 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 Did different jobs also like a CNA, . Is on disability due to  ipolar .             She plays the [employer].        Current Outpatient Medications   Medication Sig Dispense Refill     ACCU-CHEK FASTCLIX LANCET DRUM USE THREE TIMES DAILY 306 each 3     albuterol (PROVENTIL) 2.5 mg /3 mL (0.083 %) nebulizer solution Take 3 mL (2.5 mg total) by nebulization every 6 (six) hours as needed for wheezing. 6 vial 3     ARIPiprazole (ABILIFY) 30 MG tablet Take 1 tablet (30 mg total) by mouth daily. 90 tablet 2     aspirin-calcium carbonate 81 mg-300 mg calcium(777 mg) Tab Take 81 mg by mouth daily.       biotin 10,000 mcg cap Take 1 capsule by mouth daily.       blood glucose test (ACCU-CHEK GUIDE TEST STRIPS) strips USE TEST STRIPS THREE TIMES DAILY 300 strip 3     etodolac (LODINE) 200 MG capsule Take 1-2 capsules (200-400 mg total) by mouth 3 (three) times a day as needed (for pain). 60 capsule 2     flaxseed oil 1,000 mg cap Take 3 g by mouth daily.        hydrocortisone 1 % cream Apply 1 application topically as needed.       hydrocortisone 2.5 % ointment applly twice a day for two weeks 30 g 0     insulin glargine (LANTUS SOLOSTAR PEN) 100 unit/mL (3 mL) pen Inject 100 Units under the skin at bedtime. 11.65 Type 2 with hyperglycemia 10 mL 0     insulin lispro protamine-insulin lispro (HUMALOG MIX 50-50 KWIKPEN) 100 unit/mL (50-50) InPn pen Patient is on a sliding scale  150 and under 35 units  151-200  Plus 2 units  201-250  Plus 4 units  251-300  Plus 6 units  301-350  Plus 8 units 15 mL 3     ipratropium-albuteroL (DUO-NEB) 0.5-2.5 mg/3 mL nebulizer Take 3 mL by nebulization every 6 (six) hours as needed. 90 vial 1     ketoconazole (NIZORAL) 2 % cream applly externally twice daily for two weeks 15 g 0     LACTOBACILLUS ACIDOPHILUS ORAL Take 1 tablet by mouth daily.       lamoTRIgine (LAMICTAL) 150 MG tablet Take 1 tablet (150 mg total) by mouth daily. 90 tablet 3     liraglutide (VICTOZA) 0.6 mg/0.1 mL (18 mg/3 mL) injection Inject 0.2 mL (1.2 mg total) under the skin daily. With or without food. 30 mL 3     losartan (COZAAR) 25 MG tablet Take 1 tablet (25 mg total) by mouth daily. (Patient taking differently: Take 50 mg by mouth daily before breakfast. ) 90 tablet 2     magnesium citrate 125 mg cap Take by mouth.       MAGNESIUM CITRATE ORAL Take 500 mg by mouth daily before breakfast.       metFORMIN (GLUCOPHAGE XR) 500 MG 24 hr tablet Take 4 tablets (2,000 mg total) by mouth daily with supper. 360 tablet 1     methocarbamoL (ROBAXIN) 500 MG tablet Take 1 tablet (500 mg total) by mouth 2 (two) times a day as needed. 60 tablet 1     metroNIDAZOLE (METROGEL) 0.75 % gel Apply 1 application topically 2 (two) times a day. 45 g 0     montelukast (SINGULAIR) 10 mg tablet Take 1 tablet (10 mg total) by mouth daily. 90 tablet 2     multivitamin (MULTIVITAMIN) per tablet Take 1 tablet by mouth daily.       mupirocin (BACTROBAN) 2 % cream Apply 1 application topically 3  "(three) times a day.       MILAN 128 5 % ophthalmic ointment APPLY A SMALL AMOUNT IN OU QPM  3     nabumetone (RELAFEN) 500 MG tablet Take 1 tablet (500 mg total) by mouth 2 (two) times a day as needed for pain. 60 tablet 2     naproxen (NAPROSYN) 500 MG tablet Take 1 tablet (500 mg total) by mouth 2 (two) times a day as needed (for pain.  Take with food.). 30 tablet 0     nystatin (MYCOSTATIN) ointment Apply 1 application topically 4 (four) times a day. 30 g 0     pen needle, diabetic (BD ULTRA-FINE MICRO PEN NEEDLE) 32 gauge x 1/4\" Ndle Use 1 Device As Directed 3 (three) times a day. 900 each 3     rosuvastatin (CRESTOR) 10 MG tablet Take 1 tablet (10 mg total) by mouth at bedtime. 90 tablet 2     VENTOLIN HFA 90 mcg/actuation inhaler Inhale 1 puff every 4 (four) hours as needed. 3 Inhaler 3     vitamin B complex (B COMPLEX-VITAMIN B12 ORAL) Take 1,000 mcg by mouth daily.       No current facility-administered medications for this visit.       Objective:   Vital Signs:   Visit Vitals  /62   Pulse 73   Temp 97.6  F (36.4  C) (Temporal)   Ht 5' 3\" (1.6 m)   Wt 177 lb 6.4 oz (80.5 kg)   SpO2 97%   BMI 31.42 kg/m         Most Recent EKG     Units 11/20/20  1311   VENTRATE BPM 87   ATRIALRATE BPM 87   QRSDURATION ms 86   QTINTERVAL ms 368   QTCCALC ms 442   P Axis degrees 48   RAXIS degrees 9   TAXIS degrees 32   MUSEDX  Normal sinus rhythm  Possible Inferior infarct , age undetermined  Abnormal ECG  No previous ECGs available  Confirmed by KAY CHOI MD LOC: ([id number]) on 11/20/2020 4:36:55 PM           VisionScreening:   Hearing Screening    125Hz 250Hz 500Hz 1000Hz 2000Hz 3000Hz 4000Hz 6000Hz 8000Hz   Right ear:            Left ear:               Visual Acuity Screening    Right eye Left eye Both eyes   Without correction:      With correction: 20/20 20/20 20/20        PHYSICAL EXAM  /62   Pulse 73   Temp 97.6  F (36.4  C) (Temporal)   Ht 5' 3\" (1.6 m)   Wt 177 lb 6.4 oz (80.5 kg)   SpO2 97%   BMI " 31.42 kg/m      General Appearance:    Alert, cooperative, no distress, appears stated age   Head:    Normocephalic, without obvious abnormality, atraumatic   Eyes:    PERRL, conjunctiva/corneas clear, EOM's intact   Ears:    Normal TM's and external ear canals, both ears   Nose:   Nares normal, septum midline, mucosa normal, no drainage     or sinus tenderness   Throat:   Lips, mucosa, and tongue normal   Neck:   Supple, symmetrical, trachea midline, no adenopathy;     thyroid:  no enlargement/tenderness/nodules; no carotid    bruit or JVD   Lungs:     Clear to auscultation bilaterally, respirations unlabored   Chest Wall:    No tenderness or deformity    Heart:    Regular rate and rhythm, S1 and S2 normal, no murmur, rub    or gallop   Abdomen:     Soft, non-tender, bowel sounds active all four quadrants,     no masses, no organomegaly   Extremities:   Extremities normal, atraumatic, no cyanosis or edema   Pulses:   2+ and symmetric all extremities   Skin:   Skin color, texture, turgor normal, no rashes or lesions   Lymph nodes:   Cervical, supraclavicular, and axillary nodes normal   Neurologic:   CNII-XII intact, normal strength, sensation and reflexes     throughout         Assessment Results 11/20/2020   Activities of Daily Living No help needed   Instrumental Activities of Daily Living No help needed   Get Up and Go Score -   Mini Cog Total Score 5   Some recent data might be hidden     A Mini Cog score of 0-2 suggests the possibility of dementia, score of 3-5 suggests no dementia    Identified Health Risks:     The patient was provided with suggestions to help her develop a healthy physical lifestyle.   The patient was provided with written information regarding signs of hearing loss.  Patient's advanced directive was discussed and I am comfortable with the patient's wishes.

## 2021-06-13 NOTE — TELEPHONE ENCOUNTER
Called Den PA is required for Maxine. Den gave help desk # of [phone], I called this number and was told pt does not have an active acct.     Please start a PA for freestyle maxine. Pt is checking BG at least 4x/day, has lows and is taking insulin 4-6x/day.     Please also start PA for U500 as this was not covered either, pt is taking a total daily dose of at least 288 units per day.

## 2021-06-13 NOTE — PROGRESS NOTES
"CHIEF COMPLAINT:   Chief Complaint   Patient presents with     Shortness of Breath     Wheezing, did a breathing test, has COPD, choking on food when she eats fast         HISTORY OF PRESENT ILLNESS    Annel was seen at the behest of Eileen Sanchez for breathing issues.  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    Recently seen by RT at [facility] and there was concern about scarring.  She complains of shortness of breath but worse at night.   Patient has COPD.   Bronchial problems as a child.  RSI = 16. Has been told she has RANDALL in the past (sleep study) but cannot tolerate CPAP.   Feels like she has to \"gasp for air\" at night.  She has a nebulizer treatment 2x daily (albuterol) which seems to help with breathing.  If she eats to fast she will choke and have throw it up.   Some heartburn with spicy food.  Coffee in the morning and then black tea through the day.  Diabetic and sugars not well controlled overnight.       Chief Complaint   Patient presents with     Shortness of Breath     Wheezing, did a breathing test, has COPD, choking on food when she eats fast       11/20/20 Visit with Dr. Sanchez:      1. Type 2 diabetes mellitus with complication, with long-term current use of insulin (H)  - insulin lispro protamine-insulin lispro (HUMALOG MIX 50-50 KWIKPEN) 100 unit/mL (50-50) InPn pen; Patient is on a sliding scale  150 and under 35 units  151-200  Plus 2 units  201-250  Plus 4 units  251-300  Plus 6 units  301-350  Plus 8 units  Dispense: 15 mL; Refill: 3     2. Encounter for allergy testing  - Ambulatory referral to Allergy     3. Routine general medical examination at a health care facility  - Electrocardiogram Perform and Read     4. History of tracheostomy  - Ambulatory referral to ENT       REVIEW OF SYSTEMS    Review of Systems: a 10-system review is reviewed at this encounter.  See scanned document.     Desmopressin, Antihistamines - alkylamine, Cephalexin, Codeine, " Diazepam, Diphenhydramine hcl, Doxycycline, Erythromycin base, Estrogens, Hydrochlorothiazide, Hydrocodone-acetaminophen, Invokamet [canagliflozin-metformin], Lithium, Penicillins, Risperidone, Beclomethasone dipropionate, Latex, and Valacyclovir       There were no vitals filed for this visit.        PHYSICAL EXAM:        HEAD: Normal appearance and symmetry:  No cutaneous lesions.      EARS:    Normal TM's bilaterally. Normal auditory canals and external ears. Non-tender.         NOSE:    Dorsum:   straight  Septum: normal  Mucosa:  moist  Inferior turbinates:  normal, swollen       ORAL CAVITY/OROPHARYNX:    Lips:  Normal.  Tongue: normal, midline  Mucosa:   no lesions  Tonsils:  2+      NECK:  Trachea:  midline.   Thyroid:  normal   Adenopathy:  none       NEURO:   Alert and Oriented    GAIT AND STATION:  normal     RESPIRATORY:   Symmetry and Respiratory effort    PSYCH:   normal mood and affect    SKIN:  warm and dry         FLEX LARYNGOSCOPY:    After obtaining consent, a flexible laryngoscope is used to examine both nasal cavities, the nasopharynx, pharnx, and larynx.      Nasal cavity: nl   NP: nl  Pharnx: nl  Larynx: TVCs are sharp, mobile  Subglottis:  patent        IMPRESSION:    Encounter Diagnoses   Name Primary?     LPRD (laryngopharyngeal reflux disease) Yes     Chronic obstructive pulmonary disease, unspecified COPD type (H)      Hearing difficulty, unspecified laterality           RECOMMENDATIONS:      Orders Placed This Encounter   Procedures     Ambulatory referral to Pulmonology     Referral Priority:   Routine     Referral Type:   Consultation     Referral Reason:   Evaluation and Treatment     Number of Visits Requested:   1      Medications Ordered   Medications     omeprazole (PRILOSEC) 20 MG capsule     Sig: Take 2 capsules (40 mg total) by mouth daily before breakfast. Take 30 minutes before breakfast     Dispense:  60 capsule     Refill:  0       Return in 2 months for hearing test.      Fiberoptic exam today is relatively unremarkable.  I do not see evidence of subglottic stenosis.  I think there may be an element of upper airway reflux that may be exacerbating her breathing.  We will start her on PPI 40 mg in the morning.  Also will refer to pulmonary medicine for pulmonary function testing although it sounds like she did this some function testing within the last month or so although I did not see a formal report with flow volume loops.  Recommend she return in 2 months for hearing test since she has a history of hearing loss.  If she is doing well in terms of her reflux symptom questionnaire we will wean her off PPIs at that time.  RSI today was elevated at a 16 out of a possible 45 points.

## 2021-06-13 NOTE — TELEPHONE ENCOUNTER
Medication Request  Medication name: One Touch Ultra test strips, test blood sugar level six times a day  [facility]ed [facility]  Reason for request: Currently using  When did you use medication last?:  Currently using  Patient offered appointment:  patient declined  Okay to leave a detailed message: yes

## 2021-06-13 NOTE — TELEPHONE ENCOUNTER
Who is calling:  Annel  Reason for Call:  She wants to speak to Eileen or her assistant.  Eileen has a kitten that Annel may take home with her but she will need a carrier.  Annel will be taking the bus home.  Please call Annel back but not between 9 am-12 pm nor 2:30-4:30 pm.    Date of last appointment with primary care: Yesterday  Okay to leave a detailed message: Yes.  If leaving a message do speak loudly and slow.  Annel is hard of hearing.

## 2021-06-13 NOTE — TELEPHONE ENCOUNTER
Who is calling:  Annel  Reason for Call:  Patient would like a copy of her lab results from 11/18/20 mailed to her home.   Date of last appointment with primary care: 11/18/20  Okay to leave a detailed message: No need to call her back.

## 2021-06-13 NOTE — TELEPHONE ENCOUNTER
Per pharmacy U500 ok with latex allergy, ok per Eileen aSnchez to send Rx.     U500 sent.     Called and informed pt and informed she does continue with metformin. She has not tried picking up lore yet. Pt will call if she has issues picking up anything.

## 2021-06-13 NOTE — PROGRESS NOTES
Chief complaint: Cat allergy    History of present illness: This is a pleasant 65-year-old woman I was asked to see for evaluation by Eileen Sanchez in regards to cat allergy.  She states she would like to get a cat when she has been exposed to cats previously, she will noticed shortness of breath, cough and wheeze.  She states she has emphysema.  She does take medication for this and is going to follow-up with pulmonary soon in regards to her breathing difficulty.  She states she does not have any symptoms currently.  She is not sure if she has other allergies but thinks that they are mostly centered around cats.  She was recently seen by ENT and suggested to use a saline mist.  She does not take any allergy medication regularly.    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         Current Outpatient Medications:      albuterol (PROVENTIL) 2.5 mg /3 mL (0.083 %) nebulizer solution, Take 3 mL (2.5 mg total) by nebulization every 6 (six) hours as needed for wheezing., Disp: 6 vial, Rfl: 3     ARIPiprazole (ABILIFY) 30 MG tablet, Take 1 tablet (30 mg total) by mouth daily., Disp: 90 tablet, Rfl: 2     aspirin-calcium carbonate 81 mg-300 mg calcium(777 mg) Tab, Take 81 mg by mouth daily., Disp: , Rfl:      biotin 10,000 mcg cap, Take 1 capsule by mouth daily., Disp: , Rfl:      blood glucose test (FREESTYLE INSULINX TEST STRIPS) strips, Use 1 each As Directed 4 (four) times a day. Please strips for freestyle lore system, Disp: 150 strip, Rfl: 3     calcium citrate/vitamin D3 (CALCIUM CITRATE + D ORAL), Take 2 tablets by mouth every morning., Disp: , Rfl:      carboxymethylcellulose (REFRESH PLUS) 0.5 % Dpet  ophthalmic dropperette, , Disp: , Rfl:      coenzyme Q10 200 mg capsule, Take 200 mg by mouth daily., Disp: , Rfl:      etodolac (LODINE) 200 MG capsule, Take 1-2 capsules (200-400 mg total) by mouth 3 (three) times a day as needed (for pain)., Disp: 60 capsule, Rfl: 2     fish oil-omega-3 fatty acids (FISH OIL) 300-1,000 mg capsule, Take 1 g by mouth daily., Disp: , Rfl:      flash glucose scanning reader (FREESTYLE EDITH 2 READER) Misc, Use 1 each As Directed daily., Disp: 1 each, Rfl: 0     flash glucose sensor (FREESTYLE EDITH 2 SENSOR) Kit, Use 1 each As Directed daily., Disp: 2 kit, Rfl: 9     hydrocortisone 1 % cream, Apply 1 application topically as needed., Disp: , Rfl:      insulin glargine (LANTUS SOLOSTAR PEN) 100 unit/mL (3 mL) pen, Inject 100 Units under the skin at bedtime. 11.65 Type 2 with hyperglycemia, Disp: 10 mL, Rfl: 0     insulin lispro protamine-insulin lispro (HUMALOG MIX 50-50 KWIKPEN) 100 unit/mL (50-50) InPn pen, Patient is on a sliding scale 150 and under 35 units 151-200  Plus 2 units 201-250  Plus 4 units 251-300  Plus 6 units 301-350  Plus 8 units, Disp: 15 mL, Rfl: 3     insulin regular HIGH CONC (HUMULIN R U-500, CONC, KWIKPEN) 500 unit/mL (3 mL) PEN solution, Inject three times per day before meals. Breakfast 90 unit, lunch 70 units, dinner 70 units., Disp: 18 mL, Rfl: 1     ipratropium-albuteroL (DUO-NEB) 0.5-2.5 mg/3 mL nebulizer, Take 3 mL by nebulization every 6 (six) hours as needed., Disp: 90 vial, Rfl: 1     ketoconazole (NIZORAL) 2 % cream, applly externally twice daily for two weeks, Disp: 15 g, Rfl: 0     LACTOBACILLUS ACIDOPHILUS ORAL, Take 1 tablet by mouth daily., Disp: , Rfl:      lamoTRIgine (LAMICTAL) 150 MG tablet, Take 1 tablet (150 mg total) by mouth daily., Disp: 90 tablet, Rfl: 3     liraglutide (VICTOZA) 0.6 mg/0.1 mL (18 mg/3 mL) injection, Inject 0.2 mL (1.2 mg total) under the skin daily. With or without food., Disp: 30 mL, Rfl: 3     losartan (COZAAR) 50  "MG tablet, Take 50 mg by mouth daily., Disp: , Rfl:      magnesium oxide (MAG-OX) 400 mg (241.3 mg magnesium) tablet, Take 1,600 mg by mouth daily., Disp: , Rfl:      metFORMIN (GLUCOPHAGE XR) 500 MG 24 hr tablet, Take 4 tablets (2,000 mg total) by mouth daily with supper., Disp: 360 tablet, Rfl: 1     methocarbamoL (ROBAXIN) 500 MG tablet, Take 1 tablet (500 mg total) by mouth 2 (two) times a day as needed., Disp: 60 tablet, Rfl: 1     metroNIDAZOLE (METROGEL) 0.75 % gel, Apply 1 application topically 2 (two) times a day., Disp: 45 g, Rfl: 0     montelukast (SINGULAIR) 10 mg tablet, Take 1 tablet (10 mg total) by mouth daily., Disp: 90 tablet, Rfl: 2     multivitamin (MULTIVITAMIN) per tablet, Take 1 tablet by mouth daily., Disp: , Rfl:      mupirocin (BACTROBAN) 2 % cream, Apply 1 application topically 3 (three) times a day., Disp: , Rfl:      MILAN 128 5 % ophthalmic ointment, APPLY A SMALL AMOUNT IN OU QPM, Disp: , Rfl: 3     nystatin (MYCOSTATIN) ointment, Apply 1 application topically 4 (four) times a day., Disp: 30 g, Rfl: 0     omeprazole (PRILOSEC) 20 MG capsule, Take 2 capsules (40 mg total) by mouth daily before breakfast. Take 30 minutes before breakfast, Disp: 60 capsule, Rfl: 0     pen needle, diabetic (BD ULTRA-FINE MICRO PEN NEEDLE) 32 gauge x 1/4\" Ndle, Use 1 Device As Directed 3 (three) times a day., Disp: 900 each, Rfl: 3     rosuvastatin (CRESTOR) 10 MG tablet, Take 1 tablet (10 mg total) by mouth at bedtime., Disp: 90 tablet, Rfl: 2     VENTOLIN HFA 90 mcg/actuation inhaler, Inhale 1 puff every 4 (four) hours as needed. (Patient taking differently: Inhale 2 puffs every 4 (four) hours as needed. ), Disp: 3 Inhaler, Rfl: 3     vitamin B complex (B COMPLEX-VITAMIN B12 ORAL), Take 1,000 mcg by mouth daily., Disp: , Rfl:     Allergies   Allergen Reactions     Desmopressin Swelling     LE  LE       Antihistamines - Alkylamine Other (See Comments)     Chest pains     Cephalexin      rash  rash  rash   " "    Codeine Headache     Headache  Headache  Headache       Diazepam Dizziness and Other (See Comments)     Diphenhydramine Hcl      Chest tightness  Chest tightness  Chest tightness       Doxycycline      Does not work. Ineffective per patient.     Erythromycin Base      rash  rash       Estrogens Headache     Hydrochlorothiazide Other (See Comments) and Unknown     Patient reports can't take this med due to increased urine output  Patient reports can't take this med due to increased urine output  Patient reports can't take this med due to increased urine output       Hydrocodone-Acetaminophen      Justin change  Justin change  Justin change       Invokamet [Canagliflozin-Metformin] Itching     Lithium Diarrhea     Diabetes insipidus  Diabetes insipidus  Causing DI  Causing DI       Penicillins Unknown     Risperidone      Beclomethasone Dipropionate Rash     qvar  qvar       Latex Rash and Other (See Comments)     rash     Valacyclovir Rash       Pulse 80   Ht 5' 3\" (1.6 m)   Wt 183 lb (83 kg)   SpO2 99%   BMI 32.42 kg/m    Gen: Pleasant female not in acute distress  HEENT: Eyes no erythema of the bulbar or palpebral conjunctiva, no edema. Ears: No deformities or lesions. Nose: No congestion, mucosa normal. Mouth: Throat clear, no lip or tongue edema.   Cardiac: Regular rate and rhythm, no murmurs, rubs or gallops  Respiratory: Clear to auscultation bilaterally, no adventitious breath sounds  Lymph: No visible supraclavicular or cervical lymphadenopathy  Skin: No rashes or lesions  Psych: Alert and oriented times 3    Last Percutaneous Allergy Test Results  Dust Mites  D. Pteronyssinus Mite 30,000 AU/ML H (W/F in mm): 0/0 (12/17/20 1109)  D. Farinae Mite 30,000 AU/ML H (W/F in mm: 0/0 (12/17/20 1109)  Animal  Cat 10,000 BAU/ML H (W/F in mm): 0/0 (12/17/20 1109)  Controls  Neg. control: 50% Glycerine/Saline H (W/F in mm): 0/0 (12/17/20 1109)  Pos. control: Histamine 6mg/ML (W/F in mms): 6/0 (12/17/20 " 3841)      Impression report and plan:  1.  Allergic reaction    Allergy testing today was negative for cat and dust mite.  Stated that testing is not 100% would recommend that she spend significant time with the cat prior to bringing at home.  Follow-up with pulmonary for further work-up of her breathing concerns.  Otherwise, follow as needed.

## 2021-06-13 NOTE — TELEPHONE ENCOUNTER
Who is calling:  Patient   Reason for Call:  Patient states she is not able to get in to see diabatic educator until 01/04/21 . Patient stated that her blood glucose in the morning are 200 . Patient is asking if provider can change her Lantus dosage . Please advise.  Date of last appointment with primary care: unknown   Okay to leave a detailed message: No

## 2021-06-13 NOTE — TELEPHONE ENCOUNTER
Ebony- patient called. She wants to  her medications from the pharmacy and would like to know if you got the U-500 approved by Eileen Sanchez?   She said that you were going to check with Eileen because she is allergic to latex and there latex products found in the medication?    Please call Annel @ [phone]

## 2021-06-13 NOTE — TELEPHONE ENCOUNTER
I sent in a prescription for 3 times a day but patient needs a follow up with Eileen Sanchez NP to review this more and her low blood sugars.    She would need Eileen Sanchez NP to document the need for more frequently monitoring.    Lele Posada MD  General Internal Medicine  [facility]  11/30/2020, 9:51 PM

## 2021-06-13 NOTE — PATIENT INSTRUCTIONS - HE
U-500: TAKE 90 UNITS WITH BREAKFAST, 70 UNITS WITH LUNCH AND 70 UNITS WITH DINNER.   MAY TAKE 10 UNITS WITH SNACKS IF NEEDED.

## 2021-06-13 NOTE — TELEPHONE ENCOUNTER
I agree with nursing recommendations.  She should continue to cut back on pain medications as tolerated and continue through her physical therapy course.  She can follow-up with me as needed after physical therapy if her pain returns or worsens.

## 2021-06-13 NOTE — PROGRESS NOTES
[facility] Daily Progress     Patient Name: Annel Stoddard  Date: 12/7/2020  Visit #: 4  PTA visit #: 1  Referral Diagnosis:Lumbar radicular pain [M54.16], Spinal stenosis of lumbar region with neurogenic claudication [M48.062], Spondylolisthesis of lumbar region [M43.16], Myofascial pain [M79.18]   Referring provider: Napoleon Santana DO  Visit Diagnosis:   No diagnosis found.      Assessment:     HEP/POC compliance is  good .  Patient demonstrates understanding/independence with home program.  Patient is benefitting from skilled physical therapy and is making steady progress toward functional goals.  Patient is appropriate to continue with skilled physical therapy intervention, as indicated by initial plan of care.   Reports relief with exercises  Complaint with HEP  Cues needed for HEP/posture    From eval on 11/16/2020-   Annel Stoddard is a 65 y.o. female who presents to therapy today with chief complaints of LBP w/ L LE pn. Onset date of sx was 7 years ago after fell off a curb.  Pt reported h/o LBP, L LE pn.  Pain symptoms are constant 8-10/10.  Functional impairments include sitting, standing, walking, sleeping, transfers, stairs, bending, yard work, carry items.  Pt demo's signs and sx consistent with LBP w/ radicular sxs.     Goal Status:  Progressing   Pt. will be independent with home exercise program in : 4 weeks;6 weeks  Pt. will have improved quality of sleep: with less pain;waking less times/night;in 4 weeks;in 6 weeks  Pt. will be able to walk : 30 minutes;on even surfaces;on uneven/inclined surfaces;with less pain;with less difficulty;for community mobility;in 4 weeks;in 6 weeks  Patient will sit: 30 minutes;for eating;for watching TV;with less pain;with less difficultty;in 4 weeks;in 6 weeks  Patient will transfer: for in/out of bed;supine/sit;sit/stand;for in/out of chair;with less pain;with less difficulty;in 4 weeks;in 6 weeks;Comment  Comment: log roll for back precautions and d/t  "hernia    Patient will decrease : ISSA score;by _ points;for improved quality of function;for improved quality of life;in 4 weeks;in 6 weeks  by ___ points: 6 or more      Plan / Patient Education:     Continue with initial plan of care.  Progress with home program as tolerated.  Review sit to stand, seated nerve glide   Glute/ABD strength, hamstring strength, bridging, TA activation w/ movement.     Subjective:     Pain Ratin/10 left buttocks  In a lot of pain after last visit -One hour - exercises and TENS.  Sits on tennis ball - \"feel good hurt\"  Got TENS unit - wants help learning how to use it.  Pain medications  3x/day  Exercises 5x/day  No cortisone shot because doing so well!         Objective:     TTP - L iliacus, mild at L psoas (more stretching feeling)  Tolerating exercises well, no increase in pain.  Felt B quads some after rx   Continue MT per flow sheet  Added TENS to left SI/buttock 20 mins at 2.5 intensity  Tolerated treatment well    Exercises:  Exercise #1: ADD w/ ball seated  Comment #1: Seated, gently squeeze ball b/w knees. Hold 3-5 sec and relax. Do 10-20  Exercise #2: TA activation in sitting  Comment #2: Seated - gently lift lower abs and hold as continue to breath 3-5 breaths.  Exercise #3: Glute set - seated  Comment #3: Seated - glute set, 3-5 sec holds. 10-20 reps.  Exercise #4: Seated nerve glide with knee extension 5x alia  Comment #4: Nerve glide  - sitting on edge of chair, leg out straight, pull toes up. If feel any nerve tightness or pain, slide back a little in your chair. Then pull toes up again to check nerve tension. If none, pump ankle until fatigue (or 30 sec). Do on other side.  Exercise #5: Standing psoas stretch  Comment #5: Stand w/ leg behind you (at counter for balance) - tuck tailbone under (squeeze glutes) to feel stretch at front of that hip. Hold until stretch relaxes. Repeat on opposite side.  Exercise #6: Kneeling psoas stretch (quad stretch)  Comment #6: Kneel " on pillow (L leg) - tuck tailbone under (squeeze glutes) to feel stretch on L hip/quad. Keep gentle. Hold in comfortable stretch position until stretch relaxes. Repeat on opp side.  Exercise #7: sit to stand 8x  Comment #7: practiced squats to pick u items off floor 5x  Exercise #8: instruction in self massage with tennis ball      Treatment Today     TREATMENT MINUTES COMMENTS   Evaluation     Self-care/ Home management     Manual therapy     Neuromuscular Re-education     Therapeutic Activity     Therapeutic Exercises 15 Exercise per flow sheet  Sit to stand instruction w/ pillow b/w knees, use glutes to control standing up and descent .  Review standing hip flexor stretch vs gastroc/soleus stretch   Gait training     Modality___E-stim/TENS____ 15 mins + 5 set-up Instruct and show pt how to set up her own TENS. She donned electrodes in siva cross pattern to L glutes, surrounding area of pain. Pt use hand/wrist setting at 4 B. 15 min program in seated position.              Total 35    Blank areas are intentional and mean the treatment did not include these items.       Carlene Xie PTA  12/7/2020

## 2021-06-13 NOTE — TELEPHONE ENCOUNTER
pt stopped by the clinic stating her blood sugars have been flucuating high and would not be able to see Catrina the diabetic educator until several weeks. would like a call to discuss previous meds

## 2021-06-13 NOTE — TELEPHONE ENCOUNTER
Question following Office Visit  When did you see your provider: 11/20/2020  What is your question: I brought all my medications to this appointment and no one even looked at them.  I noticed in another appointment the medication list is wrong.  Please return my call as my glucometer is a 1 touch Ultra 2 as my insurance did not approved the listed one and I test 6 times a day.  I have at least 6 other corrections to make to this list and I need a return call.  Okay to leave a detailed message: Yes

## 2021-06-13 NOTE — TELEPHONE ENCOUNTER
Diabetes Educator      Patient calling. States that she needs a PA for her meter that was sent to [facility].    She is getting her accuchek test strips at [facility], since an RX is not needed.      She has further questions as well. Please call her back @ [phone]. (this number is only good for today, 12/14, as she will be out and about with her friend.)

## 2021-06-13 NOTE — TELEPHONE ENCOUNTER
Please contact the patient and address her concerns.    1. Etodolac refill provided    2.  Typically a TENS unit contraindication would be use over the chest with a history of cardiovascular disease or pacemaker, spinal cord stimulator, other implanted device, or any breaks of the skin.      3.  Please discuss potential side effects of steroid.  This can cause folliculitis on occasion or flushing of the skin.  As this is an epidural injection, the systemic side effects are often fairly low.

## 2021-06-13 NOTE — TELEPHONE ENCOUNTER
"FYI - Status Update  Who is Calling: Patient  Update: [facility] does not have this prescription.  It was sent as \"print\" on 11/2/520 verses sending it to the pharmacy.  Please send medication today.  Okay to leave a detailed message?:  No return call needed    "

## 2021-06-13 NOTE — TELEPHONE ENCOUNTER
Central PA team  [phone]  Pool: HE PA MED ([id number])          PA has been initiated.       PA form completed and faxed insurance via Cover My Meds     Key:  [id number]     Medication:  FreeStyle Maxine 14 Day Sensor    Insurance:  [employer]         Response will be received via fax and may take up to 5-10 business days depending on plan

## 2021-06-13 NOTE — TELEPHONE ENCOUNTER
Phone call to patient to inform her PSP was updated. He too recommends you continue to cut back on the medications as able. [facility] may return to [facility] as needed.

## 2021-06-13 NOTE — PROGRESS NOTES
[facility] Daily Progress     Patient Name: Annel Stoddard  Date: 11/30/2020  Visit #: 2  PTA visit #:  NA  Referral Diagnosis:Lumbar radicular pain [M54.16], Spinal stenosis of lumbar region with neurogenic claudication [M48.062], Spondylolisthesis of lumbar region [M43.16], Myofascial pain [M79.18]   Referring provider: Napoleon Santana DO  Visit Diagnosis:     ICD-10-CM    1. Left leg pain  M79.605    2. Chronic left-sided low back pain with left-sided sciatica  M54.42     G89.29          Assessment:     HEP/POC compliance is  good .  Patient demonstrates understanding/independence with home program.  Patient is benefitting from skilled physical therapy and is making steady progress toward functional goals.  Patient is appropriate to continue with skilled physical therapy intervention, as indicated by initial plan of care.     From eval on 11/16/2020-   Annel Stoddard is a 65 y.o. female who presents to therapy today with chief complaints of LBP w/ L LE pn. Onset date of sx was 7 years ago after fell off a curb.  Pt reported h/o LBP, L LE pn.  Pain symptoms are constant 8-10/10.  Functional impairments include sitting, standing, walking, sleeping, transfers, stairs, bending, yard work, carry items.  Pt demo's signs and sx consistent with LBP w/ radicular sxs.     Goal Status:  Pt. will be independent with home exercise program in : 4 weeks;6 weeks  Pt. will have improved quality of sleep: with less pain;waking less times/night;in 4 weeks;in 6 weeks  Pt. will be able to walk : 30 minutes;on even surfaces;on uneven/inclined surfaces;with less pain;with less difficulty;for community mobility;in 4 weeks;in 6 weeks  Patient will sit: 30 minutes;for eating;for watching TV;with less pain;with less difficultty;in 4 weeks;in 6 weeks  Patient will transfer: for in/out of bed;supine/sit;sit/stand;for in/out of chair;with less pain;with less difficulty;in 4 weeks;in 6 weeks;Comment  Comment: log roll for back  precautions and d/t hernia    Patient will decrease : ISSA score;by _ points;for improved quality of function;for improved quality of life;in 4 weeks;in 6 weeks  by ___ points: 6 or more      Plan / Patient Education:     Continue with initial plan of care.  Progress with home program as tolerated.    Plan for next visit: Work on bending to  items (requested next visit per pt).  Glute/ABD strength, hamstring strength, bridging, TA activation w/ movement.   Subjective:     Pain Ratin  Didn't have pain for 5-6 days but then on Fri and Sat in pain because had to walk. Afraid of that = walking to the ENT.   Still taking pain meds - tried to cut back on Fri and Sat - walking more   1 atotolach AM, 2 noon, 1 PM  No cortisone shot because doing so well!    Saw OBGYN - still bleeding but no cancer.     Ok to do TENS and/or TX per MD.    Objective:     TTP - L iliacus, mild at L psoas (more stretching feeling)  Tolerating exercises well, no increase in pain.  Felt B quads some after rx     Treatment Today     TREATMENT MINUTES COMMENTS   Evaluation     Self-care/ Home management     Manual therapy 10 STM - L psoas gentle deep pressure release (sensation of stretching initially --> pressure after). Gentle deep pressure release to L iliacus w/ pain --> pressure.   Neuromuscular Re-education     Therapeutic Activity     Therapeutic Exercises 23 Exercises:  Exercise #1: ADD w/ ball seated  Comment #1: Seated, gently squeeze ball b/w knees. Hold 3-5 sec and relax. Do 10-20  Exercise #2: TA activation in sitting  Comment #2: Seated - gently lift lower abs and hold as continue to breath 3-5 breaths.  Exercise #3: Glute set - seated  Comment #3: Seated - glute set, 3-5 sec holds. 10-20 reps.  Exercise #4: Seated nerve glide  Comment #4: Nerve glide  - sitting on edge of chair, leg out straight, pull toes up. If feel any nerve tightness or pain, slide back a little in your chair. Then pull toes up again to check nerve  tension. If none, pump ankle until fatigue (or 30 sec). Do on other side.  Exercise #5: Standing psoas stretch  Comment #5: Stand w/ leg behind you (at counter for balance) - tuck tailbone under (squeeze glutes) to feel stretch at front of that hip. Hold until stretch relaxes. Repeat on opposite side.  Exercise #6: Kneeling psoas stretch (quad stretch)  Comment #6: Kneel on pillow (L leg) - tuck tailbone under (squeeze glutes) to feel stretch on L hip/quad. Keep gentle. Hold in comfortable stretch position until stretch relaxes. Repeat on opp side.    Combine ex 1-3 into one ex for HEP   Gait training     Modality__________________                Total 33    Blank areas are intentional and mean the treatment did not include these items.       Carlene Xie  11/30/2020

## 2021-06-13 NOTE — PROGRESS NOTES
[facility] Daily Progress     Patient Name: Annel Stoddard  Date: 12/3/2020  Visit #: 3  PTA visit #: 1  Referral Diagnosis:Lumbar radicular pain [M54.16], Spinal stenosis of lumbar region with neurogenic claudication [M48.062], Spondylolisthesis of lumbar region [M43.16], Myofascial pain [M79.18]   Referring provider: Napoleon Santana DO  Visit Diagnosis:     ICD-10-CM    1. Left leg pain  M79.605    2. Chronic left-sided low back pain with left-sided sciatica  M54.42     G89.29          Assessment:     HEP/POC compliance is  good .  Patient demonstrates understanding/independence with home program.  Patient is benefitting from skilled physical therapy and is making steady progress toward functional goals.  Patient is appropriate to continue with skilled physical therapy intervention, as indicated by initial plan of care.   Reports relief with exercises  Complaint with HEP  Cues needed for HEP/posture    From eval on 11/16/2020-   Annel Stoddard is a 65 y.o. female who presents to therapy today with chief complaints of LBP w/ L LE pn. Onset date of sx was 7 years ago after fell off a curb.  Pt reported h/o LBP, L LE pn.  Pain symptoms are constant 8-10/10.  Functional impairments include sitting, standing, walking, sleeping, transfers, stairs, bending, yard work, carry items.  Pt demo's signs and sx consistent with LBP w/ radicular sxs.     Goal Status:  Progressing   Pt. will be independent with home exercise program in : 4 weeks;6 weeks  Pt. will have improved quality of sleep: with less pain;waking less times/night;in 4 weeks;in 6 weeks  Pt. will be able to walk : 30 minutes;on even surfaces;on uneven/inclined surfaces;with less pain;with less difficulty;for community mobility;in 4 weeks;in 6 weeks  Patient will sit: 30 minutes;for eating;for watching TV;with less pain;with less difficultty;in 4 weeks;in 6 weeks  Patient will transfer: for in/out of bed;supine/sit;sit/stand;for in/out of chair;with  less pain;with less difficulty;in 4 weeks;in 6 weeks;Comment  Comment: log roll for back precautions and d/t hernia    Patient will decrease : ISSA score;by _ points;for improved quality of function;for improved quality of life;in 4 weeks;in 6 weeks  by ___ points: 6 or more      Plan / Patient Education:     Continue with initial plan of care.  Progress with home program as tolerated.  Review sit to stand, seated nerve glide   Glute/ABD strength, hamstring strength, bridging, TA activation w/ movement.   Assess TENS/if she purchased for home use  Subjective:     Pain Ratin/10 left buttocks  Pain medications  2-3x/day  Exercises 5x/day  No cortisone shot because doing so well!        Objective:     TTP - L iliacus, mild at L psoas (more stretching feeling)  Tolerating exercises well, no increase in pain.  Felt B quads some after rx   Continue MT per flow sheet  Added TENS to left SI/buttock 20 mins at 2.5 intensity  Tolerated treatment well    Exercises:  Exercise #1: ADD w/ ball seated  Comment #1: Seated, gently squeeze ball b/w knees. Hold 3-5 sec and relax. Do 10-20  Exercise #2: TA activation in sitting  Comment #2: Seated - gently lift lower abs and hold as continue to breath 3-5 breaths.  Exercise #3: Glute set - seated  Comment #3: Seated - glute set, 3-5 sec holds. 10-20 reps.  Exercise #4: Seated nerve glide with knee extension 5x alia  Comment #4: Nerve glide  - sitting on edge of chair, leg out straight, pull toes up. If feel any nerve tightness or pain, slide back a little in your chair. Then pull toes up again to check nerve tension. If none, pump ankle until fatigue (or 30 sec). Do on other side.  Exercise #5: Standing psoas stretch  Comment #5: Stand w/ leg behind you (at counter for balance) - tuck tailbone under (squeeze glutes) to feel stretch at front of that hip. Hold until stretch relaxes. Repeat on opposite side.  Exercise #6: Kneeling psoas stretch (quad stretch)  Comment #6: Kneel on  pillow (L leg) - tuck tailbone under (squeeze glutes) to feel stretch on L hip/quad. Keep gentle. Hold in comfortable stretch position until stretch relaxes. Repeat on opp side.  Exercise #7: sit to stand 8x  Comment #7: practiced squats to pick u items off floor 5x  Exercise #8: instruction in self massage with tennis ball      Treatment Today     TREATMENT MINUTES COMMENTS   Evaluation     Self-care/ Home management     Manual therapy 10 STM - L psoas gentle deep pressure release (sensation of stretching initially --> pressure after). Gentle deep pressure release to L iliacus w/ pain --> pressure.   Neuromuscular Re-education     Therapeutic Activity     Therapeutic Exercises 23 Exercise per flow sheet  instruction in tennis ball massage  TENS instuctions /precautions discussed    Gait training     Modality__________________ 20 mins + 5 set-up               Total 58    Blank areas are intentional and mean the treatment did not include these items.       Herbert Adair PTA  12/3/2020

## 2021-06-13 NOTE — TELEPHONE ENCOUNTER
"PSP:  Dr. Santana  Last clinic visit:  10/20/20  Reason for call: Update on status  Clinical information:  Patient left message on nurse navigation line at 0839 today. \"I am still taking my pain medications, but for the last 4 days since doing my PT exercises I don't have any pain. Wondering if I even should have the injection on December 1st. Let me know if I need an appointment with Dr. Santana.\"   Advice given to patient: Returned patient's call to discuss. Left message to return call.    "

## 2021-06-13 NOTE — TELEPHONE ENCOUNTER
Patient had called T 156 pm and left message with the same questions. Returned patient's call. Information reviewed with her. Stated understanding and appreciation for the call back.     Then wanted to know if she has scoliosis. Chart reviewed. Explained scoliosis films were not done. No scoliosis noted on lumbar CT. Encouraged to discuss at her follow up with PSP.     Also wondering if traction is OK for her as the therapist mentioned this today.

## 2021-06-13 NOTE — TELEPHONE ENCOUNTER
Patient returned phone call. Explained that injections are typically done for patients in pain. As she is not currently having pain, recommend she not have it as there is a limited amount of steroid one can safely have in 12 month period. Stated understanding and wants to cancel the scheduled injection.     Does report she is taking Etodilac 200 mg 1 tablet in the AM, 2 tabs mid day and 1 with her evening meal. She also is taking Methocarbamol 500 mg 1 tablet two times a day. Explained she can cut back on the medication to see if her symptoms return. Stated understanding. Inquiring if she needs an appointment with PSP. Explained he would be updated on status. Typically have patient attend PT. If symptoms worsen or change ask that they follow up. She will be called if further recommendations form PSP.

## 2021-06-13 NOTE — PROGRESS NOTES
[facility] Daily Progress     Patient Name: Annel Stoddard  Date: 12/21/2020  Visit #: 5  PTA visit #: 1  Referral Diagnosis:Lumbar radicular pain [M54.16], Spinal stenosis of lumbar region with neurogenic claudication [M48.062], Spondylolisthesis of lumbar region [M43.16], Myofascial pain [M79.18]   Referring provider: Napoleon Santana DO  Visit Diagnosis:     ICD-10-CM    1. Chronic left-sided low back pain with left-sided sciatica  M54.42     G89.29    2. Chronic bilateral low back pain with left-sided sciatica  M54.42     G89.29          Assessment:     HEP/POC compliance is  good .  Patient demonstrates understanding/independence with home program.  Patient is benefitting from skilled physical therapy and is making steady progress toward functional goals.  Patient is appropriate to continue with skilled physical therapy intervention, as indicated by initial plan of care.   Reports relief with exercises  Compliant with HEP  Cues needed for HEP/posture  Progressing well    From eval on 11/16/2020-   Annel Stoddard is a 65 y.o. female who presents to therapy today with chief complaints of LBP w/ L LE pn. Onset date of sx was 7 years ago after fell off a curb.  Pt reported h/o LBP, L LE pn.  Pain symptoms are constant 8-10/10.  Functional impairments include sitting, standing, walking, sleeping, transfers, stairs, bending, yard work, carry items.  Pt demo's signs and sx consistent with LBP w/ radicular sxs.     Goal Status:  Progressing   Pt. will be independent with home exercise program in : 4 weeks;6 weeks;Partially met;Comment  Comment:: Pt needs minimal cuing for only a couple of exercises. Received 2 new ex today  Pt. will have improved quality of sleep: with less pain;waking less times/night;in 4 weeks;in 6 weeks;Met  Pt. will be able to walk : 30 minutes;on even surfaces;on uneven/inclined surfaces;with less pain;with less difficulty;for community mobility;in 4 weeks;in 6 weeks;Partially  "met;Comment  Comment:: goal partially met in that pt can walk 30 min w/ less pain but needs to do so 2-3x/day for community mobility so still has some issues w/ doing goal multiple times/day  Patient will sit: 30 minutes;for eating;for watching TV;with less pain;with less difficultty;in 4 weeks;in 6 weeks  Patient will transfer: for in/out of bed;supine/sit;sit/stand;for in/out of chair;with less pain;with less difficulty;in 4 weeks;in 6 weeks;Comment;Partially met  Comment: log roll for back precautions and d/t hernia. goal met except when hernia \"grabs her\".    Patient will decrease : ISSA score;by _ points;for improved quality of function;for improved quality of life;in 4 weeks;in 6 weeks;Progressing toward  by ___ points: 6 or more      Plan / Patient Education:     Continue with initial plan of care.  Progress with home program as tolerated.  Review sit to stand, seated nerve glide   Glute/ABD strength, hamstring strength, bridging,   TA activation w/ movement.   ?MT - L piriformis, glutes - pt declined any manual therapy today  Plan to cancel appt in 1 week if no issues arise to see how pt does on her own for 2 weeks and then discontinue on 2021, if goes according to plan.    Subjective:     Pain Ratin/10 this morning ,but not in pain now.  All ex helpful. Sit to stand, glute stretch both help!  Back hurting after left, hurt for a few days.  Getting a sana for vikas    Sits on tennis ball - helps a lot  TENS unit - not tried yet on low back.     Has too many appts going on. Ready to be done after next 2 visits.     Pain medications  3x/day - tried 1x to decrease and didn't go well, does not want to try again at this time.  Exercises 5x/day  No cortisone shot because doing so well! Pt goals to keep pain down, not have shot or surgery.    Sleep - getting up to go to bathroom but not d/t pain  Walk - less painful sometimes, 30 minutes can be ok but then tired. Walks more than 1x 30 minutes/ day - walks " "1-1.5 hours a day  Transfers are less painful and less difficult unless hernia \"grabs me\"    Using the gym again - biking 5 min, bands - rows/biceps/triceps      Objective:     TTP -sacral area  Tolerating exercises well, no increase in pain.  L QL tension w/ gait, decreased w/ cues to use L glutes more and keep abs on    Exercises:  Exercise #1: ADD w/ ball seated  Comment #1: Seated, gently squeeze ball b/w knees. Hold 3-5 sec and relax. Do 10-20  Exercise #2: TA activation in sitting  Comment #2: Seated - gently lift lower abs and hold as continue to breath 3-5 breaths.  Exercise #3: Glute set - seated  Comment #3: Seated - glute set, 3-5 sec holds. 10-20 reps.  Exercise #4: Seated nerve glide with knee extension 5x alia  Comment #4: Nerve glide  - sitting on edge of chair, leg out straight, pull toes up. If feel any nerve tightness or pain, slide back a little in your chair. Then pull toes up again to check nerve tension. If none, pump ankle until fatigue (or 30 sec). Do on other side.  Exercise #5: Standing psoas stretch  Comment #5: Stand w/ leg behind you (at counter for balance) - tuck tailbone under (squeeze glutes) to feel stretch at front of that hip. Hold until stretch relaxes. Repeat on opposite side.  Exercise #6: Kneeling psoas stretch (quad stretch)  Comment #6: Kneel on pillow (L leg) - tuck tailbone under (squeeze glutes) to feel stretch on L hip/quad. Keep gentle. Hold in comfortable stretch position until stretch relaxes. Repeat on opp side.  Exercise #7: sit to stand 8x  Comment #7: practiced squats to pick u items off floor 5x  Exercise #8: instruction in self massage with tennis ball      Treatment Today     TREATMENT MINUTES COMMENTS   Evaluation     Self-care/ Home management     Manual therapy     Neuromuscular Re-education     Therapeutic Activity     Therapeutic Exercises 35 Supine TA/Glutes/hamstrings w/ tailbone curl - cue to add more motion but keep painfree (pre-bridge)  Piriformis " stretch B w/ cues for knee to same vs opposite shoulder and how to use to get at different areas of glutes  Instruct and pt perform LTR 5reps and SKTC - 5 ea B  Review log rolling    Standing hip flexor stretch vs gastroc/soleus stretch - verbal review  Standing Hip ABD/EXT B w/ cues on technique (toe pointed forward, lead w/ heel)    Gait w/ cues to gently tighten abs first, feel glutes w/ heel strike. Pt c/o L QL pain initially but able to eliminate pn w/ focused glute firing.     Gait training     Modality___E-stim/TENS____                Total 35    Blank areas are intentional and mean the treatment did not include these items.       Carlene Xie PT, DPT  12/21/2020

## 2021-06-13 NOTE — TELEPHONE ENCOUNTER
She has a mild left lumbar curve which is likely to mild to be classified as scoliosis.  Traction trial would be fine.

## 2021-06-14 NOTE — TELEPHONE ENCOUNTER
It would be best to contact the lung doctor regarding this as they had a plan in place.You can use your nebulizer when you feel this way. We can also restart your Singulair if you think that is the cause.

## 2021-06-14 NOTE — PROGRESS NOTES
.1/25/2021  Patient enrolled in [facility] as of 1-25-21  Reviewed assessment, goals, and any delegations from [facility]      [facility] Follow up: Monthly    [facility] Plan: Follow up on goals    [facility] Next Follow Up: 2-23-21

## 2021-06-14 NOTE — PROGRESS NOTES
[facility] CLINIC EXAM NOTE      Chief Complaint   Patient presents with     hard to breath     medication review     frost bite on toe     left toe       ASSESSMENT & PLAN    Annel was seen today for hard to breath, medication review and frost bite on toe.    Diagnoses and all orders for this visit:    Dyspnea on exertion: reviewed note from lung doctor with patient and recommendations. She was confused about her diagnosis. Restrictive pattern on PFTs not obstructive as previously thought. Taken off meds and scheduled inhalers. Planning for CT scan. F/u for sleep apnea- does not tolerate CPAP. Refill albuterol to use as needed.   -     albuterol (PROAIR HFA;PROVENTIL HFA;VENTOLIN HFA) 90 mcg/actuation inhaler; Inhale 2 puffs every 6 (six) hours as needed for wheezing.    Type 2 diabetes mellitus without complication, with long-term current use of insulin (H): continue recommendations from diabetic education. Refills as below.  -     metFORMIN (GLUCOPHAGE XR) 500 MG 24 hr tablet; Take 4 tablets (2,000 mg total) by mouth daily with supper.  -     flash glucose sensor (DishcrawlSTYLE EDITH 2 SENSOR) Kit; Use 1 each As Directed daily.    Superficial frostbite of toe of left foot, initial encounter:  Its very superficial and along a calloused part. Reviewed cares. Is on baby aspirin. No s.s of infections. Discussed monitoring for this and discussed soaks and cares. F/u if issues. Given mild symptoms okay to monitor at clinic.         HISTORY    Annel Stoddard is a 65 y.o.  female who presents for the following issues:    Diabetes: insulin 90/70/75-> low BS in the middle of the night. Check and 64.   now 90/70/70  Needs some refills.     2. Lung doctor: took off lung meds  Doesn't have asthma or COPD>   diaphragm damage? Emphysema. Blood gases.   CT scan  Has to take the albuterol inhaler. Uses it a lot during the day.   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   Going to take violin and piano  lessons   studying Palestinian  on line  COVID shot on Friday- [employer].   Shingles shot-feverish    Frost bite on left middle toe. Standing for the bus. Knew she should go inside but didn't want to miss the bus. waited too long. Black streak. Not spreading. Not painful.  No new numbness or tingling.       MEDICATIONS    Current Outpatient Medications on File Prior to Visit   Medication Sig Dispense Refill     ARIPiprazole (ABILIFY) 30 MG tablet Take 1 tablet (30 mg total) by mouth daily. 90 tablet 2     aspirin-calcium carbonate 81 mg-300 mg calcium(777 mg) Tab Take 81 mg by mouth daily.       biotin 10,000 mcg cap Take 1 capsule by mouth daily.       blood glucose test (FREESTYLE INSULINX TEST STRIPS) strips Use 1 each As Directed 4 (four) times a day. Please strips for freestyle edith system 150 strip 3     calcium citrate/vitamin D3 (CALCIUM CITRATE + D ORAL) Take 2 tablets by mouth every morning.       carboxymethylcellulose (REFRESH PLUS) 0.5 % Dpet ophthalmic dropperette        coenzyme Q10 200 mg capsule Take 200 mg by mouth daily.       etodolac (LODINE) 200 MG capsule Take 1-2 capsules (200-400 mg total) by mouth 3 (three) times a day as needed (for pain). 60 capsule 2     fish oil-omega-3 fatty acids (FISH OIL) 300-1,000 mg capsule Take 1 g by mouth daily.       flash glucose scanning reader (FREESTYLE EDITH 2 READER) Misc Use 1 each As Directed daily. 1 each 0     hydrocortisone 1 % cream Apply 1 application topically as needed.       insulin regular HIGH CONC (HUMULIN R U-500, CONC, KWIKPEN) 500 unit/mL (3 mL) PEN solution Inject three times per day before meals. Breakfast 90 unit, lunch 70 units, dinner 70 units. 18 mL 1     ipratropium-albuteroL (DUO-NEB) 0.5-2.5 mg/3 mL nebulizer Take 3 mL by nebulization every 6 (six) hours as needed. 90 vial 1     ketoconazole (NIZORAL) 2 % cream applly externally twice daily for two weeks 15 g 0     LACTOBACILLUS ACIDOPHILUS ORAL Take 1 tablet by mouth daily.        "lamoTRIgine (LAMICTAL) 150 MG tablet Take 1 tablet (150 mg total) by mouth daily. 90 tablet 3     liraglutide (VICTOZA) 0.6 mg/0.1 mL (18 mg/3 mL) injection Inject 0.2 mL (1.2 mg total) under the skin daily. With or without food. 30 mL 3     losartan (COZAAR) 50 MG tablet Take 1 tablet (50 mg total) by mouth daily. 90 tablet 1     magnesium oxide (MAG-OX) 400 mg (241.3 mg magnesium) tablet Take 1,600 mg by mouth daily.       methocarbamoL (ROBAXIN) 500 MG tablet Take 1 tablet (500 mg total) by mouth 2 (two) times a day as needed. 60 tablet 1     metroNIDAZOLE (METROGEL) 0.75 % gel Apply 1 application topically 2 (two) times a day. 45 g 0     montelukast (SINGULAIR) 10 mg tablet Take 1 tablet (10 mg total) by mouth daily. 90 tablet 2     multivitamin (MULTIVITAMIN) per tablet Take 1 tablet by mouth daily.       mupirocin (BACTROBAN) 2 % cream Apply 1 application topically 3 (three) times a day.       MILAN 128 5 % ophthalmic ointment APPLY A SMALL AMOUNT IN OU QPM  3     nystatin (MYCOSTATIN) ointment Apply 1 application topically 4 (four) times a day. 30 g 0     omeprazole (PRILOSEC) 20 MG capsule Take 2 capsules (40 mg total) by mouth daily before breakfast. Take 30 minutes before breakfast 60 capsule 0     pen needle, diabetic (BD ULTRA-FINE MICRO PEN NEEDLE) 32 gauge x 1/4\" Ndle Use 1 Device As Directed 3 (three) times a day. 900 each 3     rosuvastatin (CRESTOR) 10 MG tablet Take 1 tablet (10 mg total) by mouth at bedtime. 90 tablet 2     tiZANidine (ZANAFLEX) 2 MG tablet Take 1 tablet (2 mg total) by mouth 2 (two) times a day as needed (muscle spasms). 60 tablet 1     vitamin B complex (B COMPLEX-VITAMIN B12 ORAL) Take 1,000 mcg by mouth daily.       No current facility-administered medications on file prior to visit.        Pertinent past medical, surgical, social and family history reviewed and updated in Epic.          REVIEW OF SYSTEMS    ROS: 10 pt review of systems performed and all negative except noted " in HPI.     PHYSICAL EXAM    /69 (Patient Site: Left Arm, Patient Position: Sitting, Cuff Size: Adult Regular)   Pulse 77   Temp 97.4  F (36.3  C) (Temporal)   Resp 16   Wt 185 lb (83.9 kg)   BMI 32.77 kg/m    GEN:  65 y.o. female sitting comfortably in no apparent distress.   HEENT: EOMI, no scleral icterus, buccal mucosa moist  Neck: Supple without lymphadenopathy or thyromegally   CHEST/LUNG: No respiratory distress, good air flow to bases, CTAB   CV: RRR, S1, S2 normal; no murmurs, rubs or gallops. No edema.  EXTR/SKIN:  Streak of cyanosis on the left middle toe medial aspect. This part feels raised and callused. Not painful to touch. No redness. Not cold. Cap refill in this toe is a second longer than other toes but still 3 seconds. Sensation intact.   NEURO: Gait normal, coordination intact      At the end of the encounter, I discussed results, diagnosis, medications. Discussed red flags for immediate return to clinic/ER, as well as indications for follow up if no improvement. Patient and/or caregiver understood and agreed to plan. Patient was stable for discharge.        Brynn Ibrahim

## 2021-06-14 NOTE — PROGRESS NOTES
2/2/2021  Received voice message from patient.  She was thinking to go to [facility] to get the those test because it is across the street from her home and that there are special test done that can't do at [facility] .  If she can get an appt with Dr. Oliveira then to cancel the appt with Dr Alejandro on 3-18-21.  She is taking Singulair again and to let Dr Ibrahim know.    Reviewed 1-28-21 encounter from Rand Mock RN (Dr Oliveira's nurse) noted that patient is scheduled for testing on 2-15-21. Wants to see Dr Oliveira before 2-15-21.    Coordinated with [facility] RN to relay message and explain situation before call today at 11am.   Routed encounter to [facility] RN     CHW Follow up: Monthly    CHW Plan: Follow up on goals    CHW Next Follow Up: 2-23-21

## 2021-06-14 NOTE — TELEPHONE ENCOUNTER
Reason for Call:  Other call back      Detailed comments: pt calling asking if letter was sent regarding her pet     Phone Number Patient can be reached at: Home number on file [phone] (home)    Best Time: anytime    Can we leave a detailed message on this number?: Yes    Call taken on 1/11/2021 at 9:52 AM by Joaquina Segal

## 2021-06-14 NOTE — TELEPHONE ENCOUNTER
"PSP:  Dr. Santana  Last clinic visit:  10/20/2020  Reason for call: Medication refill  Clinical information:  Call received from pt requesting refill of her muscle relaxer. Pt taking methocarbamol 500 mg tablets 2 times daily as needed. However, she reports she received a letter from her insurance company stating they no longer cover this medication. She was given an \"alternative\" medication as Tizanidine. She inquires if she could be prescribed this instead.   Advice given to patient: Informed pt that her request will be sent to the provider. Pharmacy verified.   Provider to address: Medication refill - pt requesting tizanidine but previously taking methocarbamol.    "

## 2021-06-14 NOTE — TELEPHONE ENCOUNTER
Reason for Call:  Other returning call      Detailed comments: Patient calling back, she stated that she can't get in any sooner than March to see the Pulmonary provider. They have nothing available until middle of March, and she refusing to go elsewhere but [facility]. She is still having trouble with her breathing. Patient would like to know what should she do in the mean time? Please call patient to advise.    Phone Number Patient can be reached at: Home number on file [phone] (home)    Best Time: any    Can we leave a detailed message on this number?: Yes    Call taken on 2/1/2021 at 4:07 PM by Aisha Caicedo

## 2021-06-14 NOTE — TELEPHONE ENCOUNTER
I spoke to patient and let her know that Dr. Perez is not in and he should get back to me next week about refilling the Omeprazole. She has plenty to last until next week. Will sent to pharmacy once approved.    Joie Law RN  [facility]  [phone]

## 2021-06-14 NOTE — TELEPHONE ENCOUNTER
Referral in for care management to get in touch with Hayder.  He will contact her when he is able to.

## 2021-06-14 NOTE — TELEPHONE ENCOUNTER
Patient is scheduled for follow up appointment. Refill sent to pharmacy and patient notified.    Joie Law RN  [facility]  [phone]

## 2021-06-14 NOTE — TELEPHONE ENCOUNTER
PSP:  Dr. Santana  Last clinic visit:  10/20/2020  Reason for call: Status Update  Clinical information:  Pt calling to give update. Pt reports she is continuing her PT exercises and is feeling better. She has been able to decrease the amount of Etodolac she is taking 3 capsules daily now.   Pt also wanted it noted that she received a letter from [facility] that there is an alternative to the Methocarbamol prescription she is taking. She reports this is Tizanidine. She states the next time she calls for a refill she may be requesting Tizanidine instead.   Advice given to patient: Advised pt to follow-up as needed. She should call with any questions/concerns.   Provider to address: EMMANUEL

## 2021-06-14 NOTE — TELEPHONE ENCOUNTER
Reason for Call:  Other call back      Detailed comments: pt would like to talk to Hayder about her insurance situation Do you need to send him a referral or message to call her?  Phone Number Patient can be reached at: Home number on file [phone] (home)    Best Time: *anytime will be running errands today but can leave message for call back     Can we leave a detailed message on this number?: Yes    Call taken on 1/19/2021 at 8:04 AM by Joaquina Segal

## 2021-06-14 NOTE — PROGRESS NOTES
[facility] Daily Progress/Discharge     Patient Name: Annel Stoddard  Date: 1/5/2021  Visit #: 6  PTA visit #: 1  Referral Diagnosis:Lumbar radicular pain [M54.16], Spinal stenosis of lumbar region with neurogenic claudication [M48.062], Spondylolisthesis of lumbar region [M43.16], Myofascial pain [M79.18]   Referring provider: Napoleon Santana DO  Visit Diagnosis:     ICD-10-CM    1. Chronic left-sided low back pain with left-sided sciatica  M54.42     G89.29    2. Chronic bilateral low back pain with left-sided sciatica  M54.42     G89.29    3. Left leg pain  M79.605          Assessment:     HEP/POC compliance is  good .  Patient demonstrates understanding/independence with home program.  Patient is benefitting from skilled physical therapy and is making steady progress toward functional goals.  Patient is appropriate to continue with skilled physical therapy intervention, as indicated by initial plan of care.     From eval on 11/16/2020-   Annel Stoddard is a 65 y.o. female who presents to therapy today with chief complaints of LBP w/ L LE pn. Onset date of sx was 7 years ago after fell off a curb.  Pt reported h/o LBP, L LE pn.  Pain symptoms are constant 8-10/10.  Functional impairments include sitting, standing, walking, sleeping, transfers, stairs, bending, yard work, carry items.  Pt demo's signs and sx consistent with LBP w/ radicular sxs.     Goal Status:  Goals met   Pt. will be independent with home exercise program in : 4 weeks;6 weeks;Met  Comment:: Pt needs minimal cuing for only a couple of exercises. Received 2 new ex today  Pt. will have improved quality of sleep: with less pain;waking less times/night;in 4 weeks;in 6 weeks;Met  Pt. will be able to walk : 30 minutes;on even surfaces;on uneven/inclined surfaces;with less pain;with less difficulty;for community mobility;in 4 weeks;in 6 weeks;Comment;Met  Comment:: can walk a couple of miles if take it easy  Patient will sit: 30  "minutes;for eating;for watching TV;with less pain;with less difficultty;in 4 weeks;in 6 weeks;Met  Patient will transfer: for in/out of bed;supine/sit;sit/stand;for in/out of chair;with less pain;with less difficulty;in 4 weeks;in 6 weeks;Comment;Met  Comment: Transfers are good - hernia hasn't grabbed me lately (surprised)    Patient will decrease : ISSA score;by _ points;for improved quality of function;for improved quality of life;in 4 weeks;in 6 weeks;Met;Comment  by ___ points: 6 or more  Comment: 20% down from 60% at eval      Plan / Patient Education:     Continue with initial plan of care.  Progress with home program as tolerated.    dicontinue from PT today. Plan to see PT at [facility] if needed in the future    Subjective:     Dr. Ibrahim at [facility] now new clinic, \"not happy with Eileen wanting to do video visits and brushing me off.\" Will continue at [facility] in the future.   Getting a cat today.  Pain Rating:don't have pain, doing exercises, really help.  Put on new concentrated insulin, has new blood glucose     Will see a new lung doctor soon - Dr Jacob    Pain medications  3x/day - tried 1x to decrease and didn't go well, does not want to try again at this time.  Exercises 5x/day    Sleep - getting up to go to bathroom but not d/t pain  Walk - can walk a couple of miles if take it easy  Sit - can sit 30+ minutes   Transfers are good - hernia hasn't grabbed me lately    Using the gym again - biking 5 min, bands - rows/biceps/triceps    Would like to use bands in apartment - showed pt how to use wrapping around handle or through door      Objective:     Lumbar ROM:  Date: 11/16/2020 1/5/21    *Indicate scale AROM AROM AROM   Lumbar Flexion Hand to knees Hands to knees, no pn    Lumbar Extension Severe limit Severe limitation, no pn, seems limited by neck     Right Left Right Left Right Left   Lumbar Sidebending Hand to 2\"above knee w/ pull in L waist Hand to 2\" above knee Hand to mid thigh, no pn Hand " to mid thigh, no pn     Lumbar Rotation Mod limit Mod limit Mild limit, no pn Mod limit, no pn           Hip/Knee Strength  Date: 11/16/2020 1/5/21    Hip/Knee Strength (/5) MMT MMT MMT    Right Left Right Left Right Left   Hip Flexion 3-/5 4/5 4+/5 5/5     Hip Abduction 4+/5 4+/5 4+/5 4+/5     Hip Adduction 4/5 4-/5 4+/5 4+/5     Hip Extension 5/5 4/5 4+/5 5/5     Hip External Rotation         Hip Internal Rotation         Knee Extension 5/5 5/5 5/5 5/5     Knee Flexion  Ankle DF 4+/5  4+/5 4-/5 4/5  5/5 4+/5  5/5         ISSA - 20%, down from 60% at eval.    Exercises:  Exercise #1: ADD w/ ball seated  Comment #1: Seated, gently squeeze ball b/w knees. Hold 3-5 sec and relax. Do 10-20  Exercise #2: TA activation in sitting  Comment #2: Seated - gently lift lower abs and hold as continue to breath 3-5 breaths.  Exercise #3: Glute set - seated  Comment #3: Seated - glute set, 3-5 sec holds. 10-20 reps.  Exercise #4: Seated nerve glide with knee extension 5x alia  Comment #4: Nerve glide  - sitting on edge of chair, leg out straight, pull toes up. If feel any nerve tightness or pain, slide back a little in your chair. Then pull toes up again to check nerve tension. If none, pump ankle until fatigue (or 30 sec). Do on other side.  Exercise #5: Standing psoas stretch  Comment #5: Stand w/ leg behind you (at counter for balance) - tuck tailbone under (squeeze glutes) to feel stretch at front of that hip. Hold until stretch relaxes. Repeat on opposite side.  Exercise #6: Kneeling psoas stretch (quad stretch)  Comment #6: Kneel on pillow (L leg) - tuck tailbone under (squeeze glutes) to feel stretch on L hip/quad. Keep gentle. Hold in comfortable stretch position until stretch relaxes. Repeat on opp side.  Exercise #7: sit to stand 8x  Comment #7: practiced squats to pick u items off floor 5x  Exercise #8: instruction in self massage with tennis ball      Treatment Today     TREATMENT MINUTES COMMENTS   Evaluation      Self-care/ Home management     Manual therapy     Neuromuscular Re-education     Therapeutic Activity     Therapeutic Exercises 20 Lumbar ROM, LE strength, Ex review, instruct and demo standing hamstring curl at counter   Gait training     Modality___E-stim/TENS____                Total 20    Blank areas are intentional and mean the treatment did not include these items.       Carlene Xie PT, DPT  1/5/2021

## 2021-06-14 NOTE — TELEPHONE ENCOUNTER
Phone call back from Annel. She will restart her singulair and nebs and see if her symptoms improve.  SHe would like to keep her appt with Dr. Alejandro for now on March 18.  She will call back if running into problems and may want to see someone else before her testing is scheduled (on march 15) if necessary.

## 2021-06-14 NOTE — TELEPHONE ENCOUNTER
Reason for Call:  Other appointment     Detailed comments: pt has an appt today with dr briscoe but she spoke with someone today to change that appt to 12;00  noone has called her for the noon appt?    Phone Number Patient can be reached at: Home number on file [phone] (home)    Best Time: asap    Can we leave a detailed message on this number?: No    Call taken on 1/8/2021 at 12:30 PM by Lexus West

## 2021-06-14 NOTE — TELEPHONE ENCOUNTER
PSP:  Dr. Santana  Last clinic visit:  10/20/2020  Reason for call: Refill  Clinical information:  Pt calling stating she picked up her refill of Etodolac but was only given #60. She states she takes 4 capsules daily so a 30 day supply for her would be #120. She also reports she is considering switching pharmacies.   Advice given to patient: Discussed with pt. Pt will call when she is running low on the prescription she just picked up. New prescription for 30 day supply could then be sent to new pharmacy of her preference. She was in agreement with this.   Provider to address: EMMANUEL

## 2021-06-14 NOTE — PROGRESS NOTES
Annel Stoddard is a 65 y.o. female who is being evaluated via a billable telephone visit.      What phone number would you like to be contacted at? [phone]   How would you like to obtain your AVS? AVS Preference: Mail a copy.    Annel was seen today for letter.    Diagnoses and all orders for this visit:    Encounter for completion of form with patient:  Will mail J CARLOS letter for patient.   Answered all other questions to patient satisfaction.  Has appt scheduled next month for diabetes check up.         Subjective     Annel Stoddard is 65 y.o. and presents to clinic today for the following health issues   HPI   Getting a cat tonight. .   Had another cat -  passed 4 years ago  Emotional support- bipolar II   Medical needs. Does not want me to be specific in my letter.     Now taking U-500  90 AM   70 in lunch time  75 at nigth time wasn't holding me  Hasn't dropped really low.   Yesterday ate a whole box of ice cream  Talk to olya on the 20th .   Bring a support person. Not that dependable to be support person.   Something called [employer]- help with weight loss and diabets? Supplement  COVID shot  appt next month.   Goal wt should be 167  Letter from her - submitted to them 181.8 this morning  Hard to eat less when taking all this insulin. Eat something inbetween.   Usually doesn't do that.   Tore her conrad the last time working out. Leg swelling with the bike rubber extension bands. Put around the door knobs.       Review of Systems  No CP, shortness of breath, syncope, HA< vision changes.       Objective       Vitals:  No vitals were obtained today due to virtual visit.    Physical Exam  Calm on the phone, no slurred speech, alert      Phone call duration: 13 minutes

## 2021-06-14 NOTE — TELEPHONE ENCOUNTER
Patient has been instructed to follow up with her pulmonologist regarding her shortness of breath. If she is having respiratory distress she should go to the Emergency room.  We should move up her CT scan looks like it was scheduled March 15 can she get it done this month instead?    Regarding the vaccine- that does not sound like an immunization reaction as the symptoms started 24 hours after the shot.  Those are not normal immune response  symptoms.  If she were to have anaphylaxis etc. or be allergic to the shot it would have happened very shortly after getting the vaccine. I will have to look in to whether she should get the next vaccine or not.

## 2021-06-14 NOTE — TELEPHONE ENCOUNTER
2/1/2021  Received voice messages from patient and request PCP advise  She reported she had COVID vaccine and had reaction of problem breathing.  She noticed her blood pressure went up since she had the COVID vaccine.    Patient would like to know from Dr Ibrahim should she get the 2nd COVID vaccine if she had problem breathing from the 1st one.  She would like to know is the COVID vaccine a 1x thing or is this is something she has to every year    Please call to advise patient of the above concerns

## 2021-06-14 NOTE — TELEPHONE ENCOUNTER
Reason for Call:  Other call back      Detailed comments: calling just to let  know that she is taking her medicine and the singular is working well     Phone Number Patient can be reached at:   Cell number on file:    Telephone Information:   Mobile [phone]       Best Time: no call back needed  Can we leave a detailed message on this number?: No call back needed    Call taken on 1/29/2021 at 10:30 AM by Joaquina Segal

## 2021-06-14 NOTE — TELEPHONE ENCOUNTER
pt is calling for various reasons    Needs refill on ventolin had labs done last week and needs results  She does not have my chart    [facility]    Pt also is getting a cat and [employer] requires a 300 dollar fee. She is asking for a letter saying she needs the pet and doesn't have to pay  Please call pt at

## 2021-06-14 NOTE — PROGRESS NOTES
[facility] EXAM NOTE      Chief Complaint   Patient presents with     Establish Care     labs      Medication Refill       Annel was seen today for establish care and medication refill.    Diagnoses and all orders for this visit:    Establishing care with new doctor, encounter for:  Hx, all and meds reviewed and updated in the chart.     Lower extremity edema: Localized to the bottom calf/ankle area and both lower extremities.  Nontender no pain.  Unlikely to be DVT.  Rule out some causes with the labs below.  She does complain of some dyspnea mostly orthopnea due to her chronic emphysema.  But she states  this is not new or worsening in the last week or more.  She has been doing a lot of walking and I suspect that this is the cause.  Likely dependent edema.  We discussed compression stockings which she already has, elevating her legs above her head.  She cannot take water pills so we will have to just work on conservative measures.  -     Basic Metabolic Panel  -     HM1(CBC and Differential)  -     BNP(B-type Natriuretic Peptide)  -     Thyroid Stimulating Hormone (TSH)    Type 2 diabetes mellitus with complication, with long-term current use of insulin (H):  Continue close f/u with Diabetic education. On U-500.    Mixed hyperlipidemia: continue crestor    Pulmonary emphysema, unspecified emphysema type (H):  Seeing new lung doctor on the 12th.     Bipolar 2 disorder (H):  Stable. Continue current regimen. She likes where she is living.     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 continue losartan          HISTORY    Annel Stoddard is a 65 y.o.  female who presents for the following issues:    Establishing Care: used to be seen at [facility]. Provider has now gone to [place]. Was seeing Eileen Sanchez at [place] but it is too far to go. Would like to bring care closer to [facility] where she lives.  Also met with Johana her  diabetic  today.  They had spoken on the phone but met in person today.    Concerns Today:  Swelling in legs. Can't take water pills due to diabetes insidious. Per patient. Has had problems on and off over the years but not as bad as this. Has been walking a lot. No shortness of breath. No CP. Does have orthopnea bc of emphysema.     Has a edith sensor monitor.  Actually started her on the U-500.  90 in the a.m.  65 PM.    Seeing new Lung Dr.  on the 12th.  Has a gym at apartment using her bike a lot.  Took a bunch of aspirin due to pain.  Mom had leg twice the size and had DVT.  Has had sciatica in the past and had physical therapy for this.    MEDICATIONS    Current Outpatient Medications on File Prior to Visit   Medication Sig Dispense Refill     albuterol (PROVENTIL) 2.5 mg /3 mL (0.083 %) nebulizer solution Take 3 mL (2.5 mg total) by nebulization every 6 (six) hours as needed for wheezing. 6 vial 3     ARIPiprazole (ABILIFY) 30 MG tablet Take 1 tablet (30 mg total) by mouth daily. 90 tablet 2     aspirin-calcium carbonate 81 mg-300 mg calcium(777 mg) Tab Take 81 mg by mouth daily.       biotin 10,000 mcg cap Take 1 capsule by mouth daily.       blood glucose test (FREESTYLE INSULINX TEST STRIPS) strips Use 1 each As Directed 4 (four) times a day. Please strips for freestyle edith system 150 strip 3     calcium citrate/vitamin D3 (CALCIUM CITRATE + D ORAL) Take 2 tablets by mouth every morning.       carboxymethylcellulose (REFRESH PLUS) 0.5 % Dpet ophthalmic dropperette        coenzyme Q10 200 mg capsule Take 200 mg by mouth daily.       etodolac (LODINE) 200 MG capsule Take 1-2 capsules (200-400 mg total) by mouth 3 (three) times a day as needed (for pain). 60 capsule 2     fish oil-omega-3 fatty acids (FISH OIL) 300-1,000 mg capsule Take 1 g by mouth daily.       flash glucose scanning reader (FREESTYLE EDITH 2 READER) [facility] Use 1 each As Directed daily. 1 each 0     flash glucose sensor  (FREESTYLE EDITH 2 SENSOR) Kit Use 1 each As Directed daily. 2 kit 9     hydrocortisone 1 % cream Apply 1 application topically as needed.       insulin glargine (LANTUS SOLOSTAR PEN) 100 unit/mL (3 mL) pen Inject 100 Units under the skin at bedtime. 11.65 Type 2 with hyperglycemia 10 mL 0     insulin lispro protamine-insulin lispro (HUMALOG MIX 50-50 KWIKPEN) 100 unit/mL (50-50) InPn pen Patient is on a sliding scale  150 and under 35 units  151-200  Plus 2 units  201-250  Plus 4 units  251-300  Plus 6 units  301-350  Plus 8 units 15 mL 3     insulin regular HIGH CONC (HUMULIN R U-500, CONC, KWIKPEN) 500 unit/mL (3 mL) PEN solution Inject three times per day before meals. Breakfast 90 unit, lunch 70 units, dinner 70 units. 18 mL 1     ipratropium-albuteroL (DUO-NEB) 0.5-2.5 mg/3 mL nebulizer Take 3 mL by nebulization every 6 (six) hours as needed. 90 vial 1     ketoconazole (NIZORAL) 2 % cream applly externally twice daily for two weeks 15 g 0     LACTOBACILLUS ACIDOPHILUS ORAL Take 1 tablet by mouth daily.       lamoTRIgine (LAMICTAL) 150 MG tablet Take 1 tablet (150 mg total) by mouth daily. 90 tablet 3     liraglutide (VICTOZA) 0.6 mg/0.1 mL (18 mg/3 mL) injection Inject 0.2 mL (1.2 mg total) under the skin daily. With or without food. 30 mL 3     losartan (COZAAR) 50 MG tablet Take 1 tablet (50 mg total) by mouth daily. 90 tablet 1     magnesium oxide (MAG-OX) 400 mg (241.3 mg magnesium) tablet Take 1,600 mg by mouth daily.       metFORMIN (GLUCOPHAGE XR) 500 MG 24 hr tablet Take 4 tablets (2,000 mg total) by mouth daily with supper. 360 tablet 1     methocarbamoL (ROBAXIN) 500 MG tablet Take 1 tablet (500 mg total) by mouth 2 (two) times a day as needed. 60 tablet 1     metroNIDAZOLE (METROGEL) 0.75 % gel Apply 1 application topically 2 (two) times a day. 45 g 0     montelukast (SINGULAIR) 10 mg tablet Take 1 tablet (10 mg total) by mouth daily. 90 tablet 2     multivitamin (MULTIVITAMIN) per tablet Take 1  "tablet by mouth daily.       mupirocin (BACTROBAN) 2 % cream Apply 1 application topically 3 (three) times a day.       MILAN 128 5 % ophthalmic ointment APPLY A SMALL AMOUNT IN OU QPM  3     nystatin (MYCOSTATIN) ointment Apply 1 application topically 4 (four) times a day. 30 g 0     pen needle, diabetic (BD ULTRA-FINE MICRO PEN NEEDLE) 32 gauge x 1/4\" Ndle Use 1 Device As Directed 3 (three) times a day. 900 each 3     rosuvastatin (CRESTOR) 10 MG tablet Take 1 tablet (10 mg total) by mouth at bedtime. 90 tablet 2     vitamin B complex (B COMPLEX-VITAMIN B12 ORAL) Take 1,000 mcg by mouth daily.       No current facility-administered medications on file prior to visit.        Pertinent past medical, surgical, social and family history reviewed and updated in [employer].    Social History     Socioeconomic History     Marital status: Single     Spouse name: Not on file     Number of children: Not on file     Years of education: Not on file     Highest education level: Not on file   Occupational History     Not on file   Social Needs     Financial resource strain: Not on file     Food insecurity     Worry: Not on file     Inability: Not on file     Transportation needs     Medical: Not on file     Non-medical: Not on file   Tobacco Use     Smoking status: Never Smoker     Smokeless tobacco: Never Used   Substance and Sexual Activity     Alcohol use: Not Currently     Drug use: Not on file     Sexual activity: Not on file   Lifestyle     Physical activity     Days per week: Not on file     Minutes per session: Not on file     Stress: Not on file   Relationships     Social connections     Talks on phone: Not on file     Gets together: Not on file     Attends Gnosticism service: Not on file     Active member of club or organization: Not on file     Attends meetings of clubs or organizations: Not on file     Relationship status: Not on file     Intimate partner violence     Fear of current or ex partner: Not on file     " Emotionally abused: Not on file     Physically abused: Not on file     Forced sexual activity: Not on file   Other Topics Concern     Not on file   Social History Narrative    Originally from [place]. Moved from [place].  4 times . Has 5 children ,9 grandchildren . Her daughters were taken away from her by biological dad when she was ill with botulism . All adults now . All of them are in [facility]. Youngest daughter is 38 . Initially estranged but has a closer relationship .Hadnt worked for a long time . Is a violinist and plays in [employer] . Wasn't diagnosed with bipolar much later in life . 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 Did different jobs also like a CNA, . Is on disability due to  ipolar .             She plays the violin.      No past surgical history on file.  Past Medical History:   Diagnosis Date     Anxiety      Botulism     1980     COPD (chronic obstructive pulmonary disease) (H)      Depression      Diabetes mellitus (H)      Hyperlipidemia      Hypertension      Pneumonia      Family History   Problem Relation Age of Onset     Stroke Father      Esophageal cancer Sister      Heart attack Brother            REVIEW OF SYSTEMS    ROS: 14 pt review of systems preformed and all negative except noted in HPI    PHYSICAL EXAM    /79 (Patient Site: Left Arm, Patient Position: Sitting, Cuff Size: Adult Regular)   Pulse 93   Resp 16   Wt 183 lb (83 kg)   SpO2 99%   BMI 32.42 kg/m    GEN:  65 y.o. female sitting comfortably in no apparent distress.   HEENT: EOMI, no scleral icterus, buccal mucosa moist  Neck: Supple without lymphadenopathy or thyromegally   CHEST/LUNG: No respiratory distress, good air flow to bases, CTAB   CV: RRR, S1, S2 normal; no murmurs, rubs or gallops. No edema.   ABD:  Soft, non-tender, non distended, no guarding,  No masses  MSK:  Strength grossly normal  EXTR: +1 pitting edema mostly at ankles/lower calves b/l. Np calf TTP. Some  varicose veins noted upper thigh  SKIN: warm, dry, no rashes or lesions  NEURO: Gait normal, coordination intact  PSYCH:  Mood and affect appropriate  Diabetic foot exam:   Left: Skin intact, no lesions    Proprioception normal    Pulses intact,  warm, well perfused   Filament test present  Right: Skin intact, no lesions   Proprioception normal   Pulses intact, warm, well perfused   Filament test present      Brynn Ibrahim

## 2021-06-14 NOTE — TELEPHONE ENCOUNTER
CCC SW spoke with Annel. She is requesting F2F appointment with PCP to discuss:    Frost bite on her toe, doesn't hurt    [facility] discontinued nebulizer and singulair because she has tests coming up in March. Having harder time with breathing, still using inhaler and humidifier. Wants to talk about medications.

## 2021-06-14 NOTE — TELEPHONE ENCOUNTER
There is a note to call around noon. Inform patient that Dr. Ibrahim is still seeing patient's once she is done she will contact patient.

## 2021-06-14 NOTE — TELEPHONE ENCOUNTER
Phone call to patient to inform that prescription was authorized and has been sent to their pharmacy. Left detailed message on voicemail. Encouraged pt to call nurse navigation line if questions or concerns arise.

## 2021-06-14 NOTE — TELEPHONE ENCOUNTER
----- Message from Chi Perez MD sent at 1/1/2021 11:38 AM CST -----  Regarding: RE: Omeprazole Refill  Yes, okay to refill provided she has follow up with me with audiogram.  ----- Message -----  From: Joie Law RN  Sent: 12/30/2020  10:03 AM CST  To: Chi Perez MD  Subject: Omeprazole Refill                                Annel Brown is requesting a refill of omeprazole. Is that ok with you? She says it is helping.    ThanksJoie

## 2021-06-14 NOTE — TELEPHONE ENCOUNTER
See telephone encounter from today. RN combining encounters to avoid duplication.  Adele Tavera RN 02/01/21 2:40 PM  [facility] Nurse Advisor

## 2021-06-14 NOTE — PROGRESS NOTES
Assessment: pt seen today for f/u. She has been using the lore w/o difficulty. 7 day av, 14 day av, 30 day av. She is in target 63% of the time.   The following lows noted:   : 69 at noon  : 61 at 12am  : 72 at 9pm  : 64 at 5pm  : 69 at 11pm  She is eating around 4am, 10-12pm and 4-5pm. She is taking  90-70-70. She notes she went down to 60 with dinner a couple weeks ago when she was getting low before vikas, FBG then started elevating so she has been back at 70 units at HS for the past week or so. She is no longer getting lows overnight. She is concerned about higher morning readings 150-200. Will increase U500 to 90-70-75. IF she has any low BG between dinner and fasting then she is to decrease back to 70 units with dinner. Pt is not taking any other doses for snacks.   Pt notes she has switched her PCP back to [facility] with Dr. Ibrahim and will start seeing Johana O. There for DM education as well. F/u scheduled with Johana in 2 weeks.       Plan: increase to 75 units with dinner, go back to 70 units if any lows, continue with everything else. Will f/u in 2 weeks, to call sooner w/ any concerns.     Subjective and Objective:      Annel Stoddard is referred by Eileen Sanchez for Diabetes Education.     Lab Results   Component Value Date    HGBA1C 7.2 (H) 2020         Current diabetes medications:  victoza 1.2 mg/day, metformin 2g with dinner, U500: 90-70-75        Education:     Monitoring   Meter (per above goals): Assessed and Discussed  Monitoring: Assessed and Discussed  BG goals: Discussed    Nutrition Management  Nutrition Management: Assessed and Discussed  Weight: Discussed and pt notes she is gaining wt   Portions/Balance: Assessed and Discussed  Carb ID/Count: Assessed and Discussed  Label Reading: Not addressed  Heart Healthy Fats: Not addressed  Menu Planning: Assessed and Discussed  Dining Out: Not addressed  Physical Activity: Assessed and  Discussed  Medications: Discussed  Orals: Discussed  Injected Medications: Discussed   Storage/Exp:Not addressed   Site Rotation: Discussed   Sites Assessed: no    Diabetes Disease Process: Discussed    Acute Complications: Prevent, Detect, Treat:  Hypoglycemia: Assessed and Discussed  Hyperglycemia: Assessed and Discussed  Sick Days: Not addressed  Driving: Not addressed    Chronic Complications  Foot Care:Not addressed  Skin Care: Not addressed  Eye: Not addressed  ABC: Discussed  Teeth:Not addressed  Goal Setting and Problem Solving: Assessed and Discussed  Barriers: Assessed and Discussed  Psychosocial Adjustments: Assessed and Discussed      Time spent with the patient: 30 minutes for diabetes education and counseling.   Previous Education: yes  Visit Type:HENRIK So  1/4/2021

## 2021-06-14 NOTE — PROGRESS NOTES
1/20/2021  CCC Referral from PCP  Insurance coverage   Clinic Care Coordination Contact  [facility]/Voicemail       Clinical Data: Care Coordinator Outreach  Outreach attempted x 1.  Left message on patient's voicemail with call back information and requested return call.  Plan: Care Coordinator will try to reach patient again in 1-2 business days.    CHW next outreach: 1-21-21

## 2021-06-14 NOTE — TELEPHONE ENCOUNTER
Refill for inhaler sent.  Please relay results letter in chart and let her know we are going to mail her a copy.    Results message: All your labs look great! Liver, kidneys, blood sugar and blood levels all normal. Not the cause of your swelling in your legs. Continue the management we discussed. Likely from all the walking you do!    Lets set up a telephone visit to discuss her cat and needing a letter.

## 2021-06-14 NOTE — PROGRESS NOTES
Clinic Care Coordination Contact    Follow Up Progress Note      Assessment: RN CC spoke with patient.  Patient states that she is working with care team to try and reschedule diagnostic test for this month  RN CC offered to check in within one week to support if needed  Patient was open to outreach and appreciate call     Goals addressed this encounter:   Goals Addressed                 This Visit's Progress       Patient Stated      Medical (pt-stated)        Goal Statement: I would like to have a plan of care with pulmonary within 1-2 months   Date Goal set: 02/02/21    Barriers: location,scheduling barriers  Strengths: patient engagement  Date to Achieve By: April   Patient expressed understanding of goal: yes  Action steps to achieve this goal:  1. I will attend diagnozit testing and imaging as recommended   2. I will attend follow up visit with pulmonology and review medications   3. I will report to my Community Health Worker if any additional resources or support needed.                 Plan:   Patient will schedule diagnostic visit and update RN CC at next outreach   RN CC will assist if needed to connect with scheduling at next outreach

## 2021-06-14 NOTE — PROGRESS NOTES
2/1/2021  Called and spoke to patient and follow up on her voice messages.  She did talk to someone at the clinic and that they want her to see Pulmonary.  She called and Dr. Alejandro ordered to do a CT scan and blood gases and both are scheduled on 3-15-21.    She can't see Lele Delgado (Pulmonary) in [place] until March, but she needs to see him sooner than March and what should she do in the mean time with her breathing. She has breathing issues and botulism in the past.  She has a nebulizer.  She did call the triage nurse today.    She needs [facility] team support if there any thing she can do or help to see Dr Oliveira sooner.  She has Dr. Oliveira's nurse contact number  DEWAYNE Gordillo at [phone]    Scheduled appt to talk with CC RN tomorrow 2-2-21 at 11am for support.      appt with CCC RN 2-2-21  CHW Follow up: Monthly    CHW Plan: Follow up on goals    CHW Next Follow Up: 2-23-21

## 2021-06-14 NOTE — TELEPHONE ENCOUNTER
Reason for Call:  Other COVID Vaccine     Detailed comments: Patient called and stated that she is receiving the COVID vaccine at her place of resident, and she is wondering if she is allergic to Penicillin, can she still get the vaccine. Please call patient to advise?    Phone Number Patient can be reached at: Home number on file [phone] (home)    Best Time: any    Can we leave a detailed message on this number?: Yes    Call taken on 1/27/2021 at 12:10 PM by Aisha Caicedo

## 2021-06-14 NOTE — PROGRESS NOTES
2/1/2021  Received several voice messages from patient.  Patient reported had COVID vaccine and had a reaction.   She reported problem/hard time breathing and wondering if she should get the 2nd shot if she had problem breathing from the 1st.  She also notice her blood pressure went up since the COVID vaccine.  She wants to know if this COVID vaccine is a 1x shot or does she needs to get it every year.    She had frost bite on of her finger a little numb and tingling.    She received ASHLEY Singletary letter and that she would to update the her contact.   Patient stated to add her daughter Ольга Graham at [phone]   Other daughter Morena Weathers [phone]  Updated in demographics      She will start online classes in May at the [employer].    CHW sent Telephone message to PCP Care Team Pool to call patient to advise patient.    Routed to CCC RN for support to follow up if PCP did not response to the telephone message.      CHW Follow up: Monthly    CHW Plan: Follow up on goals    CHW Next Follow Up: 2--23-21

## 2021-06-14 NOTE — PATIENT INSTRUCTIONS - HE
"It was a pleasure seeing you today.    You have evidence of \"restriction\" on your lung function testing. There is no evidence of asthma or COPD. I do not believe you have COPD based on testing and no smoking history.    I recommend:    1. CT scan of the lungs  2. Additional lung function testing  3. Blood draw to test carbon dioxide level      Follow up with me when above testing is complete.      Angelica Alejandro MD  Pulmonary and Critical Care Medicine  [facility]  Office: [phone]      "

## 2021-06-14 NOTE — TELEPHONE ENCOUNTER
"Phone message from Annel. STates she saw Dr. Alejandro last week and she \"took me off all my meds\".  Now is having more wheezing and coughing.  She is scheduled for testing on 2/15, but would like to see another provider (not Dr. Alejandro prior to this testing).    Attempted to reach Annel back by phone.  Was only able to leave a .  I see that Dr. Alejandro stopped her Singulair.  Told Annel it would be fine to restart this if she is more symptomatic now.  Dr. Alejandro also recommended using her neb/inhaler as needed rather than scheduled.  Told Annel it would be fine to go back to her usual schedule for this if she is having difficulties controlling her symptoms.  Would like to schedule with Dr. Oliveira, but he does not have any openings prior to 2/15.  Left the scheduling phone number for her to call back and try to schedule with another provider if she would like.  Also left my phone number to call back if any concerns we did not address or any further questions.  "

## 2021-06-14 NOTE — TELEPHONE ENCOUNTER
PSP:  Dr. Santana  Last clinic visit:  10/20/20  Reason for call: Medication refill  Clinical information:  Request refill of Methocarbamol 500 mg tablets. Reports she is taking 1 tablet twice a day.   Advice given to patient: PSP will be notified of her request. Pharmacy verified.   Provider to address: Refill request. Prescription prepped.

## 2021-06-14 NOTE — PROGRESS NOTES
1/20/2021   CCC referral from PCP  Insurance coverage    Clinic Care Coordination Contact  Community Health Worker Initial Outreach    CHW Initial Information Gathering:  Preferred Hospital: [facility]  [phone]  Preferred Urgent Care: [facility], [phone]  Current living arrangement:: I live alone  Type of residence:: Apartment  Supplies Currently Used at Home: Nebulizer tubing  Equipment Currently Used at Home: cane, straight, grab bar, tub/shower  Informal Support system:: Friends  No PCP office visit in Past Year: No  Transportation means:: [employer] mobility, Public transportation  CHW Additional Questions  If ED/Hospital discharge, follow-up appointment scheduled as recommended?: N/A  MyChart active?: No  Patient agreeable to assistance with activating MyChart?: No    Patient accepts CC: Yes. Patient scheduled for assessment with CCC SW on 1-25-21 at 7:30am. Patient noted desire to discuss services and supoort..   Received call back from patient.  Spoke to patient and discussed CCC enrollment.  Patient agreed to enrolled.  Patient shared that she has insurance coverage.  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    CHW Follow up:  1-25-21 review assessment, goals, delegations, and consult with CCC SW if needed.

## 2021-06-14 NOTE — TELEPHONE ENCOUNTER
Reason for call:  Patient reporting a symptomsob and nightime heaviness    Symptom or request: pt called and michael last week her pulmanary dr took her off all of her lung rx's. Since then she is very shortness of breath and having some gasping episodes at nighttime.    Duration (how long have symptoms been present):1 week    Have you been treated for this before? Yes    Additional comments: n/a    Phone Number patient can be reached at:  Home number on file [phone] (home)    Best Time:  asap    Can we leave a detailed message on this number: Yes    Call taken on 1/28/2021 at 8:03 AM by Lexus West

## 2021-06-14 NOTE — TELEPHONE ENCOUNTER
Triage call:    Pt had a COVID vaccine on Friday. Initially she was fine. Saturday night, over 24 hours after injection, patient began to have SOB. Pt states she was up every hour due to her breathing.  Patient states she has breathing problems and always has some shortness of breath.   Breathing went back to baseline on Sunday.     Patient states SOB has been worse since her pulmonologist took her off some medications. Pt states she has restarted these mediation but SOB is still worse than normal.     Patient is wondering if she should take the second shot next month or not?      RN will route to clinic for second level triage due to worsening shortness of breath from patient's baseline.  Patient reports this started when pulmonologist stopped some medications.  It has improved but still worse than patient's baseline.     Triage was completed in [facility] chart, protocol had recommended ED now or PCP triage.   Note copied from [facility] chart to route to provider for second level triage. Please advise.     Adele Tavera RN 02/01/21 2:35 PM  [facility] Nurse Advisor

## 2021-06-14 NOTE — TELEPHONE ENCOUNTER
Pt is calling to check on the status of the prescription that was recommended from Dr. Perez. Please call her back at [phone] with and updated status

## 2021-06-14 NOTE — PROGRESS NOTES
2/1/2021  Called patient no answer.   not available to leave message for patient.      CHW Follow up: Monthly    CHW Plan: Follow up on goals    CHW Next Follow Up: 2-23-21

## 2021-06-14 NOTE — PROGRESS NOTES
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     Lung function testing reveals moderate restriction. We reviewed the possible causes of this (ILD, structural, neuromuscular, +/- obesity). Her serum HCO3 levels also run high, raising the possibility of chronic hypercapnia. I do wonder if there is any residual neuromuscular weakness from the prior respiratory failure from botulism. She does not have COPD based on PFTs and absence of prior tobacco history. I also have a lower suspicion for asthma given her testing and history, however she finds the inhaler/neb helpful. It is also possible that her nocturnal symptoms are due to the ongoing untreated sleep apnea, of unknown severity.      PLAN:    Non contrast CT chest    MIP, MEP MVV    ABG    I have removed COPD from her problem list    I recommended stopping singulair and monitoring her symptoms, discontinue if no change    I recommended subsequently using neb/inhaler prn rather than scheduled, and monitoring symptoms.      Follow up with me when above testing is complete.      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     She reports that her difficulty breathing occurs primarily at night - wakes up with shortness of breath. She uses nebulizer or inhaler which helps. Coughs in the morning, scant phlegm.    She does not notice the symptoms predominantly during the day, or with exertion, at times she feels as though she cannot get a deep breath. She was told she has COPD several months ago, uses  duoneb two times a day, prn albuterol, and singulair.    She was dx with sleep apnea years ago, unable to tolerate PAP.  ENT evaluation was unremarkable.  Niece, nephew, cousin, reportedly with asthma    BNP < 10  PFTs reviewed with her in detail      ROS:  A 12-system review was obtained and was negative with the exception of the symptoms endorsed in the history of present illness.    PMH:  Past Medical History:   Diagnosis Date     Anxiety      Botulism     1980     COPD (chronic obstructive pulmonary disease) (H)      Depression      Diabetes mellitus (H)      History of tracheostomy 1/5/2021     Hyperlipidemia      Hypertension      Pneumonia      PSH:  Tracheostomy  Eye surgery    Allergies:  Allergies   Allergen Reactions     Desmopressin Swelling     LE  LE       Antihistamines - Alkylamine Other (See Comments)     Chest pains     Cephalexin      rash  rash  rash       Codeine Headache     Headache  Headache  Headache       Diazepam Dizziness and Other (See Comments)     Diphenhydramine Hcl      Chest tightness  Chest tightness  Chest tightness       Doxycycline      Does not work. Ineffective per patient.     Erythromycin Base      rash  rash       Estrogens Headache     Hydrochlorothiazide Other (See Comments) and Unknown     Patient reports can't take this med due to increased urine output  Patient reports can't take this med due to increased urine output  Patient reports can't take this med due to increased urine output       Hydrocodone-Acetaminophen      Justin change  Justin change  Justin change       Invokamet [Canagliflozin-Metformin] Itching     Lithium Diarrhea     Diabetes insipidus  Diabetes insipidus  Causing DI  Causing DI       Penicillins Unknown     Risperidone      Beclomethasone Dipropionate Rash     qvar  qvar       Latex Rash and Other (See Comments)     rash     Valacyclovir Rash     Family HX:  Family History   Problem Relation Age of Onset     Stroke Father      Esophageal cancer Sister       Heart attack Brother      Social Hx:  Social History     Socioeconomic History     Marital status: Single     Spouse name: Not on file     Number of children: Not on file     Years of education: Not on file     Highest education level: Not on file   Occupational History     Not on file   Social Needs     Financial resource strain: Not on file     Food insecurity     Worry: Not on file     Inability: Not on file     Transportation needs     Medical: Not on file     Non-medical: Not on file   Tobacco Use     Smoking status: Never Smoker     Smokeless tobacco: Never Used   Substance and Sexual Activity     Alcohol use: Not Currently     Drug use: Not on file     Sexual activity: Not on file   Lifestyle     Physical activity     Days per week: Not on file     Minutes per session: Not on file     Stress: Not on file   Relationships     Social connections     Talks on phone: Not on file     Gets together: Not on file     Attends Muslim service: Not on file     Active member of club or organization: Not on file     Attends meetings of clubs or organizations: Not on file     Relationship status: Not on file     Intimate partner violence     Fear of current or ex partner: Not on file     Emotionally abused: Not on file     Physically abused: Not on file     Forced sexual activity: Not on file   Other Topics Concern     Not on file   Social History Narrative    Originally from [place]. Moved from [place].  4 times . Has 5 children ,9 grandchildren . Her daughters were taken away from her by biological dad when she was ill with botulism . All adults now . All of them are in [facility]. Youngest daughter is 38 . Initially estranged but has a closer relationship .Hadnt worked for a long time . Is a violinist and plays in [employer] . Wasn't diagnosed with bipolar much later in life . 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 Did different jobs also like a CNA, . Is on disability due to   "giovanniolar .             She plays the violin.      Current Meds:  Reviewed    Physical Exam:  /62   Pulse 87   Ht 5' 3\" (1.6 m)   Wt 183 lb 9.6 oz (83.3 kg)   SpO2 95% Comment: RA  BMI 32.52 kg/m    Gen: Alert, oriented, no distress  HEENT: nasal turbinates are unremarkable, no oropharyngeal lesions, no cervical or supraclavicular lymphadenopathy  CV: RRR, no M/G/R  Resp: CTAB/L, no focal crackles or wheezes  Abd: soft, nontender, no palpable organomegaly  Skin: no apparent rashes  Ext: no cyanosis, clubbing or edema  Neuro: alert, nonfocal    Labs:  Reviewed  HCO3 29  Eos: 300    Imaging studies:  Personally reviewed and interpreted:    No recent chest imaging    10/20 PFT's  FEV1/FVC is 84 and is normal.  FEV1 is 74% predicted and is reduced.  FVC is 69% predicted and is reduced.  There was no improvement in spirometry after a single inhaled dose of bronchodilator.  TLC is 67% predicted and is reduced.  RV is 67% predicted and is reduced.  DLCO is 93% predicted and is normal when it   is corrected for hemoglobin.  The flow volume loop is normal Yes.     Impression:  Full Pulmonary Function Test is abnormal. Spirometry is consistent with moderate restrictive ventilatory defect.  Spirometry is not consistent with reversibility.  There is no hyperinflation.  There is no air-trapping.  Diffusion capacity when corrected for hemoglobin is normal.    "

## 2021-06-14 NOTE — TELEPHONE ENCOUNTER
I can provide a trial of tizanidine for her.  2 mg twice a day as needed for muscle pain.  This can make her quite drowsy.  I do not see any obvious drug interactions, but she should discuss with her pharmacist she is on numerous supplements.

## 2021-06-14 NOTE — TELEPHONE ENCOUNTER
Called and spoke with patient. She plans to schedule with pulmonologist and get into CT scan sooner

## 2021-06-15 ENCOUNTER — AMBULATORY - HEALTHEAST (OUTPATIENT)
Dept: EDUCATION SERVICES | Facility: CLINIC | Age: 66
End: 2021-06-15

## 2021-06-15 DIAGNOSIS — Z79.4 TYPE 2 DIABETES MELLITUS WITH COMPLICATION, WITH LONG-TERM CURRENT USE OF INSULIN (H): ICD-10-CM

## 2021-06-15 DIAGNOSIS — E11.8 TYPE 2 DIABETES MELLITUS WITH COMPLICATION, WITH LONG-TERM CURRENT USE OF INSULIN (H): ICD-10-CM

## 2021-06-15 DIAGNOSIS — E11.9 DIABETES MELLITUS, TYPE 2 (H): ICD-10-CM

## 2021-06-15 NOTE — TELEPHONE ENCOUNTER
Reason for Call:  Medication or medication refill:    Do you use a [facility]?  Name of the pharmacy and phone number for the current request: [facility]     Name of the medication requested: victozia  1.8 in am changed  per [facility]   needs prior auth per insurance     Other request: she asked about the prilosec I told her to contact pharmacy to request that rx     Can we leave a detailed message on this number? Yes    Phone number patient can be reached at: Home number on file [phone] (home)    Best Time: as soon as possible  Call taken on 2/26/2021 at 9:11 AM by Joaquina Segal

## 2021-06-15 NOTE — PROGRESS NOTES
2/17/2021   Clinic Care Coordination Contact    Community Health Worker Follow Up    Goals:   Goals Addressed                 This Visit's Progress       Patient Stated      COMPLETED: Healthy Eating (pt-stated)   100%     Goal Statement: I have information and aware of food resources in the community.  Date Goal set: 01/25/21  Barriers:   Strengths:   Date to Achieve By: 2/282021 Completed  Patient expressed understanding of goal: Yes  Personal Plan:   I have food resources and information if needed.    I will wait for the [address]  [facility]  to call and sign up for the program   If I have any questions I can call the clinic at [phone] regarding signing up for [facility] [facility].          Medical (pt-stated)   50%     Goal Statement: I would like to have a plan of care with pulmonary within 1-2 months   Date Goal set: 02/02/21    Barriers: location,scheduling barriers  Strengths: patient engagement  Date to Achieve By: April   Patient expressed understanding of goal: yes  Action steps to achieve this goal:  1. I will attend appt on 3-15-21 at 10:30am at the [facility] in [place].  2. I will attend follow up visit with pulmonology on 3-29-21 with Dr Oliveira and review medications   3. I will report to my Community Health Worker if any additional resources or support needed.    Updated: 2-17-21 AL    02/04/21  RN CC spoke with pulmonology -  Facilitated changing CT and blood gases to an earlier appointment  Pulmonology team will connect with radiology and update patient with schedule information   Patient expressed interest in changing provider - set up an establish care visit with Dr Oliveira for 3/29/21 @ 8:30 following PFT results to review next steps and plan of care          Other (pt-stated)   40%     Goal Statement: I would like information and resources/programs to assist with getting hearing aid in the next month.  Date Goal set: 2/9/2021  Barriers: limited financial    Strengths: motivated and engaging with [facility] team for support  Date to Achieve By: 3-1-21  Patient expressed understanding of goal: Yes  Action steps to achieve this goal:  1. I will e-mail [email or web address] for information.  2. I will wait to get the information and resources from [facility] in the mail.     Updated: 2-17-21 AL               Other (pt-stated)   40%     Goal Statement: I would like information and resources/programs to assist with getting prescription eye glasses within next month.  Date Goal set: 2/9/2021  Barriers: limited financial   Strengths: motivated and engaging with [facility] team for support, social security benefit  Date to Achieve By: 3-9-21  Patient expressed understanding of goal: Yes  Action steps to achieve this goal:  1. I will review resources from [facility]  2. I will wait to get the information and resources from [facility] in the mail.     Updated: 2-17-21 AL              Returned patient's VM messages.    Called and spoke to patient and  follow up on VM messages.  Patient shared she got accepted letter today from the [facility].  Congratulated patient on the accepted letter.    Informed patient that [facility] sent the [facility] application wait for a few days in the mail.  Informed that  also sent out resources to her and to wait for a  few days to get in the mail.    She already spoke to KRISTINA Vaughn she is okay with the appt at 8:45am able to make it on the bus.    Patient stated not happy with how  staff communicate to her.  Apologized to patient for her bad experience.  Offered patient to talk to Customer Advocacy Team to share her concerns.  Patient agreed to talk to them.  Provided patient Customer Advocacy team number [phone].    Patient plans to visit her cousin in [facility] when safe to travel again.    CHW scheduled  SW 2-18-21 to review and support finding new audiologist.    CHW sent staff message to Richard Walker, Coordinator to add patient for [employer] [facility]  Program.     Patient appreciated the call and support from CCC team.    CHW Follow up: Monthly    CHW Plan: Follow up on goals    CHW Next Follow Up: 3-17-21

## 2021-06-15 NOTE — TELEPHONE ENCOUNTER
ASHLEY Singletary connected with patient today.    
Aun, this patient called and requested for you to call her.   
Hi Aun,    Patient stated that she has been trying to call you. She would like a call back. She didn't state what she needs to talk to you about. Thanks.  
person/place/time

## 2021-06-15 NOTE — PROGRESS NOTES
2021     Clinic Care Coordination Contact  Financial Resource Worker Screening    [place] Benefits  Is patient requesting help applying for [facility] benefits?: Yes([facility] Care application  in March)  Have you recently applied for any [facility] benefits?: No  How many people in your household?: 1  Do you buy/eat food together?: No  What is the monthly gross income for the household (wages, social security, workers comp, and pension)? : 1287  Is patient requesting an increase in SNAP/CASH?: No    Ins[place]:  [place]?: No  Is this an insurance renewal?: No  Is this a new insurance application request?: No    Any other information for the FRW?: any day call in the morning.    Care Coordination team will tell patient:   Thank you for answering all the questions, based on screening questions, our Financial Resource Worker will reach out to you with additional questions and next steps.

## 2021-06-15 NOTE — PROGRESS NOTES
2021  Received several voice messages from patient.  Stated she attended audiology appt today at [place] has concern about the coverage for audiology.  She gave the letter to Dr Perez's [place] that they she will take care of it. She is scared she will have to pay $45.  She will see Dr Oliveira on 3-29-21 after all the testing.  Stated to see someone in the clinic to apply for the financial aid again before it is  in March.    Consulted with CCC SW   Per CC SW to connect with patient and do a referral to [facility] to apply for [facility] Care again.

## 2021-06-15 NOTE — PROGRESS NOTES
Clinic Care Coordination Contact  Care Team Conversations     [facility] SW spoke with Annel    She reported she will be switching her audiology to [facility]. They are going to test her hearing again and for $150 she will be able to get hearing aids and have adjustments included.    She is excited to be going to [facility].    She is going to get a cell phone and a TTY phone as well.

## 2021-06-15 NOTE — TELEPHONE ENCOUNTER
Patient is requesting for you to contact her. She has additional questions regarding the application she put in for [facility]. If there is no answer, when you leave a message, please speak loudly and slowly.

## 2021-06-15 NOTE — PROGRESS NOTES
[facility] EXAM NOTE      Chief Complaint   Patient presents with     Follow-up         ASSESSMENT & PLAN    Annel was seen today for follow-up.    Diagnoses and all orders for this visit:    Type 2 diabetes mellitus without complication, with long-term current use of insulin (H):  Last A1c 7.5 in February.  Elevated up from previous 7.2 but overall still stable and doing well.  Continue follow-up with diabetic education.  She will follow up in the Valentin. we will check A1c again.    Dyspnea on exertion: Unclear cause still.  CT scan did not tell us much.  Reviewed results with her.  She was restarted on her inhalers.  She continues with issues breathing.  She is scheduled for pulmonary function test next week on the 15th and then follow-up with pulmonology after that.  She is oxygenating at 99% here.  But sounded to be breathing heavily when I walked in the room.  I do wonder if some of this is perception.  Otherwise could be due to her hx of tracheostomy?    Superficial frostbite of toe of left foot: Frostbite resolved.    Lower extremity edema: Likely dependent.  Previous work-up negative.  Continue with compression stockings.  Elevating the feet etc.    Essential hypertension, benign: Repeat blood pressure within normal limits.  Continue current management.    Pulmonary emphysema, unspecified emphysema type (H): As above unclear that she has emphysema.  Follow-up with PFTs and pulmonology.  CT scan did not show signs of emphysema.    Bipolar 2 disorder (H): Stable.  Has stable housing.  She is well engaged in the community plans to volunteer and start school in May.      HISTORY    Annel Stoddard is a 65 y.o.  female who presents for the following issues:    [facility] - PNA shot before november 2023  Starts school middle of May  volunteering for hospice. Offered three days a week but might be too much.     BS meter: last week wasn't so good. But this week good. Looking at her meter she checks her sugars  multiple times a day.   3/9: 128, 207, 183, 136, 146, 158  161, 140, 167, 155, 131, 112   85, 69, 79    [place] the - going to the dental thing tomorrow. To see about implants.   Getting hearing aids- [facility]- only 150$ with [employer] donation  Info on glasses- optometrist. [facility].     Aun has not returned any of my calls.   Spent 12$   [facility] care application- upset about that.     COVID vaccine- tired after 2nd one    Playing violin- on easter.   [facility]       MEDICATIONS    Current Outpatient Medications on File Prior to Visit   Medication Sig Dispense Refill     albuterol (PROAIR HFA;PROVENTIL HFA;VENTOLIN HFA) 90 mcg/actuation inhaler Inhale 2 puffs every 6 (six) hours as needed for wheezing. 1 each 0     ARIPiprazole (ABILIFY) 30 MG tablet Take 1 tablet (30 mg total) by mouth daily. 90 tablet 2     aspirin-calcium carbonate 81 mg-300 mg calcium(777 mg) Tab Take 81 mg by mouth daily.       biotin 10,000 mcg cap Take 1 capsule by mouth daily.       blood glucose test (FREESTYLE INSULINX TEST STRIPS) strips Use 1 each As Directed 4 (four) times a day. Please strips for freestyle lore system 150 strip 3     calcium citrate/vitamin D3 (CALCIUM CITRATE + D ORAL) Take 2 tablets by mouth every morning.       carboxymethylcellulose (REFRESH PLUS) 0.5 % Dpet ophthalmic dropperette        coenzyme Q10 200 mg capsule Take 200 mg by mouth daily.       etodolac (LODINE) 200 MG capsule TAKE 1 TO 2 CAPSULES BY MOUTH THREE TIMES DAILY AS NEEDED FOR PAIN 60 capsule 0     fish oil-omega-3 fatty acids (FISH OIL) 300-1,000 mg capsule Take 1 g by mouth daily.       flash glucose scanning reader (FREESTYLE LORE 2 READER) Misc Use 1 each As Directed daily. 1 each 0     flash glucose sensor (FREESTYLE LORE 2 SENSOR) Kit Use 1 each As Directed daily. 2 kit 9     hydrocortisone 1 % cream Apply 1 application topically as needed.       insulin regular HIGH CONC  "(HUMULIN R U-500, CONC, KWIKPEN) 500 unit/mL (3 mL) PEN solution Inject three times per day before meals. Breakfast 90 unit, lunch 70 units, dinner 70 units. 18 mL 5     ipratropium-albuteroL (DUO-NEB) 0.5-2.5 mg/3 mL nebulizer Take 3 mL by nebulization every 6 (six) hours as needed. 90 vial 1     ketoconazole (NIZORAL) 2 % cream applly externally twice daily for two weeks 15 g 0     LACTOBACILLUS ACIDOPHILUS ORAL Take 1 tablet by mouth daily.       lamoTRIgine (LAMICTAL) 150 MG tablet Take 1 tablet (150 mg total) by mouth daily. 90 tablet 3     losartan (COZAAR) 50 MG tablet Take 1 tablet (50 mg total) by mouth daily. 90 tablet 1     magnesium oxide (MAG-OX) 400 mg (241.3 mg magnesium) tablet Take 1,600 mg by mouth daily.       metFORMIN (GLUCOPHAGE XR) 500 MG 24 hr tablet Take 4 tablets (2,000 mg total) by mouth daily with supper. 360 tablet 1     methocarbamoL (ROBAXIN) 500 MG tablet Take 1 tablet (500 mg total) by mouth 2 (two) times a day as needed. 60 tablet 1     metroNIDAZOLE (METROGEL) 0.75 % gel Apply 1 application topically 2 (two) times a day. 45 g 0     montelukast (SINGULAIR) 10 mg tablet Take 1 tablet (10 mg total) by mouth daily. 90 tablet 2     multivitamin (MULTIVITAMIN) per tablet Take 1 tablet by mouth daily.       mupirocin (BACTROBAN) 2 % cream Apply 1 application topically 3 (three) times a day.       MILAN 128 5 % ophthalmic ointment APPLY A SMALL AMOUNT IN OU QPM  3     nystatin (MYCOSTATIN) ointment Apply 1 application topically 4 (four) times a day. 30 g 0     omeprazole (PRILOSEC) 20 MG capsule Take 2 capsules (40 mg total) by mouth daily before breakfast. Take 30 minutes before breakfast 180 capsule 3     pen needle, diabetic (BD ULTRA-FINE MICRO PEN NEEDLE) 32 gauge x 1/4\" Ndle Use 1 Device As Directed 3 (three) times a day. 900 each 3     rosuvastatin (CRESTOR) 10 MG tablet Take 1 tablet (10 mg total) by mouth at bedtime. 90 tablet 2     tiZANidine (ZANAFLEX) 2 MG tablet Take 1 tablet " (2 mg total) by mouth 2 (two) times a day as needed (muscle spasms). 60 tablet 1     VICTOZA 2-BERENICE 0.6 mg/0.1 mL (18 mg/3 mL) injection INJECT 1.2MG SUBCUTANEOUSLY ONCE DAILY. USE WITH OR WITHOUT FOOD 36 mL 3     vitamin B complex (B COMPLEX-VITAMIN B12 ORAL) Take 1,000 mcg by mouth daily.       No current facility-administered medications on file prior to visit.        Pertinent past medical, surgical, social and family history reviewed and updated in Epic.        REVIEW OF SYSTEMS    ROS: 10 pt review of systems performed and all negative except noted in HPI.     PHYSICAL EXAM    /74   Pulse 86   Resp 16   Wt 185 lb (83.9 kg)   SpO2 99%   BMI 32.77 kg/m    GEN:  65 y.o. female sitting comfortably in no apparent distress.   HEENT: EOMI, no scleral icterus  CHEST/LUNG: No respiratory distress, good air flow to bases, CTAB   CV: RRR, S1, S2 normal; no murmurs, rubs or gallops. Trace edema RLE- stable  Ext: frostbite resolved.   SKIN: warm, dry, no rashes or lesions  NEURO: Gait normal, coordination intact  PSYCH:  Mood and affect appropriate      At the end of the encounter, I discussed results, diagnosis, medications. Discussed red flags for immediate return to clinic/ER, as well as indications for follow up if no improvement. Patient and/or caregiver understood and agreed to plan. Patient was stable for discharge.      Brynn Ibrahim

## 2021-06-15 NOTE — PROGRESS NOTES
Clinic Care Coordination Contact    Follow Up Progress Note      Assessment: RN CC outreach and spoke with patient  Patient states that she was able to speak with PCP and would like to set up imaging sooner if possible     RN CC connected patient to pulmonology and assisted with request to change imaging and establish care with new provider   Goals addressed this encounter:   Goals Addressed                 This Visit's Progress       Patient Stated      Medical (pt-stated)   20%     Goal Statement: I would like to have a plan of care with pulmonary within 1-2 months   Date Goal set: 02/02/21    Barriers: location,scheduling barriers  Strengths: patient engagement  Date to Achieve By: April   Patient expressed understanding of goal: yes  Action steps to achieve this goal:  1. I will attend diagnozit testing and imaging as recommended   2. I will attend follow up visit with pulmonology and review medications   3. I will report to my Community Health Worker if any additional resources or support needed.    02/04/21  RN CC spoke with pulmonology -  Facilitated changing CT and blood gases to an earlier appointment  Pulmonology team will connect with radiology and update patient with schedule information   Patient expressed interest in changing provider - set up an establish care visit with Dr Oliveira for 3/29/21 @ 8:30 following PFT results to review next steps and plan of care          Medication 1 (pt-stated)   30%     Goal Statement: I want to see my PCP as soon as possible to discuss frost bite and medications for breathing  Date Goal set: 01/25/21  Barriers:   Strengths:   Date to Achieve By: ASAP  Patient expressed understanding of goal: Yes  Action steps to achieve this goal:  1. I will wait to hear from [facility] Scheduling   2. I will update CCC team    02/04/21  Pateint has visit with PCP scheduled for 3/8/21             Intervention/Education provided during outreach: supported patient request to change scheduled  visit and pulmonary provider         Plan:   Patient will work with pulmonary scheduling to reschedule CT and labs sooner  Patient will attend scheduled visit with PCP and speciality providers  Community Health Worker to outreach per standard work and updated on goal progression      RN CC will outreach in 4-6 weeks to support ongoing recommendations and plan of care will be available sooner if needed.

## 2021-06-15 NOTE — TELEPHONE ENCOUNTER
Central PA team  [phone]  Pool: HE PA MED ([id number])          PA has been initiated.       PA form completed and faxed insurance via Cover My Meds     Key:  [id number]     Medication:  VICTOZA    Insurance:  [employer]         Response will be received via fax and may take up to 5-10 business days depending on plan

## 2021-06-15 NOTE — PROGRESS NOTES
2/22/2021  Received several VM messages from patient ( 2-19-21, 2-20-21, 2-22-21 update on goals and progress)    Stated she received the [facility] application and found the address where to send the application.  Did not received information from [employer] yet.    Received letter from [facility] that Victoza is not cover v by insurance and for the doctor to send a letter  to [employer]. Need prior authorization   Telephone: [phone]  Fax: [phone]    She received the information and resources from [employer]SH[employer] Singletary for the hearing aid, cell phone, denture, but not for assistance with eye glasses.    Stated PCP sent the letter to the school.  She might have to withdraw from school because she does not understand or how to use Word.    Stated she called on the weekend and that they need for PCP to send new script for Victoza to [employer].    CHW sent telephone message to PCP regarding script to Victoza     CHW will notify  ASHLEY to send resources for free or assistance with eye glasses.    Updated goals.    CHW Follow up: Monthly    CHW Plan: Follow up on goals    CHW Next Follow Up: 3-24-21

## 2021-06-15 NOTE — PROGRESS NOTES
2/10/2021   Patient left several voice messages today.  Patient requesting help to find resources for free cell phone and denture-partial.  Eye glasses and hearing aid.    Called and spoke to patient to discuss with CCC SW on 2-15-21 at 9am  Patient confirmed appt.

## 2021-06-15 NOTE — TELEPHONE ENCOUNTER
DOD: Dr. Ibrahim is out today and will be out next week. Please see message below and advise. Thanks.

## 2021-06-15 NOTE — TELEPHONE ENCOUNTER
2/22/2021  Patient received letter from [facility] that Victoza is not cover  and for the doctor to send new script and a letter to [employer].   Need prior authorization   Telephone: [phone]  Fax: [phone]    Stated she needs that medication.    Please call patient to let her know

## 2021-06-15 NOTE — TELEPHONE ENCOUNTER
Refill Approved    Rx renewed per Medication Renewal Policy. Medication was last renewed on 2/5/21.    Vin Smith, [facility] Triage/Med Refill 3/5/2021     Requested Prescriptions   Pending Prescriptions Disp Refills     omeprazole (PRILOSEC) 20 MG capsule 60 capsule 0     Sig: Take 2 capsules (40 mg total) by mouth daily before breakfast. Take 30 minutes before breakfast       GI Medications Refill Protocol Passed - 3/4/2021  2:07 PM        Passed - PCP or prescribing provider visit in last 12 or next 3 months.     Last office visit with prescriber/PCP: 1/26/2021 Brynn Ibrahim DO OR same dept: 1/26/2021 Brynn Ibrahim DO OR same specialty: 1/26/2021 Brynn Ibrahim DO  Last physical: Visit date not found Last MTM visit: Visit date not found   Next visit within 3 mo: Visit date not found  Next physical within 3 mo: Visit date not found  Prescriber OR PCP: Brynn Ibrahim DO  Last diagnosis associated with med order: 1. LPRD (laryngopharyngeal reflux disease)  - omeprazole (PRILOSEC) 20 MG capsule; Take 2 capsules (40 mg total) by mouth daily before breakfast. Take 30 minutes before breakfast  Dispense: 60 capsule; Refill: 0    If protocol passes may refill for 12 months if within 3 months of last provider visit (or a total of 15 months).

## 2021-06-15 NOTE — TELEPHONE ENCOUNTER
PSP:  Napoleon Santana DO  Last clinic visit:  10/6/20 consult  Reason for call: Update on how taking Etodolac  Clinical information:  Reports she is only needing to take the Etodolac 1-2 times a day now.  Advice given to patient: Patient may follow up with PSP as needed. Stated understanding.

## 2021-06-15 NOTE — PROGRESS NOTES
2/9/2021   Clinic Care Coordination Contact    Community Health Worker Follow Up    Goals:   Goals Addressed                 This Visit's Progress       Patient Stated      Healthy Eating (pt-stated)   70%     Goal Statement: I want to be aware of food resources within one month  Date Goal set: 01/25/21  Barriers:   Strengths:   Date to Achieve By: 2/282021  Patient expressed understanding of goal: Yes  Action steps to achieve this goal:  1. I will sign up for the Vegetable Rx Program at the clinic.  2. I will wait for the clinic to call to sign up.  3I will update CCC team    Updated: 2-9-21 AL        Medical (pt-stated)   30%     Goal Statement: I would like to have a plan of care with pulmonary within 1-2 months   Date Goal set: 02/02/21    Barriers: location,scheduling barriers  Strengths: patient engagement  Date to Achieve By: April   Patient expressed understanding of goal: yes  Action steps to achieve this goal:  1. I will attend diagnozit testing and imaging appt on 2-17-21 and appt on 3-15-21.  2. I will attend follow up visit with pulmonology on 3-29-21 with Dr Oliveira and review medications   3. I will report to my Community Health Worker if any additional resources or support needed.    Updated: 2-9-21 AL    02/04/21  RN CC spoke with pulmonology -  Facilitated changing CT and blood gases to an earlier appointment  Pulmonology team will connect with radiology and update patient with schedule information   Patient expressed interest in changing provider - set up an establish care visit with Dr Oliveira for 3/29/21 @ 8:30 following PFT results to review next steps and plan of care          COMPLETED: Medication 1 (pt-stated)   100%     Goal Statement: I have discussed and consulted with PCP regarding frost bite and medications for breathing.  Date Goal set: 01/25/21  Barriers:   Strengths:   Date to Achieve By: ASAP  Patient expressed understanding of goal: Yes  Personal Plan:   I will follow up with PCP as  scheduled to address health concerns.     02/04/21  Pateint has visit with PCP scheduled for 3/8/21        Other (pt-stated)        Goal Statement: I would like information and resources/programs to assist with getting hearing aid in the next month.  Date Goal set: 2/9/2021  Barriers: limited financial   Strengths: motivated and engaging with [facility] team for support  Date to Achieve By: 3-1-21  Patient expressed understanding of goal: Yes  Action steps to achieve this goal:  1. I will e-mail [email or web address] for information.  2. I will talk to [facility] SW on 2-15-21 at 9 am for any information or resources or programs to assist with getting with hearing aid.               Other (pt-stated)        Goal Statement: I would like information and resources/programs to assist with getting prescription eye glasses within next month.  Date Goal set: 2/9/2021  Barriers: limited financial   Strengths: motivated and engaging with [facility] team for support, social security benefit  Date to Achieve By: 3-9-21  Patient expressed understanding of goal: Yes  Action steps to achieve this goal:  1. I will talk to [facility] SW on 2-15-21 at 9am for support   2 I will update CCC team            Problem Solving (pt-stated)   30%     Goal Statement: In February, I want to renew my etelvina care application  Date Goal set: 01/25/21  Barriers:   Strengths:   Date to Achieve By: 2/28/2021  Patient expressed understanding of goal: Yes  Action steps to achieve this goal:  1. I will wait for FRW to call for support to re apply for [employer] Care that expires in March  2. I will update CCC team    Updated: 2-9-21           Called and spoke to patient and follow up on goals.  Patient reported:  -saw audiologist today and that she needs hearing aid.   Doctor suggested to check out [facility] for hearing aid.  -preferred to wait and sign up for Vegetable Rx program through the clinic.  -has appts with Dr Oliveira 3-29-21 and procedures scheduled for 2-17-21   -PCP  checked finger still numb and toes okay.  -would like information and resources to help hearing aid and eye glasses.    CHW completed FRW screening referral for [facility] application.    Provided email to [email or web address]     Scheduled appt with JEANETTE RAMIREZ on 2-15-21 at 9am for support.      CHW Follow up: Monthly    CHW Plan: Follow up on goals    CHW Next Follow Up: 3-15- 21

## 2021-06-15 NOTE — TELEPHONE ENCOUNTER
Called [facility]armacy and patient did  her Victoza this morning. Patient still does need refill for prilosec. Pended medication for refill.   Miryam Webber

## 2021-06-15 NOTE — PROGRESS NOTES
REASON FOR VISIT: Recheck      HISTORY OF PRESENT ILLNESS    Annel was seen in follow up for patient is here for audiogram review.  She does notice that she has some difficulty understanding others in background noise.  She denies any dizziness or tinnitus.  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  She also is starting the violin.  She continues to have some issues with choking on food if she eats too fast.  Previous RSI was 16.  Today RSI is 18.  Patient subjectively states that she is has not her swallowing is improved on the omeprazole 40 mg since she would like to stay at this at this point.  She is she is scheduled to see a pulmonary a pulmonologist in the next in April and would like to follow-up with me after that visit.  No other new concerns today.         REVIEW OF SYSTEMS    Review of Systems: a 10-system review is reviewed at this encounter.  See scanned document.     Desmopressin, Antihistamines - alkylamine, Cephalexin, Codeine, Diazepam, Diphenhydramine hcl, Doxycycline, Erythromycin base, Estrogens, Hydrochlorothiazide, Hydrocodone-acetaminophen, Invokamet [canagliflozin-metformin], Lithium, Penicillins, Risperidone, Beclomethasone dipropionate, Latex, and Valacyclovir     PHYSICAL EXAM:        HEAD: Normal appearance and symmetry:  No cutaneous lesions.      EARS:   Auricles normal         NOSE:    Dorsum:   straight       ORAL CAVITY/OROPHARYNX:    Lips:  Normal.     NECK:  Trachea:  midline       NEURO:   Alert and Oriented    GAIT AND STATION:  normal     RESPIRATORY:   Symmetry and Respiratory effort    PSYCH:   normal mood and affect    SKIN:  warm and dry     Audiogram shows a mild to moderate sensorineural hearing loss with excellent word recognition.  Tympanograms are type a.  This is reviewed in detail with the patient    IMPRESSION:    Encounter Diagnoses   Name Primary?     LPRD (laryngopharyngeal reflux disease) Yes     Sensorineural hearing loss  (SNHL) of both ears           RECOMMENDATIONS:    Recommend annual hearing test.  Patient may want to consider a hearing aid consultation.  She can stay on her PPI at the current regimen since her reflux index score is relatively the same if not a little bit higher than last visit.  Patient wishes to follow-up after her pulmonary testing which I think is fine.  All questions were answered she is agreeable this plan of care

## 2021-06-15 NOTE — TELEPHONE ENCOUNTER
Patient was seen in ENT/Audio today and was given a prive for hearing aid.  Patient wants to know if the price was for one or a pair?  Also wants to what the monthly payments would be.    Please call Annel back ok to leave a msg but speak loud.  Thanks!

## 2021-06-15 NOTE — TELEPHONE ENCOUNTER
Reason for Call:  Medication or medication refill:    Do you use a [facility]?  Name of the pharmacy and phone number for the current request: [facility] [address]    Name of the medication requested: omeprozele    Other request: n/a    Can we leave a detailed message on this number? Yes    Phone number patient can be reached at: Home number on file [phone] (home)    Best Time: asap    Call taken on 3/5/2021 at 3:37 PM by Lexus West

## 2021-06-15 NOTE — PROGRESS NOTES
Clinic Care Coordination Contact    Follow Up Progress Note      Assessment: CCC SW contacted Annel     She reported she hasn't been able to find free or low cost frames. Checked [employer]. The resources SW sent her were more. No other resources at this time.    She is going to get her hearing aids for $150, very happy about this.    She went to the dentist in [facility] to talk about dentures and is not a candidate due to her gums receding and diabetes. Will get a partial at [place] for $560.     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      Goals addressed this encounter:   Goals Addressed                 This Visit's Progress      COMPLETED: Other (pt-stated)        I have resources for eye glasses             Intervention/Education provided during outreach: See above     Outreach Frequency: monthly    Plan:   Standard Outreach

## 2021-06-15 NOTE — TELEPHONE ENCOUNTER
Prior Authorization Request  Who s requesting:  patient stated that her health insurance needed the PA for her to be on Victoza   Pharmacy Name and Location: [facility] [address].  Medication Name: Liraglutide 1.8 mg daily  Insurance Plan: [id number]  [facility]-[id number]  Insurance Member ID Number:  See above  CoverMyMeds Key: N/A  Informed patient that prior authorizations can take up to 10 business days for response:   Yes  Okay to leave a detailed message: Yes, speak loudly when leaving a message

## 2021-06-15 NOTE — PROGRESS NOTES
2/18/2021  Called and spoke to patient and informed that telephone message ahs been sent to her PCP regarding letter of accommodation to the [facility]    She will wait for the clinic staff to call.    CHW Follow up: Monthly    CHW Plan: Follow up on goals    CHW Next Follow Up: 3-17-21

## 2021-06-15 NOTE — PROGRESS NOTES
2/25/2021   Received several VM messages from patient updating her goals and requesting support regarding the prior authorization for the Victoza.  Patient stated she received the resources from ASHLEY Singletary but not the glasses and that she is getting help from school with the Word[employer].    She updated that she mailed the [facility] application and tax document to [employer] [place] Sat. and to check if she received it.    She met with KRISTINA Vaughn 2-24-21 and that she changed the Victoza dose and she will call the script to [facility] . She wants the medications to be from Dr Ibrahim not from Dr Aguilar.  Wants to know about her A1c.    Will update CCC Team RN and ASHLEY today and support regarding the Victoza.      Received message from KRISTINA Arazia 2-25-21 that she spoke to patient and that she is working on the prior authorization for the Victoza.      CHW Follow up: Monthly    CHW Plan: Follow up on goals    CHW Next Follow Up: 3-25-21

## 2021-06-15 NOTE — TELEPHONE ENCOUNTER
2/18/2021   Received VM from patient.  Patient requesting Dr Ibrahim to write a letter of accomodation to [facility] [employer]  Fax letter to [place] at  [phone]  For the doctor to state her disability diagnoses so the school can make reasonable accommodation like more time for testing.  She has a bad back, hearing loss, poor vision, and bipolar.    Please call patient to advise or if need more information

## 2021-06-15 NOTE — TELEPHONE ENCOUNTER
Patient is getting her 2nd COVID shot tomorrow and she has some questions about it. She is requesting for someone on PCP's team to reach out to her to answer some of the concerns she had.

## 2021-06-15 NOTE — TELEPHONE ENCOUNTER
Refill sent to pharmacy. Patient needs to get further refills from primary physician per Dr. Perez.    Joie Law RN  [facility]  [phone]

## 2021-06-15 NOTE — TELEPHONE ENCOUNTER
Annel called and would like you to reach out to her for other resources. She called the resources that you provided and the pricing was still very expensive. Please call her to advise.

## 2021-06-15 NOTE — PROGRESS NOTES
RESPIRATORY CARE NOTE     Patient Name: Annel Stoddard  Today's Date: 3/15/2021     MIP, MEP and MVV done. Pt performed tests with good effort. Test results meet ATS criteria. Results scanned into epic. Pt left in no distress.       Betsy John, LRT

## 2021-06-15 NOTE — TELEPHONE ENCOUNTER
Refill Approved    Rx renewed per Medication Renewal Policy. Medication was last renewed on 12/11/2020.    Latricia Saez, [facility] Triage/Med Refill 2/10/2021     Requested Prescriptions   Pending Prescriptions Disp Refills     insulin regular HIGH CONC (HUMULIN R U-500, CONC, KWIKPEN) 500 unit/mL (3 mL) PEN solution 18 mL 1     Sig: Inject three times per day before meals. Breakfast 90 unit, lunch 70 units, dinner 70 units.       Insulin/GLP-1 Refill Protocol Passed - 2/10/2021  4:14 PM        Passed - Visit with PCP or prescribing provider visit in last 6 months     Last office visit with prescriber/PCP: 1/26/2021 OR same dept: 1/26/2021 Brynn Ibrahim DO OR same specialty: 1/26/2021 Brynn Ibrahim DO Last physical: Visit date not found Last MTM visit: Visit date not found     Next appt within 3 mo: Visit date not found  Next physical within 3 mo: Visit date not found  Prescriber OR PCP: Brynn Ibrahim DO  Last diagnosis associated with med order: 1. Diabetes mellitus, type 2 (H)  - insulin regular HIGH CONC (HUMULIN R U-500, CONC, KWIKPEN) 500 unit/mL (3 mL) PEN solution; Inject three times per day before meals. Breakfast 90 unit, lunch 70 units, dinner 70 units.  Dispense: 18 mL; Refill: 1    If protocol passes may refill for 6 months if within 3 months of last provider visit (or a total of 9 months).              Passed - A1C in last 6 months     Hemoglobin A1c   Date Value Ref Range Status   11/18/2020 7.2 (H) <=5.6 % Final     Comment:     Normal <5.7% Prediabete 5.7-6.4% Diabletes 6.5% or higher - adopted from ADA consensus guidelines               Passed - Microalbumin in last year     Microalbumin, Random Urine   Date Value Ref Range Status   11/18/2020 1.26 0.00 - 1.99 mg/dL Final                  Passed - Blood pressure in last year     BP Readings from Last 1 Encounters:   01/26/21 121/69             Passed - Creatinine done in last year     Creatinine   Date Value Ref Range Status    12/30/2020 0.69 0.60 - 1.10 mg/dL Final

## 2021-06-15 NOTE — TELEPHONE ENCOUNTER
Patient would like you to call her. She is requesting for some additional resources. If no answer, please speak loudly and slowly with your call back number.

## 2021-06-15 NOTE — TELEPHONE ENCOUNTER
Patient called back.  I read Dr. Alejandro's message below.      She had no further questions and will follow up with Dr. Oliveira next month as planned.

## 2021-06-15 NOTE — PROGRESS NOTES
Clinic Care Coordination Contact  Care Team Conversations     CCC SW received message from clinic that patient wanted a callback from ASHLEY.    SW called back, left voicemail. Informed Annel that ASHLEY mailed audiology and eyeglass resources yesterday.

## 2021-06-15 NOTE — PROGRESS NOTES
2021  Received several messages from patient.  Stated she is at [facility] for the CT and lab.  She would like to talk to ASHLEY Singletary to see a different audiologist not happy with the one at [facility].    Stated she did not received Etel[facility]ina [facility] application from [facility]. She will be doing her tax on 3-6-21 and concern that she will not turn in on time before Etelvina Care  in  March.    She has questions about the appt with Johana Diabetes Educator at 8:45am and how she will get there on time.    CHW sent staff message to [facility] regarding the etelvina care application.

## 2021-06-15 NOTE — TELEPHONE ENCOUNTER
Patient is requesting for you to call her regarding the GOLO. Please call her. If there is no answer, please speak loudly and slowly when you leave a message.

## 2021-06-15 NOTE — TELEPHONE ENCOUNTER
shortness of breath is better. Still issues at night time. Waking up gasping for air- has had that for a long time.   Two nights after having COVID shot was really bad. Was waking up every hour that night.     I know I need to take the second or I won't be able to go anywhere.     I would like to get in sooner to do the test.     Would like to see Dr Oliveira.

## 2021-06-15 NOTE — TELEPHONE ENCOUNTER
CT scan and ABG reviewed. No significant chronic hypercapnia, ILD.  Has PFTs next month, and follow-up with Dr. Oliveira as patient wanted to switch providers.    Called her to review test results. Unable to reach her and left VM to call back if desired to review anything before visit next month.        Angelica Alejandro MD  Pulmonary and Critical Care Medicine  [facility]  Office: [phone]

## 2021-06-15 NOTE — PROGRESS NOTES
Clinic Care Coordination Contact    Follow Up Progress Note      Assessment: [facility] SW contacted Annel to review resource needs.    She reported she needs help finding affordable hearing aids. She emailed Cheryl and hasn't heard from them yet. Interested in possibly switching audiologists to a [facility] closer to [facility].     She needs resources for low cost glasses. She has no line bifocals and progressive lenses.    She has an appointment with [employer] to get a partial for $560. She is wondering if there may be other options that are cheaper.     She needs help getting a cell phone. Applied at [employer] 3-4 months ago, they keep asking for more paperwork, which she has sent in but still hasn't gotten a phone. Wondering if there are other options. Also interested in a [employer] phone for home. She pays for phone service in her rent through [employer].    She will be doing her taxes on 3/6/2021, hoping this is enough time to get her CC application done by the end of March.     SW will mail resources.     Goals addressed this encounter:   Goals Addressed                 This Visit's Progress      Other (pt-stated)        Goal Statement: I would like information and resources/programs to assist with getting hearing aid in the next month.  Date Goal set: 2/9/2021  Barriers: limited financial   Strengths: motivated and engaging with [facility] team for support  Date to Achieve By: 3-1-21  Patient expressed understanding of goal: Yes  Action steps to achieve this goal:  1. I will e-mail [email or web address] for information.  2. I will review resources from [facility] ASHLEY               Other (pt-stated)        Goal Statement: I would like information and resources/programs to assist with getting prescription eye glasses within next month.  Date Goal set: 2/9/2021  Barriers: limited financial   Strengths: motivated and engaging with [facility] team for support, social security benefit  Date to Achieve By: 3-9-21  Patient  expressed understanding of goal: Yes  Action steps to achieve this goal:  1. I will review resources from CCC SW  2 I will update CCC team                 Intervention/Education provided during outreach: See above     Outreach Frequency: monthly    Plan:   Standard Outreach

## 2021-06-15 NOTE — PROGRESS NOTES
2/18/2021  Received VM from patient  Patient requesting PCP to write letter of accommodation sent to the  [facility] for reasonable accommodation due to her health diagnosis.    CHW sent telephone message to PCP Care Team Pool to advise patient and regarding letter of accomodation       CHW Follow up: Monthly    CHW Plan: Follow up on goals    CHW Next Follow Up: 3-17-21    Clinic Care Coordination Contact  Care Team Conversations     Consulted with CCC RN and CC ASHLEY and explained the situation regarding bad experience at audiology appt and  staff not know what the [facility] care letter is and requesting to see new audiologist.  Patient contacted Customer Advocacy team and received follow up email from Customer Advocacy liaison.

## 2021-06-16 ENCOUNTER — COMMUNICATION - HEALTHEAST (OUTPATIENT)
Dept: EDUCATION SERVICES | Facility: CLINIC | Age: 66
End: 2021-06-16

## 2021-06-16 PROBLEM — L84 CALLUS OF FOOT: Status: ACTIVE | Noted: 2019-09-23

## 2021-06-16 PROBLEM — J41.0 SIMPLE CHRONIC BRONCHITIS (H): Status: ACTIVE | Noted: 2020-01-14

## 2021-06-16 PROBLEM — Z79.4 TYPE 2 DIABETES MELLITUS WITH COMPLICATION, WITH LONG-TERM CURRENT USE OF INSULIN (H): Status: ACTIVE | Noted: 2019-09-23

## 2021-06-16 PROBLEM — E11.8 TYPE 2 DIABETES MELLITUS WITH COMPLICATION, WITH LONG-TERM CURRENT USE OF INSULIN (H): Status: ACTIVE | Noted: 2019-09-23

## 2021-06-16 PROBLEM — M85.80 OSTEOPENIA: Status: ACTIVE | Noted: 2020-10-14

## 2021-06-16 PROBLEM — I80.9 THROMBOPHLEBITIS: Status: ACTIVE | Noted: 2020-10-14

## 2021-06-16 PROBLEM — K75.81 NONALCOHOLIC STEATOHEPATITIS: Status: ACTIVE | Noted: 2020-10-14

## 2021-06-16 PROBLEM — M54.42 BILATERAL LOW BACK PAIN WITH LEFT-SIDED SCIATICA: Status: ACTIVE | Noted: 2019-09-23

## 2021-06-16 PROBLEM — J43.9 PULMONARY EMPHYSEMA (H): Status: ACTIVE | Noted: 2020-06-16

## 2021-06-16 PROBLEM — H40.039 ANATOMICAL NARROW ANGLE GLAUCOMA: Status: ACTIVE | Noted: 2019-09-23

## 2021-06-16 PROBLEM — B97.7 HUMAN PAPILLOMA VIRUS INFECTION: Status: ACTIVE | Noted: 2020-10-15

## 2021-06-16 PROBLEM — N31.8 HYPERACTIVITY OF BLADDER: Status: ACTIVE | Noted: 2019-09-23

## 2021-06-16 PROBLEM — E78.2 MIXED HYPERLIPIDEMIA: Status: ACTIVE | Noted: 2019-09-23

## 2021-06-16 PROBLEM — K43.9 HERNIA OF ANTERIOR ABDOMINAL WALL: Status: ACTIVE | Noted: 2020-10-14

## 2021-06-16 PROBLEM — R87.619 ABNORMAL CERVICAL PAPANICOLAOU SMEAR: Status: ACTIVE | Noted: 2020-10-15

## 2021-06-16 NOTE — TELEPHONE ENCOUNTER
"Phone call from Annel. States that she saw Dr. Oliveira on 3/29.  She has been using her albuterol inhaler, but it just does not seem to be \"holding\" off her shortness of breath.  She is hesitant to use steroid inhaler as she had measles type reaction in the past (however not sure if it was from that, or from other medications she was on at the time).   She does feel she is in need of something else that may help her breathing for longer during the day.        Would like to get suggestion from Dr. Oliveira.    Send to [facility] in [place]       "

## 2021-06-16 NOTE — PROGRESS NOTES
Clinic Care Coordination Contact  [facility]       Clinical Data: Care Coordinator Outreach  Chart review notes patient engagement with PCP an speciality provider   Outreach attempted x 1.    Unable to leave a message - phone only continued to ring - no one picked up and did not go to    Plan: Community Health Worker to outreach per standard work and updated on goal progression      RN CC will review in 4-6 weeks to support ongoing recommendations and plan of care will be available sooner if needed.

## 2021-06-16 NOTE — PROGRESS NOTES
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    On CT it appears she has some A-frame stenosis of her trachea but no evident obstruction of trachea on laryngoscopy per ENT.  No obstruction evident on flow volume loops.    Her history of awakening gasping for air strongly suggests sleep apnea.  I recommended she follow-up with sleep medicine.    She has MIP and mep that are decreased but within normal range.  Unlikely to have significant neuromuscular defect.    No evidence of asthma on spirometry.  TLC likely reflects body habitus.  Clinical syndrome of postnasal drip, some weather intolerance to suggest mild asthma.    Overall I think this is a combination of mild asthma, deconditioning, obesity, untreated sleep apnea.  It seems unlikely that her previous trach has resulted in a significant problem affecting her breathing.  We will plan on treating her asthma, getting her evaluated for sleep apnea.  She has a broad range of medication intolerances including a \"measles type reaction\" to inhaled steroids so we will hold off that for now.    I anticipate that some anxiety may be playing a role.    Chief complaint shortness of breath    65-year-old woman with history of botulism and previous trach here with nighttime exertional shortness of breath.  Her primary nighttime breathing issue is awakening from sleep gasping.  She has been told before that she has significant sleep apnea.  She has shortness of breath with exertion.  It improves with rest.  Symptoms localized to the chest.  She occasionally has some sputum.  Occasionally has chest colds no admissions for her lungs since her hospitalization in the early 80s.    Medical, social, family history reviewed.  Allergies and medications reviewed.    /62   Pulse 75   Ht 5' 3\" (1.6 m)   Wt 188 lb " (85.3 kg)   SpO2 94%   Breastfeeding No   BMI 33.30 kg/m    Mildly anxious  MP3  Normal tracheal breath sounds  Normal excursion, normal evident breath size, normal lung sounds  Regular rate and rhythm no murmurs  1+ edema both sides    Pulmonary function tests, CT scan, previous laryngoscopy report, ENT notes reviewed and interpreted by me.    Greater than 40 minutes spent on this visit.

## 2021-06-16 NOTE — TELEPHONE ENCOUNTER
Pt calling to request refill of Etodolac 200 mg tablets. Pt reports she has decreased her dose and is now taking 1 capsule in the AM and 1 capsule in the PM. Pharmacy verified. Informed pt her request will be sent to the provider.

## 2021-06-16 NOTE — PROGRESS NOTES
3/26/2021   Clinic Care Coordination Contact    Community Health Worker Follow Up    Goals:   Goals Addressed                 This Visit's Progress       Patient Stated      Medical (pt-stated)   70%     Goal Statement: I would like to have a plan of care with pulmonary within 1-2 months   Date Goal set: 02/02/21    Barriers: location,scheduling barriers  Strengths: patient engagement  Date to Achieve By: April   Patient expressed understanding of goal: yes  Action steps to achieve this goal:  1. I attended appt  3-15-21 at 10:30am at the [facility] in [place].  2. I will attend follow up visit with pulmonology on 3-29-21 with Dr Oliveira for plan of care and review medications   3. I will report to my Community Health Worker if any additional resources or support needed.    Updated: 3-26-21 AL    02/04/21  RN CC spoke with pulmonology -  Facilitated changing CT and blood gases to an earlier appointment  Pulmonology team will connect with radiology and update patient with schedule information   Patient expressed interest in changing provider - set up an establish care visit with Dr Oliveira for 3/29/21 @ 8:30 following PFT results to review next steps and plan of care          ,Called and spoke to patient and follow up on goal.  Patient reported:  -doing well. Everything is going well   -will be starting school on 17th of May.  -will follow up with Dr Oliveira 3-29-21      CHW Follow up: Monthly    CHW Plan: Follow up on goals    CHW Next Follow Up: 4-26-21

## 2021-06-16 NOTE — PROGRESS NOTES
REASON FOR VISIT: Recheck      HISTORY OF PRESENT ILLNESS    Annel was seen in follow up for recheck after PPI trial.   Seen by pulmonology (Dr. Oliveira) who diagnosed her with mild asthma and ordered sleep study .  Dr. Oliveira thinks decided determined there was no evidence of tracheal stenosis but she may have mild asthma.  Her reflux symptom questionnaire is still elevated at 18.  She has no new concerns today.    IMPRESSION:          Encounter Diagnoses   Name Primary?     LPRD (laryngopharyngeal reflux disease) Yes     Sensorineural hearing loss (SNHL) of both ears              RECOMMENDATIONS:     Recommend annual hearing test.  Patient may want to consider a hearing aid consultation.  She can stay on her PPI at the current regimen since her reflux index score is relatively the same if not a little bit higher than last visit.  Patient wishes to follow-up after her pulmonary testing which I think is fine.  All questions were answered she is agreeable this plan of care          REVIEW OF SYSTEMS    Review of Systems: a 10-system review is reviewed at this encounter.  See scanned document.     Desmopressin, Antihistamines - alkylamine, Cephalexin, Codeine, Diazepam, Diphenhydramine hcl, Doxycycline, Erythromycin base, Estrogens, Hydrochlorothiazide, Hydrocodone-acetaminophen, Invokamet [canagliflozin-metformin], Lithium, Penicillins, Risperidone, Beclomethasone dipropionate, Latex, and Valacyclovir     PHYSICAL EXAM:        HEAD: Normal appearance and symmetry:  No cutaneous lesions.      EARS:   Auricles normal         NOSE:    Dorsum:   straight       ORAL CAVITY/OROPHARYNX:    Lips:  Normal.     NECK:  Trachea:  midline       NEURO:   Alert and Oriented    GAIT AND STATION:  normal     RESPIRATORY:   Symmetry and Respiratory effort    PSYCH:   normal mood and affect    SKIN:  warm and dry         IMPRESSION:    Encounter Diagnoses   Name Primary?     Wears hearing aid in both ears Yes     LPRD (laryngopharyngeal  reflux disease)      Patient with stable LPRD.   Recommend continued PPI therapy.  She wishes to follow-up as needed as she is has been seen by pulmonology has a plan in place for treating her mild asthma.  All questions were answered she is agreeable with plan of care     RECOMMENDATIONS:      Return annually for hearing test.

## 2021-06-16 NOTE — TELEPHONE ENCOUNTER
Telephone Encounter by Barbara Garsia at 1/7/2020 12:18 PM     Author: Barbara Garsia Service: -- Author Type: --    Filed: 1/7/2020 12:19 PM Encounter Date: 1/3/2020 Status: Signed    : Barbara Garsia       PA is not needed.  Medication is covered under plan. Patient would have to contact plan about deductible or copayment costs.

## 2021-06-16 NOTE — PROGRESS NOTES
Clinic Care Coordination Contact    Situation: Patient chart reviewed by care coordinator.    Background: SW completed chart review    Assessment: Patient doing well. Patient met with pulmonology. CCC RN unable to reach patient at last outreach.     Plan/Recommendations: Standard Outreach

## 2021-06-16 NOTE — TELEPHONE ENCOUNTER
Telephone Encounter by Adele Tavera RN at 12/10/2019 12:09 PM     Author: Adele Tavera RN Service: -- Author Type: Registered Nurse    Filed: 12/10/2019 12:15 PM Encounter Date: 12/10/2019 Status: Signed    : Adele Tavera RN (Registered Nurse)       Patient calling as she called yesterday and was told a cream was sent to her pharmacy.  Pt states the pharmacy does not have the cream.   RN did look at triage encounter from 12/9/19.   Per message:   Approved Medication Requests        hydrocortisone        1 % Crea, 1 application Topical As needed      2.5 % Oint, applly twice a day for two weeks        The 1% cream is listed as historical and the 2.5% oint was listed as phone in to pharmacy.   Before calling into pharmacy RN wants to confirm if patient should be using both or just one of the topicals.     Please advise.  Adele Tavera RN   [facility] RN Triage    Reason for Disposition  ? [1] Prescription not at pharmacy AND [2] was prescribed today by PCP    Protocols used: MEDICATION QUESTION CALL-A-

## 2021-06-17 ENCOUNTER — COMMUNICATION - HEALTHEAST (OUTPATIENT)
Dept: FAMILY MEDICINE | Facility: CLINIC | Age: 66
End: 2021-06-17

## 2021-06-17 NOTE — PROGRESS NOTES
[facility] CLINIC EXAM NOTE      Chief Complaint   Patient presents with     Follow Up     Diebetes check and kidney's check       ASSESSMENT & PLAN    Annel was seen today for follow up.    Diagnoses and all orders for this visit:    Type 2 diabetes mellitus with complication, with long-term current use of insulin (H):  F/u with diabetic education. Adjustments to insulin per their recommendations. Check labs per below. Continue current regimen. uptodate on HM.  -     Basic Metabolic Panel  -     Glycosylated Hemoglobin A1c    Asthma:  Stable. But no improvement per patient. Plans to [place] [facility].   -     tiotropium bromide (SPIRIVA RESPIMAT) 1.25 mcg/actuation Mist; Inhale 2 puffs daily.    Lower extremity edema:  Check BMP today otherwise provided reassurance that likely dependent edema. Continue with compression stockings, elevation, low salt diet, etc  -     Basic Metabolic Panel    Encounter for completion of form with patient: form filled out for DMV regarding insulin use and appropriateness for her to drive. She is very compliant with all of her diabetes follow up and recognition of symptoms of hypoglycemia. I trust that she would only drive if appropriate and safe to do so.       HISTORY    Annel Stoddard is a 65 y.o.  female who presents for the following issues:    Form to fill out for DMV - she passed her drivers license test! Needs form filled out bc she is on insulin    She is switching all her specialists to [facility] she does not want to go out to [place] all the time  Takes etoralac and tizanidine for pain in the back and sciatica hoping these will still be filled by ortho  Can I drive when I take that ? Does not make her sleepy    Needs refill on spiriva.     Still LE swelling on and off. Labs back in December were reassuring. Left >R. Her mom had this as well. She would like me to check her kidneys again today. Wearing compression stocking. Overall seems improved. Just gets bad if  on her feet a lot that day.     Met with Johana this morning. Made some slight adjustments to insulin regimen. Due for A1c today.    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 1 each 11     ARIPiprazole (ABILIFY) 30 MG tablet Take 1 tablet (30 mg total) by mouth daily. 90 tablet 2     aspirin 81 MG EC tablet Take 81 mg by mouth daily.       biotin 10,000 mcg cap Take 1 capsule by mouth daily.       blood glucose test (FREESTYLE INSULINX TEST STRIPS) strips Use 1 each As Directed 4 (four) times a day. Please strips for freestyle edith system 150 strip 3     calcium citrate/vitamin D3 (CALCIUM CITRATE + D ORAL) Take 2 tablets by mouth every morning.       carboxymethylcellulose (REFRESH PLUS) 0.5 % Dpet ophthalmic dropperette        coenzyme Q10 200 mg capsule Take 200 mg by mouth daily.       etodolac (LODINE) 200 MG capsule TAKE 1 TO 2 CAPSULES BY MOUTH THREE TIMES DAILY AS NEEDED FOR PAIN 60 capsule 2     fish oil-omega-3 fatty acids (FISH OIL) 300-1,000 mg capsule Take 1 g by mouth daily.       flash glucose scanning reader (FREESTYLE EDITH 2 READER) Misc Use 1 each As Directed daily. 1 each 0     flash glucose sensor (FREESTYLE EDITH 2 SENSOR) Kit Use 1 each As Directed daily. 2 kit 9     hydrocortisone 1 % cream Apply 1 application topically as needed.       insulin regular HIGH CONC (HUMULIN R U-500, CONC, KWIKPEN) 500 unit/mL (3 mL) PEN solution Inject three times per day before meals. Breakfast 90 unit, lunch 70 units, dinner 70 units. 18 mL 5     ipratropium-albuteroL (DUO-NEB) 0.5-2.5 mg/3 mL nebulizer Take 3 mL by nebulization every 6 (six) hours as needed. 90 vial 1      ketoconazole (NIZORAL) 2 % cream applly externally twice daily for two weeks 15 g 0     LACTOBACILLUS ACIDOPHILUS ORAL Take 1 tablet by mouth daily.       lamoTRIgine (LAMICTAL) 150 MG tablet Take 1 tablet (150 mg total) by mouth daily. 90 tablet 3     losartan (COZAAR) 50 MG tablet Take 1 tablet (50 mg total) by mouth daily. 90 tablet 1     magnesium oxide (MAG-OX) 400 mg (241.3 mg magnesium) tablet Take 1,600 mg by mouth daily.       metFORMIN (GLUCOPHAGE XR) 500 MG 24 hr tablet Take 4 tablets (2,000 mg total) by mouth daily with supper. 360 tablet 1     methocarbamoL (ROBAXIN) 500 MG tablet Take 1 tablet (500 mg total) by mouth 2 (two) times a day as needed. 60 tablet 1     metroNIDAZOLE (METROGEL) 0.75 % gel Apply 1 application topically 2 (two) times a day. 45 g 0     montelukast (SINGULAIR) 10 mg tablet Take 1 tablet (10 mg total) by mouth daily. 90 tablet 2     multivitamin (MULTIVITAMIN) per tablet Take 1 tablet by mouth daily.       mupirocin (BACTROBAN) 2 % cream Apply 1 application topically 3 (three) times a day.       MILAN 128 5 % ophthalmic ointment APPLY A SMALL AMOUNT IN OU QPM  3     nystatin (MYCOSTATIN) ointment Apply 1 application topically 4 (four) times a day. 30 g 0     rosuvastatin (CRESTOR) 10 MG tablet Take 1 tablet (10 mg total) by mouth at bedtime. 90 tablet 2     tiZANidine (ZANAFLEX) 2 MG tablet TAKE 1 TABLET BY MOUTH TWICE DAILY AS NEEDED FOR MUSCLE SPASM 60 tablet 2     vitamin B complex (B COMPLEX-VITAMIN B12 ORAL) Take 1,000 mcg by mouth daily.       No current facility-administered medications on file prior to visit.        Pertinent past medical, surgical, social and family history reviewed and updated in Epic.        REVIEW OF SYSTEMS    ROS: 10 pt review of systems performed and all negative except noted in HPI.     PHYSICAL EXAM    /75 (Patient Site: Right Arm, Patient Position: Sitting, Cuff Size: Adult Regular)   Pulse 86   Temp 98.6  F (37  C) (Temporal)   Wt 187 lb  (84.8 kg)   SpO2 91%   BMI 33.13 kg/m    GEN:  65 y.o. female sitting comfortably in no apparent distress.   HEENT: EOMI, no scleral icterus, buccal mucosa moist  Neck: Supple   CHEST/LUNG: No respiratory distress, good air flow to bases, CTAB   CV: RRR, S1, S2 normal; no murmurs, rubs or gallops.   MSK:  Strength grossly normal  EXTR: trace edema on the right LE, more maybe +1 on the left.   SKIN: warm, dry, no rashes or lesions  NEURO: Gait normal, coordination intact  PSYCH:  Mood and affect appropriate      At the end of the encounter, I discussed results, diagnosis, medications. Discussed red flags for immediate return to clinic/ER, as well as indications for follow up if no improvement. Patient and/or caregiver understood and agreed to plan. Patient was stable for discharge.      Brynn Ibrahim

## 2021-06-17 NOTE — TELEPHONE ENCOUNTER
Referral placed  Are you referring to the etodolac and methocarbamol?  I am okay refilling those for you.

## 2021-06-17 NOTE — TELEPHONE ENCOUNTER
Left message x 1. Please review message thread below and advise the patient as indicated. Please schedule if necessary or indicated in message thread.

## 2021-06-17 NOTE — PATIENT INSTRUCTIONS - HE
Patient Instructions by Pam Candelaria PT at 10/29/2019  9:00 AM     Author: Pam Candelaria PT Service: -- Author Type: Physical Therapist    Filed: 10/29/2019  9:54 AM Encounter Date: 10/29/2019 Status: Addendum    : Pam Candelaria PT (Physical Therapist)    Related Notes: Original Note by Pam Candelaria PT (Physical Therapist) filed at 10/29/2019  9:37 AM        PELVIC TILT    While lying on your back, use your stomach muscles to press your spine downwards towards the ground.       ABDOMINAL BRACING    While lying on your back, tighten your stomach muscles as you draw your navel down towards the floor.    BRACE MARCHING    While lying on your back with your knees bent,  slowly raise up one foot a few inches and then set it back down.  Next, perform on your other leg.  Use your stomach muscles to keep your spine from moving.      CLAM SHELLS    While lying on your side with your knees bent, draw up the top knee while keeping contact of your feet together.    Do not let your pelvis roll back during the lifting movement.          Side Leg Kicks    Kick leg out to the side and slowly bring back to center.    Perform       Reps     Times per day      Back Leg Kicks    Kick leg back as far as possible standing tall with your back upright.    Perform       Reps     Times per day      Calf Stretches    With one foot in front, the other foot behind, lean forward, keeping back heel touching the ground. Hold 20-30 seconds.    Perform       Reps     Times per day

## 2021-06-17 NOTE — TELEPHONE ENCOUNTER
PSP:  Dr. Santana  Last clinic visit:  10/20/2020  Reason for call: Question for provider  Clinical information:  Call received from pt. Pt reports she is needing to transfer her care to a different facility as she lives in [place] and she has no mode of transportation. She states there is a Dr. Kang at [facility] who is a spine specialist, however, he is not accepting new patients. Pt states there is another provider, Dr. Mario Santiago, but he is an orthopedic doctor. She would like to know if Dr. Santana thinks it would be ok if she saw an orthopedic doctor instead.   Pt states overall she is doing well with her exercises and medications. She has been able to cut down on her Etodolac and Tizanidine.  Advice given to patient: Informed pt that provider would be sent her message. She will be called with a response. Detailed message ok upon call back.   Provider to address: Please advise on her question

## 2021-06-17 NOTE — TELEPHONE ENCOUNTER
I called pt. To clarify what she is looking for. Pt no longer wants to go to  due to his office is to far away she wants to see [facility] which is closer to get to.she would like to see orthopedic Dr. Mario Matthews . As far as her pain medication she is asking if  would be able to prescribe her minimal amount that she uses.  Would like a new referral for Orthopedics.  Thank you

## 2021-06-17 NOTE — TELEPHONE ENCOUNTER
PT came into Clinic today requesting for a referral to Dr. Pineda at [facility]. PT asked for the wrong referral. She needs a spine center referral and not a referral to Orthopedics. PT has a future appt at the spine clinic on June 2nd 2021   Any questions or concerns please call PT at [phone]

## 2021-06-17 NOTE — TELEPHONE ENCOUNTER
We will plan to discuss and order lab work at next visit as requested unless she feels like she needs a sooner?

## 2021-06-17 NOTE — TELEPHONE ENCOUNTER
tiotropium bromide 2.5 mcg/actuation Mist [[id number]]    Electronically signed by: Gaye Treviño MD on 11/26/19 1135 Status: Discontinued   Ordering user: Gaye Treviño MD 11/26/19 1135 Authorized by: Gaye Treviño MD   Frequency: DAILY 11/26/19 - 06/16/20  Discontinued by: Gaye Treviño MD 06/16/20 1718   Diagnoses   Chronic obstructive airway disease with asthma (H) [J44.9]

## 2021-06-17 NOTE — PROGRESS NOTES
4/28/2021  Called and spoke to patient and provided the number to call Sleep clinic [phone] and informed that Dr Geoff Pillai sees patients in [facility] location [place].  Patient does not want to go to [facility].    Suggested patient to call the number on the back of her insurance card to see where she could go for sleep study that is within network.  Provided number to [facility] [place]  [address]   [phone]    Patient will call [facility] and [facility]      CHW Follow up: Monthly    CHW Plan: Follow up on goals    CHW Next Follow Up: 5-28-21

## 2021-06-17 NOTE — PROGRESS NOTES
Please call patient and inform:  Your A1c has gone up a little bit to 7.9  But still less than 8 which is good. Continue to work with Johana on blood sugar control. Your kidney function continues to be normal

## 2021-06-17 NOTE — TELEPHONE ENCOUNTER
Patient already [facility]  Should be able to continue care with spine.     Does she mean pain clinic?

## 2021-06-17 NOTE — TELEPHONE ENCOUNTER
PSP:  Dr. Santana  Last clinic visit:  10/20/20  Reason for call: Refill  Clinical information:  Pt calling to request refill of tizanidine 2 mg tablets. Pt takes 1 tablet 2 times daily PRN. Pharmacy verified.   Advice given to patient: Informed pt her request will be sent to the provider. Order prepped.   Provider to address: Please refill if appropriate.

## 2021-06-17 NOTE — TELEPHONE ENCOUNTER
Telephone Encounter by Barbara Garsia at 12/15/2020  2:10 PM     Author: Barbara Garsia Service: -- Author Type: --    Filed: 12/15/2020  2:11 PM Encounter Date: 12/14/2020 Status: Signed    : Barbara Garsia APPROVED:    Approval start date: 11/14/2020  Approval end date:  12/15/2023    Pharmacy has been notified of approval and will contact patient when medication is ready for pickup.

## 2021-06-17 NOTE — TELEPHONE ENCOUNTER
Telephone Encounter by Georgette Dennison at 3/1/2021 12:09 PM     Author: Georgette Dennison Service: -- Author Type: --    Filed: 3/1/2021 12:12 PM Encounter Date: 2/26/2021 Status: Addendum    : Georgette Dennison    Related Notes: Original Note by Georgette Dennison filed at 3/1/2021 12:11 PM       Per insurance there is an active PA on file that expires on 2/26/2022.      Per insurance since patient has increased dosing to 1.8mg daily provider will need to send a Rx for the Victoza 3-pack.   Rx currently is still for the Victoza 2-pack but that is for 1.2mg daily dosing.     Per diabetes educator message Victoza was increased to 1.8mg daily.

## 2021-06-17 NOTE — TELEPHONE ENCOUNTER
PSP:  Dr. Santana  Last clinic visit:  10/20/20  Reason for call: Left message on nurse line stating she needs refill of Tizanidine sent to new pharmacy ([facility])  Clinical information:  Left message stating she has not been able to make an appointment with Dr. Santiago yet.  Provider to address: Refill request. Rx prepped and pharmacy updated

## 2021-06-17 NOTE — TELEPHONE ENCOUNTER
Reason for Call:  Other      Detailed comments: pt is calling to let us know that she called spine clinic and they said they are not seeing new patients,,, she is asking if dr davidson thinks she might benefit in seeing an orthopedic dr for her pain, can we please call the patient?    Phone Number Patient can be reached at: Home number on file [phone] (home)    Best Time: any    Can we leave a detailed message on this number?: Yes    Call taken on 5/14/2021 at 11:10 AM by Lexus West

## 2021-06-17 NOTE — TELEPHONE ENCOUNTER
Telephone Encounter by Georgette Dennison at 3/2/2021 10:34 AM     Author: Georgette Dennison Service: -- Author Type: --    Filed: 3/2/2021 10:34 AM Encounter Date: 2/24/2021 Status: Signed    : Georgette Dennison APPROVED:    Approval start date: 1/27/2021  Approval end date:  2/26/2022    Pharmacy has been notified of approval and will contact patient when medication is ready for pickup.

## 2021-06-17 NOTE — TELEPHONE ENCOUNTER
Telephone Encounter by Barbara Garsia at 10/28/2020  1:09 PM     Author: Barbara Garsia Service: -- Author Type: --    Filed: 10/28/2020  1:10 PM Encounter Date: 10/23/2020 Status: Signed    : Barbara Garsia       Per insurance patient already has PA on file

## 2021-06-17 NOTE — TELEPHONE ENCOUNTER
----- Message from Brynn Ibrahim DO sent at 5/6/2021  8:21 AM CDT -----  Please call patient and inform:  Your A1c has gone up a little bit to 7.9  But still less than 8 which is good. Continue to work with Johana on blood sugar control. Your kidney function continues to be normal

## 2021-06-17 NOTE — PROGRESS NOTES
4/26/2021   Clinic Care Coordination Contact    Community Health Worker Follow Up    Goals:   Goals Addressed                 This Visit's Progress       Patient Stated      Medical (pt-stated)   70%     Goal Statement: I would like to have a plan of care with pulmonary within 1-2 months   Date Goal set: 02/02/21    Barriers: location,scheduling barriers  Strengths: patient engagement  Date to Achieve By: April   Patient expressed understanding of goal: yes  Action steps to achieve this goal:  1. I will wait for the clinic to call to schedule for sleep study with Dr Geoff Pillai at [facility].  2. I will report to my Community Health Worker if any additional resources or support needed.    Updated: 4-26-21 AL    02/04/21  RN CC spoke with pulmonology -  Facilitated changing CT and blood gases to an earlier appointment  Pulmonology team will connect with radiology and update patient with schedule information   Patient expressed interest in changing provider - set up an establish care visit with Dr Oliveira for 3/29/21 @ 8:30 following PFT results to review next steps and plan of care            Called and spoke to patient and follow up on goal.  Patient reported:  -she rrder eye glasses online for $78 waiting to get in the mail.  -scheduled for dental impression in May  -got the hearing aid from [employer] for around $100 but was not working that well. Has appt with them again in June.  -saw Dr Oliveira pulmonary and he suggested to get sleep test. Stated Dr Geoff Pillai is in [facility]". She does not want to go to \[facility]\"" wondering if Dr Geoff Pillai sees patient in [place].  -start school on 5-17-21  -swollen ankles/legs. Has appt with Dr Ibrahim on 5-5-21.  Offered to schedule sooner 4-30-21 at 2:40pm. Stated she is not available okay to wait until 5-5-21.  If she needs to be seen sooner she will call the clinic      CHW Follow up: Monthly    CHW Plan: Follow up on goals    CHW Next Follow Up: 5-27-21

## 2021-06-17 NOTE — PROGRESS NOTES
4/28/2021  Clinic Care Coordination Contact  Care Team Conversations     Called [place] [facility] [phone] and spoke to  and verify if Dr Geoff Pillai sees patient in [place] or [facility].  Stated HE [place] [facility] closed and Dr Geoff Pillai sees patients in \[facility]\"" at [place].  Requested for other location besides \[facility]\""  Stated patient can go to [facility]  Will provide the information to patient.

## 2021-06-17 NOTE — TELEPHONE ENCOUNTER
I think will be fine for her to see him and get an opinion to see what he has to say.  If she does not feel that he can help her then she can always find another provider.

## 2021-06-17 NOTE — TELEPHONE ENCOUNTER
Reason for Call: Request for an order or referral:    Order or referral being requested: labwork for kidney functions she states her ankles and  Feet are swollen     Date needed: 05/05 scheduled for A1C  Has the patient been seen by the PCP for this problem? YES    Additional comments: seeing dr davidson 05/05 after appt with [facility] Diabetes ed     Phone number Patient can be reached at:  Home number on file [phone] (home)    Best Time:  anytime  Can we leave a detailed message on this number?  Yes    Call taken on 4/26/2021 at 8:13 AM by Joaquina Segal

## 2021-06-17 NOTE — PROGRESS NOTES
Clinic Care Coordination Contact    Situation: Patient chart reviewed by care coordinator.    Background: RN CC review for status update     Assessment: Patient engaged with care team and CCC to support goal progression   Has schedule visit with DE and PCP     Plan/Recommendations: Community Health Worker to outreach per standard work and updated on goal progression    RN CC will review in 3  weeks to support ongoing recommendations and plan of care will be available sooner if needed.

## 2021-06-17 NOTE — TELEPHONE ENCOUNTER
Phone call form Annel. Wanted to let Dr. Oliveira know that she will be seeing a Yoselyn Ko at [facility] for her sleep consulttaion on May 12.  She preferred to have this done there instead of having to travel downtown.

## 2021-06-17 NOTE — PATIENT INSTRUCTIONS - HE
Patient Instructions by Pam Candelaria PT at 10/21/2019 10:30 AM     Author: Pam Candelaria PT Service: -- Author Type: Physical Therapist    Filed: 10/21/2019 11:14 AM Encounter Date: 10/21/2019 Status: Signed    : Pam Candelaria PT (Physical Therapist)       STRAIGHT LEG SLIDERS      Lie on your back     Grab behind your knee slightly pulling your knee to your chest (you can use a towel if needed)     Straighten the leg as far as you can. Get a nice easy stretch. Do not hold     Bring the leg down     Repeat __________ times     Repeat __________ sets     Repeat _________ times per day

## 2021-06-18 ENCOUNTER — COMMUNICATION - HEALTHEAST (OUTPATIENT)
Dept: SCHEDULING | Facility: CLINIC | Age: 66
End: 2021-06-18

## 2021-06-18 NOTE — PATIENT INSTRUCTIONS - HE
Patient Instructions by Eileen Sanchez CNP at 11/20/2020  1:25 PM     Author: Eileen Sanchez CNP Service: -- Author Type: Nurse Practitioner    Filed: 11/20/2020 12:54 PM Encounter Date: 11/20/2020 Status: Signed    : Eileen Sanchez CNP (Nurse Practitioner)         Patient Education     Your Health Risk Assessment indicates you feel you are not in good physical health.    A healthy lifestyle helps keep the body fit and the mind alert. It helps protect you from disease, helps you fight disease, and helps prevent chronic disease (disease that doesn't go away) from getting worse. This is important as you get older and begin to notice twinges in muscles and joints and a decline in the strength and stamina you once took for granted. A healthy lifestyle includes good healthcare, good nutrition, weight control, recreation, and regular exercise. Avoid harmful substances and do what you can to keep safe. Another part of a healthy lifestyle is stay mentally active and socially involved.    Good healthcare     Have a wellness visit every year.     If you have new symptoms, let us know right away. Don't wait until the next checkup.     Take medicines exactly as prescribed and keep your medicines in a safe place. Tell us if your medicine causes problems.   Healthy diet and weight control     Eat 3 or 4 small, nutritious, low-fat, high-fiber meals a day. Include a variety of fruits, vegetables, and whole-grain foods.     Make sure you get enough calcium in your diet. Calcium, vitamin D, and exercise help prevent osteoporosis (bone thinning).     If you live alone, try eating with others when you can. That way you get a good meal and have company while you eat it.     Try to keep a healthy weight. If you eat more calories than your body uses for energy, it will be stored as fat and you will gain weight.     Recreation   Recreation is not limited to sports and team events. It includes any activity  that provides relaxation, interest, enjoyment, and exercise. Recreation provides an outlet for physical, mental, and social energy. It can give a sense of worth and achievement. It can help you stay healthy.       Patient Education   Signs of Hearing Loss  Hearing loss is a problem shared by many people. In fact, it is one of the most common health conditions, particularly as people age. Most people over age 65 have some hearing loss, and by age 80, almost everyone does. Because hearing loss usually occurs slowly over the years, you may not realize your hearing ability has gotten worse.       Have your hearing checked  Contact your [facility] care provider if you:    Have to strain to hear normal conversation.    Have to watch other peoples faces very carefully to follow what theyre saying.    Need to ask people to repeat what theyve said.    Often misunderstand what people are saying.    Turn the volume of the television or radio up so high that others complain.    Feel that people are mumbling when theyre talking to you.    Find that the effort to hear leaves you feeling tired and irritated.    Notice, when using the phone, that you hear better with 1 ear than the other.    9194-4074 [employer]. [address] [place] [address]. All rights reserved. This information is not intended as a substitute for professional medical care. Always follow your healthcare professional's instructions.           Advance Directive  Patients advance directive was discussed and I am comfortable with the patients wishes.  Patient Education   Personalized Prevention Plan  You are due for the preventive services outlined below.  Your care team is available to assist you in scheduling these services.  If you have already completed any of these items, please share that information with your care team to update in your medical record.  Health Maintenance   Topic Date Due   ? COPD ACTION PLAN  1955   ? DIABETIC FOOT  EXAM  12/16/2020   ? A1C  05/18/2021   ? COLORECTAL CANCER SCREENING  09/01/2021   ? DIABETIC EYE EXAM  11/09/2021   ? BMP  11/18/2021   ? LIPID  11/18/2021   ? MICROALBUMIN  11/18/2021   ? MEDICARE ANNUAL WELLNESS VISIT  11/20/2021   ? FALL RISK ASSESSMENT  11/20/2021   ? MAMMOGRAM  11/25/2021   ? DXA SCAN  10/12/2022   ? Pneumococcal Vaccine: 65+ Years (2 of 2 - PPSV23) 11/17/2023   ? ADVANCE CARE PLANNING  12/16/2024   ? TD 18+ HE  09/05/2029   ? HEPATITIS C SCREENING  Completed   ? HIV SCREENING  Completed   ? SPIROMETRY  Completed   ? INFLUENZA VACCINE RULE BASED  Completed   ? TDAP ADULT ONE TIME DOSE  Completed   ? ZOSTER VACCINES  Completed   ? Pneumococcal Vaccine: Pediatrics (0 to 5 Years) and At-Risk Patients (6 to 64 Years)  Aged Out

## 2021-06-18 NOTE — PATIENT INSTRUCTIONS - HE
Patient Instructions by Carlene Xie PT at 11/16/2020  9:30 AM     Author: Carlene Xie PT Service: -- Author Type: Physical Therapist    Filed: 11/16/2020 10:29 AM Encounter Date: 11/16/2020 Status: Addendum    : Carlene Xie PT (Physical Therapist)    Related Notes: Original Note by Carlene Xie PT (Physical Therapist) filed at 11/16/2020 10:09 AM       Try ice    Back precautions - limit bending, twisting, lifting  Limit sitting 20-30 min (or less)  Change positions    Sleeping - sleep w/ pillow b/w knees to keep hips and knees aligned,      ABDOMINAL BRACING    While lying on your back (sitting, standing, walking), gently lift your lower stomach muscles  Hold for 3-5 breaths.       BALL SQUEEZE - SEATED    While sitting, place a ball/pillow between your knees. Squeeze (gently) the ball with your knees and hold 3-5 sec.10-20 reps.             GLUTE ISOMETRIC    Squeeze buttocks together (sitting, standing, walking) - hold 3-5 sec. Do 10-20 reps.

## 2021-06-18 NOTE — PATIENT INSTRUCTIONS - HE
Patient Instructions by Carlene Xie PT at 11/30/2020 12:00 PM     Author: Calrene Xie PT Service: -- Author Type: Physical Therapist    Filed: 11/30/2020 12:36 PM Encounter Date: 11/30/2020 Status: Signed    : Carlene Xie PT (Physical Therapist)       Nerve glide  - sitting on edge of chair, leg out straight, pull toes up. If feel any nerve tightness or pain, slide back a little in your chair. Then pull toes up again to check nerve tension. If none, pump ankle until fatigue (or 30 sec). Do on other side.       HIP EXTENSION - STANDING    While standing, move your leg back as shown.    Use your arms for support if needed for balance and safety.     Tuck tailbone under (squeeze glutes) to feel a stretch at the front of that hip. Hold until stretch jose. Switch sides           HALF KNEEL HIP FLEXOR STRETCH    While kneeling,tuck tailbone under (squeeze glutes), stay tall -  until a stretch is felt along the front of the other hip/quad/abdomen.    Hold 30 seconds, or until the stretch jose.    You are in control - only go as far as is comfortable stretch (OPTIONAL)

## 2021-06-18 NOTE — PATIENT INSTRUCTIONS - HE
Patient Instructions by Carlene Xie PT at 1/5/2021 10:00 AM     Author: Carlene Xie PT Service: -- Author Type: Physical Therapist    Filed: 1/5/2021 10:20 AM Encounter Date: 1/5/2021 Status: Signed    : Carlene Xie PT (Physical Therapist)             Hamstring Curl    Standing upright slowly bend your knee as you bring your foot towards your buttock. Keep knee pointing down at ground.  Do slowly 5-10 reps.    Note - if hamstring gets crampy, just stretch out

## 2021-06-18 NOTE — PATIENT INSTRUCTIONS - HE
Patient Instructions by Herbert Adair PTA at 12/3/2020  9:00 AM     Author: Herbert Adair PTA Service: -- Author Type: Physical Therapist Assistant    Filed: 12/3/2020  9:26 AM Encounter Date: 12/3/2020 Status: Addendum    : Herbert Adair PTA (Physical Therapist Assistant)    Related Notes: Original Note by Herbert Adair PTA (Physical Therapist Assistant) filed at 12/3/2020  9:18 AM         Sit on the edge of a table or chair.  Grab behind your knee.  Slowly extend your leg, as you extend your leg tip your head back.  Extend your knee as far as you can.  Do not hold.  Slowly lower your leg and tip your head forward.     SIT TO STAND    While making sure you bend at the hip, SLOWLY sit down    Your chest may come forward over your toes, but your spine should stay in neutral as shown.

## 2021-06-18 NOTE — PATIENT INSTRUCTIONS - HE
Patient Instructions by Carlene Xie PT at 12/21/2020 12:00 PM     Author: Carlene Xie PT Service: -- Author Type: Physical Therapist    Filed: 12/21/2020 12:37 PM Encounter Date: 12/21/2020 Status: Signed    : Carlene Xie PT (Physical Therapist)        LOWER TRUNK ROTATIONS - LTR    Lying on your back with your knees bent, gently move your knees side-to-side.      SINGLE KNEE TO CHEST STRETCH - SKTC    While Lying on your back,  hold your knee and gently pull it up towards your chest.    Alternate:       Reminder - tighten abs with arm ex    Walk - intentional and slowing down - feel abs on and glutes with heel strike

## 2021-06-18 NOTE — PATIENT INSTRUCTIONS - HE
Patient Instructions by Aileen Arteaga CMA at 2/9/2021  9:00 AM     Author: Aileen Arteaga CMA Service: -- Author Type: Certified Medical Assistant    Filed: 2/9/2021  9:13 AM Encounter Date: 2/9/2021 Status: Signed    : Aileen Arteaga CMA (Certified Medical Assistant)       Continue on omeprazole as directed  Follow up in April of 2021  [facility]   Consider hearing consultation  Annual hearing test    Patient Education     How Hearing Aids Can Help You    If youre losing your hearing, it can be frustrating: But, hearing aids can help you hear what youve been missing. Not everyone who has hearing loss needs hearing aids. But if your hearing loss is keeping you from communicating with others or hearing warning sounds, such as a car horn, hearing aids will most likely help you.  What hearing aids do  After getting used to your new hearing aids, you may find that:    You hear and understand speech better in many cases.    Youre able to join in when talking with a group of people.    You hear certain speech sounds more clearly.    You can hear warning signs that help you stay safe, such as a smoke alarm or car horn.    Life is more enjoyable for you and the people around you.  How hearing aids help you hear  Hearing aids help by making most sounds clearer for the brain. Sounds you cant hear as well are made louder. Hearing aids also filter sound to reduce some background noise. And they soften some sounds that may be too loud. As a result, signals traveling to the brain are easier to understand.  The microphone picks up sound and carries it into the hearing aid. The amplifier makes the sound louder and clearer. The  sends this stronger sound into the ear canal. The stronger sound travels the rest of the way into the ear to the brain.    Advances in technology have made todays hearing aids better than ever. To make certain you have the hearing aids best  suited to your needs, request an evaluation by a person licensed in audiology. But your hearing still wont be perfect. You may not hear all sounds. And you wont hear only the things you want to. In noisy places you may still have trouble hearing speech clearly. Even so, you can learn techniques for better listening. Along with hearing aids, these techniques will help you understand whats happening around you much better.  Date Last Reviewed: 9/1/2016 2000-2019 [employer]. [address] [place] [address]. All rights reserved. This information is not intended as a substitute for professional medical care. Always follow your healthcare professional's instructions.         Patient Education     Styles of Hearing Aids  The most common hearing aid styles and features are described below. Keep in mind that some styles and features cost more than others. If you find the best hearing aids for you, though, the benefits may outweigh the cost.             In-the-ear (ITE)    For mild to moderate hearing loss.     Electrical parts are contained in a plastic case that fits into the outer ear and extends into the ear canal.    Hearing aid fits inside the ear, but can be seen from outside the ear.    Sound is sent into the ear by a  in the ear canal.   In-the-canal (ITC)    For mild to moderately severe hearing loss.    Electrical parts are contained in a plastic case that fits into the ear opening and extends into the ear canal.    Hearing aid fits inside the ear; harder to see than ITE models.    Sound is sent into the ear by a  in the ear chai  Open ear    For mild to severe hearing loss. This type is better for high-frequency hearing loss.     Electrical parts are contained in a plastic case hidden behind the ear; speaker pieces fit deeply into the ear canal.    Hearing aid is very hard to see; clear pieces blend in with skin color.    Sound is sent into the ear through a plastic tube  or wire to a  in the ear canal.  Completely-in-canal (CIC)    For mild to moderately severe hearing loss.    Electrical parts are contained in a plastic case that fits inside the ear canal.    Hearing aid can hardly be seen.    Sound is sent into the ear by a  in the ear canal.  Behind-the-ear (BTE)    For mild to profound hearing loss.    Electrical parts are contained in a plastic case hidden behind the ear.    Casing can be seen behind the ear; different colors and sizes are available.    Sound is sent into the ear through a plastic tube in the ear canal.  Optional features  Extra features can tailor hearing aids to your needs. A few common ones are listed below.    Telecoils let you switch from the hearing aids microphone to a special setting that works better with telephones and in certain auditoriums, theaters, and museums that have telecoil-compatible sound systems.    Direct audio input feeds sound from a sound system, computer, radio, or microphone directly into your hearing aids.    Directional microphones  head-on sounds and reduce background noise.    Customized listening programs store programs to adapt to changes in noise level. For example, you can have one program for your home and another for crowds.  Noncustom hearing aids  The hearing aids shown here are customized based on the shape of your ear and the nature of your hearing loss. Noncustom (ready to wear) hearing aids may also be an option. These are often purchased through mail order or over the counter. Noncustom hearing aids may cost less than custom hearing aids. But they may not work as well because they have not been programmed for your specific hearing needs. And since theyre not made to fit the shape of your ear, noncustom hearing aids may not be as comfortable. Your hearing professional can tell you more.   Date Last Reviewed: 7/1/2016 2000-2019 [employer]. [address] [place] [address], [place]  [address]. All rights reserved. This information is not intended as a substitute for professional medical care. Always follow your healthcare professional's instructions.

## 2021-06-18 NOTE — PATIENT INSTRUCTIONS - HE
Patient Instructions by Carlene Xie PT at 12/15/2020  3:00 PM     Author: Carlene Xie PT Service: -- Author Type: Physical Therapist    Filed: 12/15/2020  3:08 PM Encounter Date: 12/15/2020 Status: Signed    : Carlene Xie PT (Physical Therapist)        PIRIFORMIS STRETCH    While lying on your back with both knees bent, cross your affected leg on the other knee.     Next, hold your affected knee and ankle and pull it up towards your chest until a gentle stretch is felt in the buttock. Hold until relax.          Standing hip out to side/back, toes to front (lead with heel). Feel in glutes (not in side/front of hip). Do slowly, no pain. 5-10 on each side.    More range on tightening abs/glutes/hamstrings - when lying down.

## 2021-06-19 NOTE — LETTER
Letter by Gaye Treviño MD at      Author: Gaye Treviño MD Service: -- Author Type: --    Filed:  Encounter Date: 11/26/2019 Status: Signed         November 26, 2019     Patient: Annel Stoddard   YOB: 1955   Date of Visit: 11/26/2019       To Whom it May Concern:    Annel Stoddard was seen in my clinic on 11/26/2019.   Her goal weight is 164 lbs.   If you have any questions or concerns, please don't hesitate to call.    Sincerely,         Electronically signed by Gaye Treviño MD

## 2021-06-19 NOTE — LETTER
"Letter by Gaye Treviño MD at      Author: Gaye Treviño MD Service: -- Author Type: --    Filed:  Encounter Date: 9/27/2019 Status: Signed         Annel Stoddard  [address]  [place] [address]             September 30, 2019         Dear Ms. Stoddard,    Below are the results from your recent visit:    Resulted Orders   Glycosylated Hemoglobin A1c   Result Value Ref Range    Hemoglobin A1c 7.4 (H) 3.5 - 6.0 %   Comprehensive Metabolic Panel   Result Value Ref Range    Sodium 140 136 - 145 mmol/L    Potassium 4.3 3.5 - 5.0 mmol/L    Chloride 104 98 - 107 mmol/L    CO2 29 22 - 31 mmol/L    Anion Gap, Calculation 7 5 - 18 mmol/L    Glucose 135 (H) 70 - 125 mg/dL    BUN 13 8 - 22 mg/dL    Creatinine 0.69 0.60 - 1.10 mg/dL    GFR MDRD Af Amer >60 >60 mL/min/1.73m2    GFR MDRD Non Af Amer >60 >60 mL/min/1.73m2    Bilirubin, Total 0.4 0.0 - 1.0 mg/dL    Calcium 10.5 8.5 - 10.5 mg/dL    Protein, Total 7.3 6.0 - 8.0 g/dL    Albumin 4.1 3.5 - 5.0 g/dL    Alkaline Phosphatase 89 45 - 120 U/L    AST 50 (H) 0 - 40 U/L    ALT 62 (H) 0 - 45 U/L    Narrative    Fasting Glucose reference range is 70-99 mg/dL per  American Diabetes Association (ADA) guidelines.   HM2(CBC w/o Differential)   Result Value Ref Range    WBC 5.9 4.0 - 11.0 thou/uL    RBC 4.02 3.80 - 5.40 mill/uL    Hemoglobin 12.8 12.0 - 16.0 g/dL    Hematocrit 38.0 35.0 - 47.0 %    MCV 95 80 - 100 fL    MCH 31.8 27.0 - 34.0 pg    MCHC 33.6 32.0 - 36.0 g/dL    RDW 12.3 11.0 - 14.5 %    Platelets 265 140 - 440 thou/uL    MPV 8.7 7.0 - 10.0 fL   Microalbumin, Random Urine   Result Value Ref Range    Microalbumin, Random Urine <0.50 0.00 - 1.99 mg/dL    Creatinine, Urine 33.5 mg/dL    Microalbumin/Creatinine Ratio Random Urine        Comment:      \"Unable to calculate: Creatinine and/or Microalbumin value below detectable level\"    Narrative    Microalbumin, Random Urine  <2.0 mg/dL . . . . . . . . Normal  3.0-30.0 mg/dL . . . . . . " Microalbuminuria  >30.0 mg/dL . . . . . .  . Clinical Proteinuria    Microalbumin/Creatinine Ratio, Random Urine  <20 mg/g . . . . .. . . . Normal   mg/g . . . . . . . Microalbuminuria  >300 mg/g . . . . . . . . Clinical Proteinuria       LDL Cholesterol, Direct   Result Value Ref Range    Direct LDL 48 <=129 mg/dl       The cholesterol is at goal .The electrolytes, kidney tests are all normal .   The liver tests are slightly elevated . This is common in diabetes and seen in fatty liver , but we will monitor this and if still high consider other testing. There is no protein in the urine which is a good sign . The aic or diabetic test is at a good level and I dont think any adjustment is needed in your diet.  It was a pleasure to see you in the office the other day.      Please call with questions or contact us using [employer].    Sincerely,        Electronically signed by Gaye Treviño MD

## 2021-06-19 NOTE — LETTER
"Letter by Gaye Treviño MD at      Author: Gaye Treviño MD Service: -- Author Type: --    Filed:  Encounter Date: 9/27/2019 Status: Signed         Annel Stoddard  [address]  [place] [address]             September 27, 2019         Dear Ms. Stoddard,    Below are the results from your recent visit:    Resulted Orders   Glycosylated Hemoglobin A1c   Result Value Ref Range    Hemoglobin A1c 7.4 (H) 3.5 - 6.0 %   Comprehensive Metabolic Panel   Result Value Ref Range    Sodium 140 136 - 145 mmol/L    Potassium 4.3 3.5 - 5.0 mmol/L    Chloride 104 98 - 107 mmol/L    CO2 29 22 - 31 mmol/L    Anion Gap, Calculation 7 5 - 18 mmol/L    Glucose 135 (H) 70 - 125 mg/dL    BUN 13 8 - 22 mg/dL    Creatinine 0.69 0.60 - 1.10 mg/dL    GFR MDRD Af Amer >60 >60 mL/min/1.73m2    GFR MDRD Non Af Amer >60 >60 mL/min/1.73m2    Bilirubin, Total 0.4 0.0 - 1.0 mg/dL    Calcium 10.5 8.5 - 10.5 mg/dL    Protein, Total 7.3 6.0 - 8.0 g/dL    Albumin 4.1 3.5 - 5.0 g/dL    Alkaline Phosphatase 89 45 - 120 U/L    AST 50 (H) 0 - 40 U/L    ALT 62 (H) 0 - 45 U/L    Narrative    Fasting Glucose reference range is 70-99 mg/dL per  American Diabetes Association (ADA) guidelines.   HM2(CBC w/o Differential)   Result Value Ref Range    WBC 5.9 4.0 - 11.0 thou/uL    RBC 4.02 3.80 - 5.40 mill/uL    Hemoglobin 12.8 12.0 - 16.0 g/dL    Hematocrit 38.0 35.0 - 47.0 %    MCV 95 80 - 100 fL    MCH 31.8 27.0 - 34.0 pg    MCHC 33.6 32.0 - 36.0 g/dL    RDW 12.3 11.0 - 14.5 %    Platelets 265 140 - 440 thou/uL    MPV 8.7 7.0 - 10.0 fL   Microalbumin, Random Urine   Result Value Ref Range    Microalbumin, Random Urine <0.50 0.00 - 1.99 mg/dL    Creatinine, Urine 33.5 mg/dL    Microalbumin/Creatinine Ratio Random Urine        Comment:      \"Unable to calculate: Creatinine and/or Microalbumin value below detectable level\"    Narrative    Microalbumin, Random Urine  <2.0 mg/dL . . . . . . . . Normal  3.0-30.0 mg/dL . . . . . . " Microalbuminuria  >30.0 mg/dL . . . . . .  . Clinical Proteinuria    Microalbumin/Creatinine Ratio, Random Urine  <20 mg/g . . . . .. . . . Normal   mg/g . . . . . . . Microalbuminuria  >300 mg/g . . . . . . . . Clinical Proteinuria       LDL Cholesterol, Direct   Result Value Ref Range    Direct LDL 48 <=129 mg/dl       The aic is the diabetic test and in fair control . Ideal would be less than 7. For now continue medications at current dosage and we will continue to monitor. Cholesterol is at optimal levels. The electrolytes, kidney tests are all normal . There is no protein in the urine .   Blood counts are normal.  The liver tests are slightly elevated. This is commonly seen in diabetes and is due to fatty deposits in the liver . I am going to order an ultrasound of the liver and monitor liver tests.      Please call with questions or contact us using [employer].    Sincerely,        Electronically signed by Gaye Treviño MD

## 2021-06-20 NOTE — LETTER
Letter by Myhre, David J, RN at      Author: Myhre, David J, RN Service: -- Author Type: --    Filed:  Encounter Date: 6/10/2020 Status: (Other)       CARE COORDINATION  [facility]  [address] [place] [address]      June 18, 2020    Annel Stoddard  [address]  [place] [address]      Dear Annel,    I am a clinic care coordinator  who works with Gaye Treviño MD at the [facility]. I wanted to thank you for spending the time to talk with me.  I wanted to introduce myself and provide you with our contact information so that you can call with questions or concerns about your health care. Below is a description of clinic care coordination and how I can further assist you.     The clinic care coordinator team is made up of a registered nurse,  and community health worker who understand the health care system. The goal of clinic care coordination is to help you manage your health and improve access to the health care system in the most efficient manner. The team can assist you in meeting your health care goals by providing education, coordinating services, strengthening the communication among your providers, assist you with any financial, behavioral, psychosocial, chemical dependency, counseling, and/or psychiatric resources if needed.    Please feel free to contact Vaishali,  the Community Health Worker, at [phone] with any questions or concerns. We are focused on providing you with the highest-quality healthcare experience possible and that all starts with you.     Sincerely,     David Myhre, RN   CCC RN    Enclosed: I have enclosed a copy of the Care Plan. This has helpful information and goals that we have talked about. Please keep this in an easy to access place to use as needed.

## 2021-06-20 NOTE — LETTER
Letter by Gaye Treviño MD at      Author: Gaye Treviño MD Service: -- Author Type: --    Filed:  Encounter Date: 5/8/2020 Status: (Other)         Annel Stoddard  [address]  [place] [address]             May 8, 2020         Dear MsRachael Stoddard,    Below are the results from your recent visit:    Resulted Orders   HIV Antigen/Antibody Screening Cascade   Result Value Ref Range    HIV Antigen / Antibody Negative Negative    Narrative    Method is Abbott HIV Ag/Ab for the detection of HIV p24 antigen, HIV-1 antibodies and HIV-2 antibodies.   Hepatitis B Surface Antigen (HBsAG)   Result Value Ref Range    Hepatitis B Surface Ag Negative Negative   Hepatitis C Antibody (Anti-HCV)   Result Value Ref Range    Hepatitis C Ab Negative Negative   Microalbumin, Random Urine   Result Value Ref Range    Microalbumin, Random Urine 1.06 0.00 - 1.99 mg/dL    Creatinine, Urine 88.3 mg/dL    Microalbumin/Creatinine Ratio Random Urine 12.0 <=19.9 mg/g    Narrative    Microalbumin, Random Urine  <2.0 mg/dL . . . . . . . . Normal  3.0-30.0 mg/dL . . . . . . Microalbuminuria  >30.0 mg/dL . . . . . .  . Clinical Proteinuria    Microalbumin/Creatinine Ratio, Random Urine  <20 mg/g . . . . .. . . . Normal   mg/g . . . . . . . Microalbuminuria  >300 mg/g . . . . . . . . Clinical Proteinuria       Chlamydia trachomatis & Neisseria gonorrhoeae, Amplified Detection   Result Value Ref Range    Chlamydia trachomatis, Amplified Detection Negative Negative    Neisseria gonorrhoeae, Amplified Detection Negative Negative   Glycosylated Hemoglobin A1c   Result Value Ref Range    Hemoglobin A1c 8.6 (H) 3.5 - 6.0 %   Comprehensive Metabolic Panel   Result Value Ref Range    Sodium 138 136 - 145 mmol/L    Potassium 4.6 3.5 - 5.0 mmol/L    Chloride 99 98 - 107 mmol/L    CO2 27 22 - 31 mmol/L    Anion Gap, Calculation 12 5 - 18 mmol/L    Glucose 215 (H) 70 - 125 mg/dL    BUN 13 8 - 22 mg/dL    Creatinine 0.78 0.60 -  1.10 mg/dL    GFR MDRD Af Amer >60 >60 mL/min/1.73m2    GFR MDRD Non Af Amer >60 >60 mL/min/1.73m2    Bilirubin, Total 0.4 0.0 - 1.0 mg/dL    Calcium 10.0 8.5 - 10.5 mg/dL    Protein, Total 7.5 6.0 - 8.0 g/dL    Albumin 4.0 3.5 - 5.0 g/dL    Alkaline Phosphatase 109 45 - 120 U/L     (H) 0 - 40 U/L     (H) 0 - 45 U/L    Narrative    Fasting Glucose reference range is 70-99 mg/dL per  American Diabetes Association (ADA) guidelines.       The STD tests are all negative . The electrolytes, kidney tests are all normal .   In essence the AIc is still high above 8 . The liver tests are still high but stable and consistent with fatty liver .    Please call with questions or contact us using [employer].    Sincerely,        Electronically signed by Gaye Treviño MD

## 2021-06-20 NOTE — LETTER
Letter by Myhre, David J, RN at      Author: Myhre, David J, RN Service: -- Author Type: --    Filed:  Encounter Date: 6/10/2020 Status: (Other)       Care Plan  About Me:    Patient Name:  Annel Stoddard    YOB: 1955  Age:         64 y.o.   [facility] MRN:    [id number] Telephone Information:  Home Phone [phone]   Mobile Not on file.       Address:   [address]  [place] [address] Email address:  [email or web address]      Emergency Contact(s)  Extended Emergency Contact Information      Name: Rita Merida  Address:       [address] [place] [address]  Relation: Child      Name: [place] [address]  Relation: Declined          Primary language:  English     needed? [facility] [employer]:  [phone] op. 1  Other communication barriers: None  Preferred Method of Communication:     Current living arrangement: I live alone  Mobility Status/ Medical Equipment: Independent    Health Maintenance  Health Maintenance Reviewed: Up to date    My Access Plan  Medical Emergency 911   Primary Clinic Line Gaye Treviño MD - [phone]   24 Hour Appointment Line [phone] or  [phone]) (toll-free)   24 Hour Nurse Line [phone] (toll-free)   Preferred Urgent Care [facility], [phone]   Preferred Hospital [facility]  [phone]   Preferred Pharmacy [facility] [place] - [address]     Behavioral Health Crisis Line The National Suicide Prevention Lifeline at 1-796.616.1739 or 911             My Care Team Members  Patient Care Team       Relationship Specialty Notifications Start End    Gaye Treviño MD PCP - General Internal Medicine  9/23/19     Phone: [phone] Fax: [phone]         [address] [place] [address] [address] [place] [address]    Gaye Treviño MD Assigned PCP   8/31/19     Phone:  [phone] Fax: [phone]         [address] [place] [address] [address] [place] [address]    Myhre, David J, RN Lead Care Coordinator Primary Care -  Admissions 6/17/20     Fax: [phone]         Vaishali Driscoll CHW Community Health Worker Primary Care - CC Admissions 6/17/20     Phone: [phone] Fax: [phone]        Tami Sommers Financial Resource Worker Primary Care - CC  6/17/20     Phone: [phone] Fax: [phone]                My Care Plans  Self Management and Treatment Plan  Goals and (Comments)  Goals        General    Financial Wellbeing (pt-stated)     Notes - Note created  6/17/2020  2:12 PM by Myhre, David J, RN    Goal Statement: I would like to know if I am eligible for Medical Assistance in the next 2 months.   Date Goal set: 6/10/20  Barriers: Patient currently is finding it difficult to make ends meet related to out of pocket medical expenses.   Strengths: Patient has a strong willingness to do what she has to do to make her current situation better.   Date to Achieve By: 8/10/20  Patient expressed understanding of goal: Yes  Action steps to achieve this goal:  1. I understand the [facility] RNRon will send a referral to the [facility] FRWJazz[facility] to discuss eligibility for Medical Assistance.    2. I understand [employer] will contact me directly.   3. I will provide [employer] with any necessary documentation and will complete any paperwork associated with this goal in a timely manner.          Improve chronic symptoms (pt-stated)     Notes - Note created  6/17/2020  2:27 PM by Myhre, David J, RN    Goal Statement: I would like to know if there are resources for lower cost hearing aides in the next two months.   Date Goal set: 6/10/20  Barriers: Limited income.  Strengths: Strong advocate herself. Willing to do what she has to do in order to improve her current situation.   Date to Achieve By: 8/10/20  Patient expressed understanding of goal: Yes  Action steps to achieve this goal:  1. I  understand the [facility] Community Health WorkerVaishali look into resources for low cost hearing aides and will contact me with available resources.   2. I will look over the resources and determine which resource/program meets my needs.   3. I will contact the hearing aide resource directly.         Medical (pt-stated)     Notes - Note edited  6/17/2020  2:36 PM by Myhre, David J, RN    Goal Statement: I would like to getting my diabetes under better control in the next 6 months,including getting my A1C at for below 7.0  Date Goal set: 6/10/20  Strengths: Patient has a strong willingness to make changes to improve her health.   Date to Achieve By: 12/10/20  Patient expressed understanding of goal: Yes  Action steps to achieve this goal:  1. I will continue to take my diabetic medications daily as directed and will check my blood glucose level at least 4 times a day.   2. I will continue to attend all scheduled appointments with my PCP, Dr. Treviño and my diabetic educator, Johana  3. I will adhere to my diabetic diet as best as I can.   4. I will follow up with the [facility] RN on a monthly basis to discuss my progress with this goal.        Psychosocial (pt-stated)     Notes - Note created  6/17/2020  2:19 PM by Myhre, David J, RN    Goal Statement: I would like to have resources for a low cost dental clinic in the next 2 months.   Date Goal set: 6/10/20  Barriers: Limited income. No dental insurance.   Strengths: Patient is a strong advocate for herself. She stated she wants to stay on top of her health.   Date to Achieve By: 8/10/20  Patient expressed understanding of goal: Yes  Action steps to achieve this goal:  1. I understand the [facility] Community Health WorkerVaishali will be contacting me directly with resources for low cost dental clinics.   2. I will look over the resources and decide which resource best suits my dental needs.                  Advance Care Plans/Directives Type:   Type Advanced Care Plans/Directives:  Advanced Directive - On File    My Medical and Care Information  Problem List   Patient Active Problem List   Diagnosis   ? Health maintenance examination   ? Type 2 diabetes mellitus with complication, with long-term current use of insulin (H)   ? Anatomical narrow angle glaucoma   ? Hyperactivity of bladder   ? Bilateral low back pain with left-sided sciatica   ? Bipolar 2 disorder (H)   ? Callus of foot   ? Mixed hyperlipidemia   ? Simple chronic bronchitis (H)   ? Pulmonary emphysema (H)      Current Medications and Allergies:  See printed Medication Report.    Care Coordination Start Date: 6/10/2020   Frequency of Care Coordination:     Form Last Updated: 06/18/2020

## 2021-06-20 NOTE — LETTER
Letter by Gaye Treviño MD at      Author: Gaye Treviño MD Service: -- Author Type: --    Filed:  Encounter Date: 1/7/2020 Status: Signed         Annel Stoddard  [address]  [place] [address]             January 7, 2020         Dear Ms. Stoddard,    Below are the results from your recent visit:    Resulted Orders   US Abdomen Limited    Narrative    EXAM: US ABDOMEN LIMITED  LOCATION: [facility]  DATE/TIME: 1/6/2020 6:56 AM    INDICATION: VALE  COMPARISON: None.  TECHNIQUE: Limited abdominal ultrasound.    FINDINGS:    GALLBLADDER: Not distended. There are mobile echogenic stones which produce shadowing in the gallbladder neck. No gallbladder wall thickening or pericholecystic fluid.    BILE DUCTS: No biliary dilatation. The common duct measures 3 mm.    LIVER: Mild increased echogenicity of the liver parenchyma suggestive of parenchymal fat infiltration. Smooth liver border. No focal mass.    RIGHT KIDNEY: No hydronephrosis.    PANCREAS: The visualized portions are normal.    No ascites.      Impression    1.  Cholelithiasis without additional sonographic features to suggest acute gallbladder inflammation.       The ultrasound confirms asymptomatic gallstones and mild fatty liver . Nothing needs to be done at this point and we can monitor this.     Please call with questions or contact us using [employer].    Sincerely,        Electronically signed by Gaye Treviño MD

## 2021-06-20 NOTE — LETTER
Letter by Gaye Treviño MD at      Author: Gaye Treviño MD Service: -- Author Type: --    Filed:  Encounter Date: 10/1/2020 Status: (Other)         Annel Stoddard  [address]  [place] [address]             October 1, 2020         Dear Ms. Stoddard,    Below are the results from your recent visit:    Resulted Orders   Comprehensive Metabolic Panel   Result Value Ref Range    Sodium 138 mmol/L    Potassium 4.4 mmol/L    Chloride 101 mmol/L    CO2 25 mmol/L    Anion Gap, Calculation 12 mmol/L    Glucose 160 (H) mg/dL    BUN 15 mg/dL    Creatinine 0.67 mg/dL    GFR MDRD Af Amer >60 mL/min/1.73m2    GFR MDRD Non Af Amer >60 mL/min/1.73m2    Bilirubin, Total 0.4 mg/dL    Calcium 10.3 mg/dL    Protein, Total 7.9 g/dL    Albumin 4.4 g/dL    Alkaline Phosphatase 99 U/L    AST 79 (H) U/L    ALT 80 (H) U/L   INR   Result Value Ref Range    INR 0.99 0.90 - 1.10    Narrative    INR Therapeutic Ranges:  Mech. Valve 2.5-3.5  Post Surg.  2.0-3.0  DVT/PE      2.0-3.0   COVID-19 Virus (Coronavirus) Antibody & Titer Reflex   Result Value Ref Range    COVID-19 Antibody Screen Negative       Comment:      No COVID-19 antibodies detected.  Patients within 10 days of symptom onset for  COVID-19 may not produce sufficient levels of detectable antibodies.  Immunocompromised COVID-19 patients may take longer to develop antibodies.    COVID-19 IgG Titer Not Applicable       Comment:      Qualitative screen for total antibodies to COVID-19 (SARS-CoV-2) with  semi-quantitative measurement of IgG COVID-19 antibodies by endpoint titer.  COVID-19 antibodies may be elevated due to a past or current infection.  Negative results do not rule out COVID-19 infection.  Results from antibody  testing should not be used as the sole basis to diagnose or exclude SARS-CoV-2  infection or to inform infection status.  COVID-19 PCR test should be ordered  if current infection is suspected.  False positive results may occur in  rare  cases due to cross-reacting antibodies.  This test was developed and its performance characteristics determined by the  [facility]),  which is regulated under CLIA as qualified to perform high-complexity testing.  This test has not been reviewed by the FDA.  Testing performed by [facility], [facility], [address], [address], [place]  [address]       The electrolytes, kidney tests are all normal .   Blood sugar is high   Liver tests are elevated but stable   covid antibody test is negative meaning you have likely not been exposed to Covid 19 or are immune to it .   Please call with questions or contact us using [employer].    Sincerely,        Electronically signed by Gaye Treviño MD

## 2021-06-21 NOTE — LETTER
Letter by Eileen Sanchez CNP at      Author: Eileen Sanchez CNP Service: -- Author Type: --    Filed:  Encounter Date: 2020 Status: (Other)       2020        Annel Stoddard  [address]  [place] [address]         RE:  Annel Stoddard  : 1955    To Whom It May Concern:    Annel Stoddard is my patient.  I am familiar with her history and with the functional limitations imposed by her disability. She meets the definition of disability under the Americans with Disabilities Act, the Fair Housing Act, and the Rehabilitation Act of .     Due to mental illness, Annel has certain limitations regarding [social interaction/coping with stress/anxiety, etc.].  In order to help alleviate these difficulties and to enhance their ability to live independently and to fully use and enjoy the dwelling unit you own and/or administer, I am prescribing an emotional support animal that will assist this patient in coping with her disability.    I am familiar with the voluminous professional literature concerning the therapeutic benefits of assistance animals for people with disabilities such as that experienced by her.   Upon request, I will share citations to relevant studies, and would be happy to answer other questions you may have concerning my recommendation this patient have an emotional support animal.  Should you have additional questions, please do not hesitate to contact me.    Sincerely,  Eileen Sanchez      [facility]  [address] [place]  [address]  P:  [phone]  F:  [phone]      Eileen Sanchez CNP

## 2021-06-21 NOTE — LETTER
Letter by Aron Phipps LGSW at      Author: Aron Phipps LGSW Service: -- Author Type: --    Filed:  Encounter Date: 1/25/2021 Status: (Other)       Care Plan  About Me:    Patient Name:  Annel Stoddard    YOB: 1955  Age:         65 y.o.   [facility] MRN:    [id number] Telephone Information:  Home Phone [phone]   Mobile [phone]       Address:  [address]  [place] [address] Email address:  [email or web address]      Emergency Contact(s)  Extended Emergency Contact Information      Name: Rita Merida  Address:       [address] [place] [address]  Relation: Child      Name: [place] [address]  Home Phone Number: [phone]  Relation: Friend          Primary language:  English     needed? No   [facility]:  [phone] op. 1  Other communication barriers: None  Preferred Method of Communication:     Current living arrangement: I live alone  Mobility Status/ Medical Equipment: Independent    Health Maintenance  Health Maintenance Reviewed: Not assessed    My Access Plan  Medical Emergency 911   Primary Clinic Line Brynn Ibrahim DO - [phone]   24 Hour Appointment Line [phone] or  [phone]) (toll-free)   24 Hour Nurse Line [phone] (toll-free)   Preferred Urgent Care [facility] [phone]   Preferred Hospital [facility]  [phone]   Preferred Pharmacy [facility] [address] [place] [address].     Behavioral Health Crisis Line The National Suicide Prevention Lifeline at 1-976.432.2148 or 911             My Care Team Members  Patient Care Team       Relationship Specialty Notifications Start End    Brynn Ibarhim DO PCP - General Family Medicine  12/30/20     PER PATIENT REQUEST     Phone: [phone] Fax: [phone]         [address] [place] [address]    Eileen Sanchez, CNP  Assigned PCP   12/14/20     Phone: [phone] Fax: [phone]         [address] [facility] [address] [facility] [address]    Aileen Dueñas MD Assigned Pulmonology Provider   1/23/21     Phone: [phone] Fax: [phone]         [address] [facility] [address] [facility] [address]    Chi Perez MD Assigned Surgical Provider   1/23/21     Phone: [phone] Fax: [phone]         [address] [facility] [address] [facility] [address]    Aron Phipps LGSW Lead Care Coordinator Primary Care - [facility] Admissions 1/25/21     Fax: [phone]         George Rollins CHW Community Health Worker Primary Care - [facility] Admissions 1/25/21     Phone: [phone] Fax: [phone]                My Care Plans  Self Management and Treatment Plan  Goals and (Comments)  Goals        General    Healthy Eating (pt-stated)     Notes - Note created  1/25/2021  8:40 AM by Aron Phipps LGSW    Goal Statement: I want to be aware of food resources within one month  Date Goal set: 01/25/21  Barriers:   Strengths:   Date to Achieve By: 2/282021  Patient expressed understanding of goal: Yes  Action steps to achieve this goal:  1. I will review resources sent by [facility] SW  2. I will update [facility] team        Medication 1 (pt-stated)     Notes - Note created  1/25/2021  8:38 AM by Aron Phipps LGSW    Goal Statement: I want to see my PCP as soon as possible to discuss frost bite and medications for breathing  Date Goal set: 01/25/21  Barriers:   Strengths:   Date to Achieve By: ASAP  Patient expressed understanding of goal: Yes  Action steps to achieve this goal:  1. I will wait to hear from [facility] Scheduling   2. I will update [facility] team          Problem Solving (pt-stated)     Notes - Note created  1/25/2021  8:39 AM by Aron Phipps LGSW    Goal Statement: In February, I want to renew my etelvina care application  Date Goal set: 01/25/21  Barriers:   Strengths:   Date to Achieve By: 2/28/2021  Patient expressed understanding of goal:  Yes  Action steps to achieve this goal:  1. I will ask [facility] team for a referral to [facility] in February to help renew my [facility] Care Application that expires in March                 Advance Care Plans/Directives Type:   Type Advanced Care Plans/Directives: Advanced Directive - On File    My Medical and Care Information  Problem List   Patient Active Problem List   Diagnosis   ? Health maintenance examination   ? Diabetes mellitus (H)   ? Anatomical narrow angle glaucoma   ? Hyperactivity of bladder   ? Sciatica   ? Bipolar II disorder (H)   ? Callus of foot   ? Mixed hyperlipidemia   ? Simple chronic bronchitis (H)   ? Pulmonary emphysema (H)   ? Abnormal cervical Papanicolaou smear   ? Hernia of anterior abdominal wall   ? Human papilloma virus infection   ? Nonalcoholic steatohepatitis   ? Osteopenia   ? Thrombophlebitis      Current Medications and Allergies:  See printed Medication Report.    Care Coordination Start Date: 1/25/2021   Frequency of Care Coordination:     Form Last Updated: 01/25/2021

## 2021-06-21 NOTE — LETTER
Letter by Eileen Sanchez CNP at      Author: Eileen Sanchez CNP Service: -- Author Type: --    Filed:  Encounter Date: 11/19/2020 Status: (Other)         Annel Stoddard  [address]  [place] [address]             November 19, 2020        Dear Ms. Stoddard,    Below are the results from your recent visit:    Resulted Orders   Lipid [place] FASTING   Result Value Ref Range    Cholesterol 115 <=199 mg/dL    Triglycerides 102 <=149 mg/dL    HDL Cholesterol 48 (L) >=50 mg/dL    LDL Calculated 47 <=129 mg/dL    Patient Fasting > 8hrs? Yes    Comprehensive Metabolic Panel   Result Value Ref Range    Sodium 139 136 - 145 mmol/L    Potassium 4.4 3.5 - 5.0 mmol/L    Chloride 103 98 - 107 mmol/L    CO2 27 22 - 31 mmol/L    Anion Gap, Calculation 9 5 - 18 mmol/L    Glucose 148 (H) 70 - 125 mg/dL    BUN 12 8 - 22 mg/dL    Creatinine 0.65 0.60 - 1.10 mg/dL    GFR MDRD Af Amer >60 >60 mL/min/1.73m2    GFR MDRD Non Af Amer >60 >60 mL/min/1.73m2    Bilirubin, Total 0.4 0.0 - 1.0 mg/dL    Calcium 9.7 8.5 - 10.5 mg/dL    Protein, Total 7.5 6.0 - 8.0 g/dL    Albumin 4.3 3.5 - 5.0 g/dL    Alkaline Phosphatase 96 45 - 120 U/L    AST 38 0 - 40 U/L    ALT 54 (H) 0 - 45 U/L    Narrative    Fasting Glucose reference range is 70-99 mg/dL per  American Diabetes Association (ADA) guidelines.   Glycosylated Hemoglobin A1c   Result Value Ref Range    Hemoglobin A1c 7.2 (H) <=5.6 %      Comment:      Normal <5.7% Prediabete 5.7-6.4% Diabletes 6.5% or higher - adopted from ADA consensus guidelines   Microalbumin, Random Urine   Result Value Ref Range    Microalbumin, Random Urine 1.26 0.00 - 1.99 mg/dL    Creatinine, Urine 20.8 mg/dL    Microalbumin/Creatinine Ratio Random Urine 60.6 (H) <=19.9 mg/g    Narrative    Microalbumin, Random Urine  <2.0 mg/dL . . . . . . . . Normal  3.0-30.0 mg/dL . . . . . . Microalbuminuria  >30.0 mg/dL . . . . . .  . Clinical Proteinuria    Microalbumin/Creatinine Ratio, Random  Urine  <20 mg/g . . . . .. . . . Normal   mg/g . . . . . . . Microalbuminuria  >300 mg/g . . . . . . . . Clinical Proteinuria       HM1 (CBC with Diff)   Result Value Ref Range    WBC 6.9 4.0 - 11.0 thou/uL    RBC 3.93 3.80 - 5.40 mill/uL    Hemoglobin 12.7 12.0 - 16.0 g/dL    Hematocrit 37.1 35.0 - 47.0 %    MCV 94 80 - 100 fL    MCH 32.2 27.0 - 34.0 pg    MCHC 34.1 32.0 - 36.0 g/dL    RDW 11.5 11.0 - 14.5 %    Platelets 242 140 - 440 thou/uL    MPV 8.6 7.0 - 10.0 fL    Neutrophils % 57 50 - 70 %    Lymphocytes % 30 20 - 40 %    Monocytes % 8 2 - 10 %    Eosinophils % 4 0 - 6 %    Basophils % 1 0 - 2 %    Neutrophils Absolute 4.0 2.0 - 7.7 thou/uL    Lymphocytes Absolute 2.1 0.8 - 4.4 thou/uL    Monocytes Absolute 0.6 0.0 - 0.9 thou/uL    Eosinophils Absolute 0.3 0.0 - 0.4 thou/uL    Basophils Absolute 0.1 0.0 - 0.2 thou/uL         Annel     It was such a pleasure to meet you thank you so much for the beautiful card.  Happy holidays to you and your family.  Please see your recent lab results which look great and I do not recommend any additional changes at this time.         Electronically signed by Eileen Sanchez CNP

## 2021-06-21 NOTE — LETTER
Letter by George Rollins CHW at      Author: George Rollins CHW Service: -- Author Type: --    Filed:  Encounter Date: 2/18/2021 Status: (Other)         February 19, 2021     Patient: Annel Stoddard   YOB: 1955   Date of Visit: 2/18/2021       To Whom it May Concern:    Annel Stoddard has the following conditions that result in disability. Please make appropriate accommodations:     Diagnosis   ? Pulmonary emphysema   ? Simple chronic bronchitis   ? Diabetes mellitus   ? Anatomical narrow angle glaucoma   ? Hyperactivity of bladder   ? Sciatica   ? Bipolar II disorder     If you have any questions or concerns, please don't hesitate to call.    Sincerely,         Electronically signed by Obdulia Alcantara M.D.

## 2021-06-21 NOTE — LETTER
Letter by Brynn Ibrahim DO at      Author: Brynn Ibrahim DO Service: -- Author Type: --    Filed:  Encounter Date: 1/26/2021 Status: (Other)         January 26, 2021     Patient: Annel Stoddard   YOB: 1955   Date of Visit: 1/26/2021       To Whom It May Concern:    Annel Stoddard is my patient.  I am familiar with her history and with the functional limitations imposed by her disability. She meets the definition of disability under the Americans with Disabilities Act, the Fair Housing Act, and the Rehabilitation Act of 1973.      Due to mental health issues, Annel has certain limitations regarding social interaction/coping with stress/anxiety, etc..  In order to help alleviate these difficulties and to enhance their ability to live independently and to fully use and enjoy the dwelling unit you own and/or administer, I am prescribing an emotional support animal that will assist this patient in coping with her disability.     I am familiar with the voluminous professional literature concerning the therapeutic benefits of assistance animals for people with disabilities such as that experienced by her.   Upon request, I would be happy to answer other questions you may have concerning my recommendation this patient have an emotional support animal.  Should you have additional questions, please do not hesitate to contact me.    Sincerely,        Electronically signed by Brynn Ibrahim DO

## 2021-06-21 NOTE — LETTER
Letter by Brynn Ibrahim DO at      Author: Brynn Ibrahim DO Service: -- Author Type: --    Filed:  Encounter Date: 2021 Status: (Other)       2021        Annel Stoddard  [address]  [place] [address]         RE:  Annel Stoddard  : 1955    To Whom It May Concern:    Annel Stoddard is my patient.  I am familiar with her history and with the functional limitations imposed by her disability. She meets the definition of disability under the Americans with Disabilities Act, the Fair Housing Act, and the Rehabilitation Act of .     Due to mental illness, Annel has certain limitations regarding social interaction/coping with stress/anxiety, etc..  In order to help alleviate these difficulties and to enhance their ability to live independently and to fully use and enjoy the dwelling unit you own and/or administer, I am prescribing an emotional support animal that will assist this patient in coping with her disability.    I am familiar with the voluminous professional literature concerning the therapeutic benefits of assistance animals for people with disabilities such as that experienced by her.   Upon request, I would be happy to answer other questions you may have concerning my recommendation this patient have an emotional support animal.  Should you have additional questions, please do not hesitate to contact me.    Sincerely,  Brynn Ibrahim

## 2021-06-21 NOTE — LETTER
Letter by Aron Phipps LGSW at      Author: Aron Phipps LGSW Service: -- Author Type: --    Filed:  Encounter Date: 1/25/2021 Status: (Other)       CARE COORDINATION    January 25, 2021    Annel Stoddard  [address]  [place] [address]      Dear Annel,    I am a clinic care coordinator who works with Brynn Ibrahim DO at [facility]. I wanted to thank you for spending the time to talk with me.  Below is a description of clinic care coordination and how I can further assist you.      The clinic care coordination team is made up of a registered nurse,  and community health worker who understand the health care system. The goal of clinic care coordination is to help you manage your health and improve access to the health care system in the most efficient manner. The team can assist you in meeting your health care goals by providing education, coordinating services, strengthening the communication among your providers and supporting you with any resource needs.    Please feel free to contact the Community Health Worker at [phone] with any questions or concerns. We are focused on providing you with the highest-quality healthcare experience possible and that all starts with you.     Sincerely,     Aron Phipps    Enclosed: I have enclosed a copy of the Care Plan. This has helpful information and goals that we have talked about. Please keep this in an easy to access place to use as needed.

## 2021-06-21 NOTE — LETTER
Letter by Brynn Ibrahim DO at      Author: Brynn Ibrahim DO Service: -- Author Type: --    Filed:  Encounter Date: 1/2/2021 Status: (Other)         Annel Stoddard  [address]  [place] [address]             January 2, 2021         Dear Ms. Stoddard,    Below are the results from your recent visit:    Resulted Orders   Basic Metabolic Panel   Result Value Ref Range    Sodium 139 136 - 145 mmol/L    Potassium 4.3 3.5 - 5.0 mmol/L    Chloride 101 98 - 107 mmol/L    CO2 29 22 - 31 mmol/L    Anion Gap, Calculation 9 5 - 18 mmol/L    Glucose 122 70 - 125 mg/dL    Calcium 10.4 8.5 - 10.5 mg/dL    BUN 17 8 - 22 mg/dL    Creatinine 0.69 0.60 - 1.10 mg/dL    GFR MDRD Af Amer >60 >60 mL/min/1.73m2    GFR MDRD Non Af Amer >60 >60 mL/min/1.73m2    Narrative    Fasting Glucose reference range is 70-99 mg/dL per  American Diabetes Association (ADA) guidelines.   HM1 (CBC with Diff)   Result Value Ref Range    WBC 6.8 4.0 - 11.0 thou/uL    RBC 3.97 3.80 - 5.40 mill/uL    Hemoglobin 12.8 12.0 - 16.0 g/dL    Hematocrit 37.8 35.0 - 47.0 %    MCV 95 80 - 100 fL    MCH 32.3 27.0 - 34.0 pg    MCHC 33.9 32.0 - 36.0 g/dL    RDW 11.7 11.0 - 14.5 %    Platelets 251 140 - 440 thou/uL    MPV 7.7 7.0 - 10.0 fL    Neutrophils % 55 50 - 70 %    Lymphocytes % 34 20 - 40 %    Monocytes % 8 2 - 10 %    Eosinophils % 3 0 - 6 %    Basophils % 1 0 - 2 %    Neutrophils Absolute 3.7 2.0 - 7.7 thou/uL    Lymphocytes Absolute 2.3 0.8 - 4.4 thou/uL    Monocytes Absolute 0.5 0.0 - 0.9 thou/uL    Eosinophils Absolute 0.2 0.0 - 0.4 thou/uL    Basophils Absolute 0.1 0.0 - 0.2 thou/uL   BNP(B-type Natriuretic Peptide)   Result Value Ref Range    BNP <10 0 - 106 pg/mL   Thyroid Stimulating Hormone (TSH)   Result Value Ref Range    TSH 0.95 0.30 - 5.00 uIU/mL       All your labs look great! Liver, kidneys, blood sugar and blood levels all normal. Not the cause of your swelling in your legs. Continue the management we discussed. Likely from all  the walking you do!    Please call with questions or contact us using [employer].    Sincerely,        Electronically signed by Brynn Ibrahim, DO

## 2021-06-21 NOTE — LETTER
Letter by Eileen Sanchez CNP at      Author: Eileen Sanchez CNP Service: -- Author Type: --    Filed:  Encounter Date: 11/23/2020 Status: (Other)         Annel Stoddard  [address]  [place] [address]                 November 23, 2020       Dear Ms. Stoddard,    Physical on 11/20/2020   Component Date Value Ref Range Status   ? VENTRICULAR RATE 11/20/2020 87  BPM Final   ? ATRIAL RATE 11/20/2020 87  BPM Final   ? P-R INTERVAL 11/20/2020 202  ms Final   ? QRS DURATION 11/20/2020 86  ms Final   ? Q-T INTERVAL 11/20/2020 368  ms Final   ? QTC CALCULATION (BEZET) 11/20/2020 442  ms Final   ? P Axis 11/20/2020 48  degrees Final   ? R AXIS 11/20/2020 9  degrees Final   ? T AXIS 11/20/2020 32  degrees Final   ? MUSE DIAGNOSIS 11/20/2020    Final                    Value:Normal sinus rhythm  Possible Inferior infarct , age undetermined  Abnormal ECG  No previous ECGs available  Confirmed by KAY CHOI MD LOC: ([id number]) on 11/20/2020 4:36:55 PM     Office Visit on 11/18/2020   Component Date Value Ref Range Status   ? Cholesterol 11/18/2020 115  <=199 mg/dL Final   ? Triglycerides 11/18/2020 102  <=149 mg/dL Final   ? HDL Cholesterol 11/18/2020 48* >=50 mg/dL Final   ? LDL Calculated 11/18/2020 47  <=129 mg/dL Final   ? Patient Fasting > 8hrs? 11/18/2020 Yes   Final   ? Sodium 11/18/2020 139  136 - 145 mmol/L Final   ? Potassium 11/18/2020 4.4  3.5 - 5.0 mmol/L Final   ? Chloride 11/18/2020 103  98 - 107 mmol/L Final   ? CO2 11/18/2020 27  22 - 31 mmol/L Final   ? Anion Gap, Calculation 11/18/2020 9  5 - 18 mmol/L Final   ? Glucose 11/18/2020 148* 70 - 125 mg/dL Final   ? BUN 11/18/2020 12  8 - 22 mg/dL Final   ? Creatinine 11/18/2020 0.65  0.60 - 1.10 mg/dL Final   ? GFR MDRD Af Amer 11/18/2020 >60  >60 mL/min/1.73m2 Final   ? GFR MDRD Non Af Amer 11/18/2020 >60  >60 mL/min/1.73m2 Final   ? Bilirubin, Total 11/18/2020 0.4  0.0 - 1.0 mg/dL Final   ? Calcium 11/18/2020 9.7  8.5 - 10.5  mg/dL Final   ? Protein, Total 11/18/2020 7.5  6.0 - 8.0 g/dL Final   ? Albumin 11/18/2020 4.3  3.5 - 5.0 g/dL Final   ? Alkaline Phosphatase 11/18/2020 96  45 - 120 U/L Final   ? AST 11/18/2020 38  0 - 40 U/L Final   ? ALT 11/18/2020 54* 0 - 45 U/L Final   ? Hemoglobin A1c 11/18/2020 7.2* <=5.6 % Final    Normal <5.7% Prediabete 5.7-6.4% Diabletes 6.5% or higher - adopted from ADA consensus guidelines   ? Microalbumin, Random Urine 11/18/2020 1.26  0.00 - 1.99 mg/dL Final   ? Creatinine, Urine 11/18/2020 20.8  mg/dL Final   ? Microalbumin/Creatinine Ratio Rand* 11/18/2020 60.6* <=19.9 mg/g Final   ? WBC 11/18/2020 6.9  4.0 - 11.0 thou/uL Final   ? RBC 11/18/2020 3.93  3.80 - 5.40 mill/uL Final   ? Hemoglobin 11/18/2020 12.7  12.0 - 16.0 g/dL Final   ? Hematocrit 11/18/2020 37.1  35.0 - 47.0 % Final   ? MCV 11/18/2020 94  80 - 100 fL Final   ? MCH 11/18/2020 32.2  27.0 - 34.0 pg Final   ? MCHC 11/18/2020 34.1  32.0 - 36.0 g/dL Final   ? RDW 11/18/2020 11.5  11.0 - 14.5 % Final   ? Platelets 11/18/2020 242  140 - 440 thou/uL Final   ? MPV 11/18/2020 8.6  7.0 - 10.0 fL Final   ? Neutrophils % 11/18/2020 57  50 - 70 % Final   ? Lymphocytes % 11/18/2020 30  20 - 40 % Final   ? Monocytes % 11/18/2020 8  2 - 10 % Final   ? Eosinophils % 11/18/2020 4  0 - 6 % Final   ? Basophils % 11/18/2020 1  0 - 2 % Final   ? Neutrophils Absolute 11/18/2020 4.0  2.0 - 7.7 thou/uL Final   ? Lymphocytes Absolute 11/18/2020 2.1  0.8 - 4.4 thou/uL Final   ? Monocytes Absolute 11/18/2020 0.6  0.0 - 0.9 thou/uL Final   ? Eosinophils Absolute 11/18/2020 0.3  0.0 - 0.4 thou/uL Final   ? Basophils Absolute 11/18/2020 0.1  0.0 - 0.2 thou/uL Final   Hospital Outpatient Visit on 10/08/2020   Component Date Value Ref Range Status   ? Hemoglobin 10/08/2020 11.7* 12.0 - 16.0 g/dL Final   Office Visit on 09/21/2020   Component Date Value Ref Range Status   ? Sodium 09/21/2020 138  136 - 145 mmol/L Final   ? Potassium 09/21/2020 4.4  3.5 - 5.0 mmol/L  Final   ? Chloride 09/21/2020 101  98 - 107 mmol/L Final   ? CO2 09/21/2020 25  22 - 31 mmol/L Final   ? Anion Gap, Calculation 09/21/2020 12  5 - 18 mmol/L Final   ? Glucose 09/21/2020 160* 70 - 125 mg/dL Final   ? BUN 09/21/2020 15  8 - 22 mg/dL Final   ? Creatinine 09/21/2020 0.67  0.60 - 1.10 mg/dL Final   ? GFR MDRD Af Amer 09/21/2020 >60  >60 mL/min/1.73m2 Final   ? GFR MDRD Non Af Amer 09/21/2020 >60  >60 mL/min/1.73m2 Final   ? Bilirubin, Total 09/21/2020 0.4  0.0 - 1.0 mg/dL Final   ? Calcium 09/21/2020 10.3  8.5 - 10.5 mg/dL Final   ? Protein, Total 09/21/2020 7.9  6.0 - 8.0 g/dL Final   ? Albumin 09/21/2020 4.4  3.5 - 5.0 g/dL Final   ? Alkaline Phosphatase 09/21/2020 99  45 - 120 U/L Final   ? AST 09/21/2020 79* 0 - 40 U/L Final   ? ALT 09/21/2020 80* 0 - 45 U/L Final   ? INR 09/21/2020 0.99  0.90 - 1.10 Final   ? COVID-19 Antibody Screen 09/21/2020 Negative   Final    No COVID-19 antibodies detected.  Patients within 10 days of symptom onset for  COVID-19 may not produce sufficient levels of detectable antibodies.  Immunocompromised COVID-19 patients may take longer to develop antibodies.   ? COVID-19 IgG Titer 09/21/2020 Not Applicable   Final    Comment: Qualitative screen for total antibodies to COVID-19 (SARS-CoV-2) with  semi-quantitative measurement of IgG COVID-19 antibodies by endpoint titer.  COVID-19 antibodies may be elevated due to a past or current infection.  Negative results do not rule out COVID-19 infection.  Results from antibody  testing should not be used as the sole basis to diagnose or exclude SARS-CoV-2  infection or to inform infection status.  COVID-19 PCR test should be ordered  if current infection is suspected.  False positive results may occur in rare  cases due to cross-reacting antibodies.  This test was developed and its performance characteristics determined by the  [facility] ([facility]),  which is regulated under CLIA  as qualified to perform high-complexity testing.  This test has not been reviewed by the FDA.  Testing performed by [facility], [facility], [address], [address], [place], [place]                            [address]   Scanned Document on 08/25/2020   Component Date Value Ref Range Status   ? OCCULT BLOOD (FIT)_EXT 08/24/2020 Negative   Final          Please call with questions or contact us using [employer].      Sincerely,        Electronically signed by Eileen Sanchez CNP

## 2021-06-21 NOTE — LETTER
Letter by Aileen Dueñas MD at      Author: Aileen Dueñas MD Service: -- Author Type: --    Filed:  Encounter Date: 12/17/2020 Status: (Other)         December 17, 2020     Eileen Sanchez CNP  [address] [address]    Patient: Annel Stoddard   MR Number: [id number]   YOB: 1955   Date of Visit: 12/17/2020     Dear Ms. Daniel CNP:    Thank you for referring Annel Stoddard to me for evaluation.  Testing was negative for cat.  I have included my note for your review.    If you have questions, please do not hesitate to call me.    Sincerely,        Aileen Dueñsa MD        CC  No Recipients    Progress Notes:Chief complaint: Cat allergy    History of present illness: This is a pleasant 65-year-old woman I was asked to see for evaluation by Eileen Sanchez in regards to cat allergy.  She states she would like to get a cat when she has been exposed to cats previously, she will noticed shortness of breath, cough and wheeze.  She states she has emphysema.  She does take medication for this and is going to follow-up with pulmonary soon in regards to her breathing difficulty.  She states she does not have any symptoms currently.  She is not sure if she has other allergies but thinks that they are mostly centered around cats.  She was recently seen by ENT and suggested to use a saline mist.  She does not take any allergy medication regularly.    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         Current Outpatient Medications:   ?  albuterol (PROVENTIL) 2.5 mg /3 mL (0.083 %) nebulizer solution, Take 3 mL (2.5 mg total) by nebulization every 6 (six)  hours as needed for wheezing., Disp: 6 vial, Rfl: 3  ?  ARIPiprazole (ABILIFY) 30 MG tablet, Take 1 tablet (30 mg total) by mouth daily., Disp: 90 tablet, Rfl: 2  ?  aspirin-calcium carbonate 81 mg-300 mg calcium(777 mg) Tab, Take 81 mg by mouth daily., Disp: , Rfl:   ?  biotin 10,000 mcg cap, Take 1 capsule by mouth daily., Disp: , Rfl:   ?  blood glucose test (FREESTYLE INSULINX TEST STRIPS) strips, Use 1 each As Directed 4 (four) times a day. Please strips for freestyle edith system, Disp: 150 strip, Rfl: 3  ?  calcium citrate/vitamin D3 (CALCIUM CITRATE + D ORAL), Take 2 tablets by mouth every morning., Disp: , Rfl:   ?  carboxymethylcellulose (REFRESH PLUS) 0.5 % Dpet ophthalmic dropperette, , Disp: , Rfl:   ?  coenzyme Q10 200 mg capsule, Take 200 mg by mouth daily., Disp: , Rfl:   ?  etodolac (LODINE) 200 MG capsule, Take 1-2 capsules (200-400 mg total) by mouth 3 (three) times a day as needed (for pain)., Disp: 60 capsule, Rfl: 2  ?  fish oil-omega-3 fatty acids (FISH OIL) 300-1,000 mg capsule, Take 1 g by mouth daily., Disp: , Rfl:   ?  flash glucose scanning reader (FREESTYLE EDITH 2 READER) Misc, Use 1 each As Directed daily., Disp: 1 each, Rfl: 0  ?  flash glucose sensor (FREESTYLE EDITH 2 SENSOR) Kit, Use 1 each As Directed daily., Disp: 2 kit, Rfl: 9  ?  hydrocortisone 1 % cream, Apply 1 application topically as needed., Disp: , Rfl:   ?  insulin glargine (LANTUS SOLOSTAR PEN) 100 unit/mL (3 mL) pen, Inject 100 Units under the skin at bedtime. 11.65 Type 2 with hyperglycemia, Disp: 10 mL, Rfl: 0  ?  insulin lispro protamine-insulin lispro (HUMALOG MIX 50-50 KWIKPEN) 100 unit/mL (50-50) InPn pen, Patient is on a sliding scale 150 and under 35 units 151-200  Plus 2 units 201-250  Plus 4 units 251-300  Plus 6 units 301-350  Plus 8 units, Disp: 15 mL, Rfl: 3  ?  insulin regular HIGH CONC (HUMULIN R U-500, CONC, KWIKPEN) 500 unit/mL (3 mL) PEN solution, Inject three times per day before meals. Breakfast 90  unit, lunch 70 units, dinner 70 units., Disp: 18 mL, Rfl: 1  ?  ipratropium-albuteroL (DUO-NEB) 0.5-2.5 mg/3 mL nebulizer, Take 3 mL by nebulization every 6 (six) hours as needed., Disp: 90 vial, Rfl: 1  ?  ketoconazole (NIZORAL) 2 % cream, applly externally twice daily for two weeks, Disp: 15 g, Rfl: 0  ?  LACTOBACILLUS ACIDOPHILUS ORAL, Take 1 tablet by mouth daily., Disp: , Rfl:   ?  lamoTRIgine (LAMICTAL) 150 MG tablet, Take 1 tablet (150 mg total) by mouth daily., Disp: 90 tablet, Rfl: 3  ?  liraglutide (VICTOZA) 0.6 mg/0.1 mL (18 mg/3 mL) injection, Inject 0.2 mL (1.2 mg total) under the skin daily. With or without food., Disp: 30 mL, Rfl: 3  ?  losartan (COZAAR) 50 MG tablet, Take 50 mg by mouth daily., Disp: , Rfl:   ?  magnesium oxide (MAG-OX) 400 mg (241.3 mg magnesium) tablet, Take 1,600 mg by mouth daily., Disp: , Rfl:   ?  metFORMIN (GLUCOPHAGE XR) 500 MG 24 hr tablet, Take 4 tablets (2,000 mg total) by mouth daily with supper., Disp: 360 tablet, Rfl: 1  ?  methocarbamoL (ROBAXIN) 500 MG tablet, Take 1 tablet (500 mg total) by mouth 2 (two) times a day as needed., Disp: 60 tablet, Rfl: 1  ?  metroNIDAZOLE (METROGEL) 0.75 % gel, Apply 1 application topically 2 (two) times a day., Disp: 45 g, Rfl: 0  ?  montelukast (SINGULAIR) 10 mg tablet, Take 1 tablet (10 mg total) by mouth daily., Disp: 90 tablet, Rfl: 2  ?  multivitamin (MULTIVITAMIN) per tablet, Take 1 tablet by mouth daily., Disp: , Rfl:   ?  mupirocin (BACTROBAN) 2 % cream, Apply 1 application topically 3 (three) times a day., Disp: , Rfl:   ?  MILAN 128 5 % ophthalmic ointment, APPLY A SMALL AMOUNT IN OU QPM, Disp: , Rfl: 3  ?  nystatin (MYCOSTATIN) ointment, Apply 1 application topically 4 (four) times a day., Disp: 30 g, Rfl: 0  ?  omeprazole (PRILOSEC) 20 MG capsule, Take 2 capsules (40 mg total) by mouth daily before breakfast. Take 30 minutes before breakfast, Disp: 60 capsule, Rfl: 0  ?  pen needle, diabetic (BD ULTRA-FINE MICRO PEN NEEDLE)  "32 gauge x 1/4\" Ndle, Use 1 Device As Directed 3 (three) times a day., Disp: 900 each, Rfl: 3  ?  rosuvastatin (CRESTOR) 10 MG tablet, Take 1 tablet (10 mg total) by mouth at bedtime., Disp: 90 tablet, Rfl: 2  ?  VENTOLIN HFA 90 mcg/actuation inhaler, Inhale 1 puff every 4 (four) hours as needed. (Patient taking differently: Inhale 2 puffs every 4 (four) hours as needed. ), Disp: 3 Inhaler, Rfl: 3  ?  vitamin B complex (B COMPLEX-VITAMIN B12 ORAL), Take 1,000 mcg by mouth daily., Disp: , Rfl:     Allergies   Allergen Reactions   ? Desmopressin Swelling     LE  LE     ? Antihistamines - Alkylamine Other (See Comments)     Chest pains   ? Cephalexin      rash  rash  rash     ? Codeine Headache     Headache  Headache  Headache     ? Diazepam Dizziness and Other (See Comments)   ? Diphenhydramine Hcl      Chest tightness  Chest tightness  Chest tightness     ? Doxycycline      Does not work. Ineffective per patient.   ? Erythromycin Base      rash  rash     ? Estrogens Headache   ? Hydrochlorothiazide Other (See Comments) and Unknown     Patient reports can't take this med due to increased urine output  Patient reports can't take this med due to increased urine output  Patient reports can't take this med due to increased urine output     ? Hydrocodone-Acetaminophen      Justin change  Justin change  Justin change     ? Invokamet [Canagliflozin-Metformin] Itching   ? Lithium Diarrhea     Diabetes insipidus  Diabetes insipidus  Causing DI  Causing DI     ? Penicillins Unknown   ? Risperidone    ? Beclomethasone Dipropionate Rash     qvar  qvar     ? Latex Rash and Other (See Comments)     rash   ? Valacyclovir Rash       Pulse 80   Ht 5' 3\" (1.6 m)   Wt 183 lb (83 kg)   SpO2 99%   BMI 32.42 kg/m    Gen: Pleasant female not in acute distress  HEENT: Eyes no erythema of the bulbar or palpebral conjunctiva, no edema. Ears: No deformities or lesions. Nose: No congestion, mucosa normal. Mouth: Throat clear, no lip or tongue " edema.   Cardiac: Regular rate and rhythm, no murmurs, rubs or gallops  Respiratory: Clear to auscultation bilaterally, no adventitious breath sounds  Lymph: No visible supraclavicular or cervical lymphadenopathy  Skin: No rashes or lesions  Psych: Alert and oriented times 3    Last Percutaneous Allergy Test Results  Dust Mites  D. Pteronyssinus Mite 30,000 AU/ML H (W/F in mm): 0/0 (12/17/20 1109)  D. Farinae Mite 30,000 AU/ML H (W/F in mm: 0/0 (12/17/20 1109)  Animal  Cat 10,000 BAU/ML H (W/F in mm): 0/0 (12/17/20 1109)  Controls  Neg. control: 50% Glycerine/Saline H (W/F in mm): 0/0 (12/17/20 1109)  Pos. control: Histamine 6mg/ML (W/F in mms): 6/0 (12/17/20 1109)      Impression report and plan:  1.  Allergic reaction    Allergy testing today was negative for cat and dust mite.  Stated that testing is not 100% would recommend that she spend significant time with the cat prior to bringing at home.  Follow-up with pulmonary for further work-up of her breathing concerns.  Otherwise, follow as needed.

## 2021-06-23 ENCOUNTER — AMBULATORY - HEALTHEAST (OUTPATIENT)
Dept: EDUCATION SERVICES | Facility: CLINIC | Age: 66
End: 2021-06-23

## 2021-06-23 ENCOUNTER — RECORDS - HEALTHEAST (OUTPATIENT)
Dept: FAMILY MEDICINE | Facility: CLINIC | Age: 66
End: 2021-06-23

## 2021-06-23 DIAGNOSIS — E11.9 DIABETES MELLITUS, TYPE 2 (H): ICD-10-CM

## 2021-06-25 NOTE — TELEPHONE ENCOUNTER
Reason for Call:  Medication or medication refill:    Do you use a [facility]?  Name of the pharmacy and phone number for the current request: [facility] on file    Name of the medication requested: Etodolac 200 mg/ Tizanidine 2 mg    Other request: Patient called to request if provider would send RX to pharmacy for the following medication, Etodolac 200mg and Tizanidine 2mg.  Her spine provider advised her to ask PCP to refill these medication. Please send RX to pharmacy on file    Can we leave a detailed message on this number? Yes    Phone number patient can be reached at: Home number on file [phone] (home)    Best Time: any    Call taken on 6/10/2021 at 8:49 AM by Aisha Caicedo

## 2021-06-25 NOTE — TELEPHONE ENCOUNTER
I left a voice message for Annel to let her know that after speaking with Dr. Ibrahim, we would like her to increase her 3rd dose of Humulin R U 500 to 75 units.  Annel has my office phone number to call if she has further questions.      Johana Evans RD, [facility]

## 2021-06-25 NOTE — PROGRESS NOTES
5/28/2021   Clinic Care Coordination Contact    Community Health Worker Follow Up    Goals:   Goals Addressed                 This Visit's Progress       Patient Stated      Medical (pt-stated)   80%     Goal Statement: I would like to establish care with pulmonary provider that is within my insurance network within 2 months   Date Goal set: 02/02/21 updated 5/28/2021    Barriers: location,scheduling barriers  Strengths: patient engagement  Date to Achieve By: 7-28-21  Patient expressed understanding of goal: yes  Action steps to achieve this goal:  1. I will attend sleep study on 6-6-21 at [facility] [phone]  2. I will attend appt with pulmomary at [facility] with Dr. Perdomo (sp?)  2. I will talk to CC RN on 6-23-21 for support with plan of care.  3. I will update my Community Health Worker if any additional resources or support needed.    Updated: 5-28-21 AL    02/04/21  RN CC spoke with pulmonology -  Facilitated changing CT and blood gases to an earlier appointment  Pulmonology team will connect with radiology and update patient with schedule information   Patient expressed interest in changing provider - set up an establish care visit with Dr Oliveira for 3/29/21 @ 8:30 following PFT results to review next steps and plan of care          Called and spoke to patient and follow up on goal.  Patient reported:  -she withdrew from school to much homework and taking a lot of time.  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 Connected with [employer] for classes.  -got [employer] deliver to her home on Mondays  -has an appt on 6-6-21 for sleep study at [facility]  -will see pulmonary at [facility] Dr Perdomo(sp?)   -Audiologist at [facility]  -o much trouble breathing at night.  Offered to consult with Triage nurse.  Patient declined to consult with Triage Nurse.  Offered to provide the 24/7 Care Connection number [phone] to talk to triage nurse if needed.    Reminded of appt with KRISTINA Vaughn  on 6-15-21 and PCP on 6-16-21.  appt with CCC RN on 6-23-21  Patient confirmed appts      CC RN follow up on 6-23-21   CHW Follow up: Monthly    CHW Plan: Follow up on goals    CHW Next Follow Up: 7-23-21

## 2021-06-25 NOTE — TELEPHONE ENCOUNTER
"PSP:  Dr. Santana  Last clinic visit:  10/26/20 [facility]  Reason for call: \"Can I come back there and get my medications from Dr. Santana if this new clinic doesn't prescribe?\"  Clinical information:  Reports she was seen at [facility] \"but they won't prescribe my medications to me (Etodolac and Tizanidine), so he referred me to a different doctor at [facility] in [place]. If this doctor doesn't give me any will I be able to get them from Dr. Santana?\"   Advice given to patient: Explained these are medications that do not necessarily have to come from Dr. Santana. If stable with current regime, she could certainly ask her PCP to take over prescribing. Did explain that if PCP does not take over, patient should return for a follow up with PSP as it has been 8 months since last seen in clinic. Stated understanding.       "

## 2021-06-26 NOTE — PROGRESS NOTES
Clinic Care Coordination Contact    Situation: Patient chart reviewed by care coordinator.    Background: SW completed chart review    Assessment: Patient continues to meet with providers. Patient working on pulmonology goal.    Plan/Recommendations: Standard Outreach

## 2021-06-26 NOTE — TELEPHONE ENCOUNTER
Patient called back and I relayed the message, she said she needs to handle other appts for now and will get back to us regarding this issue.

## 2021-06-26 NOTE — TELEPHONE ENCOUNTER
Please call patient and let her know that I am uploading the advance directive in her chart but we do not need a copy of her well.  Would she like this copy back?

## 2021-06-26 NOTE — TELEPHONE ENCOUNTER
Please call patient and let her know:  1) I cannot send you for imaging studies at [facility] these will have to be done in the [facility] system (if we do them)  2) I do not see any documentation of an actual hernia in your abdomen.  I did not feel one on exam.  I have no imaging studies that suggest this.  I was able to find multiple ultrasound showing gallstones.  And I felt like your pain was consistent in that area.  If you desire I can refer you to the surgeon to discuss gallbladder removal.  And they could evaluate for hernia at same time.

## 2021-06-26 NOTE — TELEPHONE ENCOUNTER
Per insurance perhaps she can go to [place]. But I can't order imaging to be done at [place]. I hate to say this but if she wants all her specialists at [place] she might want to think about getting a primary doctor there too.

## 2021-06-26 NOTE — TELEPHONE ENCOUNTER
Type of referral:  Abdominal U/S  What provider or facility are you being referred to? Not sure of facility or where she should go  Do you have an appointment scheduled?  No  What is your question?  When, Where is she going for U/S  Okay to leave a detailed message: Yes

## 2021-06-26 NOTE — TELEPHONE ENCOUNTER
----- Message from Brynn Ibrahim DO sent at 6/17/2021  1:56 PM CDT -----  Please call patient and inform:  Protein on your urine has actually improved!

## 2021-06-26 NOTE — TELEPHONE ENCOUNTER
Reason for Call:  Other      Detailed comments: pt called to be sure and remind dr davidson that the rx etodolac needs to be 1-2 3 times daily as needed. Also she said to tell dr davidson that she checked with her insurance and they said she can go to [place],,, she cannot as that is out of network!    Phone Number Patient can be reached at: Home number on file [phone] (home)    Best Time: any    Can we leave a detailed message on this number?: Yes    Call taken on 6/23/2021 at 8:39 AM by [place]

## 2021-06-29 NOTE — PROGRESS NOTES
Progress Notes by Whitney Richards, Diabetes Ed at 10/1/2020 11:00 AM     Author: Whitney Richards, Diabetes Ed Service: -- Author Type: Diabetes Ed    Filed: 10/3/2020  4:33 PM Encounter Date: 10/1/2020 Status: Attested    : Whitney Richards, Diabetes Ed (Diabetes Ed) Cosigner: Gaye Treviño MD at 10/4/2020 11:27 AM    Attestation signed by Gaye Treviño MD at 10/4/2020 11:27 AM    Agree with plan                  Diabetes Educator has received verbal consent for a telephone visit for the patient.  Declined video visit      Assessment:   F/u on diabetes management  Had the diabetes diagnosis 16 years ago   Has been testing BS regularly; therapy was adjusted on 9/21 - majority of her fasting readings are elevated still   Not much family support.Her daughters live in [facility].  Has joined a support group, called [employer] - meets 1x/wk to discuss nutrition and wt loss. Also has an RN friend that lives in her building  Pt had switched to [facility] due to coverage issues but is happy to reestablish care at [facility]  Has a new insurance ([employer]) now and is on 100% financial aid     Taking:  Metformin XR 2000 mg every day ,  100 U lantus everyday at bedtime,   34 U of meal time insulin 4x/d (with meals) - doing a sliding scale   151-200, additional 2 units.   201-250, additional 4 units  251-300, additional 6 units   Victoza 0.6 mg in the am today (started this morning)-  Reports no SE    Glipizide was d/cd on 9/21 and basal insulin was increased to 100 U every day     SMBG:  Has been testing 5-6x/d. Per pt: she cannot use a CGM - allergic reaction   Per pt:  FBS: 181, 150, 168, 126, 178,131, 174   BS (before meals): 162, 152, 145, 178, 151, 85,   HS: 140, 139, 98  Lows: reports no s/s  We reviewed sx and tx of hypoglycemia. Encouraged having glucose tablets available at all times    Drinks lots of water daily    Very active. Does not have a car, so walks everywhere and get in at least 1-2 miles/d    Consumes 4  meals/d. Goes to bed at 8 pm and gets up very early  Food recall:  BF( 5 am): tea/coffee, small banana, yogurt  7 am : 2 eggs, 1 slice of bread, coffee   9 am: 2 slices of bread, veg juice  12 pm: apple, orange, canned lasagna  4 pm: 2 eggs, 2 slices of bread, 1 small apple, 1 orange  HS: 2 spoons of yogurt      Plan:   Continue with the current therapy. Pt is taking 100 U of basal insulin: we could consider switching to a more concentrated basal insulin like Degludec U200 - did not discuss today since she had just started taking Victoza this morning and hence too many therapy changes could be overwhelming - to be discussed at f/u visit  DM management plan - regular monitoring of BG, daily physical activity as able/safe, eating healthy & watching portions of carbs  Patient to test BS three times/day: fasting, two hours post meal and bedtime  Pt to work on goals outlined in the goals section below.  To follow up in ~ 4 weeks (or sooner if concerns)       Subjective and Objective:      Annel Stoddard is referred by Dr. Treviño for Diabetes Education.     Lab Results   Component Value Date    HGBA1C 8.6 (H) 05/06/2020       Goals:  - test BS 3 times daily: fasting, 2 hours post meal, bedtime   - eat 3 small meals, limiting it to 45 grams of CHO per meal. 15 grams of CHO for snacks  - adequate hydration  - take meds as prescribed         Education:     Monitoring   Meter (per above goals): Assessed and Discussed  Monitoring: Assessed and Discussed  BG goals: Assessed and Discussed    Nutrition Management  Nutrition Management: Assessed  Weight: Assessed  Portions/Balance: Assessed and Discussed  Carb ID/Count: Assessed and Discussed  Label Reading: Not addressed  Heart Healthy Fats: Not addressed  Menu Planning: Assessed  Dining Out: Assessed  Physical Activity: Assessed and Discussed  Medications: Assessed and Discussed  Orals: Assessed and Discussed  Injected Medications: Assessed and Discussed   Storage/Exp:Assessed  and Discussed   Site Rotation: Assessed   Sites Assessed: no    Diabetes Disease Process: Assessed    Acute Complications: Prevent, Detect, Treat:  Hypoglycemia: Assessed and Discussed  Hyperglycemia: Assessed and Discussed  Sick Days: Assessed  Driving: Assessed and Discussed    Chronic Complications  Foot Care:Assessed  Skin Care: Assessed  Eye: Assessed  ABC: Assessed  Teeth:Assessed  Goal Setting and Problem Solving: Assessed and Discussed  Barriers: Assessed and Discussed  Psychosocial Adjustments: Assessed      Time spent with the patient: 60 minutes for diabetes education and counseling.   Previous Education: yes  Visit Type:HENRIK Richards  10/1/2020

## 2021-06-29 NOTE — PROGRESS NOTES
Progress Notes by Myhre, David J, RN at 6/10/2020  2:00 PM     Author: Myhre, David J, RN Service: -- Author Type: Registered Nurse    Filed: 6/17/2020  3:18 PM Encounter Date: 6/10/2020 Status: Signed    : Myhre, David J, RN (Registered Nurse)       Clinic Care Coordination Contact    Clinic Care Coordination Contact  OUTREACH    Referral Information:  Referral Source: PCP    Primary Diagnosis: Psychosocial    Chief Complaint   Patient presents with   ? Clinic Care Coordination - Initial        Universal Utilization:   Clinic Utilization  Difficulty keeping appointments:: No  Compliance Concerns: No  No-Show Concerns: No  No PCP office visit in Past Year: No  Utilization    Last refreshed: 6/17/2020  2:08 PM:  Hospital Admissions 0           Last refreshed: 6/17/2020  2:08 PM:  ED Visits 0           Last refreshed: 6/17/2020  2:08 PM:  No Show Count (past year) 0              Current as of: 6/17/2020  2:08 PM              Clinical Concerns:  Current Medical Concerns: Patient primary concern is getting medical insurance that covers her medications. With her current medical plan, patient is paying out of pocket for some of his medications and has limited income. CCC RN sent referral to the [facility] FRW to discuss eligibility for MA or other insurance programs that may better meet her needs.   Patient has a history of type 2 diabetes. He last A1C was 8.6 on 5/6/20 up from 8.4 on 11/26/20. Patient stated her blood glucose levels have been elevated. Her blood glucose level was 219 early this morning. Patient stated she take her diabetic medications daily and checks her blood glucose levels at least 4 to 5 times a day. Patient has a follow up with PCP next week to discuss her elevated glucose levels and will make contact with her diabetic educator Johana. Patient agreed to work on a goal to get her diabetes under better control in the next 6 months. She stated she hopes to accomplish this by taking her diabetic  medications daily as directed, exercising on a regular basis, adhering to her diabetic diet and by following up with her PCP and her diabetic educator as scheduled. Patient is also part of the [facility] program at [facility] [employer] [facility] [employer] where she reside. She stated she will continue to follow up with Bronwyn delacruz RN for the [facility] program regarding her diabetic concerns.   Patient said his is interested in resources for low cost dental and low cost hearing aides. Will defer to [facility] CHW to assist with these resources.  Current Behavioral Concerns: Patient reported a history bipolar disorder. She said she mental health is currently stable. Patient stated she takes her Abilify and Lamictal daily as directed. She declined any need to work with a therapist at this time. Patient denied any suicidal ideation at this time.   Education Provided to patient:    Pain  Pain (GOAL):: No  Health Maintenance Reviewed: Up to date       Medication Management:  Patient manages her medication independently. She denied needing assistance with medication set up. Patient was able to name each medication she is taking, stated the correct dosage and state the reason she is taking each medication.     Functional Status:  Dependent ADLs:: Independent  Dependent IADLs:: Independent  Bed or wheelchair confined:: No  Mobility Status: Independent  Fallen 2 or more times in the past year?: No  Any fall with injury in the past year?: No    Living Situation:  Current living arrangement:: I live alone  Type of residence:: Apartment - handicap accessible    Lifestyle & Psychosocial Needs:        Diet:: Diabetic diet  Inadequate nutrition (GOAL):: No  Tube Feeding: No  Inadequate activity/exercise (GOAL):: Yes  Significant changes in sleep pattern (GOAL): No  Transportation means:: [employer] mobility     Advent or spiritual beliefs that impact treatment:: No  Mental health DX:: Yes  Mental health DX how managed:: Medication  Mental health management concern (GOAL)::  No  Informal Support system:: Friends, Neighbors   Socioeconomic History   ? Marital status: Single     Spouse name: Not on file   ? Number of children: Not on file   ? Years of education: Not on file   ? Highest education level: Not on file     Tobacco Use   ? Smoking status: Never Smoker   ? Smokeless tobacco: Never Used         Financial Concerns: Patient's current income is $1299 monthly. She stated she currently has difficulty making ends meet related to medical expenses. She stated wondered if she would be eligible for MA. Writer will send referral to [facility] FRW to determine if she is eligible for MA.       Resources and Interventions:  Current Resources:      Community Resources: None  Supplies used at home:: Diabetic Supplies, Compression Stockings  Equipment Currently Used at Home: cane, straight, grab bar, tub/shower, glucometer    Advance Care Plan/Directive  Advanced Care Plans/Directives on file:: Yes  Type Advanced Care Plans/Directives: Advanced Directive - On File    Referrals Placed: None     Goals:   Goals        General    Financial Wellbeing (pt-stated)     Notes - Note created  6/17/2020  2:12 PM by Myhre, David J, RN    Goal Statement: I would like to know if I am eligible for Medical Assistance in the next 2 months.   Date Goal set: 6/10/20  Barriers: Patient currently is finding it difficult to make ends meet related to out of pocket medical expenses.   Strengths: Patient has a strong willingness to do what she has to do to make her current situation better.   Date to Achieve By: 8/10/20  Patient expressed understanding of goal: Yes  Action steps to achieve this goal:  1. I understand the [facility] RNRon will send a referral to the [facility] DALTON to discuss eligibility for Medical Assistance.    2. I understand [employer] will contact me directly.   3. I will provide [employer] with any necessary documentation and will complete any paperwork associated with this goal in a timely manner.          Improve  chronic symptoms (pt-stated)     Notes - Note created  6/17/2020  2:27 PM by Myhre, David J, RN    Goal Statement: I would like to know if there are resources for lower cost hearing aides in the next two months.   Date Goal set: 6/10/20  Barriers: Limited income.  Strengths: Strong advocate herself. Willing to do what she has to do in order to improve her current situation.   Date to Achieve By: 8/10/20  Patient expressed understanding of goal: Yes  Action steps to achieve this goal:  1. I understand the CCC Community Health Worker, Vaishali look into resources for low cost hearing aides and will contact me with available resources.   2. I will look over the resources and determine which resource/program meets my needs.   3. I will contact the hearing aide resource directly.         Medical (pt-stated)     Notes - Note edited  6/17/2020  2:36 PM by Myhre, David J, RN    Goal Statement: I would like to getting my diabetes under better control in the next 6 months,including getting my A1C at for below 7.0  Date Goal set: 6/10/20  Strengths: Patient has a strong willingness to make changes to improve her health.   Date to Achieve By: 12/10/20  Patient expressed understanding of goal: Yes  Action steps to achieve this goal:  1. I will continue to take my diabetic medications daily as directed and will check my blood glucose level at least 4 times a day.   2. I will continue to attend all scheduled appointments with my PCP, Dr. Treviño and my diabetic educator, Johana  3. I will adhere to my diabetic diet as best as I can.   4. I will follow up with the [facility] RN on a monthly basis to discuss my progress with this goal.        Psychosocial (pt-stated)     Notes - Note created  6/17/2020  2:19 PM by Myhre, David J, RN    Goal Statement: I would like to have resources for a low cost dental clinic in the next 2 months.   Date Goal set: 6/10/20  Barriers: Limited income. No dental insurance.   Strengths: Patient is a strong  advocate for herself. She stated she wants to stay on top of her health.   Date to Achieve By: 8/10/20  Patient expressed understanding of goal: Yes  Action steps to achieve this goal:  1. I understand the [facility] Community Health Worker, Vaishali will be contacting me directly with resources for low cost dental clinics.   2. I will look over the resources and decide which resource best suits my dental needs.               Patient/Caregiver understanding: Patient verbalized understanding of goals and other information discussed during today's assessment.        Future Appointments              In 1 week Johana Evans Diabetes Ed [facility] Diabetes Education, RSC Clinic    In 2 months Gaye Treviño MD [facility] Internal Medicine, DTP Clinic    In 5 months [facility]    In 5 months MID [facility] [place] [facility], MID Clinic          Plan: CCC RN will continue to monitor, support patient with current goals and will be available to assist as additional nursing needs arise. [facility] FRW will discuss eligibility for MA or other insurance programs that may be available to her. [facility] CHW will provide resources on for low cost dental clinics and possible resources for low cost hearing aides.

## 2021-06-30 NOTE — PROGRESS NOTES
Progress Notes by George Rollins CHW at 2/25/2021 11:41 AM     Author: George Rollins CHW Service: -- Author Type: Community Health Worker    Filed: 2/26/2021  9:53 AM Encounter Date: 2/25/2021 Status: Signed    : George Rollins CHW (Community Health Worker)       Tami Sommers Aun, CHW             I checked billing, they received the application but it was denied due to no balance. She cannot send in the application until after 3/21 and she also has to have an outstanding balance in order to send it in.
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
## 2021-06-30 NOTE — PROGRESS NOTES
"Progress Notes by Hilaria Parker AuD at 2/9/2021  8:00 AM     Author: Hilaria Parker AuD Service: -- Author Type: Audiologist    Filed: 2/9/2021  9:25 AM Encounter Date: 2/9/2021 Status: Signed    : Hilaria Parker AuD (Audiologist)       Audiology Report:    Referring Provider:  Dr. Perez    Summary: Audiology visit completed. Please see audiogram below or in \"media\" tab for case history and results.      Transducer: Insert earphones and Circumaural headphones    Reliability was good  and there was good  SRT to PTA agreement. Asymmetry of 10-20 dB from 9796-5705 Hz and from 2065-9972 Hz where the right ear is worse than the left ear.        PLAN: Annel is returned to ENT for follow up. She should return as required for medical management.     Criselda Kirby, [facility]  [place] Licensed Audiologist #[id number]                    "

## 2021-06-30 NOTE — PROGRESS NOTES
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  --she has poor sleep, waking every hour to use the restroom  --patient states that her eating pattern is unchanged:  4-5 am: tea or coffee with yogurt  10 am: cottage cheese/fruit  12-1 pm: 1/2 sandwich/vegetables  4-5 pm: sandwich or cottage cheese/fruit  --she is walking at least every other day for 20-60 minutes, hydrating with water.     Blood glucose record:  See CGM data  BG < 70 mg/dl x 3 in the last two weeks, in the late evening, after midnight time period          Medications prescribed:  Victoza 1.8 mg/daily  Humulin R 500 85-70-70  Metformin 2000 mg    Plan:   1. Per CDE protocol patient to increase 1st injection of Humulin R 500 to 90 units.   2. Scan sensor a minimum of every eight hours when awake.  3. Treat BG < 70 mg/dl with 5-6 hard candies.  4. Walk a minimum of 3-4x/week 20-60 minutes  4. Follow up with educator on 6/15/21      Subjective and Objective:      Annel Stoddard is referred by Brynn Ibrahim for Diabetes Education.     Lab Results   Component Value Date    HGBA1C 7.5 (H) 02/24/2021       Wt Readings from Last 3 Encounters:   05/05/21 189 lb 8 oz (86 kg)   03/29/21 188 lb (85.3 kg)   03/24/21 186 lb 6.4 oz (84.6 kg)               Education:     Monitoring   Meter (per above goals): patient uses freestyle lore 2   Monitoring: Assessed and Discussed  BG goals: Assessed and Discussed    Nutrition Management  Nutrition Management: Assessed and  Discussed  Weight: Assessed and Discussed  Portions/Balance: Assessed and Discussed  Carb ID/Count: Not addressed  Label Reading: Not addressed  Heart Healthy Fats: Assessed  Menu Planning: Assessed and Discussed  Dining Out: Not addressed  Physical Activity: Assessed and Discussed    Orals: Assessed and Discussed  Injected Medications: Assessed and Discussed   Storage/Exp:Competent   Site Rotation: Competent   Sites Assessed: no    Diabetes Disease Process: Assessed and Discussed    Acute Complications: Prevent, Detect, Treat:  Hypoglycemia: Assessed and Discussed  Hyperglycemia: Assessed and Discussed  Sick Days: Not addressed  Driving: Assessed, Discussed and patient recently passed drivers test and has form for MD to fill out, patient currently does not have a car    Chronic Complications  Foot Care:Competent  Skin Care: Competent  Eye: last eye exam   ABC: Assessed  Teeth:Not addressed  Goal Setting and Problem Solving: Assessed and Discussed  Barriers: Assessed  Psychosocial Adjustments: Assessed      Time spent with the patient: 30 minutes for diabetes education and counseling.   Previous Education: yes  Visit Type:DSMT  Hours Remainin, DSMT .5 and MNT 1      Johana Evans  2021

## 2021-06-30 NOTE — PROGRESS NOTES
Progress Notes by Eileen Sanchez CNP at 2020  7:00 AM     Author: Eileen Sanchez CNP Service: -- Author Type: Nurse Practitioner    Filed: 2020  7:33 AM Encounter Date: 2020 Status: Signed    : Eileen Sanchez CNP (Nurse Practitioner)            [facility]  Patient Name: Annel Stoddard  Patient Age: 65 y.o.  YOB: 1955  MRN: [id number]    Date of Visit: 2020  Reason for Office Visit:   Chief Complaint   Patient presents with   ? Establish Care           Assessment / Plan / Medical Decision Makin. Encounter to establish care    2. Type 2 diabetes mellitus with complication, with long-term current use of insulin (H)  - Lipid [place] FASTING  - Comprehensive Metabolic Panel  - HM1(CBC and Differential)  - Glycosylated Hemoglobin A1c  - Microalbumin, Random Urine  - Ambulatory referral to Diabetes Education Program (CDE)  Patient reports that she is using her mealtime insulin with every meal and snack discussion was had with patient about she should only be using her insulin with her meals 3 times a day.  Certainly patient may benefit from transition to a U5 100 insulin given that she is currently utilizing 100 units of her basal insulin daily.    3. Essential hypertension, benign  Mildly elevated today though has been checking at home has been within normal limits recommend no changes at this time she will follow-up in 2 days if remains elevated at that time we will consider increasing her losartan    4. Ventral hernia without obstruction or gangrene  Remained stable did discuss with patient if worsening symptoms will recommend referral to general surgery for consult    5. Abnormal cervical Papanicolaou smear, unspecified abnormal pap finding  Continues to be follow-up with gynecology as scheduled    6. Personal history of thrombophlebitis  Patient reports no recurrence continue to monitor    7. VALE (nonalcoholic  steatohepatitis)  Recommend weight loss and following VALE diet    8. Bipolar 2 disorder (H)  Recommend continuing current medications continue follow-up with psychiatry as scheduled    9. Chronic obstructive pulmonary disease, unspecified COPD type (H)  Remained stable recommend no medication changes at this time    10. Age-related osteoporosis without current pathological fracture   Recommend weightbearing physical activity continue current medications as prescribed    11. Class 1 obesity with serious comorbidity and body mass index (BMI) of 31.0 to 31.9 in adult, unspecified obesity type  Recommend increasing physical activity to 30 minutes most days of the week weight loss is encouraged    Orders Placed This Encounter   Procedures   ? Lipid [place] FASTING   ? Comprehensive Metabolic Panel   ? Glycosylated Hemoglobin A1c   ? Microalbumin, Random Urine   ? HM1 (CBC with Diff)   ? Ambulatory referral to Diabetes Education Program (CDE)   Followup: Return in about 2 days (around 11/20/2020). earlier if needed.    Health Maintenance Review  Health Maintenance   Topic Date Due   ? COPD ACTION PLAN  1955   ? LIPID  1955   ? A1C  11/06/2020   ? MEDICARE ANNUAL WELLNESS VISIT  12/16/2020   ? DIABETIC FOOT EXAM  12/16/2020   ? FALL RISK ASSESSMENT  12/16/2020   ? MICROALBUMIN  05/06/2021   ? COLORECTAL CANCER SCREENING  09/01/2021   ? BMP  09/21/2021   ? DIABETIC EYE EXAM  11/09/2021   ? MAMMOGRAM  11/25/2021   ? DXA SCAN  10/12/2022   ? Pneumococcal Vaccine: 65+ Years (2 of 2 - PPSV23) 11/17/2023   ? ADVANCE CARE PLANNING  12/16/2024   ? TD 18+ HE  09/05/2029   ? HEPATITIS C SCREENING  Completed   ? HIV SCREENING  Completed   ? SPIROMETRY  Completed   ? INFLUENZA VACCINE RULE BASED  Completed   ? TDAP ADULT ONE TIME DOSE  Completed   ? ZOSTER VACCINES  Completed   ? Pneumococcal Vaccine: Pediatrics (0 to 5 Years) and At-Risk Patients (6 to 64 Years)  Aged Out         I am having Annel Stoddard maintain  her multivitamin, aspirin-calcium carbonate, flaxseed oil, LACTOBACILLUS ACIDOPHILUS ORAL, biotin, mupirocin, hydrocortisone, [place] 128, vitamin B complex (B COMPLEX-VITAMIN B12 ORAL), magnesium citrate, MAGNESIUM CITRATE ORAL, montelukast, Ventolin HFA, albuterol, rosuvastatin, hydrocortisone, Accu-Chek Fastclix Lancet Drum, ketoconazole, blood glucose test, metFORMIN, nystatin, metroNIDAZOLE, losartan, lamoTRIgine, ARIPiprazole, insulin glargine, naproxen, nabumetone, methocarbamoL, ipratropium-albuteroL, (pen needle, diabetic), HumaLOG Mix 50-50 KwikPen, liraglutide, and etodolac.      HPI:  Annel Stoddard is a 65 y.o. year old who presents to the office today with chronic conditions including type 2 diabetes, hyperlipidemia, simple chronic bronchitis, history of pulmonary emphysema, anatomical narrow angle glaucoma, hyperactivity of the bladder, bilateral low back pain with left-sided sciatica, bipolar 2 disorder, history of callus of the foot, anxiety, history of botulism, COPD, depression, hypertension, History of pneumonia.  Patient presents to clinic today to establish care she is new to my practice.    She is seeing the Spine clinic for sciatica.     She is checking her blood sugars.  She states it is 188 this morning ;    34 units plus 2 units for every 50 points above 150.  She is testing 6 times per day.  Lantus 100 units split into 2-50 units nightly.   Last diabetic eye exam was last week.     She underwent a leap procedure [facility], low grade changes noted.     Blood pressure checked 1 time per week by a nurse and states it has been good at home.      She has an abdominal hernia and reports intermittent discomfort.        Review of Systems- pertinent positive in bold:  Constitutional: Fever, chills, night sweats, fainting, weight change, fatigue, dizziness, sleeping difficulties, loud snoring/pauses in breathing  Eyes: change in vision, blurred or double vision, redness/eye pain  Ears, nose,  mouth, throat: change in hearing, ear pain, hoarseness, difficulty swallowing, sores in the mouth or throat  Respiratory: shortness of breath, cough, bloody sputum, wheezing  Cardiovascular: chest pain, palpitations   Gastrointestinal: abdominal pain, heartburn/indigestion, nausea/vomiting, change in appetite, change in bowel habits, constipation or diarrhea, rectal bleeding/dark stools, difficulty swallowing  Urinary: painful urination, frequent urination, urinary urgency/incontinence, blood in urine/dark urine, nocturia (new or increasing), muscle cramps, swelling of hands, feet, ankles, leg pain/redness  Skin: change in moles/freckles, rash, nodules  Hematologic/lymphatic: swollen lymph glands, abnormal bruising/bleeding  Endocrine: excessive thirst/urination, cold or heat intolerance  Neurologic/emotional: worrisome memory change, numbness/tingling, anxiety, mood swings      Current Scheduled Meds:  Outpatient Encounter Medications as of 11/18/2020   Medication Sig Dispense Refill   ? ACCU-CHEK FASTCLIX LANCET DRUM USE THREE TIMES DAILY 306 each 3   ? albuterol (PROVENTIL) 2.5 mg /3 mL (0.083 %) nebulizer solution Take 3 mL (2.5 mg total) by nebulization every 6 (six) hours as needed for wheezing. 6 vial 3   ? ARIPiprazole (ABILIFY) 30 MG tablet Take 1 tablet (30 mg total) by mouth daily. 90 tablet 2   ? aspirin-calcium carbonate 81 mg-300 mg calcium(777 mg) Tab Take 81 mg by mouth daily.     ? biotin 10,000 mcg cap Take 1 capsule by mouth daily.     ? blood glucose test (ACCU-CHEK GUIDE TEST STRIPS) strips USE TEST STRIPS THREE TIMES DAILY 300 strip 3   ? etodolac (LODINE) 200 MG capsule Take 1-2 capsules (200-400 mg total) by mouth 3 (three) times a day as needed (for pain). 60 capsule 2   ? flaxseed oil 1,000 mg cap Take 3 g by mouth daily.     ? hydrocortisone 1 % cream Apply 1 application topically as needed.     ? hydrocortisone 2.5 % ointment applly twice a day for two weeks 30 g 0   ? insulin glargine  (LANTUS SOLOSTAR PEN) 100 unit/mL (3 mL) pen Inject 100 Units under the skin at bedtime. 11.65 Type 2 with hyperglycemia 10 mL 0   ? insulin lispro protamine-insulin lispro (HUMALOG MIX 50-50 KWIKPEN) 100 unit/mL (50-50) InPn pen Patient is on a sliding scale  150 and under 35 units  151-200  Plus 2 units  201-250  Plus 4 units  251-300  Plus 6 units  301-350  Plus 8 units 15 mL 3   ? ipratropium-albuteroL (DUO-NEB) 0.5-2.5 mg/3 mL nebulizer Take 3 mL by nebulization every 6 (six) hours as needed. 90 vial 1   ? ketoconazole (NIZORAL) 2 % cream applly externally twice daily for two weeks 15 g 0   ? LACTOBACILLUS ACIDOPHILUS ORAL Take 1 tablet by mouth daily.     ? lamoTRIgine (LAMICTAL) 150 MG tablet Take 1 tablet (150 mg total) by mouth daily. 90 tablet 3   ? liraglutide (VICTOZA) 0.6 mg/0.1 mL (18 mg/3 mL) injection Inject 0.2 mL (1.2 mg total) under the skin daily. With or without food. 30 mL 3   ? losartan (COZAAR) 25 MG tablet Take 1 tablet (25 mg total) by mouth daily. (Patient taking differently: Take 50 mg by mouth daily before breakfast. ) 90 tablet 2   ? magnesium citrate 125 mg cap Take by mouth.     ? MAGNESIUM CITRATE ORAL Take 500 mg by mouth daily before breakfast.     ? metFORMIN (GLUCOPHAGE XR) 500 MG 24 hr tablet Take 4 tablets (2,000 mg total) by mouth daily with supper. 360 tablet 1   ? methocarbamoL (ROBAXIN) 500 MG tablet Take 1 tablet (500 mg total) by mouth 2 (two) times a day as needed. 60 tablet 1   ? metroNIDAZOLE (METROGEL) 0.75 % gel Apply 1 application topically 2 (two) times a day. 45 g 0   ? montelukast (SINGULAIR) 10 mg tablet Take 1 tablet (10 mg total) by mouth daily. 90 tablet 2   ? multivitamin (MULTIVITAMIN) per tablet Take 1 tablet by mouth daily.     ? mupirocin (BACTROBAN) 2 % cream Apply 1 application topically 3 (three) times a day.     ? MILAN 128 5 % ophthalmic ointment APPLY A SMALL AMOUNT IN OU QPM  3   ? nabumetone (RELAFEN) 500 MG tablet Take 1 tablet (500 mg total) by  "mouth 2 (two) times a day as needed for pain. 60 tablet 2   ? naproxen (NAPROSYN) 500 MG tablet Take 1 tablet (500 mg total) by mouth 2 (two) times a day as needed (for pain.  Take with food.). 30 tablet 0   ? nystatin (MYCOSTATIN) ointment Apply 1 application topically 4 (four) times a day. 30 g 0   ? pen needle, diabetic (BD ULTRA-FINE MICRO PEN NEEDLE) 32 gauge x 1/4\" Ndle Use 1 Device As Directed 3 (three) times a day. 900 each 3   ? rosuvastatin (CRESTOR) 10 MG tablet Take 1 tablet (10 mg total) by mouth at bedtime. 90 tablet 2   ? VENTOLIN HFA 90 mcg/actuation inhaler Inhale 1 puff every 4 (four) hours as needed. 3 Inhaler 3   ? vitamin B complex (B COMPLEX-VITAMIN B12 ORAL) Take 1,000 mcg by mouth daily.       No facility-administered encounter medications on file as of 11/18/2020.      Past Medical History:   Diagnosis Date   ? Anxiety    ? Botulism     1980   ? COPD (chronic obstructive pulmonary disease) (H)    ? Depression    ? Diabetes mellitus (H)    ? Hyperlipidemia    ? Hypertension    ? Pneumonia      No past surgical history on file.  Social History     Tobacco Use   ? Smoking status: Never Smoker   ? Smokeless tobacco: Never Used   Substance Use Topics   ? Alcohol use: Not Currently   ? Drug use: Not on file     Family History   Problem Relation Age of Onset   ? Stroke Father    ? Esophageal cancer Sister    ? Heart attack Brother      Social History     Social History Narrative    Originally from [facility]. Moved from [place].  4 times . Has 5 children ,9 grandchildren . Her daughters were taken away from her by biological dad when she was ill with botulism . All adults now . All of them are in [facility]. Youngest daughter is 38 . Initially estranged but has a closer relationship .Hadnt worked for a long time . Is a violinist and plays in [employer] . Wasn't diagnosed with bipolar much later in life . 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 Is on disability due to  ipolar .             She plays the violin.        Objective / Physical Examination:  Vitals:    11/18/20 0657   BP: 164/66   Pulse: 70   Weight: 178 lb (80.7 kg)   Height: 5' 3\" (1.6 m)     Wt Readings from Last 3 Encounters:   11/18/20 178 lb (80.7 kg)   09/21/20 177 lb (80.3 kg)   06/16/20 176 lb (79.8 kg)     Body mass index is 31.53 kg/m .     General Appearance: Alert and oriented, cooperative, affect appropriate, speech clear, in no apparent distress  Head: Normocephalic, atraumatic  Eyes:   Conjunctivae clear and sclerae non-icteric  Neck: Supple, trachea midline. No cervical adenopathy  Lungs: Clear to auscultation bilaterally. No adventitious lung sounds noted. Normal inspiratory and expiratory effort  Cardiovascular: Regular rate, normal S1, S2. No murmurs, rubs, or gallops  Extremities: Pulses 2+ and equal throughout. No edema.   Integumentary: Warm and dry.   Neuro: Alert and oriented, follows commands appropriately.     Ct Lumbar Spine Without Contrast    Result Date: 10/14/2020  EXAM: CT LUMBAR SPINE WO CONTRAST LOCATION: [facility] DATE/TIME: 10/14/2020 9:00 AM INDICATION: Back pain or radiculopathy, > 6 wks, Lumbar radicular pain left S1, and L5-S1 spondylolisthesis COMPARISON: Lumbar spine radiographs 05/16/2019 TECHNIQUE: Routine without IV contrast. Multiplanar reformats. Dose reduction techniques were used. FINDINGS: Nomenclature is based on 5 lumbar type vertebral bodies. Normal vertebral body heights. Mild broad lumbar levocurvature, apex at L2-L3. 3 mm grade 1 anterolisthesis of L5 on S1 with otherwise unremarkable sagittal alignment. No lytic or destructive osseous lesion. No extraspinal soft tissue abnormality. Unremarkable visualized bony pelvis. T12-L1: Normal disc height. No herniation. No facet arthropathy. No spinal canal stenosis. No right neural foraminal stenosis. No left neural foraminal stenosis. L1-L2: Mild loss " of disc height. Tiny left subarticular disc protrusion. Mild facet arthropathy. No spinal canal stenosis. No right neural foraminal stenosis. No left neural foraminal stenosis. L2-L3: Mild loss of disc height. Shallow left foraminal/far lateral recess disc protrusion. Mild facet arthropathy. No spinal canal stenosis. No right neural foraminal stenosis. No left neural foraminal stenosis. L3-L4: Mild loss of disc height. Shallow posterior disc bulge, eccentric to the left. No facet arthropathy. No central spinal canal stenosis, though there is mild narrowing of the left lateral recess. No right neural foraminal stenosis. Mild left neural foraminal stenosis. L4-L5: Mild to moderate loss of disc height. Broad-based circumferential disc osteophyte complex. Ligamentum flavum thickening. Mild to moderate left facet arthropathy. Mild to moderate spinal canal stenosis. Mild to moderate right neural foraminal stenosis. Mild left neural foraminal stenosis. L5-S1: Anterolisthesis with mild loss of disc height. Uncovering the disc space posteriorly with small broad-based posterior disc bulge. Moderate to severe facet arthropathy. Mild spinal canal stenosis. Mild right neural foraminal stenosis. Mild left neural foraminal stenosis.     CONCLUSION: 1.  Multilevel degenerative changes of the lumbar spine as described in detail above, greatest at L4-L5 level, where there is mild to moderate spinal canal stenosis with mild to moderate right and mild left neuroforaminal stenosis. 2.  At L5-S1, there is mild spinal canal stenosis and mild bilateral neuroforaminal stenosis.    Dxa Bone Density Scan    Result Date: 10/14/2020  10/12/2020 RE: Annel Stoddard YOB: 1955 Dear Gaye Treviño, Patient Profile: 65 y.o. female, postmenopausal, is here for the first bone density test. History of fractures - None. Family history of osteoporosis - None.  Family history of hip fracture: None. Smoking history - No. Osteoporosis  treatment past -  No. Osteoporosis treatment current - No.  Chronic medical problems - Chronic low back problems and Diabetes Mellitus. High risk medications -  Anti-seizure;  Yes, Currently. Assessment: 1. The spine bone density L1-L4 with T-score -0.4. 2. Femoral bone densities show left femoral neck T- score -1.5 and right femoral neck T-score -1.2. 3. Trabecular bone score indicates poor trabecular bone architecture. 65 y.o. female with LOW BONE DENSITY (OSTEOPENIA) and MODERATE fracture risk, adjusted for the TBS, with major osteoporotic fracture risk 12.1% and hip fracture risk 1.5%. Recommendations: Appropriate calcium, vitamin D supplements, along with balance and weight bearing exercise recommended with follow up bone density scan in 2 years. Bone densitometry was performed on your patient using our freee densitometer. The results are summarized and a copy of the actual scans are included for your review. In conformity with the International Society of Clinical Densitometry's most recent position statement for DXA interpretation (2015), the diagnosis will be made on the lowest measured T-score of the lumbar spine, femoral neck, total proximal femur or 33% radius. Note the change in terminology for diagnostic classification from OSTEOPENIA to LOW BONE MASS. All trending for sequential exams will be done using multiple vertebrae or the total proximal femur. Fracture risk is based on the WHO Fracture Risk Assessment Tool (FRAX). If additional information is needed or if you would like to discuss the results, please do not hesitate to call me. Thank you for referring this patient to [facility]. We are happy to be of service in support of you and your practice. If you have any questions or suggestions to improve our service, please call me at [phone]. Sincerely, Carisa Smith M.D. CRachaelCMARYA. Osteoporosis Services, [facility]     Pft Complete    Result Date:  10/8/2020  FEV1/FVC is 84 and is normal. FEV1 is 74% predicted and is reduced. FVC is 69% predicted and is reduced. There was no improvement in spirometry after a single inhaled dose of bronchodilator. TLC is 67% predicted and is reduced. RV is 67% predicted and is reduced. DLCO is 93% predicted and is normal when it is corrected for hemoglobin. The flow volume loop is normal Yes. Impression:  Full Pulmonary Function Test is abnormal. Spirometry is consistent with moderate restrictive ventilatory defect. Spirometry is not consistent with reversibility. There is no hyperinflation. There is no air-trapping. Diffusion capacity when corrected for hemoglobin is normal. Kyle Shah MD PhD [facility] Pulmonary Critical Care     No results found for this or any previous visit (from the past 240 hour(s)).  Diagnostics:     Results for orders placed or performed during the hospital encounter of 10/08/20   Hemoglobin   Result Value Ref Range    Hemoglobin 11.7 (L) 12.0 - 16.0 g/dL   The ASCVD Risk score (Harper DC Jr., et al., 2013) failed to calculate for the following reasons:    Cannot find a previous HDL lab    Cannot find a previous total cholesterol lab      Quality review:     PHQ-2 Total Score: 0 (12/16/2019  8:00 AM)      No data recorded      Eileen Sanchez [facility]  Internal Medicine  [address] [address]

## 2021-06-30 NOTE — PROGRESS NOTES
Progress Notes by Catrina Beckman RD at 12/21/2020  2:00 PM     Author: Catrina Beckman RD Service: -- Author Type: Registered Dietitian    Filed: 12/21/2020  2:43 PM Encounter Date: 12/21/2020 Status: Attested    : Catrina Beckman RD (Registered Dietitian) Cosigner: Eileen Sanchez CNP at 12/21/2020  2:44 PM    Attestation signed by Eileen Sanchez CNP at 12/21/2020  2:44 PM    Noted                  Assessment: Annel here today for follow up DM education. She has had diabetes for 16 years.  She started taking U500 insulin about 10 days ago--  BF- 90 units     L- 70 units    D- 70 units and 10 units to cover snacks.   Typically has bedtime snack and is up at 4:30 am and has yogurt.   Previously took lantus 100 units/day and humalog 36 units/meal and snack.      She does check 4-6 times daily.  Three nights ago started waking up at 2 am with hypoglycemia.   Last night she did decrease dinner dose to 60 units but also took 10 units at bedtime to cover snack.  She brings her personal Freestyle Maxine for training today.    , 157, 187, 210, 169  preL;  137, 96, 94, 188  preD   109, 117, 133, 158, 138    Plan: Reviewed insulin action time for U500 insulin and it is dosed 2-3 times daily, no need to cover snacks.  Rec she go back to 90-70-70 units as initially prescribed.  After 1-2 weeks, we can review glucose download and make adjustments.   She verbalized her understanding.    Reviewed basics of continous glucose sensor information.   Annel was able to apply sensor with minimal assistance.  Discussed how to use reader and frequency of testing recommended.  BF, pre meals and at bedtime.  Ok to use sensor readings for insulin dosing.  DIscussed the delay in sensor readings if BG is trending up or down.  Discussed importance of following up with CDE or PCP to assess BG patterns and not to change diabetes medications on own without consulting with PCP.   To call if  continues to have problems with hypoglycemia.  Appt scheduled with CDE in 2-3 weeks.       Subjective and Objective:      Annel Stoddard is referred by Eileen Sanchez for Diabetes Education.     Lab Results   Component Value Date    HGBA1C 7.2 (H) 11/18/2020     Goal:  take U500 3 times daily as noted above.     Education:     Monitoring   Meter (per above goals): assessment, discussed, pt returned demonstration,-  Freestyle Maxine  Monitoring: assessment, discussed, pt returned demonstration, literature provided   BG goals: assessment, discussed, pt returned demonstration, literature provided      Nutrition Management:  assessment, discussed, pt returned demo,  literature provided - reviewed food records.   Typically attend TOPS for weight loss.     Weight:assessment, discussed, pt returned demo,  literature provided   Portions/Balance: assessment, discussed, pt returned demo,  literature provided   Carb ID/Count: assessment, discussed, pt returned demo,  literature provided   Medications: assessment, discussed  Orals: assessment, discussed  Injected Medications: Discussed   Storage/Exp:Discussed   Site Rotation: Discussed     Diabetes Disease Process: Discussed    Acute Complications: Prevent, Detect, Treat:  Hypoglycemia: Discussed and Literature provided  Hyperglycemia: Discussed  Goal Setting and Problem Solving: Discussed  Barriers: Discussed  Psychosocial Adjustments: Discussed      Time spent with the patient: 30 minutes for diabetes education and counseling.   Previous Education: yes  Visit Type:DSMT      Catrina Beckman  12/21/2020

## 2021-06-30 NOTE — PROGRESS NOTES
"Progress Notes by Eileen Sanchez CNP at 12/23/2020  2:40 PM     Author: Eileen Sanchez CNP Service: -- Author Type: Nurse Practitioner    Filed: 1/10/2021  8:45 AM Encounter Date: 12/23/2020 Status: Signed    : Eileen Sanchez CNP (Nurse Practitioner)       Annel Stoddard is a 65 y.o. female who is being evaluated via a billable telephone visit.      The patient has been notified of following:     \"This telephone visit will be conducted via a call between you and your physician/provider. We have found that certain health care needs can be provided without the need for a physical exam.  This service lets us provide the care you need with a short phone conversation.  If a prescription is necessary we can send it directly to your pharmacy.  If lab work is needed we can place an order for that and you can then stop by our lab to have the test done at a later time.    Telephone visits are billed at different rates depending on your insurance coverage. During this emergency period, for some insurers they may be billed the same as an in-person visit.  Please reach out to your insurance provider with any questions.    If during the course of the call the physician/provider feels a telephone visit is not appropriate, you will not be charged for this service.\"    Patient has given verbal consent to a Telephone visit? Yes    What phone number would you like to be contacted at? [phone]    Patient would like to receive their AVS by AVS Preference: Mail a copy.    Additional provider notes:          [facility]  Patient Name: Annel Stoddard  Patient Age: 65 y.o.  YOB: 1955  MRN: [id number]    Date of Visit: 12/23/2020  Reason for Office Visit:   Chief Complaint   Patient presents with   ? Medication Refill     Needing medcation refills.    ? Diabetes     Patient has had elevated BS. Has spoken to DM education and Trini gave instuction on insuling intake. Has " been lows and highs.    ? Request For Letter     Patient needing a letter stating that a cat is a  for her housing.            Assessment / Plan / Medical Decision Making:    No orders of the defined types were placed in this encounter.  Followup: Return in about 2 weeks (around 1/6/2021). earlier if needed.    Health Maintenance Review  Health Maintenance   Topic Date Due   ? COPD ACTION PLAN  1955   ? A1C  05/18/2021   ? COLORECTAL CANCER SCREENING  09/01/2021   ? DIABETIC EYE EXAM  11/09/2021   ? LIPID  11/18/2021   ? MICROALBUMIN  11/18/2021   ? MEDICARE ANNUAL WELLNESS VISIT  11/20/2021   ? FALL RISK ASSESSMENT  11/20/2021   ? BMP  12/30/2021   ? DIABETIC FOOT EXAM  12/30/2021   ? MAMMOGRAM  11/25/2022   ? Pneumococcal Vaccine: 65+ Years (2 of 2 - PPSV23) 11/17/2023   ? ADVANCE CARE PLANNING  11/20/2025   ? TD 18+ HE  09/05/2029   ? DEXA SCAN  10/12/2035   ? HEPATITIS C SCREENING  Completed   ? HIV SCREENING  Completed   ? SPIROMETRY  Completed   ? INFLUENZA VACCINE RULE BASED  Completed   ? TDAP ADULT ONE TIME DOSE  Completed   ? ZOSTER VACCINES  Completed   ? Pneumococcal Vaccine: Pediatrics (0 to 5 Years) and At-Risk Patients (6 to 64 Years)  Aged Out         I have changed Annel Stoddard's losartan. I am also having her maintain her multivitamin, aspirin-calcium carbonate, LACTOBACILLUS ACIDOPHILUS ORAL, biotin, mupirocin, hydrocortisone, Henry 128, vitamin B complex (B COMPLEX-VITAMIN B12 ORAL), albuterol, ketoconazole, metFORMIN, nystatin, metroNIDAZOLE, lamoTRIgine, ARIPiprazole, insulin glargine, ipratropium-albuteroL, (pen needle, diabetic), etodolac, HumaLOG Mix 50-50 KwikPen, fish oil-omega-3 fatty acids, coenzyme Q10, carboxymethylcellulose, calcium citrate/vitamin D3 (CALCIUM CITRATE + D ORAL), magnesium oxide, Freestyle InsuLinx Test Strips, FreeStyle Maxine 2 Floyd, FreeStyle Maxine 2 Sensor, HumuLIN R U-500 (Conc) Kwikpen, montelukast, liraglutide, and rosuvastatin.       HPI:  Annel Stoddard is a 65 y.o. year old who presents to the office today with chronic conditions including type 2 diabetes, hyperlipidemia, simple chronic bronchitis, history of pulmonary emphysema, anatomical narrow angle glaucoma, hyperactivity of the bladder, bilateral low back pain with left-sided sciatica, bipolar 2 disorder, history of callus of the foot, anxiety, history of botulism, COPD, depression, hypertension, History of pneumonia.     She states that the U500 is working well.  90 units in the morning, 70 units at lunch, and 60 units at evening meal, if BS low in the night go down to 60 units, morning 180-224.  She states she is not eating 4am breakfast and thinks she should probably eat then.  She continues to work with the diabetic educator on an ongoing basis and reports that this is going well.    She is planning to get a cat and is in need of a letter for her landlord as patient has a history of bipolar 2 disorder, anxiety, depression.        Review of Systems- pertinent positive in bold:  Constitutional: Fever, chills, night sweats, fainting, weight change, fatigue, dizziness, sleeping difficulties, loud snoring/pauses in breathing  Eyes: change in vision, blurred or double vision, redness/eye pain  Ears, nose, mouth, throat: change in hearing, ear pain, hoarseness, difficulty swallowing, sores in the mouth or throat  Respiratory: shortness of breath, cough, bloody sputum, wheezing  Cardiovascular: chest pain, palpitations   Gastrointestinal: abdominal pain, heartburn/indigestion, nausea/vomiting, change in appetite, change in bowel habits, constipation or diarrhea, rectal bleeding/dark stools, difficulty swallowing  Urinary: painful urination, frequent urination, urinary urgency/incontinence, blood in urine/dark urine, nocturia (new or increasing), muscle cramps, swelling of hands, feet, ankles, leg pain/redness  Skin: change in moles/freckles, rash, nodules  Hematologic/lymphatic:  swollen lymph glands, abnormal bruising/bleeding  Endocrine: excessive thirst/urination, cold or heat intolerance  Neurologic/emotional: worrisome memory change, numbness/tingling, anxiety, mood swings      Current Scheduled Meds:  Outpatient Encounter Medications as of 12/23/2020   Medication Sig Dispense Refill   ? albuterol (PROVENTIL) 2.5 mg /3 mL (0.083 %) nebulizer solution Take 3 mL (2.5 mg total) by nebulization every 6 (six) hours as needed for wheezing. 6 vial 3   ? ARIPiprazole (ABILIFY) 30 MG tablet Take 1 tablet (30 mg total) by mouth daily. 90 tablet 2   ? aspirin-calcium carbonate 81 mg-300 mg calcium(777 mg) Tab Take 81 mg by mouth daily.     ? biotin 10,000 mcg cap Take 1 capsule by mouth daily.     ? blood glucose test (FREESTYLE INSULINX TEST STRIPS) strips Use 1 each As Directed 4 (four) times a day. Please strips for freestyle edith system 150 strip 3   ? calcium citrate/vitamin D3 (CALCIUM CITRATE + D ORAL) Take 2 tablets by mouth every morning.     ? carboxymethylcellulose (REFRESH PLUS) 0.5 % Dpet ophthalmic dropperette      ? coenzyme Q10 200 mg capsule Take 200 mg by mouth daily.     ? etodolac (LODINE) 200 MG capsule Take 1-2 capsules (200-400 mg total) by mouth 3 (three) times a day as needed (for pain). 60 capsule 2   ? fish oil-omega-3 fatty acids (FISH OIL) 300-1,000 mg capsule Take 1 g by mouth daily.     ? flash glucose scanning reader (FREESTYLE EDITH 2 READER) [facility] Use 1 each As Directed daily. 1 each 0   ? flash glucose sensor (FREESTYLE EDITH 2 SENSOR) Kit Use 1 each As Directed daily. 2 kit 9   ? hydrocortisone 1 % cream Apply 1 application topically as needed.     ? insulin glargine (LANTUS SOLOSTAR PEN) 100 unit/mL (3 mL) pen Inject 100 Units under the skin at bedtime. 11.65 Type 2 with hyperglycemia 10 mL 0   ? insulin lispro protamine-insulin lispro (HUMALOG MIX 50-50 KWIKPEN) 100 unit/mL (50-50) InPn pen Patient is on a sliding scale  150 and under 35 units  151-200  Plus 2  "units  201-250  Plus 4 units  251-300  Plus 6 units  301-350  Plus 8 units 15 mL 3   ? insulin regular HIGH CONC (HUMULIN R U-500, CONC, KWIKPEN) 500 unit/mL (3 mL) PEN solution Inject three times per day before meals. Breakfast 90 unit, lunch 70 units, dinner 70 units. 18 mL 1   ? ipratropium-albuteroL (DUO-NEB) 0.5-2.5 mg/3 mL nebulizer Take 3 mL by nebulization every 6 (six) hours as needed. 90 vial 1   ? ketoconazole (NIZORAL) 2 % cream applly externally twice daily for two weeks 15 g 0   ? LACTOBACILLUS ACIDOPHILUS ORAL Take 1 tablet by mouth daily.     ? lamoTRIgine (LAMICTAL) 150 MG tablet Take 1 tablet (150 mg total) by mouth daily. 90 tablet 3   ? liraglutide (VICTOZA) 0.6 mg/0.1 mL (18 mg/3 mL) injection Inject 0.2 mL (1.2 mg total) under the skin daily. With or without food. 30 mL 3   ? losartan (COZAAR) 50 MG tablet Take 1 tablet (50 mg total) by mouth daily. 90 tablet 1   ? magnesium oxide (MAG-OX) 400 mg (241.3 mg magnesium) tablet Take 1,600 mg by mouth daily.     ? metFORMIN (GLUCOPHAGE XR) 500 MG 24 hr tablet Take 4 tablets (2,000 mg total) by mouth daily with supper. 360 tablet 1   ? metroNIDAZOLE (METROGEL) 0.75 % gel Apply 1 application topically 2 (two) times a day. 45 g 0   ? montelukast (SINGULAIR) 10 mg tablet Take 1 tablet (10 mg total) by mouth daily. 90 tablet 2   ? multivitamin (MULTIVITAMIN) per tablet Take 1 tablet by mouth daily.     ? mupirocin (BACTROBAN) 2 % cream Apply 1 application topically 3 (three) times a day.     ? MILAN 128 5 % ophthalmic ointment APPLY A SMALL AMOUNT IN OU QPM  3   ? nystatin (MYCOSTATIN) ointment Apply 1 application topically 4 (four) times a day. 30 g 0   ? pen needle, diabetic (BD ULTRA-FINE MICRO PEN NEEDLE) 32 gauge x 1/4\" Ndle Use 1 Device As Directed 3 (three) times a day. 900 each 3   ? rosuvastatin (CRESTOR) 10 MG tablet Take 1 tablet (10 mg total) by mouth at bedtime. 90 tablet 2   ? vitamin B complex (B COMPLEX-VITAMIN B12 ORAL) Take 1,000 mcg by " mouth daily.     ? [DISCONTINUED] liraglutide (VICTOZA) 0.6 mg/0.1 mL (18 mg/3 mL) injection Inject 0.2 mL (1.2 mg total) under the skin daily. With or without food. 30 mL 3   ? [DISCONTINUED] losartan (COZAAR) 50 MG tablet Take 50 mg by mouth daily.     ? [DISCONTINUED] methocarbamoL (ROBAXIN) 500 MG tablet Take 1 tablet (500 mg total) by mouth 2 (two) times a day as needed. 60 tablet 1   ? [DISCONTINUED] omeprazole (PRILOSEC) 20 MG capsule Take 2 capsules (40 mg total) by mouth daily before breakfast. Take 30 minutes before breakfast 60 capsule 0   ? [DISCONTINUED] rosuvastatin (CRESTOR) 10 MG tablet Take 1 tablet (10 mg total) by mouth at bedtime. 90 tablet 2   ? [DISCONTINUED] VENTOLIN HFA 90 mcg/actuation inhaler Inhale 1 puff every 4 (four) hours as needed. (Patient taking differently: Inhale 2 puffs every 4 (four) hours as needed. ) 3 Inhaler 3     No facility-administered encounter medications on file as of 12/23/2020.      Past Medical History:   Diagnosis Date   ? Anxiety    ? Botulism     1980   ? COPD (chronic obstructive pulmonary disease) (H)    ? Depression    ? Diabetes mellitus (H)    ? History of tracheostomy 1/5/2021   ? Hyperlipidemia    ? Hypertension    ? Pneumonia      No past surgical history on file.  Social History     Tobacco Use   ? Smoking status: Never Smoker   ? Smokeless tobacco: Never Used   Substance Use Topics   ? Alcohol use: Not Currently   ? Drug use: Not on file     Family History   Problem Relation Age of Onset   ? Stroke Father    ? Esophageal cancer Sister    ? Heart attack Brother      Social History     Social History Narrative    Originally from [facility]. Moved from [place].  4 times . Has 5 children ,9 grandchildren . Her daughters were taken away from her by biological dad when she was ill with botulism . All adults now . All of them are in [facility]. Youngest daughter is 38 . Initially estranged but has a closer relationship .Hadnt worked for  a long time . Is a violinist and plays in [employer] . Wasn't diagnosed with bipolar much later in life . 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 Did different jobs also like a CNA, . Is on disability due to  ipolar .             She plays the violin.        Objective / Physical Examination:  There were no vitals filed for this visit.  Wt Readings from Last 3 Encounters:   12/30/20 183 lb (83 kg)   12/17/20 183 lb (83 kg)   12/09/20 183 lb (83 kg)     There is no height or weight on file to calculate BMI.     RESP: No audible wheeze, cough  PSYCH:  judgement and insight intact, normal speech     No results found.  No results found for this or any previous visit (from the past 240 hour(s)).  Diagnostics:     Results for orders placed or performed in visit on 11/20/20   Electrocardiogram Perform and Read   Result Value Ref Range    SYSTOLIC BLOOD PRESSURE      DIASTOLIC BLOOD PRESSURE      VENTRICULAR RATE 87 BPM    ATRIAL RATE 87 BPM    P-R INTERVAL 202 ms    QRS DURATION 86 ms    Q-T INTERVAL 368 ms    QTC CALCULATION (BEZET) 442 ms    P Axis 48 degrees    R AXIS 9 degrees    T AXIS 32 degrees    MUSE DIAGNOSIS       Normal sinus rhythm  Possible Inferior infarct , age undetermined  Abnormal ECG  No previous ECGs available  Confirmed by KAY CHOI MD LOC:[facility] ([id number]) on 11/20/2020 4:36:55 PM         Quality review:     PHQ-2 Total Score: 1 (11/20/2020 12:00 PM)      No data recorded      Eileen Sanchez, [facility]  Internal Medicine  [address] [address]    Assessment/Plan:  1. Type 2 diabetes mellitus with complication, with long-term current use of insulin (H)  - liraglutide (VICTOZA) 0.6 mg/0.1 mL (18 mg/3 mL) injection; Inject 0.2 mL (1.2 mg total) under the skin daily. With or without food.  Dispense: 30 mL; Refill: 3  - rosuvastatin (CRESTOR) 10 MG tablet; Take 1 tablet (10 mg total) by mouth at bedtime.  Dispense: 90 tablet; Refill: 2  Advised to continue insulin plan as  per Diabetic education and follow up in 1-2 weeks, sooner if need    2. Essential hypertension, benign  - losartan (COZAAR) 50 MG tablet; Take 1 tablet (50 mg total) by mouth daily.  Dispense: 90 tablet; Refill: 1    3. Bipolar 2 disorder (H)  Patient is provider a letter for an emotional support animal      Phone call duration:  7 minutes    Eileen Sanchez, CNP

## 2021-06-30 NOTE — PROGRESS NOTES
Progress Notes by Johana Evans Diabetes Ed at 1/20/2021  9:00 AM     Author: Johana Evans Diabetes Ed Service: -- Author Type: Diabetes Ed    Filed: 1/20/2021  7:49 PM Encounter Date: 1/20/2021 Status: Signed    : Johana Evans Diabetes Ed (Diabetes Ed)       Assessment:   --follow up for Annel today in office  --patient is excited to be starting an online B.A. program at [employer] in May, plus she will be taking violin and piano lessons and she is learning Romanian online.  --she is keeping a food journal and is using the [employer] CGM to track her glucose.   --patient is walking daily outside or in the skyways for at least an hour, plus she is using strengthening equipment in the gym 3x/week, and doing daily exercises from her physical therapist.  --patient's eating schedule:  4am: tea/coffee, 1/4 banana, yogurt  9am: eggs or toast or oatmeal or 1/2 sandwich  1-2pm: 1/2 sandwich/vegetables  4pm: sandwich or cottage cheese/fruit, or meatless chili  --beverages: tea, coffee, water( 6 glasses/day)  --last eye exam, 11/2020    Medications prescribed:  Humulin R U-500  90-70-75   Metformin 2000 mg day  Victoza 1.2 mg/daily    Blood glucose record:   See Below          Education:  --reviewed CGM data, goals for BG and A1C  --discussed nutrition, CHO counting, label readings,hydration and exercise routine  --      marlo:   1. Scan sensor a minimum of every 8 hours when awake.  2. Treat BG < 70 mg/dl with 5-6 pieces of hard candy.  3. Continue to walk daily for 60 minutes  4. Remain a current doses of insulin 90-70-75, if having lows overnight, patient will reduce pm dose to 70 units.  5. Follow up with educator on 2/24/21    Subjective and Objective:      Annel Stoddard is referred by ingrid Ibrahim for Diabetes Education.     Lab Results   Component Value Date    HGBA1C 7.2 (H) 11/18/2020       Education:     Monitoring   Meter (per above goals): Assessed, Discussed and patient  uses CGM  Monitoring: Assessed and Discussed  BG goals: Assessed and Discussed    Nutrition Management  Nutrition Management: Assessed and Discussed  Weight: Assessed  Portions/Balance: Assessed and Discussed  Carb ID/Count: Assessed and Discussed  Label Reading: Assessed and Discussed  Heart Healthy Fats: Assessed  Menu Planning: Assessed and Discussed  Dining Out: Not addressed  Physical Activity: Assessed and Discussed    Orals: Assessed and Discussed  Injected Medications: Assessed and Discussed     Diabetes Disease Process: Assessed and Discussed    Acute Complications: Prevent, Detect, Treat:  Hypoglycemia: Assessed and Discussed  Hyperglycemia: Assessed and Discussed  Sick Days: Assessed  Driving: does not drive    Chronic Complications  Foot Care:Assessed and Discussed  Skin Care: Assessed and Discussed  Eye: Assessed, Discussed and last exam 2020  ABC: Assessed  Teeth:Not addressed  Goal Setting and Problem Solving: Assessed and Discussed  Barriers: Assessed and Discussed  Psychosocial Adjustments: Assessed      Time spent with the patient: 45 minutes for diabetes education and counseling.   Previous Education: yes  Visit Type:MNT  Hours Remainin, DSMT 1.5 and MNT 1.25      Johana Evans  2021

## 2021-06-30 NOTE — PROGRESS NOTES
Progress Notes by Ebony So RN at 12/11/2020 11:00 AM     Author: Ebony So RN Service: -- Author Type: Registered Nurse    Filed: 12/23/2020  9:54 AM Encounter Date: 12/11/2020 Status: Signed    : Ebony So RN (Registered Nurse)       Assessment: pt seen today for DM education. She has been diabetic for about 16 years. Pt is currently taking lantus 100 units/day and humalog 36 units/meal and snack + 2:50>150. She brings in BG log. She is doing a very good job at checking BG. Discussed lows, she reports about once per month. Overall she seems to have good control. Concern about the volume of insulin she is taking and PCP Eileen Sanchez wanted to explore U500. Discussed w/ pt and she is on board with this.   humalog 36, 38, 38, 38, 38, = 188 units/day + lantus 100 units/day = 288 units/day; 80% of that is 230 units/day. Then U500 would be dosed at 90-70-70. Pt concerned about not taking anything for snacks. Discussed giving it a couple of days and if the BG are too high then she could start with adding 10 units with snacks.   Pt would also like a CGM. Discussed all options and she would like the [employer] system. Rx sent.                                         Plan: will change to U500 and order CGM. Pt will call if she has any issues picking these up. Otherwise will f/u on 12/21 with Catrina.     Subjective and Objective:      Annel KLAUDIA Stoddard is referred by Eileen Sanchez for Diabetes Education.     Lab Results   Component Value Date    HGBA1C 7.2 (H) 11/18/2020         Current diabetes medications:  victoza 1.2 mg/day, metformin 2g with dinner,         Education:     Monitoring   Meter (per above goals): Assessed and Discussed  Monitoring: Assessed and Literature provided  BG goals: Discussed    Nutrition Management  Nutrition Management: Assessed and Discussed  Weight: Discussed and pt would like to loose 12#  Portions/Balance: Assessed and Discussed  Carb ID/Count: Assessed and  Discussed  Label Reading: Assessed and Discussed  Heart Healthy Fats: Discussed  Menu Planning: Assessed and Discussed  Dining Out: Assessed and Discussed  Physical Activity: Assessed and Discussed  Medications: Discussed  Orals: Discussed  Injected Medications: Discussed   Storage/Exp:Discussed   Site Rotation: Discussed   Sites Assessed: no    Diabetes Disease Process: Discussed    Acute Complications: Prevent, Detect, Treat:  Hypoglycemia: Assessed and Discussed  Hyperglycemia: Assessed and Discussed  Sick Days: Not addressed  Driving: does not drive    Chronic Complications  Foot Care:Not addressed  Skin Care: Not addressed  Eye: Not addressed  ABC: Discussed  Teeth:Not addressed  Goal Setting and Problem Solving: Assessed and Discussed  Barriers: Assessed and Discussed  Psychosocial Adjustments: Assessed and Discussed      Time spent with the patient: 60 minutes for diabetes education and counseling.   Previous Education: no  Visit Type:HENRIK So  12/11/2020

## 2021-06-30 NOTE — PROGRESS NOTES
Progress Notes by Eileen Sanchez CNP at 2020  5:35 PM     Author: Eileen Sanchez CNP Service: -- Author Type: Nurse Practitioner    Filed: 2020  8:10 AM Encounter Date: 2020 Status: Signed    : Eileen Sanchez CNP (Nurse Practitioner)            [facility]  Patient Name: Annel Stoddard  Patient Age: 65 y.o.  YOB: 1955  MRN: [id number]    Date of Visit: 2020  Reason for Office Visit:   Chief Complaint   Patient presents with   ? Follow Up     numerous questions.            Assessment / Plan / Medical Decision Makin. Type 2 diabetes mellitus with complication, with long-term current use of insulin (H)  Reviewed with patient that she is most likely consuming too many carbohydrates with recommendation to consume 0-1 carb snacks at 30-45 carbs per meal as well as would not recommend utilization of mealtime insulin for snacks but for meals only.  Did contact the diabetic educator and patient will be seen yet this week for possible transition to U5 100 given the amount of insulin patient is taking in addition would recommend utilization of a continuous glucose monitor for patient.    No orders of the defined types were placed in this encounter.  Followup: Return in about 2 weeks (around 2020). earlier if needed.    Health Maintenance Review  Health Maintenance   Topic Date Due   ? COPD ACTION PLAN  1955   ? DIABETIC FOOT EXAM  2020   ? A1C  2021   ? COLORECTAL CANCER SCREENING  2021   ? DIABETIC EYE EXAM  2021   ? BMP  2021   ? LIPID  2021   ? MICROALBUMIN  2021   ? MEDICARE ANNUAL WELLNESS VISIT  2021   ? FALL RISK ASSESSMENT  2021   ? MAMMOGRAM  2022   ? Pneumococcal Vaccine: 65+ Years (2 of 2 - PPSV23) 2023   ? ADVANCE CARE PLANNING  2025   ? TD 18+ HE  2029   ? DEXA SCAN  10/12/2035   ? HEPATITIS C SCREENING  Completed   ? HIV SCREENING   Completed   ? SPIROMETRY  Completed   ? INFLUENZA VACCINE RULE BASED  Completed   ? TDAP ADULT ONE TIME DOSE  Completed   ? ZOSTER VACCINES  Completed   ? Pneumococcal Vaccine: Pediatrics (0 to 5 Years) and At-Risk Patients (6 to 64 Years)  Aged Out         I have discontinued Annel Stoddard's flaxseed oil, magnesium citrate, MAGNESIUM CITRATE ORAL, Accu-Chek Fastclix Lancet Drum, naproxen, nabumetone, and Accu-Chek Guide test strips. I am also having her maintain her multivitamin, aspirin-calcium carbonate, LACTOBACILLUS ACIDOPHILUS ORAL, biotin, mupirocin, hydrocortisone, Henry 128, vitamin B complex (B COMPLEX-VITAMIN B12 ORAL), montelukast, Ventolin HFA, albuterol, rosuvastatin, ketoconazole, metFORMIN, nystatin, metroNIDAZOLE, lamoTRIgine, ARIPiprazole, insulin glargine, methocarbamoL, ipratropium-albuteroL, (pen needle, diabetic), liraglutide, etodolac, HumaLOG Mix 50-50 KwikPen, omeprazole, fish oil-omega-3 fatty acids, losartan, coenzyme Q10, carboxymethylcellulose, calcium citrate/vitamin D3 (CALCIUM CITRATE + D ORAL), and magnesium oxide.      HPI:  Annel Stodadrd is a 65 y.o. year old who presents to the office today with chronic conditions including Type 2 diabetes, history of tracheostomy    BS Fasting 190-200 and 248 post meal.  She is eating 5 times daily and     Breakfast: 36 units at breakfast starting 150-200 2 units for every 50 above 150.  Yogurt, small banana, tea coffee.     Morning Snack: 1/2 peanut butter sandwich, fruit, eggs and is taking 150 units     Lunch: meatloaf (pork/hamburger, seasoning, egg) mashed potatoes, vegetables (carrots and green beans).      Snack: pear and cutie orange and yogurt    Supper: meatloaf, mashed potatoes and vegatable    Snack: 2 TBSP yogurt.     She is giving herself injections 6 times a day.     She reports she is consuming 40g carbs per meal and 30g carb per snack.      Review of Systems- pertinent positive in bold:  Constitutional: Fever, chills, night  sweats, fainting, weight change, fatigue, dizziness, sleeping difficulties, loud snoring/pauses in breathing  Eyes: change in vision, blurred or double vision, redness/eye pain  Ears, nose, mouth, throat: change in hearing, ear pain, hoarseness, difficulty swallowing, sores in the mouth or throat  Respiratory: shortness of breath, cough, bloody sputum, wheezing  Cardiovascular: chest pain, palpitations   Gastrointestinal: abdominal pain, heartburn/indigestion, nausea/vomiting, change in appetite, change in bowel habits, constipation or diarrhea, rectal bleeding/dark stools, difficulty swallowing  Urinary: painful urination, frequent urination, urinary urgency/incontinence, blood in urine/dark urine, nocturia (new or increasing), muscle cramps, swelling of hands, feet, ankles, leg pain/redness  Skin: change in moles/freckles, rash, nodules  Hematologic/lymphatic: swollen lymph glands, abnormal bruising/bleeding  Endocrine: excessive thirst/urination, cold or heat intolerance  Neurologic/emotional: worrisome memory change, numbness/tingling, anxiety, mood swings      Current Scheduled Meds:  Outpatient Encounter Medications as of 12/9/2020   Medication Sig Dispense Refill   ? albuterol (PROVENTIL) 2.5 mg /3 mL (0.083 %) nebulizer solution Take 3 mL (2.5 mg total) by nebulization every 6 (six) hours as needed for wheezing. 6 vial 3   ? ARIPiprazole (ABILIFY) 30 MG tablet Take 1 tablet (30 mg total) by mouth daily. 90 tablet 2   ? aspirin-calcium carbonate 81 mg-300 mg calcium(777 mg) Tab Take 81 mg by mouth daily.     ? biotin 10,000 mcg cap Take 1 capsule by mouth daily.     ? calcium citrate/vitamin D3 (CALCIUM CITRATE + D ORAL) Take 2 tablets by mouth every morning.     ? carboxymethylcellulose (REFRESH PLUS) 0.5 % Dpet ophthalmic dropperette      ? coenzyme Q10 200 mg capsule Take 200 mg by mouth daily.     ? etodolac (LODINE) 200 MG capsule Take 1-2 capsules (200-400 mg total) by mouth 3 (three) times a day as  needed (for pain). 60 capsule 2   ? fish oil-omega-3 fatty acids (FISH OIL) 300-1,000 mg capsule Take 1 g by mouth daily.     ? hydrocortisone 1 % cream Apply 1 application topically as needed.     ? insulin glargine (LANTUS SOLOSTAR PEN) 100 unit/mL (3 mL) pen Inject 100 Units under the skin at bedtime. 11.65 Type 2 with hyperglycemia 10 mL 0   ? insulin lispro protamine-insulin lispro (HUMALOG MIX 50-50 KWIKPEN) 100 unit/mL (50-50) InPn pen Patient is on a sliding scale  150 and under 35 units  151-200  Plus 2 units  201-250  Plus 4 units  251-300  Plus 6 units  301-350  Plus 8 units 15 mL 3   ? ipratropium-albuteroL (DUO-NEB) 0.5-2.5 mg/3 mL nebulizer Take 3 mL by nebulization every 6 (six) hours as needed. 90 vial 1   ? ketoconazole (NIZORAL) 2 % cream applly externally twice daily for two weeks 15 g 0   ? LACTOBACILLUS ACIDOPHILUS ORAL Take 1 tablet by mouth daily.     ? lamoTRIgine (LAMICTAL) 150 MG tablet Take 1 tablet (150 mg total) by mouth daily. 90 tablet 3   ? liraglutide (VICTOZA) 0.6 mg/0.1 mL (18 mg/3 mL) injection Inject 0.2 mL (1.2 mg total) under the skin daily. With or without food. 30 mL 3   ? losartan (COZAAR) 50 MG tablet Take 50 mg by mouth daily.     ? magnesium oxide (MAG-OX) 400 mg (241.3 mg magnesium) tablet Take 1,600 mg by mouth daily.     ? metFORMIN (GLUCOPHAGE XR) 500 MG 24 hr tablet Take 4 tablets (2,000 mg total) by mouth daily with supper. 360 tablet 1   ? methocarbamoL (ROBAXIN) 500 MG tablet Take 1 tablet (500 mg total) by mouth 2 (two) times a day as needed. 60 tablet 1   ? metroNIDAZOLE (METROGEL) 0.75 % gel Apply 1 application topically 2 (two) times a day. 45 g 0   ? montelukast (SINGULAIR) 10 mg tablet Take 1 tablet (10 mg total) by mouth daily. 90 tablet 2   ? multivitamin (MULTIVITAMIN) per tablet Take 1 tablet by mouth daily.     ? mupirocin (BACTROBAN) 2 % cream Apply 1 application topically 3 (three) times a day.     ? MILAN 128 5 % ophthalmic ointment APPLY A SMALL  "AMOUNT IN OU QPM  3   ? nystatin (MYCOSTATIN) ointment Apply 1 application topically 4 (four) times a day. 30 g 0   ? omeprazole (PRILOSEC) 20 MG capsule Take 2 capsules (40 mg total) by mouth daily before breakfast. Take 30 minutes before breakfast 60 capsule 0   ? pen needle, diabetic (BD ULTRA-FINE MICRO PEN NEEDLE) 32 gauge x 1/4\" Ndle Use 1 Device As Directed 3 (three) times a day. 900 each 3   ? rosuvastatin (CRESTOR) 10 MG tablet Take 1 tablet (10 mg total) by mouth at bedtime. 90 tablet 2   ? VENTOLIN HFA 90 mcg/actuation inhaler Inhale 1 puff every 4 (four) hours as needed. (Patient taking differently: Inhale 2 puffs every 4 (four) hours as needed. ) 3 Inhaler 3   ? vitamin B complex (B COMPLEX-VITAMIN B12 ORAL) Take 1,000 mcg by mouth daily.     ? [DISCONTINUED] blood glucose test strips Use 1 each As Directed as needed. Dispense brand per patient's insurance at pharmacy discretion. 550 strip 3   ? [DISCONTINUED] blood glucose test strips Use 1 each As Directed 6 (six) times a day. Dispense brand per patient's insurance at pharmacy discretion.     ? [DISCONTINUED] ACCU-CHEK FASTCLIX LANCET DRUM USE THREE TIMES DAILY 306 each 3   ? [DISCONTINUED] blood glucose test (ACCU-CHEK GUIDE TEST STRIPS) strips USE TEST STRIPS THREE TIMES DAILY 300 strip 3   ? [DISCONTINUED] flaxseed oil 1,000 mg cap Take 3 g by mouth daily.     ? [DISCONTINUED] hydrocortisone 2.5 % ointment applly twice a day for two weeks 30 g 0   ? [DISCONTINUED] losartan (COZAAR) 25 MG tablet Take 1 tablet (25 mg total) by mouth daily. (Patient taking differently: Take 50 mg by mouth daily before breakfast. ) 90 tablet 2   ? [DISCONTINUED] magnesium citrate 125 mg cap Take by mouth.     ? [DISCONTINUED] MAGNESIUM CITRATE ORAL Take 500 mg by mouth daily before breakfast.     ? [DISCONTINUED] nabumetone (RELAFEN) 500 MG tablet Take 1 tablet (500 mg total) by mouth 2 (two) times a day as needed for pain. 60 tablet 2   ? [DISCONTINUED] naproxen " "(NAPROSYN) 500 MG tablet Take 1 tablet (500 mg total) by mouth 2 (two) times a day as needed (for pain.  Take with food.). 30 tablet 0     No facility-administered encounter medications on file as of 12/9/2020.      Past Medical History:   Diagnosis Date   ? Anxiety    ? Botulism     1980   ? COPD (chronic obstructive pulmonary disease) (H)    ? Depression    ? Diabetes mellitus (H)    ? Hyperlipidemia    ? Hypertension    ? Pneumonia      No past surgical history on file.  Social History     Tobacco Use   ? Smoking status: Never Smoker   ? Smokeless tobacco: Never Used   Substance Use Topics   ? Alcohol use: Not Currently   ? Drug use: Not on file     Family History   Problem Relation Age of Onset   ? Stroke Father    ? Esophageal cancer Sister    ? Heart attack Brother      Social History     Social History Narrative    Originally from [facility]. Moved from [place].  4 times . Has 5 children ,9 grandchildren . Her daughters were taken away from her by biological dad when she was ill with botulism . All adults now . All of them are in [facility]. Youngest daughter is 38 . Initially estranged but has a closer relationship .Hadnt worked for a long time . Is a violinist and plays in [employer] . Wasn't diagnosed with bipolar much later in life . 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 Did different jobs also like a CNA, . Is on disability due to  ipolar .             She plays the violin.        Objective / Physical Examination:  Vitals:    12/09/20 1701   BP: 132/74   Pulse: 79   Resp: 22   Temp: 97.9  F (36.6  C)   SpO2: 96%   Weight: 183 lb (83 kg)   Height: 5' 3\" (1.6 m)     Wt Readings from Last 3 Encounters:   12/09/20 183 lb (83 kg)   11/20/20 177 lb 6.4 oz (80.5 kg)   11/18/20 178 lb (80.7 kg)     Body mass index is 32.42 kg/m .     General Appearance: Alert and oriented, cooperative, affect appropriate, speech clear, in no apparent distress  Head: Normocephalic, " atraumatic  Eyes:  Conjunctivae clear and sclerae non-icteric  Lungs:  Normal inspiratory and expiratory effort  Neuro: Alert and oriented, follows commands appropriately.     Mammo Screening Bilateral    Result Date: 11/25/2020  BILATERAL FULL FIELD DIGITAL SCREENING MAMMOGRAM WITH TOMOSYNTHESIS Performed on: 11/25/20. Compared to: 11/25/2019 Mammo Screening Bilateral, 11/27/2018 [facility] 3D Diagnostic, 11/19/2018 [facility] 2D Screening, 08/15/2017 [facility] 2D Screening, and 06/01/2016 [facility] 2D Screening Findings: The breasts have scattered areas of fibroglandular densities. There is no radiographic evidence of malignancy. This study was evaluated with the assistance of Computer-Aided Detection. Breast Tomosynthesis was used in interpretation. Routine screening mammogram in one year is recommended. ACR BI-RADS Category 1: Negative    No results found for this or any previous visit (from the past 240 hour(s)).  Diagnostics:     Results for orders placed or performed in visit on 11/20/20   Electrocardiogram Perform and Read   Result Value Ref Range    SYSTOLIC BLOOD PRESSURE      DIASTOLIC BLOOD PRESSURE      VENTRICULAR RATE 87 BPM    ATRIAL RATE 87 BPM    P-R INTERVAL 202 ms    QRS DURATION 86 ms    Q-T INTERVAL 368 ms    QTC CALCULATION (BEZET) 442 ms    P Axis 48 degrees    R AXIS 9 degrees    T AXIS 32 degrees    MUSE DIAGNOSIS       Normal sinus rhythm  Possible Inferior infarct , age undetermined  Abnormal ECG  No previous ECGs available  Confirmed by KAY CHOI MD LOC:[facility] ([id number]) on 11/20/2020 4:36:55 PM         Quality review:     PHQ-2 Total Score: 1 (11/20/2020 12:00 PM)      No data recorded      Eileen Sanchez [facility]  Internal Medicine  [address] [address]

## 2021-06-30 NOTE — PROGRESS NOTES
Progress Notes by Johana Evans Diabetes Ed at 3/24/2021  8:30 AM     Author: Johana Evans Diabetes Ed Service: -- Author Type: Diabetes Ed    Filed: 3/24/2021  9:06 AM Encounter Date: 3/24/2021 Status: Signed    : Johana Evans Diabetes Ed (Diabetes Ed)       Assessment:   --follow up visit for Annel  --patient starting classes at [employer] in [place], mid May, she is excited, and feels less stressed.  --she continues to walk at least every other day, 20-60 minutes.  --no change in eating pattern:  4-5 am: tea or coffee with yogurt  10 am: cottage cheese/fruit  12-1 pm: 1/2 sandwich/vegetables  4-5 pm: sandwich or cottage cheese/fruit    Blood glucose record:  See CGM data below          Medications prescribed:  Victoza 1.8 mg/daily  Humulin R U 500 90-70-70  Metformin 2000 mg/daily    Education:  --reviewed CGM data, time in target goal, episodes of hypoglycemia/signs/symptoms/treatment,   --discussed importance of scanning at least every 8 hours to capture 100% of data  --discussed timing of insulin injection: 30 minutes before meal/ at least 6 hours between injections.   --discussed nutrition/exercise, preventative care: eyes, dental/hearing        Plan:   1. Scan sensor: every 8 hours when awake, make sure and scan right before bedtime.  2. Treat < 70 mg/dl BG with 5-6 pieces of hard candy, wait 15 minutes, if BG still < 70 mg/dl, repeat.  3. Per CDE protocol patient to decrease Humulin R U 500 insulin to 85 units for am dose, if she continues to notice < 70 mg/dl in the late morning.  4. Keep doses of insulin at least 6 hours apart.     Subjective and Objective:      Annel Stoddard is referred by ingrid Ibrahim for Diabetes Education.     Lab Results   Component Value Date    HGBA1C 7.5 (H) 02/24/2021             Education:     Monitoring   Meter (per above goals): Assessed, Discussed and patient uses Maxine 2 CGM  Monitoring: Assessed and Discussed  BG goals: Assessed and Discussed    Nutrition  Management  Nutrition Management: Assessed and Discussed  Weight: Assessed and Discussed  Portions/Balance: Not addressed  Carb ID/Count: Not addressed  Label Reading: Not addressed  Heart Healthy Fats: Assessed  Menu Planning: Assessed and Discussed  Dining Out: Not addressed  Physical Activity: Assessed and Discussed    Orals: Assessed and Discussed  Injected Medications: Assessed and Discussed     Diabetes Disease Process: Assessed    Acute Complications: Prevent, Detect, Treat:  Hypoglycemia: Assessed and Discussed  Hyperglycemia: Assessed  Sick Days: Not addressed  Driving: patient does not drive    Chronic Complications  Foot Care:Not addressed  Skin Care: Not addressed  Eye: Assessed, Discussed and last visit oct/2020  ABC: Assessed  Teeth:Assessed, Discussed and upcoming appointments scheduled  Goal Setting and Problem Solving: Assessed and Discussed  Barriers: Assessed  Psychosocial Adjustments: Assessed      Time spent with the patient: 30 minutes for diabetes education and counseling.   Previous Education: yes  Visit Type:DSMT  Hours Remainin, DSMT 1 and MNT 1      Johana Evans  3/24/2021

## 2021-06-30 NOTE — PROGRESS NOTES
Progress Notes by Johana Evans Diabetes Ed at 2/24/2021  9:00 AM     Author: Johana Evans Diabetes Ed Service: -- Author Type: Diabetes Ed    Filed: 2/24/2021  8:51 AM Encounter Date: 2/24/2021 Status: Signed    : Johana Evans Diabetes Ed (Diabetes Ed)       Assessment:   --CGM download for Annel today  --patient has been very stressed this past month due to preparing to start online classes through the [employer] in May  --she is walking, every other day  -- meals have pretty much stayed the same:  4-5am: tea or coffee with yogurt  10 am cottage cheese/fruit  12-1 pm 1/2 sandwich/vegetables  4-5 pm sandwich or cottage cheese/fruit   Beverages: tea, water, coffee,     Blood glucose record:              Medications prescribed:  Humulin R U-500 90-70-70  Victoza 1.2 mg/daily  Metformin 2000 mg/daily      Education:  --reviewed CGM data,  discussed how stress may cause increases in her BG readings, talked about stress management: exercise, reading, music, prayer  --discussed eating patterns: no big changes, encouraged to return to daily walking, 20-60 minutes  --discussed preventative care: taking action on dental, hearing aids with assistance of SW and CHW    Plan:   1. Per CDE protocol, patient to increase Victoza to 1.8 mg/day.  2. Scan sensor a minimum of every 8 hours when awake.  3. Walk a minimum of 4-5x/week for 20-60 minutes.   4. Treat BG < 70 mg/dl with 5-6 pieces of hard candy.  5. Follow up with educator on 3/24/21.     Subjective and Objective:      Annel Stoddard is referred by ingrid Ibrahim for Diabetes Education.     Lab Results   Component Value Date    HGBA1C 7.2 (H) 11/18/2020       Education:     Monitoring   Meter (per above goals): patient uses CGM   Monitoring: Assessed and Discussed  BG goals: Assessed and Discussed    Nutrition Management  Nutrition Management: Assessed and Discussed  Weight: Assessed and Discussed  Portions/Balance: Assessed and Discussed  Carb  ID/Count: Not addressed  Label Reading: Not addressed  Heart Healthy Fats: Not addressed  Menu Planning: Assessed and Discussed  Dining Out: Not addressed  Physical Activity: Assessed and Discussed    Orals: Assessed and Discussed  Injected Medications: Assessed and Discussed     Diabetes Disease Process: Assessed and Discussed    Acute Complications: Prevent, Detect, Treat:  Hypoglycemia: Assessed and Discussed  Hyperglycemia: Assessed and Discussed  Sick Days: Assessed  Driving: does not drive    Chronic Complications  Foot Care:Not addressed  Skin Care: Not addressed  Eye: Assessed, Discussed and last visit 2020  ABC: Assessed  Teeth:Assessed, Discussed and working with SW on getting dental appointment   Goal Setting and Problem Solving: Assessed and Discussed  Barriers: Assessed  Psychosocial Adjustments: Assessed      Time spent with the patient: 15 minutes for diabetes education and counseling.   Previous Education: yes  Visit Type:MNT  Hours Remainin, DSMT 1.5 and MNT 1      Johana Evans  2021

## 2021-06-30 NOTE — PROGRESS NOTES
Progress Notes by Aron Phipps LGSW at 1/25/2021  7:30 AM     Author: Aron Phipps LGSW Service: -- Author Type:     Filed: 1/25/2021  8:46 AM Encounter Date: 1/25/2021 Status: Signed    : Aron Phipps LGSW ()       Clinic Care Coordination Contact  CCC ASHLEY contacted Annel by phone to complete an assessment for enrollment into [facility].    ASHLEY messaged [facility] scheduling to reach out for concerns of frost bite and medications.    SW will mail [facility] resources     Roxana Care expires in March, wants help in February. CCC team can complete referral to [facility] in Feb.      Clinic Care Coordination Contact  OUTREACH    Referral Information:  Referral Source: PCP    Primary Diagnosis: Psychosocial    Chief Complaint   Patient presents with   ? Clinic Care Coordination - Initial        Universal Utilization:   Clinic Utilization  Difficulty keeping appointments:: No  Compliance Concerns: No  No-Show Concerns: No  No PCP office visit in Past Year: No  Utilization    Last refreshed: 1/25/2021  8:24 AM: Hospital Admissions 0           Last refreshed: 1/25/2021  8:24 AM: ED Visits 0           Last refreshed: 1/25/2021  8:24 AM: No Show Count (past year) 0              Current as of: 1/25/2021  8:24 AM              Clinical Concerns:  Current Medical Concerns:  Possible frost bite on toe and medication questions     Current Behavioral Concerns: None reported    Education Provided to patient: Role of CCC   Pain  Pain (GOAL):: No  Health Maintenance Reviewed: Not assessed  Clinical Pathway: None     Medication Management:  Wants to talk about medications for breathing. Was discontinued singulair and nebulizer by lung specialist. Has more tests coming up in March. ASHLEY messaged scheduling to get int with PCP to disucss      Functional Status:  Dependent ADLs:: Independent  Dependent IADLs:: Independent  Bed or wheelchair confined:: No  Mobility Status: Independent  Fallen 2 or more times in  the past year?: No  Any fall with injury in the past year?: No    Living Situation:  Current living arrangement:: I live alone  Type of residence:: Apartment    Lifestyle & Psychosocial Needs:        Diet:: Diabetic diet  Inadequate nutrition (GOAL):: No  Tube Feeding: No  Inadequate activity/exercise (GOAL):: No  Significant changes in sleep pattern (GOAL): No  Transportation means:: [employer] mobility, Public transportation     Restorationist or spiritual beliefs that impact treatment:: No  Mental health DX:: Yes  Mental health management concern (GOAL):: No  Informal Support system:: Friends   Socioeconomic History   ? Marital status: Single     Spouse name: Not on file   ? Number of children: Not on file   ? Years of education: Not on file   ? Highest education level: Not on file     Tobacco Use   ? Smoking status: Never Smoker   ? Smokeless tobacco: Never Used   Substance and Sexual Activity   ? Alcohol use: Not Currently                Resources and Interventions:  Current Resources:      Community Resources: None  Supplies Currently Used at Home: Nebulizer tubing  Equipment Currently Used at Home: cane, straight, grab bar, tub/shower          Advance Care Plan/Directive  Advanced Care Plans/Directives on file:: Yes  Type Advanced Care Plans/Directives: Advanced Directive - On File  Advanced Care Plan/Directive Status: Not Applicable          Goals:   Goals        General    Healthy Eating (pt-stated)     Notes - Note created  1/25/2021  8:40 AM by Aron Phipps LGSW    Goal Statement: I want to be aware of food resources within one month  Date Goal set: 01/25/21  Barriers:   Strengths:   Date to Achieve By: 2/191030  Patient expressed understanding of goal: Yes  Action steps to achieve this goal:  1. I will review resources sent by [facility] SW  2. I will update [facility] team        Medication 1 (pt-stated)     Notes - Note created  1/25/2021  8:38 AM by Aron Phipps LGSW    Goal Statement: I want to see my PCP as  soon as possible to discuss frost bite and medications for breathing  Date Goal set: 01/25/21  Barriers:   Strengths:   Date to Achieve By: ASAP  Patient expressed understanding of goal: Yes  Action steps to achieve this goal:  1. I will wait to hear from [facility] Scheduling   2. I will update CCC team          Problem Solving (pt-stated)     Notes - Note created  1/25/2021  8:39 AM by Aron Phipps LGSW    Goal Statement: In February, I want to renew my roxana care application  Date Goal set: 01/25/21  Barriers:   Strengths:   Date to Achieve By: 2/28/2021  Patient expressed understanding of goal: Yes  Action steps to achieve this goal:  1. I will ask [facility] team for a referral to [facility] in February to help renew my Roxana Care Application that expires in March              Patient/Caregiver understanding: Reported understanding        Future Appointments              In 2 weeks Hilaria Parker AuD M [facility]    In 2 weeks Chi Perez MD M [facility]    In 1 month Johana Evans Diabetes Ed M [facility]    In 1 month Brynn Ibrahim DO M [facility]    In 1 month WW CT 2 M [facility] CT, WW    In 1 month WW PFT RM 1 M [facility] Respiratory, WW    In 1 month Angelica Alejandro MD M [facility]          Plan:  will mail food resources, Standard Outreach

## 2021-07-02 ENCOUNTER — COMMUNICATION - HEALTHEAST (OUTPATIENT)
Dept: FAMILY MEDICINE | Facility: CLINIC | Age: 66
End: 2021-07-02

## 2021-07-02 DIAGNOSIS — J44.89 CHRONIC OBSTRUCTIVE AIRWAY DISEASE WITH ASTHMA (H): ICD-10-CM

## 2021-07-03 NOTE — ADDENDUM NOTE
Addendum Note by Steinert, Joy C, CMA at 12/27/2019  1:15 PM     Author: Steinert, Joy C, CMA Service: -- Author Type: Certified Medical Assistant    Filed: 12/27/2019  1:15 PM Encounter Date: 12/26/2019 Status: Signed    : Steinert, Joy C, CMA (Certified Medical Assistant)    Addended by: STEINERT, JOY C on: 12/27/2019 01:15 PM        Modules accepted: Orders

## 2021-07-04 NOTE — ADDENDUM NOTE
Addendum Note by Brynn Ibrahim DO at 5/18/2021  2:15 PM     Author: Brynn Ibrahim DO Service: -- Author Type: Physician    Filed: 5/18/2021  2:15 PM Encounter Date: 5/14/2021 Status: Signed    : Brynn Ibrahim DO (Physician)    Addended by: BRYNN IBRAHIM on: 5/18/2021 02:15 PM        Modules accepted: Orders

## 2021-07-04 NOTE — TELEPHONE ENCOUNTER
Telephone Encounter by Vin Smith RN at 7/2/2021 12:02 PM     Author: Vin Smith RN Service: -- Author Type: Registered Nurse    Filed: 7/2/2021 12:03 PM Encounter Date: 7/2/2021 Status: Signed    : Vin Smith RN (Registered Nurse)       RN cannot approve Refill Request    RN can NOT refill this medication   Patient request early refill. Medication last filled 12/16/20 for qty 90 refill 2. Provider to advise on request. . Last office visit: 6/16/2021 Brynn Ibrahim DO Last Physical: Visit date not found Last MTM visit: Visit date not found Last visit same specialty: 6/16/2021 Brynn Ibrahim DO.  Next visit within 3 mo: Visit date not found  Next physical within 3 mo: Visit date not found      [facility] Triage/Med Refill 7/2/2021    Requested Prescriptions   Pending Prescriptions Disp Refills   ? montelukast (SINGULAIR) 10 mg tablet 90 tablet 2     Sig: Take 1 tablet (10 mg total) by mouth daily.       Asthma Medications Refill Protocol Passed - 7/2/2021 11:26 AM        Passed - PCP or prescribing provider visit in last year     Last office visit with prescriber/PCP: 6/16/2021 Brynn Ibrahim DO OR same dept: 6/16/2021 Brynn Ibrahim DO OR same specialty: 6/16/2021 Brynn Ibrahim DO  Last physical: Visit date not found Last MTM visit: Visit date not found    Next appt within 3 mo: Visit date not found Next physical within 3 mo: Visit date not found  Prescriber OR PCP: Brynn Ibrahim DO  Last diagnosis associated with med order: 1. Chronic obstructive airway disease with asthma (H)  - montelukast (SINGULAIR) 10 mg tablet; Take 1 tablet (10 mg total) by mouth daily.  Dispense: 90 tablet; Refill: 2    If protocol passes may refill for 6 months if within 3 months of last provider visit (or a total of 9 months).

## 2021-07-04 NOTE — PROGRESS NOTES
Progress Notes by Johana Evans, Diabetes Ed at 6/15/2021  8:30 AM     Author: Johana Evans, Diabetes Ed Service: -- Author Type: Diabetes Ed    Filed: 6/15/2021  9:27 AM Encounter Date: 6/15/2021 Status: Signed    : Johana Evans, Diabetes Ed (Diabetes Ed)       Assessment:   --follow up visit for Annel  --patient has decided not to take classes at [facility] [employer], but she is taking violin and piano lessons, plus volunteering at her [employer].  --stress is better this month, she did find out from her ENT visit that she has sleep apnea, so she will be following up on next steps.  --she is walking daily: 20-60 minutes  --eating pattern is fairly stable:  4am: one egg, one slice of toast, 1/4 of a banana, yogurt  10-11am: cottage cheese/fruit  2p: cottage cheese/ fruit or 1/2 sandwich  4:30-5P: sandwich or cottage cheese/fruit  Patient takes her U 500 insulin injections at:  4:00 am, 10-11 am, 4:30-5:30 pm    Blood glucose record:  See CGM data below      Medications prescribed:  Victoza 1.8 mg/daily  Metformin 2000 mg daily  Humulin R U-500 90-70-70    Education:  --reviewed CGM data, elevations in early am, hypoglycemic events and TX, meal pattern/intake, insulin injection schedule, exercise routine  --discussed sleep apnea results from PCP, next steps, stress mgmt, support.         Plan:   1. Scan sensor a minimum of every 8 hours during waking hours, bring reader with when away from the home.  2. Treat BG < 70 mg/dl with 5-6 pieces of hard candy.  3. Walk daily for 20-60 minutes  4. Educator to consult with MD regarding potential insulin changes to address elevated early am BG.  5. Follow up with educator on 8/3/21.     Subjective and Objective:      Annel Stoddard is referred by Brynn Ibrahim for Diabetes Education.     Lab Results   Component Value Date    HGBA1C 7.9 (H) 05/05/2021     Wt Readings from Last 3 Encounters:   06/15/21 189 lb (85.7 kg)   05/05/21 187 lb (84.8 kg)   05/05/21 189 lb 8  oz (86 kg)                                   Education:     Monitoring   Meter (per above goals): patient uses Maxine 2 CGM  Monitoring: Assessed and Discussed  BG goals: Assessed and Discussed    Nutrition Management  Nutrition Management: Assessed and Discussed  Weight: Assessed and Discussed  Portions/Balance: Assessed and Discussed  Carb ID/Count: Not addressed  Label Reading: Not addressed  Heart Healthy Fats: Assessed  Menu Planning: Assessed and Discussed  Dining Out: Not addressed  Physical Activity: Assessed and Discussed    Orals: Assessed and Discussed  Injected Medications: Assessed and Discussed     Diabetes Disease Process: Assessed and Discussed    Acute Complications: Prevent, Detect, Treat:  Hypoglycemia: Assessed and Discussed  Hyperglycemia: Assessed and Discussed  Sick Days: Assessed  Driving: Assessed and Discussed    Chronic Complications  Foot Care:Not addressed  Skin Care: Not addressed  Eye: last exam   ABC: Assessed  Teeth:in past patient has not wanted to return to the dentist due to a bad experience  Goal Setting and Problem Solving: Assessed and Discussed  Barriers: Assessed  Psychosocial Adjustments: Assessed      Time spent with the patient: 30 minutes for diabetes education and counseling.   Previous Education: yes  Visit Type:DSMT  Hours Remainin, DSMT 0 and MNT 1      Johana Evans  6/15/2021

## 2021-07-04 NOTE — ADDENDUM NOTE
Addendum Note by Rand Mock RN at 4/12/2021  3:22 PM     Author: Rand Mock RN Service: -- Author Type: Registered Nurse    Filed: 4/12/2021  3:22 PM Encounter Date: 4/12/2021 Status: Signed    : Rand Mock RN (Registered Nurse)    Addended by: RAND MOCK on: 4/12/2021 03:22 PM        Modules accepted: Orders

## 2021-07-05 ENCOUNTER — COMMUNICATION - HEALTHEAST (OUTPATIENT)
Dept: FAMILY MEDICINE | Facility: CLINIC | Age: 66
End: 2021-07-05

## 2021-07-05 DIAGNOSIS — E11.9 DIABETES MELLITUS, TYPE 2 (H): ICD-10-CM

## 2021-07-05 PROBLEM — K21.9 LPRD (LARYNGOPHARYNGEAL REFLUX DISEASE): Status: ACTIVE | Noted: 2021-06-17

## 2021-07-05 PROBLEM — M54.50 LUMBAR SPINE PAIN: Status: ACTIVE | Noted: 2021-06-17

## 2021-07-05 PROBLEM — R10.11 RUQ ABDOMINAL PAIN: Status: ACTIVE | Noted: 2021-06-17

## 2021-07-05 NOTE — TELEPHONE ENCOUNTER
Telephone Encounter by Aisha Caicedo at 7/5/2021  9:06 AM     Author: Aisha Caicedo Service: -- Author Type: Patient Access    Filed: 7/5/2021  9:08 AM Encounter Date: 7/5/2021 Status: Signed    : Aisha Caicedo (Patient Access)       Reason for Call:  Medication or medication refill:    Do you use a [facility]?  Name of the pharmacy and phone number for the current request: [facility] on file    Name of the medication requested: insulin regular HIGH CONC (HUMULIN R U-500, CONC, KWIKPEN) 500 unit/mL (3 mL) PEN solution, Inject three times per day before meals. Breakfast 90 unit, lunch 70 units, dinner 75 units.    Other request: Patient called to request provider to send refill on this medication to pharmacy on file. She checked with [facility] and they have not received this refill. Please send refills to pharmacy.    Can we leave a detailed message on this number? Yes    Phone number patient can be reached at: Home number on file [phone] (home)    Best Time: any    Call taken on 7/5/2021 at 9:06 AM by Aisha Caicedo

## 2021-07-06 ENCOUNTER — DOCUMENTATION ONLY (OUTPATIENT)
Dept: OTHER | Facility: CLINIC | Age: 66
End: 2021-07-06

## 2021-07-06 VITALS — BODY MASS INDEX: 33.48 KG/M2 | WEIGHT: 189 LBS

## 2021-07-06 NOTE — TELEPHONE ENCOUNTER
Telephone Encounter by Humaira Craft at 7/6/2021 10:51 AM     Author: Humaira Craft Service: -- Author Type: --    Filed: 7/6/2021 10:52 AM Encounter Date: 7/5/2021 Status: Signed    : Humaira Craft       Send to the  Pool refill

## 2021-07-06 NOTE — TELEPHONE ENCOUNTER
Telephone Encounter by Humaira Craft at 7/6/2021 10:50 AM     Author: Humaira Craft Service: -- Author Type: --    Filed: 7/6/2021 10:51 AM Encounter Date: 7/5/2021 Status: Signed    : Humaira Craft       Can we refill?

## 2021-07-06 NOTE — TELEPHONE ENCOUNTER
Telephone Encounter by Vin Smith RN at 7/6/2021 11:09 AM     Author: Vin Smith, RN Service: -- Author Type: Registered Nurse    Filed: 7/6/2021 11:11 AM Encounter Date: 7/5/2021 Status: Signed    : Vin Smith, RN (Registered Nurse)       Spoke with pharmacy.  Patient has 2 refills remaining.    Refilled 2/10/21 with 5 refills.    Pharmacy is prepping refill for patient now.

## 2021-07-06 NOTE — TELEPHONE ENCOUNTER
Telephone Encounter by Joaquina Segal at 7/6/2021  8:30 AM     Author: Joaquina Segal Service: -- Author Type: Patient Access    Filed: 7/6/2021  8:31 AM Encounter Date: 7/5/2021 Status: Signed    : Joaquina Segal (Patient Access)       maki calling to check on status of RX

## 2021-07-07 ENCOUNTER — COMMUNICATION - HEALTHEAST (OUTPATIENT)
Dept: NURSING | Facility: CLINIC | Age: 66
End: 2021-07-07

## 2021-07-07 ENCOUNTER — AMBULATORY - HEALTHEAST (OUTPATIENT)
Dept: EDUCATION SERVICES | Facility: CLINIC | Age: 66
End: 2021-07-07

## 2021-07-07 DIAGNOSIS — E11.9 DIABETES MELLITUS, TYPE 2 (H): ICD-10-CM

## 2021-07-07 NOTE — TELEPHONE ENCOUNTER
Telephone Encounter by Humaira Craft at 7/7/2021  6:10 PM     Author: Humaira Craft Service: -- Author Type: --    Filed: 7/7/2021  6:10 PM Encounter Date: 7/7/2021 Status: Signed    : Humaira Craft       Please advise

## 2021-07-07 NOTE — TELEPHONE ENCOUNTER
Telephone Encounter by George Rollins CHW at 7/7/2021  5:29 PM     Author: George Rollins CHW Service: -- Author Type: Community Health Worker    Filed: 7/7/2021  5:30 PM Encounter Date: 7/7/2021 Status: Signed    : George Rollins CHW (Community Health Worker)       7/7/2021  Patient VM regarding medication that KRISTINA Vaughn sent to the pharmacy.  She has question about the doses     Please call to advise of correct doses

## 2021-07-08 NOTE — TELEPHONE ENCOUNTER
Telephone Encounter by Brynn Ibrahim DO at 7/8/2021  4:50 PM     Author: Brynn Ibrahim DO Service: -- Author Type: Physician    Filed: 7/8/2021  4:51 PM Encounter Date: 7/7/2021 Status: Signed    : Brynn Ibrahim DO (Physician)       Johana are you able to call patient and clarify?

## 2021-07-09 NOTE — TELEPHONE ENCOUNTER
Please tell pt that minor fluctuations do not matter . Check every day at different times once a day if by  Friday liam still above 140/90 we can double losartan to 50mg . She can have a phone visit on Friday    urinary retention

## 2021-07-12 ENCOUNTER — TELEPHONE (OUTPATIENT)
Dept: CARE COORDINATION | Facility: CLINIC | Age: 66
End: 2021-07-12

## 2021-07-12 NOTE — TELEPHONE ENCOUNTER
7/12/2021  Please call patient to clarify her medication dose that KRISTINA Vaughn sent to Pharmacy. It did not match .  Johana is out until 7-14-21

## 2021-07-13 ENCOUNTER — DOCUMENTATION ONLY (OUTPATIENT)
Dept: OTHER | Facility: CLINIC | Age: 66
End: 2021-07-13

## 2021-07-13 ENCOUNTER — PATIENT OUTREACH (OUTPATIENT)
Dept: CARE COORDINATION | Facility: CLINIC | Age: 66
End: 2021-07-13

## 2021-07-13 NOTE — PROGRESS NOTES
Clinic Care Coordination Contact  [facility]/Voicemail       Clinical Data: Care Coordinator Outreach  Outreach attempted x 1.  Left message on patient's voicemail with call back information and requested return call.  Plan: Community Health Worker to outreach per standard work and updated on goal progression

## 2021-07-14 DIAGNOSIS — I10 ESSENTIAL HYPERTENSION, BENIGN: Primary | ICD-10-CM

## 2021-07-14 RX ORDER — LOSARTAN POTASSIUM 25 MG/1
25 TABLET ORAL DAILY
Qty: 90 TABLET | Refills: 1 | Status: SHIPPED | OUTPATIENT
Start: 2021-07-14 | End: 2021-07-16

## 2021-07-15 DIAGNOSIS — I10 ESSENTIAL HYPERTENSION, BENIGN: ICD-10-CM

## 2021-07-15 NOTE — TELEPHONE ENCOUNTER
EMMANUEL  7-15-21   Spoke to patient today. She stated she has a busy schedule ahead. A lot of appt with specialities.  She will call me CHW to schedule with you when she is done with her appts.

## 2021-07-15 NOTE — TELEPHONE ENCOUNTER
The prescription that was signed and sent yesterday, July 14th, went to the incorrect pharmacy. Preferred pharmacy has been updated.

## 2021-07-15 NOTE — TELEPHONE ENCOUNTER
7/15/2021  Patient called CHW and left several voice messages regarding getting her med refills for the Abilify and Losartan.  Patient sounded frustrated and upset   Please support with refills

## 2021-07-15 NOTE — TELEPHONE ENCOUNTER
PT is going to be out of medication soon. Please refill medication if possible.     50ml losartim  Generic ablifiy

## 2021-07-15 NOTE — TELEPHONE ENCOUNTER
Her medication was sent to the pharmacy already see other task. There is two task regarding this refill

## 2021-07-15 NOTE — TELEPHONE ENCOUNTER
7/15/2021  CHW coordinated with clinic manager regarding refills.  Per Clinic Manager Miryam Webber RN to clarify preferred pharmacy in chart is to [facility]  Patient stated pharmacy on [address].    CHW called and left VM to call back to clarify preferred pharmacy.    Received call back from patient that she preferred [facility] on [address].    CHW informed Miryam Webber RN Clinic manager of preferred pharmacy.

## 2021-07-16 ENCOUNTER — PATIENT OUTREACH (OUTPATIENT)
Dept: NURSING | Facility: CLINIC | Age: 66
End: 2021-07-16

## 2021-07-16 ENCOUNTER — TELEPHONE (OUTPATIENT)
Dept: FAMILY MEDICINE | Facility: CLINIC | Age: 66
End: 2021-07-16

## 2021-07-16 DIAGNOSIS — E11.8 TYPE 2 DIABETES MELLITUS WITH COMPLICATION, WITH LONG-TERM CURRENT USE OF INSULIN (H): Primary | ICD-10-CM

## 2021-07-16 DIAGNOSIS — Z79.4 TYPE 2 DIABETES MELLITUS WITH COMPLICATION, WITH LONG-TERM CURRENT USE OF INSULIN (H): Primary | ICD-10-CM

## 2021-07-16 DIAGNOSIS — F31.81 BIPOLAR 2 DISORDER (H): ICD-10-CM

## 2021-07-16 RX ORDER — LOSARTAN POTASSIUM 50 MG/1
1 TABLET ORAL DAILY
COMMUNITY
Start: 2021-03-25 | End: 2021-07-16

## 2021-07-16 RX ORDER — ASPIRIN 81 MG/1
81 TABLET, CHEWABLE ORAL DAILY
COMMUNITY

## 2021-07-16 RX ORDER — ARIPIPRAZOLE 30 MG/1
30 TABLET ORAL DAILY
Qty: 90 TABLET | Refills: 0 | Status: SHIPPED | OUTPATIENT
Start: 2021-07-16 | End: 2022-12-06 | Stop reason: ALTCHOICE

## 2021-07-16 RX ORDER — LOSARTAN POTASSIUM 50 MG/1
50 TABLET ORAL DAILY
Qty: 90 TABLET | Refills: 0 | Status: SHIPPED | OUTPATIENT
Start: 2021-07-16 | End: 2023-01-04

## 2021-07-16 RX ORDER — LOSARTAN POTASSIUM 25 MG/1
25 TABLET ORAL DAILY
Qty: 90 TABLET | Refills: 1 | Status: SHIPPED | OUTPATIENT
Start: 2021-07-16 | End: 2021-07-16

## 2021-07-16 NOTE — PROGRESS NOTES
7/16/2021  Clinic Care Coordination Contact  Community Health Worker Follow Up    Goals:   Goals Addressed as of 7/16/2021 at 11:13 AM        Other (pt-stated)     Added 7/16/21 by George Rollins      Goal Statement: I would like to address issues with getting refills for medications within the next month.  Date goal set: 7/16/2021  Measure of Success:  Barriers:   Strengths: motivated and engaged with clinic and CC Team for support with addressing health needs and meds  Date to Achieve By: 830-29  Patient expressed understanding of goal: Yes    Action steps to achieve this goal  1. I will talk to clinic manager of resolving or support to address issues with getting refills for medications.  2. I will call CHW to schedule appt with CC RN for support with meds  3. I will update CCC Team at next outreach.                  Intervention and Education during outreach:   Received multiple calls from patient this  week regarding refill of medications and clarification of medications.    Received call from patient today upset stated [facility] still did not get the refills for Losartan 50mg and Abilify 30mg.  Stated the scripts was sent to [facility] and that it was wrong dose for Lorsatan. Stated she suppose to take 50mg Losartan not 25mg.    Apologized to patient for the delay.  CHw will sent message with clinic manager to help address     Patient stated she has a lot of upcoming appts with specialities and busy with her schedule.  Stated she will call CHW to schedule with with CCC RN when she is done with all her upcoming appts.    Connected with Miryam Webber, Clinic manager and shared patient's complaints and to support patient to refill her 2 meds  Losartan 50mg and Ability 30 mg  Plan: Miryam will call patient today to  address and support patient.      CHW Follow up: Monthly    CHW Plan: Follow up on goals    CHW Next Follow Up: 8-13-21     George Rollins  Community Health Worker  [facility]   Clinic Care Coordination  [email or web address]  [email or web address]   Office: [phone]  Fax: [phone]

## 2021-07-16 NOTE — TELEPHONE ENCOUNTER
The patient is calling for the refill for the losartan and she is reporting that the dose is supposed to be 50 mg versus the 25 mg, can you please clarify? Also she is asking about the abilify, are you willing to fill this script as well?

## 2021-07-19 DIAGNOSIS — Z79.4 TYPE 2 DIABETES MELLITUS WITH COMPLICATION, WITH LONG-TERM CURRENT USE OF INSULIN (H): ICD-10-CM

## 2021-07-19 DIAGNOSIS — E11.8 TYPE 2 DIABETES MELLITUS WITH COMPLICATION, WITH LONG-TERM CURRENT USE OF INSULIN (H): ICD-10-CM

## 2021-07-19 RX ORDER — LOSARTAN POTASSIUM 50 MG/1
50 TABLET ORAL DAILY
Qty: 90 TABLET | Refills: 0 | Status: CANCELLED | OUTPATIENT
Start: 2021-07-19

## 2021-07-19 NOTE — TELEPHONE ENCOUNTER
I spoke with patient and apologized for the delay experienced with her medication refills. Patient very unsatisfied with the service of the clinic regarding medication refills. Explained to her that it is something that we are actively trying to improve upon. Patient stated that she loves Dr. Ibrahim but that she is transitioning her care over to [facility]. I apologized again to the patient.

## 2021-08-05 ENCOUNTER — PATIENT OUTREACH (OUTPATIENT)
Dept: CARE COORDINATION | Facility: CLINIC | Age: 66
End: 2021-08-05

## 2021-08-05 NOTE — PROGRESS NOTES
1. Have you been to the ER, urgent care clinic since your last visit? Hospitalized since your last visit? No     2. Have you seen or consulted any other health care providers outside of the [address] [place] [address] since your last visit? Include any pap smears or colon screening.  No Clinic Care Coordination Contact    Situation: Patient chart reviewed by care coordinator.    Background: SW completed chart review    Assessment: CHW supported patient with connecting to care team regarding medication concerns    Plan/Recommendations: Standard outreach

## 2021-08-06 NOTE — PATIENT INSTRUCTIONS - HE
Patient Instructions by Gaye Treviño MD at 12/16/2019  9:00 AM     Author: Gaye Treviño MD Service: -- Author Type: Physician    Filed: 12/16/2019  9:17 AM Encounter Date: 12/16/2019 Status: Addendum    : Gaye Treviño MD (Physician)    Related Notes: Original Note by Gaye Treviño MD (Physician) filed at 12/16/2019  9:15 AM       Switch tresiba to morning   Labs AIC at the dietician visit   Then visit with me after in feb         Patient Education     Your Health Risk Assessment indicates you feel you are not in good physical health.    A healthy lifestyle helps keep the body fit and the mind alert. It helps protect you from disease, helps you fight disease, and helps prevent chronic disease (disease that doesn't go away) from getting worse. This is important as you get older and begin to notice twinges in muscles and joints and a decline in the strength and stamina you once took for granted. A healthy lifestyle includes good healthcare, good nutrition, weight control, recreation, and regular exercise. Avoid harmful substances and do what you can to keep safe. Another part of a healthy lifestyle is stay mentally active and socially involved.    Good healthcare     Have a wellness visit every year.     If you have new symptoms, let us know right away. Don't wait until the next checkup.     Take medicines exactly as prescribed and keep your medicines in a safe place. Tell us if your medicine causes problems.   Healthy diet and weight control     Eat 3 or 4 small, nutritious, low-fat, high-fiber meals a day. Include a variety of fruits, vegetables, and whole-grain foods.     Make sure you get enough calcium in your diet. Calcium, vitamin D, and exercise help prevent osteoporosis (bone thinning).     If you live alone, try eating with others when you can. That way you get a good meal and have company while you eat it.     Try to keep a healthy weight. If you eat more calories than  your body uses for energy, it will be stored as fat and you will gain weight.     Recreation   Recreation is not limited to sports and team events. It includes any activity that provides relaxation, interest, enjoyment, and exercise. Recreation provides an outlet for physical, mental, and social energy. It can give a sense of worth and achievement. It can help you stay healthy.       Patient Education   Signs of Hearing Loss  Hearing loss is a problem shared by many people. In fact, it is one of the most common health conditions, particularly as people age. Most people over age 65 have some hearing loss, and by age 80, almost everyone does. Because hearing loss usually occurs slowly over the years, you may not realize your hearing ability has gotten worse.       Have your hearing checked  Contact your [facility] care provider if you:    Have to strain to hear normal conversation.    Have to watch other peoples faces very carefully to follow what theyre saying.    Need to ask people to repeat what theyve said.    Often misunderstand what people are saying.    Turn the volume of the television or radio up so high that others complain.    Feel that people are mumbling when theyre talking to you.    Find that the effort to hear leaves you feeling tired and irritated.    Notice, when using the phone, that you hear better with 1 ear than the other.    3558-9837 [employer]. [address] [place] [address], [place] [address]. All rights reserved. This information is not intended as a substitute for professional medical care. Always follow your healthcare professional's instructions.         Patient Education   Urinary Incontinence, Female (Adult)  Urinary incontinence means loss of control of the bladder. This problem affects many women, especially as they get older. If you have incontinence, you may be embarrassed to ask for help. But know that this problem can be treated.  Types of Incontinence  There are different types  of incontinence. Two of the main types are described here. You can have more than one type.    Stress incontinence. With this type, urine leaks when pressure (stress) is put on the bladder. This may happen when you cough, sneeze, or laugh. Stress incontinence most often occurs because the pelvic floor muscles that support the bladder and urethra are weak. This can happen after pregnancy and vaginal childbirth or a hysterectomy. It can also be due to excess body weight or hormone changes.    Urge incontinence (also called overactive bladder). With this type, a sudden urge to urinate is felt often. This may happen even though there may not be much urine in the bladder. The need to urinate often during the night is common. Urge incontinence most often occurs because of bladder spasms. This may be due to bladder irritation or infection. Damage to bladder nerves or pelvic muscles, constipation, and certain medicines can also lead to urge incontinence.  Treatment of urinary incontinence depends on the cause. Further evaluation is needed to find the type you have. This will likely include an exam and certain tests. Based on the results, you and your healthcare provider can then plan treatment. Until a diagnosis is made, the home care tips below can help relieve symptoms.  Home care    Do pelvic floor muscle exercises, if they are prescribed. The pelvic floor muscles help support the bladder and urethra. Many women find that their symptoms improve when doing special exercises that strengthen these muscles. To do the exercises contract the muscles you would use to stop your stream of urine, but do this when youre not urinating. Hold for 10 seconds, then relax. Repeat 10 to 20 times in a row, at least 3 times a day. Your provider may give you other instructions for how to do the exercises and how often.    Keep a bladder diary. This helps track how often and how much you urinate over a set period of time. Bring this diary  with you to your next visit with the provider. The information can help your provider learn more about your bladder problem.    Lose weight, if advised to by your provider. Excess weight puts pressure on the bladder. Your provider can help you create a weight-loss plan thats right for you. This may include exercising more and making certain diet changes.    Don't consume foods and drinks that may irritate the bladder. These can include alcohol and caffeinated drinks.    Quit smoking. Smoking and other tobacco use can lead to chronic cough that strains the pelvic floor muscles. Smoking may also damage the bladder and urethra. Talk with your provider about treatments or methods you can use to quit smoking.    If drinking large amounts of fluid causes you to have symptoms, you may be advised to limit your fluid intake. You may also be advised to drink most of your fluids during the day and to limit fluids at night.    If youre worried about urine leakage or accidents, you may wear absorbent pads to catch urine. Change the pads often. This helps reduce discomfort. It may also reduce the risk of skin or bladder infections.  Follow-up care  Follow up with your healthcare provider, or as directed. It may take some to find the right treatment for your problem. Your treatment plan may include special therapies or medicines. Certain procedures or surgery may also be options. Be sure to discuss any questions you have with your provider.  When to seek medical advice  Call the healthcare provider right away if any of these occur:    Fever of 100.4 F (38 C) or higher, or as directed by your provider    Bladder pain or fullness    Abdominal swelling    Nausea or vomiting    Back pain    Weakness, dizziness or fainting  Date Last Reviewed: 10/1/2017    7995-8313 [employer]. [address] [place] [address], [place] [address]. All rights reserved. This information is not intended as a substitute for professional medical care.  Always follow your healthcare professional's instructions.     Patient Education   Your Health Risk Assessment indicates you feel you are not in good emotional health.    Recreation   Recreation is not limited to sports and team events. It includes any activity that provides relaxation, interest, enjoyment, and exercise. Recreation provides an outlet for physical, mental, and social energy. It can give a sense of worth and achievement. It can help you stay healthy.    Mental Exercise and Social Involvement  Mental and emotional health is as important as physical health. Keep in touch with friends and family. Stay as active as possible. Continue to learn and challenge yourself.   Things you can do to stay mentally active are:    Learn something new, like a foreign language or musical instrument.     Play SCRABBLE or do crossword puzzles. If you cannot find people to play these games with you at home, you can play them with others on your computer through the Internet.     Join a games club--anything from card games to chess or checkers or lawn bowling.     Start a new hobby.     Go back to school.     Volunteer.     Read.     Keep up with world events.       Patient Education   Preventing Falls in the Home  As you get older, falls are more likely. Thats because your reaction time slows. Your muscles and joints may also get stiffer, making them less flexible. Illness, medications, and vision changes can also affect your balance. A fall could leave you unable to live on your own. To make your home safer, follow these tips:    Floors    Put nonskid pads under area rugs.    Remove throw rugs.    Replace worn floor coverings.    Tack carpets firmly to each step on carpeted stairs. Put nonskid strips on the edges of uncarpeted stairs.    Keep floors and stairs free of clutter and cords.    Arrange furniture so there are clear pathways.    Clean up any spills right away.    Bathrooms    Install grab bars in the tub or  shower.    Apply nonskid strips or put a nonskid rubber mat in the tub or shower.    Sit on a bath chair to bathe.    Use bathmats with nonskid backing.    Lighting    Keep a flashlight in each room.    Put a nightlight along the pathway between the bedroom and the bathroom.    4041-2741 [employer]. [address] [place] [address]. All rights reserved. This information is not intended as a substitute for professional medical care. Always follow your healthcare professional's instructions.           Advance Directive  Patients advance directive was discussed and I am comfortable with the patients wishes.  Patient Education   Personalized Prevention Plan  You are due for the preventive services outlined below.  Your care team is available to assist you in scheduling these services.  If you have already completed any of these items, please share that information with your care team to update in your medical record.  Health Maintenance   Topic Date Due   ? LIPID  1955   ? HIV SCREENING  08/04/1970   ? COLONOSCOPY  08/04/2005   ? DIABETIC EYE EXAM  04/24/2019   ? INFLUENZA VACCINE RULE BASED (1) 08/01/2019   ? A1C  05/26/2020   ? BMP  09/23/2020   ? MICROALBUMIN  09/23/2020   ? MEDICARE ANNUAL WELLNESS VISIT  12/16/2020   ? DIABETIC FOOT EXAM  12/16/2020   ? MAMMOGRAM  11/25/2021   ? TD 18+ HE  03/05/2023   ? PAP SMEAR  12/06/2023   ? HPV TEST  12/06/2023   ? ADVANCE CARE PLANNING  11/05/2024   ? HEPATITIS C SCREENING  Completed   ? PNEUMOCOCCAL IMMUNIZATION 19-64 MEDIUM RISK  Completed   ? TDAP ADULT ONE TIME DOSE  Completed   ? ZOSTER VACCINES  Completed

## 2021-08-12 ENCOUNTER — PATIENT OUTREACH (OUTPATIENT)
Dept: CARE COORDINATION | Facility: CLINIC | Age: 66
End: 2021-08-12

## 2021-08-12 NOTE — PROGRESS NOTES
8/12/2021  Clinic Care Coordination Contact  Community Health Worker Follow Up    Care Gaps:     Intervention and Education during outreach:   Spoke to patient.  Stated she wwitched to PCP   She is at [facility] new PCP is Sadie Feldman on [facility]   She switched clinic because she was having issues with getting her medications refills.    Wished patient good luck at the clinic.  Informed CHW will no longer follow up with her due to PCP outside of care system.    Patient transfer PCP and clinic to [facility].     Patient has been disenrolled from Clinic Care coordination services.  No further follow up from CCC  Routed to PCP as FYI

## 2021-08-13 DIAGNOSIS — E11.8 TYPE 2 DIABETES MELLITUS WITH COMPLICATION, WITH LONG-TERM CURRENT USE OF INSULIN (H): Primary | ICD-10-CM

## 2021-08-13 DIAGNOSIS — Z79.4 TYPE 2 DIABETES MELLITUS WITH COMPLICATION, WITH LONG-TERM CURRENT USE OF INSULIN (H): Primary | ICD-10-CM

## 2021-08-13 NOTE — PROGRESS NOTES
Clinic Care Coordination Contact  Program: [facility]         Outreach:   2021: FRW reviewed referral and checked billing notes. Patient already has etelvina care at 100%, approved until 3/22/2021. She spoke with the billing department on  and they gave her the same information. FRW called patient to discuss and left a detailed VM. FRW will send her out a new CC form. She can send in the new form after 3/22/2021 IF she receives a bill. CC is not accepted if balance is zero. Patient can call FRW or billing [phone] if she has any questions.     Health Insurance:  Medicare and [employer]     Referral/Screening:  CHW completed FRW screening referral for et[facility]ina care application.     [place] Benefits  Is patient requesting help applying for [facility] benefits?: Yes([facility] Care application  in March)  Have you recently applied for any [facility] benefits?: No  How many people in your household?: 1  Do you buy/eat food together?: No  What is the monthly gross income for the household (wages, social security, workers comp, and pension)? : 1287  Is patient requesting an increase in SNAP/CASH?: No     Ins[place]:  [place]?: No  Is this an insurance renewal?: No  Is this a new insurance application request?: No     Any other information for the FRW?: any day call in the morning.    Goals Addressed:   Goals Addressed                 This Visit's Progress       Patient Stated      COMPLETED: Problem Solving (pt-stated)        Goal Statement: In February, I want to renew my etelvina care application  Date Goal set: 21  Barriers:   Strengths:   Date to Achieve By: 2021  Patient expressed understanding of goal: Yes  Action steps to achieve this goal:  1. I was approved for etelvina care until 3/22/2021. I can send in a new etelvina care form after that date IF I received an outstanding bill at [facility].   2. I was mailed a new form to complete and return to [facility].   3. I can call the billing department at [phone], if I have any questions.     Updated: 2/12/2021.                Render In Strict Bullet Format?: No

## 2021-08-13 NOTE — PROGRESS NOTES
8/12/2021  Patient transfer to different clinic and PCP outside of [facility].

## 2021-08-16 RX ORDER — METFORMIN HCL 500 MG
2000 TABLET, EXTENDED RELEASE 24 HR ORAL
Qty: 360 TABLET | Refills: 1 | Status: SHIPPED | OUTPATIENT
Start: 2021-08-16 | End: 2022-12-06

## 2021-08-16 NOTE — TELEPHONE ENCOUNTER
" Disp Refills Start End CHIQUI   metFORMIN (GLUCOPHAGE XR) 500 MG 24 hr tablet 360 tablet 1 1/26/2021  No   Sig - Route: Take 4 tablets (2,000 mg total) by mouth daily with supper. - Oral   Sent to pharmacy as: metFORMIN  mg tablet,extended release 24 hr (Glucophage XR)   E-Prescribing Status: Receipt confirmed by pharmacy (1/26/2021 10:38 AM CST)       Last Written Prescription Date:  01/26/2021  Last Fill Quantity: 360,  # refills: 1   Last office visit provider:  06/16/2021 with Dr Ibrahim.     Requested Prescriptions   Pending Prescriptions Disp Refills     metFORMIN (GLUCOPHAGE-XR) 500 MG 24 hr tablet       Sig: Take 4 tablets (2,000 mg) by mouth       Biguanide Agents Failed - 8/16/2021  9:27 AM        Failed - Recent (6 mo) or future (30 days) visit within the authorizing provider's specialty     Patient had office visit in the last 6 months or has a visit in the next 30 days with authorizing provider or within the authorizing provider's specialty.  See \"Patient Info\" tab in inbasket, or \"Choose Columns\" in Meds & Orders section of the refill encounter.            Passed - Patient is age 10 or older        Passed - Patient has documented A1c within the specified period of time.     If HgbA1C is 8 or greater, it needs to be on file within the past 3 months.  If less than 8, must be on file within the past 6 months.     Recent Labs   Lab Test 05/05/21  1040   A1C 7.9*             Passed - Patient's CR is NOT>1.4 OR Patient's EGFR is NOT<45 within past 12 mos.     Recent Labs   Lab Test 05/05/21  1040   GFRESTIMATED >60   GFRESTBLACK >60       Recent Labs   Lab Test 05/05/21  1040   CR 0.67             Passed - Patient does NOT have a diagnosis of CHF.        Passed - Medication is active on med list        Passed - Patient is not pregnant        Passed - Patient has not had a positive pregnancy test within the past 12 mos.              Teresa Dia 08/16/21 4:23 PM  "

## 2021-09-07 ENCOUNTER — TRANSFERRED RECORDS (OUTPATIENT)
Dept: HEALTH INFORMATION MANAGEMENT | Facility: CLINIC | Age: 66
End: 2021-09-07

## 2021-11-05 ENCOUNTER — TRANSFERRED RECORDS (OUTPATIENT)
Dept: HEALTH INFORMATION MANAGEMENT | Facility: CLINIC | Age: 66
End: 2021-11-05

## 2022-10-04 ENCOUNTER — TRANSFERRED RECORDS (OUTPATIENT)
Dept: HEALTH INFORMATION MANAGEMENT | Facility: CLINIC | Age: 67
End: 2022-10-04

## 2022-11-01 ENCOUNTER — TRANSFERRED RECORDS (OUTPATIENT)
Dept: MULTI SPECIALTY CLINIC | Facility: CLINIC | Age: 67
End: 2022-11-01

## 2022-11-01 LAB — RETINOPATHY: NORMAL

## 2022-11-03 ENCOUNTER — TELEPHONE (OUTPATIENT)
Dept: FAMILY MEDICINE | Facility: CLINIC | Age: 67
End: 2022-11-03

## 2022-11-03 DIAGNOSIS — F31.4 SEVERE DEPRESSED BIPOLAR I DISORDER WITHOUT PSYCHOTIC FEATURES (H): ICD-10-CM

## 2022-11-03 DIAGNOSIS — F31.4 BIPOLAR 1 DISORDER, DEPRESSED, SEVERE (H): ICD-10-CM

## 2022-11-03 DIAGNOSIS — F31.81 BIPOLAR 2 DISORDER (H): Primary | ICD-10-CM

## 2022-11-03 NOTE — Clinical Note
Patient is transitioning from [facility] to [facility]. The original order was entered in with an incorrect diagnosis code. A new order was placed with the correct F31.4 diagnosis code.     If an appeal is still needed to correct the previous order, please submit attached appeal letter. We are requesting an urgent review as the patient will be due for her next dose on 12/1.

## 2022-11-03 NOTE — TELEPHONE ENCOUNTER
Reason for Call:  Other call back    Detailed comments: Wondering if she will take over care. If an abilify shot should be scheduled or if a prescription would be sufficient. Clarification on other questions    Anyone from the care team would be okay to talk to, just wanted to speak with someone before the next appointment on 12/6/22    Phone Number Patient can be reached at: Home number on file [phone] (home)    Best Time: Anytime    Can we leave a detailed message on this number? YES    Call taken on 11/3/2022 at 3:58 PM by Jacob Oscar

## 2022-11-03 NOTE — LETTER
APPEAL FOR PRESCRIBED INJECTABLE MEDICATION     Insurance: [facility] / Plan: [employer] MED REPL [facility] / Product Type: HMO /   [employer]      Patient Name: Annel Stoddard  :  1955  Member Insurance ID: [id number]      Date:    To Whom It May Concern:     I submit this request for appeal on behalf of my patient, Annel Stoddard, for health insurance coverage of her prescribed medication, Abilify Maintena 400 mg injection.      Annel Stoddard is a patient with Bipolar Disorder I, and a significant history of psychiatric admissions. In the past, this patient has had at least 10 psychiatric admissions. She has tried and failed multiple medications including lamotrigine, lithium, risperidone, divalproex, carbamazepine, clonazepam, hydroxyzine. This patient had been working with a psychiatrist with another health system and was started on Abilify Maintena injections, and has noted to have been stabilized on these injections.     Given her significant psychiatric history including multiple inpatient admissions and multiple failed medication trials, we request reconsideration of coverage for these injections. As patient has already been stabilized on this medicine and is due for her next dose on 2022, we are requesting an expedited appeal. Please contact our clinic at the phone number below if additional information is needed.     Sincerely,     MD Bradley Sapp, PharmD     [facility] [address] [place] [address]  Phone: [phone]  Fax: [phone]

## 2022-11-07 NOTE — TELEPHONE ENCOUNTER
Patient calls back, she states she gets Abilify 400 mg shot every 4 weeks. Her next shot is due 11/30 or 12/1. Her appt with Dr. Sullivan not until 12/6 which it's too late. She used to get the shot at [place] but does not want to go back there. She is wondering if she can get the shot at [place] if she can get Dr. Shad Albrecht from [facility] to order it so she can get the shot on time.

## 2022-11-10 NOTE — TELEPHONE ENCOUNTER
Patient returns call. Writer relays message to patient. Patient agreed to schedule a nurse visit to get the Abilify 400 mg shot on 12/01/2022 at [facility].

## 2022-11-10 NOTE — TELEPHONE ENCOUNTER
RN attempted to call patient regarding to have her Abilify 400 mg shot done on either 11/30 or 12/1.    Patient did not answer. Left message to patient to call the clinic back.     If patient calls back, please let her know : Currently, there is one Abilify shot 400 mg in stock at [facility]]. Patient can make an appointment on 12/1/2022 ( no MA/LPN appointment available on 11/30).        Jaclyn Woodard RN  [facility]

## 2022-11-11 NOTE — TELEPHONE ENCOUNTER
Patient calls back. Patient requests clinic to  call Dr. Shad Albrecht office and speak to his assist (Juvenal) to give an update care plan. They've been calling her to make sure we are able to give patient her the shot on 12/01/22. Writer relayed RN's message below but patient insist care team call Juvenal at [phone] to give him an update.

## 2022-11-16 ENCOUNTER — TRANSFERRED RECORDS (OUTPATIENT)
Dept: HEALTH INFORMATION MANAGEMENT | Facility: CLINIC | Age: 67
End: 2022-11-16

## 2022-11-16 NOTE — TELEPHONE ENCOUNTER
RN called Dr. Shad Albrecht Office and asked for TYRON Sanford , as patient's request.       RN left message to Juvenal regarding the care plan for Abilify 400 mg shot.    ____________________________________________    Patient will have an appointment with  on 12/5/2022. Patient needs Abilify shot on 12/1/2022.      Future Appointments   Date Time Provider Department Center   12/1/2022 11:00 AM [facility] MA/LPN [facility]   12/6/2022  9:40 AM [facility]       RN will route this encounter to  to review and advise if  is willing to put in the order for patient to receive Abilify shot at [facility].      Jaclyn Woodard RN  [facility]

## 2022-11-17 ENCOUNTER — TELEPHONE (OUTPATIENT)
Dept: FAMILY MEDICINE | Facility: CLINIC | Age: 67
End: 2022-11-17

## 2022-11-17 NOTE — TELEPHONE ENCOUNTER
Forms/Letter Request    Type of form/letter: [facility]    Have you been seen for this request: No    Do we have the form/letter: Yes: Per Pt she use to get them from Dr. Sullivan when she was a Pt at [facility]    When is form/letter needed by: ASAP    How would you like the form/letter returned:     Patient Notified form requests are processed in 3-5 business days:Yes    Okay to leave a detailed message?: Yes at Home number on file [phone] (home)

## 2022-11-17 NOTE — TELEPHONE ENCOUNTER
RN will route this encounter to Pharmacist, Bradley, to review and advise.        Jaclyn Woodard RN  [facility]

## 2022-11-21 ENCOUNTER — TELEPHONE (OUTPATIENT)
Dept: NURSING | Facility: CLINIC | Age: 67
End: 2022-11-21

## 2022-11-21 ENCOUNTER — TRANSFERRED RECORDS (OUTPATIENT)
Dept: HEALTH INFORMATION MANAGEMENT | Facility: CLINIC | Age: 67
End: 2022-11-21

## 2022-11-21 LAB — RETINOPATHY: NEGATIVE

## 2022-11-29 NOTE — TELEPHONE ENCOUNTER
Spoke with DEWAYNE Leger with Dr. Albrecht's office.     Patient has been receiving Abilify Maintena for bipolar disorder type I diagnosis code F31.4.  Bed noted no documentation of schizophrenia.     Patient was started on 9/22 and provider notes indicate patient has been stable on this injection.  History of 10+ psychiatric admissions.     Previous medications trialed include: Lamictal, lithium, Risperdal, Depakote, Tegretol, Klonopin, Vistaril    Abilify Order was placed with updated diagnosis code. Will also draft appeal letter for coverage.

## 2022-11-30 ENCOUNTER — TELEPHONE (OUTPATIENT)
Dept: PHARMACY | Facility: CLINIC | Age: 67
End: 2022-11-30

## 2022-11-30 NOTE — TELEPHONE ENCOUNTER
Patient is scheduled to receive Abilify Maintena injection 12/1/22 at [facility]. However, initial coverage request was denied.     New [place]/appeal was submitted yesterday with request for expedited review as patient is due for her dose.     LVM at both home phone and mobile number on file explaining that we have not yet gotten insurance authorization for the injection so patient should not come in for her MA visit tomorrow. PharmD will contact patient once we have approval to get her rescheduled for MA visit.

## 2022-12-01 RX ORDER — ARIPIPRAZOLE 30 MG/1
30 TABLET ORAL DAILY
Qty: 7 TABLET | Refills: 0 | Status: SHIPPED | OUTPATIENT
Start: 2022-12-01 | End: 2022-12-06 | Stop reason: ALTCHOICE

## 2022-12-01 NOTE — TELEPHONE ENCOUNTER
"Pt's case manger Macarena called states that [facility] had informed pt that ARIPiprazole ER (ABILIFY MAINTENA) extended release inj syringe 400 mg is not covered by insurance. Per Macarena, reason per TONNY, \"pt do not need it\". Per Macarena, TONNY stated that if pt wish to appeal, pt will have to talk to provider and have provider call [facility] at ph# [phone] to appeal. Please look into this and follow back up with pt or [employer]. Thank you  "

## 2022-12-01 NOTE — TELEPHONE ENCOUNTER
[facility] Call Center    Phone Message    May a detailed message be left on voicemail: yes     Reason for Call: Other: Please call patient back regarding her injection she was suppose to have.      Action Taken: Other: Pain    Travel Screening: Not Applicable

## 2022-12-01 NOTE — TELEPHONE ENCOUNTER
Spoke with patient. She is nervous about not having her injection. Worried about decompensating and needing to be hospitalized.     Reviewed that we are currently in the process of an appeal with [facility]. However, for safety, will send in a 7 day supply of oral Abilify 30 mg daily (her previous dose). She will hold off on starting until the first sign of mood changes.     We will continue with appeal for maintena injections and update patient with outcomes.     She does have a visit with Dr. Sullivan on 12/6. Requested an MTM visit for Diabetes Management. Scheduled for 12/8.

## 2022-12-01 NOTE — TELEPHONE ENCOUNTER
New Medication Request    Contacts       Type Contact Phone/Fax    12/01/2022 08:35 AM CST Phone (Incoming) Elizabeth Stoddard (Self) [phone] (M)          What medication are you requesting?: ARIPiprazole (ABILIFY) 30 MG tablet    Reason for medication request: patient insurance does not cover for ARIPiprazole ER (ABILIFY MAINTENA) extended release inj syringe 400 mg    Have you taken this medication before?: Yes: 7/15/2021    Controlled Substance Agreement on file:   CSA -- Patient Level:    CSA: None found at the patient level.         Patient offered an appointment? No    Preferred Pharmacy:     [id number] - [place], [place] - [address] [place] [address] AT [facility] & [address] [place] [address]  Phone: [phone] Fax: [phone]      Okay to leave a detailed message?: Yes at Cell number on file:    Telephone Information:   Mobile [phone]

## 2022-12-01 NOTE — TELEPHONE ENCOUNTER
Writer routing message to Bradley to review and advise per Dayo's note below.    WHITNEY SteveN, RN   [facility]

## 2022-12-05 ENCOUNTER — PATIENT OUTREACH (OUTPATIENT)
Dept: CARE COORDINATION | Facility: CLINIC | Age: 67
End: 2022-12-05

## 2022-12-05 ENCOUNTER — ALLIED HEALTH/NURSE VISIT (OUTPATIENT)
Dept: FAMILY MEDICINE | Facility: CLINIC | Age: 67
End: 2022-12-05
Payer: COMMERCIAL

## 2022-12-05 ENCOUNTER — MEDICAL CORRESPONDENCE (OUTPATIENT)
Dept: HEALTH INFORMATION MANAGEMENT | Facility: CLINIC | Age: 67
End: 2022-12-05

## 2022-12-05 DIAGNOSIS — Z23 ENCOUNTER FOR IMMUNIZATION: Primary | ICD-10-CM

## 2022-12-05 PROCEDURE — 99207 PR NO CHARGE NURSE ONLY: CPT

## 2022-12-05 PROCEDURE — 96372 THER/PROPH/DIAG INJ SC/IM: CPT | Performed by: PHARMACIST

## 2022-12-06 ENCOUNTER — ANCILLARY PROCEDURE (OUTPATIENT)
Dept: GENERAL RADIOLOGY | Facility: CLINIC | Age: 67
End: 2022-12-06
Attending: FAMILY MEDICINE
Payer: COMMERCIAL

## 2022-12-06 ENCOUNTER — OFFICE VISIT (OUTPATIENT)
Dept: FAMILY MEDICINE | Facility: CLINIC | Age: 67
End: 2022-12-06
Payer: COMMERCIAL

## 2022-12-06 VITALS
TEMPERATURE: 98.2 F | WEIGHT: 178 LBS | RESPIRATION RATE: 20 BRPM | SYSTOLIC BLOOD PRESSURE: 139 MMHG | BODY MASS INDEX: 31.53 KG/M2 | DIASTOLIC BLOOD PRESSURE: 87 MMHG | OXYGEN SATURATION: 98 % | HEART RATE: 96 BPM

## 2022-12-06 DIAGNOSIS — Z79.4 TYPE 2 DIABETES MELLITUS WITH COMPLICATION, WITH LONG-TERM CURRENT USE OF INSULIN (H): ICD-10-CM

## 2022-12-06 DIAGNOSIS — K74.60 CIRRHOSIS OF LIVER WITHOUT ASCITES, UNSPECIFIED HEPATIC CIRRHOSIS TYPE (H): ICD-10-CM

## 2022-12-06 DIAGNOSIS — S42.331S CLOSED DISPLACED OBLIQUE FRACTURE OF SHAFT OF RIGHT HUMERUS, SEQUELA: ICD-10-CM

## 2022-12-06 DIAGNOSIS — Z98.890 HISTORY OF TRACHEOSTOMY: ICD-10-CM

## 2022-12-06 DIAGNOSIS — E11.8 TYPE 2 DIABETES MELLITUS WITH COMPLICATION, WITH LONG-TERM CURRENT USE OF INSULIN (H): ICD-10-CM

## 2022-12-06 DIAGNOSIS — J43.9 PULMONARY EMPHYSEMA, UNSPECIFIED EMPHYSEMA TYPE (H): ICD-10-CM

## 2022-12-06 DIAGNOSIS — J95.03 TRACHEAL STENOSIS FOLLOWING TRACHEOSTOMY (H): ICD-10-CM

## 2022-12-06 DIAGNOSIS — F31.4 BIPOLAR 1 DISORDER, DEPRESSED, SEVERE (H): Primary | ICD-10-CM

## 2022-12-06 DIAGNOSIS — L30.9 DERMATITIS: ICD-10-CM

## 2022-12-06 DIAGNOSIS — N25.1 NEPHROGENIC DIABETES INSIPIDUS (H): ICD-10-CM

## 2022-12-06 DIAGNOSIS — Z79.899 HIGH RISK MEDICATION USE: ICD-10-CM

## 2022-12-06 LAB
ALBUMIN SERPL BCG-MCNC: 4.7 G/DL (ref 3.5–5.2)
ALP SERPL-CCNC: 155 U/L (ref 35–104)
ALT SERPL W P-5'-P-CCNC: 92 U/L (ref 10–35)
ANION GAP SERPL CALCULATED.3IONS-SCNC: 13 MMOL/L (ref 7–15)
AST SERPL W P-5'-P-CCNC: 91 U/L (ref 10–35)
BILIRUB SERPL-MCNC: 0.4 MG/DL
BUN SERPL-MCNC: 9.7 MG/DL (ref 8–23)
CALCIUM SERPL-MCNC: 10.2 MG/DL (ref 8.8–10.2)
CHLORIDE SERPL-SCNC: 103 MMOL/L (ref 98–107)
CHOLEST SERPL-MCNC: 164 MG/DL
CREAT SERPL-MCNC: 0.49 MG/DL (ref 0.51–0.95)
CREAT UR-MCNC: 51.4 MG/DL
DEPRECATED HCO3 PLAS-SCNC: 24 MMOL/L (ref 22–29)
ERYTHROCYTE [DISTWIDTH] IN BLOOD BY AUTOMATED COUNT: 12.8 % (ref 10–15)
GFR SERPL CREATININE-BSD FRML MDRD: >90 ML/MIN/1.73M2
GLUCOSE SERPL-MCNC: 169 MG/DL (ref 70–99)
HBA1C MFR BLD: 7.9 % (ref 0–5.6)
HCT VFR BLD AUTO: 38.2 % (ref 35–47)
HDLC SERPL-MCNC: 64 MG/DL
HGB BLD-MCNC: 12.4 G/DL (ref 11.7–15.7)
IRON BINDING CAPACITY (ROCHE): 443 UG/DL (ref 240–430)
IRON SATN MFR SERPL: 15 % (ref 15–46)
IRON SERPL-MCNC: 68 UG/DL (ref 37–145)
LDLC SERPL CALC-MCNC: 63 MG/DL
MCH RBC QN AUTO: 32.6 PG (ref 26.5–33)
MCHC RBC AUTO-ENTMCNC: 32.5 G/DL (ref 31.5–36.5)
MCV RBC AUTO: 101 FL (ref 78–100)
MICROALBUMIN UR-MCNC: <12 MG/L
MICROALBUMIN/CREAT UR: NORMAL MG/G{CREAT}
NONHDLC SERPL-MCNC: 100 MG/DL
PLATELET # BLD AUTO: 226 10E3/UL (ref 150–450)
POTASSIUM SERPL-SCNC: 4.4 MMOL/L (ref 3.4–5.3)
PROT SERPL-MCNC: 8.1 G/DL (ref 6.4–8.3)
RBC # BLD AUTO: 3.8 10E6/UL (ref 3.8–5.2)
SODIUM SERPL-SCNC: 140 MMOL/L (ref 136–145)
TRIGL SERPL-MCNC: 183 MG/DL
TSH SERPL DL<=0.005 MIU/L-ACNC: 1.72 UIU/ML (ref 0.3–4.2)
WBC # BLD AUTO: 8 10E3/UL (ref 4–11)

## 2022-12-06 PROCEDURE — 80061 LIPID PANEL: CPT | Performed by: FAMILY MEDICINE

## 2022-12-06 PROCEDURE — 73060 X-RAY EXAM OF HUMERUS: CPT | Mod: TC | Performed by: RADIOLOGY

## 2022-12-06 PROCEDURE — 84443 ASSAY THYROID STIM HORMONE: CPT | Performed by: FAMILY MEDICINE

## 2022-12-06 PROCEDURE — 80053 COMPREHEN METABOLIC PANEL: CPT | Performed by: FAMILY MEDICINE

## 2022-12-06 PROCEDURE — 83036 HEMOGLOBIN GLYCOSYLATED A1C: CPT | Performed by: FAMILY MEDICINE

## 2022-12-06 PROCEDURE — 99215 OFFICE O/P EST HI 40 MIN: CPT | Performed by: FAMILY MEDICINE

## 2022-12-06 PROCEDURE — 82043 UR ALBUMIN QUANTITATIVE: CPT | Performed by: FAMILY MEDICINE

## 2022-12-06 PROCEDURE — 83550 IRON BINDING TEST: CPT | Performed by: FAMILY MEDICINE

## 2022-12-06 PROCEDURE — 85027 COMPLETE CBC AUTOMATED: CPT | Performed by: FAMILY MEDICINE

## 2022-12-06 PROCEDURE — 36415 COLL VENOUS BLD VENIPUNCTURE: CPT | Performed by: FAMILY MEDICINE

## 2022-12-06 PROCEDURE — 83540 ASSAY OF IRON: CPT | Performed by: FAMILY MEDICINE

## 2022-12-06 RX ORDER — CHLORAL HYDRATE 500 MG
1 CAPSULE ORAL DAILY
COMMUNITY
End: 2022-12-08

## 2022-12-06 RX ORDER — NYSTATIN 100000 U/G
OINTMENT TOPICAL 2 TIMES DAILY
Qty: 30 G | Refills: 1 | Status: SHIPPED | OUTPATIENT
Start: 2022-12-06 | End: 2023-03-21

## 2022-12-06 RX ORDER — CALCIUM CARBONATE/VITAMIN D3 500-10/5ML
400 LIQUID (ML) ORAL DAILY
COMMUNITY

## 2022-12-06 RX ORDER — LITHIUM CARBONATE 450 MG
450 TABLET, EXTENDED RELEASE ORAL 2 TIMES DAILY
COMMUNITY
Start: 2022-10-31 | End: 2022-12-09

## 2022-12-06 RX ORDER — MULTIVITAMIN WITH IRON
1 TABLET ORAL DAILY
COMMUNITY
End: 2022-12-06 | Stop reason: ALTCHOICE

## 2022-12-06 RX ORDER — LANOLIN ALCOHOL/MO/W.PET/CERES
200 CREAM (GRAM) TOPICAL EVERY 12 HOURS
COMMUNITY
Start: 2021-09-24 | End: 2022-12-08

## 2022-12-06 RX ORDER — VITAMIN E (DL,TOCOPHERYL ACET) 180 MG
2 CAPSULE ORAL DAILY
COMMUNITY
End: 2023-01-10

## 2022-12-06 RX ORDER — MULTIVIT-MIN/IRON/FOLIC ACID/K 18-600-40
500 CAPSULE ORAL DAILY
COMMUNITY
End: 2022-12-08
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
## 2022-12-07 ENCOUNTER — TELEPHONE (OUTPATIENT)
Dept: PHARMACY | Facility: CLINIC | Age: 67
End: 2022-12-07

## 2022-12-07 NOTE — TELEPHONE ENCOUNTER
General Call      Reason for Call:  Please call pt back    What are your questions or concerns:  Pt has an appt with you tomorrow, she wants to confirm the address. I looked at it and did give her the address but she wants to confirm it's not the new building. Please call pt to verify the address and give pt some directions. Thanks!    Date of last appointment with provider: 12/06/2022    Okay to leave a detailed message?: Yes at Home number on file [phone] (home)

## 2022-12-08 ENCOUNTER — OFFICE VISIT (OUTPATIENT)
Dept: PHARMACY | Facility: OTHER | Age: 67
End: 2022-12-08
Payer: COMMERCIAL

## 2022-12-08 VITALS — SYSTOLIC BLOOD PRESSURE: 137 MMHG | DIASTOLIC BLOOD PRESSURE: 67 MMHG | HEART RATE: 97 BPM

## 2022-12-08 DIAGNOSIS — Z79.4 TYPE 2 DIABETES MELLITUS WITH COMPLICATION, WITH LONG-TERM CURRENT USE OF INSULIN (H): Primary | ICD-10-CM

## 2022-12-08 DIAGNOSIS — E78.2 MIXED HYPERLIPIDEMIA: ICD-10-CM

## 2022-12-08 DIAGNOSIS — E11.8 TYPE 2 DIABETES MELLITUS WITH COMPLICATION, WITH LONG-TERM CURRENT USE OF INSULIN (H): ICD-10-CM

## 2022-12-08 DIAGNOSIS — K21.9 LPRD (LARYNGOPHARYNGEAL REFLUX DISEASE): ICD-10-CM

## 2022-12-08 DIAGNOSIS — F31.4 BIPOLAR 1 DISORDER, DEPRESSED, SEVERE (H): ICD-10-CM

## 2022-12-08 DIAGNOSIS — Z78.9 TAKES DIETARY SUPPLEMENTS: ICD-10-CM

## 2022-12-08 DIAGNOSIS — K75.81 NONALCOHOLIC STEATOHEPATITIS: ICD-10-CM

## 2022-12-08 DIAGNOSIS — R06.02 SOB (SHORTNESS OF BREATH): ICD-10-CM

## 2022-12-08 DIAGNOSIS — J31.0 CHRONIC RHINITIS: ICD-10-CM

## 2022-12-08 DIAGNOSIS — K59.00 CONSTIPATION, UNSPECIFIED CONSTIPATION TYPE: ICD-10-CM

## 2022-12-08 DIAGNOSIS — M85.80 OSTEOPENIA, UNSPECIFIED LOCATION: ICD-10-CM

## 2022-12-08 DIAGNOSIS — E11.8 TYPE 2 DIABETES MELLITUS WITH COMPLICATION, WITH LONG-TERM CURRENT USE OF INSULIN (H): Primary | ICD-10-CM

## 2022-12-08 DIAGNOSIS — Z79.4 TYPE 2 DIABETES MELLITUS WITH COMPLICATION, WITH LONG-TERM CURRENT USE OF INSULIN (H): ICD-10-CM

## 2022-12-08 PROCEDURE — 99605 MTMS BY PHARM NP 15 MIN: CPT | Performed by: PHARMACIST

## 2022-12-08 PROCEDURE — 99607 MTMS BY PHARM ADDL 15 MIN: CPT | Performed by: PHARMACIST

## 2022-12-08 RX ORDER — ACETAMINOPHEN 500 MG
500 TABLET ORAL 2 TIMES DAILY
COMMUNITY
End: 2024-02-13

## 2022-12-08 RX ORDER — PIOGLITAZONEHYDROCHLORIDE 15 MG/1
15 TABLET ORAL DAILY
Qty: 30 TABLET | Refills: 1 | Status: SHIPPED | OUTPATIENT
Start: 2022-12-08 | End: 2022-12-08

## 2022-12-08 RX ORDER — FLUTICASONE PROPIONATE 50 MCG
SPRAY, SUSPENSION (ML) NASAL
Qty: 48 G | Refills: 0 | Status: SHIPPED | OUTPATIENT
Start: 2022-12-08 | End: 2022-12-26

## 2022-12-08 RX ORDER — BLOOD-GLUCOSE METER
EACH MISCELLANEOUS
COMMUNITY
End: 2022-12-26

## 2022-12-08 RX ORDER — PIOGLITAZONEHYDROCHLORIDE 15 MG/1
TABLET ORAL
Qty: 90 TABLET | Refills: 0 | Status: SHIPPED | OUTPATIENT
Start: 2022-12-08 | End: 2022-12-26

## 2022-12-08 RX ORDER — FLUTICASONE PROPIONATE 50 MCG
2 SPRAY, SUSPENSION (ML) NASAL DAILY PRN
Qty: 16 G | Refills: 3 | Status: SHIPPED | OUTPATIENT
Start: 2022-12-08 | End: 2022-12-08

## 2022-12-08 RX ORDER — B-COMPLEX WITH VITAMIN C
2 TABLET ORAL DAILY
COMMUNITY

## 2022-12-08 RX ORDER — SENNA AND DOCUSATE SODIUM 50; 8.6 MG/1; MG/1
1-2 TABLET, FILM COATED ORAL 2 TIMES DAILY PRN
Qty: 120 TABLET | Refills: 3 | Status: SHIPPED | OUTPATIENT
Start: 2022-12-08 | End: 2023-06-01

## 2022-12-08 NOTE — PATIENT INSTRUCTIONS
"Recommendations from today's MTM visit:                                                         Start Pioglitazone 15 mg daily for blood glucose. Continue Rybelsus and insulin.   You can continue to check blood glucose 4-5 times per day. I don't think it's necessary to do it this often. I typically recommend fasting blood glucose in the morning before breakfast and one 2-hour post-meal reading later in the day. Your post meal reading can be after any meal, and it can change from day to day.   Avoid using Albuterol unless you're having shortness of breath of feeling closed up.   Restart Flonase (Fluticasone) 50 mcg nasal spray 2 sprays each nostril once daily as needed for congestion.   Start Senna-Docusate 8.6-50 mg. Take 1 tab at bedtime for 3 days. If no improvement, increase to 2 tabs at bedtime. Wait another 3 days, if needed, increase to 1 tab AM and 2 tabs at bedtime. Wait for 3 days. If needed, increase to 2 tabs twice daily.   Recommend stopping Vitamin C, fish oil, Vitamin B complex      Follow-up: Return in about 4 weeks (around 1/5/2023) for Medication Management Pharmacist.    It was great speaking with you today.  I value your experience and would be very thankful for your time in providing feedback in our clinic survey. In the next few days, you may receive an email or text message from [employer] with a link to a survey related to your  clinical pharmacist.\"     To schedule another MTM appointment, please call the clinic directly or you may call the MTM scheduling line at [phone] or toll-free at [phone].     My Clinical Pharmacist's contact information:                                                      Please feel free to contact me with any questions or concerns you have.      Bradley Liao, PharmD  Medication Therapy Management (MTM) Pharmacist  [facility]      "
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
## 2022-12-08 NOTE — LETTER
December 8, 2022  Annel RUDOLPH Arlyn  [address]  [place] [address]    Dear Ms. Stoddard, [facility]     Thank you for talking with me on Dec 8, 2022 about your health and medications. As a follow-up to our conversation, I have included two documents:      1. Your Recommended To-Do List has steps you should take to get the best results from your medications.  2. Your Medication List will help you keep track of your medications and how to take them.    If you want to talk about these documents, please call Bradley Liao PharmD at phone: [phone], Monday-Friday 8-4:30pm.    I look forward to working with you and your doctors to make sure your medications work well for you.    Sincerely,  Bradley Liao, Anup  [facility] Pharmacist, [facility]

## 2022-12-08 NOTE — LETTER
_  Medication List        Prepared on: Dec 8, 2022     Bring your Medication List when you go to the doctor, hospital, or   emergency room. And, share it with your family or caregivers.     Note any changes to how you take your medications.  Cross out medications when you no longer use them.    Medication How I take it Why I use it Prescriber   acetaminophen (TYLENOL) 500 MG tablet Take 500 mg by mouth 3 times daily Scheduled. Pain OTC    albuterol (PROAIR HFA/PROVENTIL HFA/VENTOLIN HFA) 108 (90 Base) MCG/ACT inhaler Inhale 2 puffs into the lungs every 4 hours as needed for shortness of breath / dyspnea or wheezing shortness of breath  Alexus Cherry    ARIPiprazole ER (ABILIFY MAINTENA) extended release inj syringe 400 mg [START ON 12/22/2022] Inject 400 mg IM once monthly Bipolar 1 disorder, depressed, severe (H) Alexus Cherry MD   aspirin (ASA) 81 MG chewable tablet Take 81 mg by mouth Heart health Alexus Cherry    Biotin 10 MG CAPS Take 1 capsule by mouth daily Hair and Nails OTC/Supplement    blood glucose (NO BRAND SPECIFIED) test strip 1 strip by In Vitro route 4 times daily Type 2 diabetes mellitus with complication, with long-term current use of insulin (H) Alexus Cherry MD   blood glucose monitoring (ONETOUCH VERIO) meter device kit Use to test blood sugar 4-5 times daily or as directed. Diabetes Alexus Cherry    fluticasone (FLONASE) 50 MCG/ACT nasal spray SHAKE LIQUID AND USE 2 SPRAYS IN EACH NOSTRIL DAILY AS NEEDED FOR RHINITIS OR ALLERGIES Chronic Rhinitis Alexus Cherry MD   insulin aspart (NOVOLOG PEN) 100 UNIT/ML pen Inject 35 Units Subcutaneous 3 times daily (before meals) Diabetes Alexus Cherry    insulin glargine U-300 (TOUJEO) 300 UNIT/ML (1 units dial) pen Inject 85 Units Subcutaneous every morning Type 2 diabetes mellitus with complication, with long-term current use of insulin (H)  Alexus Cherry MD   ketoconazole (NIZORAL) 2 % external cream Twice a day for two weeks Skin Rash Patient Reported   lithium (ESKALITH CR/LITHOBID) 450 MG CR tablet Take 450 mg by mouth 2 times daily Bipolar Type 1  Patient Reported   losartan (COZAAR) 50 MG tablet Take 1 tablet (50 mg) by mouth daily Type 2 diabetes mellitus with complication, with long-term current use of insulin (H) Denise Smith PA-C   magnesium oxide 400 MG CAPS Take 400 mg by mouth daily Leg cramps OTC/Supplement   montelukast (SINGULAIR) 10 MG tablet Take 10 mg by mouth At Bedtime shortness of breath  Alexus Cherry   multivitamin (ONE-DAILY) tablet Take 1 tablet by mouth daily General health OTC/SUpplement   mupirocin (BACTROBAN) 2 % external cream Apply 1 Application topically 3 times daily For 5 days Skin Injection Alexus Cherry    nystatin (MYCOSTATIN) 755913 UNIT/GM external ointment Apply topically 2 times daily Apply to affected areas only (right upper arm) Dermatitis Alexus Cherry MD   omeprazole (PRILOSEC) 20 MG DR capsule Take 40 mg by mouth daily Heartburn Alexus Cherry    pioglitazone (ACTOS) 15 MG tablet TAKE 1 TABLET(15 MG) BY MOUTH DAILY Type 2 diabetes mellitus with complication, with long-term current use of insulin (H) Alexus Cherry MD   Probiotic Product (PROBIOTIC ACIDOPHILUS) CHEW Take 2 tablets by mouth daily Gut health OTC/Supplement   rosuvastatin (CRESTOR) 10 MG tablet Take 10 mg by mouth daily High cholesterol Alexus Cherry   Semaglutide 14 MG TABS Take 14 mg by mouth daily before breakfast Diabetes Alexus Cherry    SENNA-docusate sodium (SENNA S) 8.6-50 MG tablet Take 1-2 tablets by mouth 2 times daily as needed (constipation) Constipation, unspecified constipation type Alexus Cherry MD   tiotropium-olodaterol 2.5-2.5 MCG/ACT AERS Inhale 2 puffs into the lungs daily  shortness of breath  Alexus Cherry         Add new medications, over-the-counter drugs, herbals, vitamins, or  minerals in the blank rows below.    Medication How I take it Why I use it Prescriber                          Allergies:      desmopressin; estrogens; hydrochlorothiazide; hydrocodone-acetaminophen; lithium; penicillins; risperidone; shellfish-derived products; latex; valacyclovir        Side effects I have had:               Other Information:              My notes and questions:

## 2022-12-08 NOTE — LETTER
"Recommended To-Do List      Prepared on: Dec 8, 2022       You can get the best results from your medications by completing the items on this \"To-Do List.\"      Bring your To-Do List when you go to your doctor. And, share it with your family or caregivers.    My To-Do List:  What we talked about: What I should do:   A medication that you may no longer need    Stop taking Vitamin B complex and Vitamin C          What we talked about: What I should do:   A new medication that may be of benefit to you    Start taking Flonase Allergy Relief 50 mcg nasal spray. 2 sprays each nostril once daily as needed.           What we talked about: What I should do:   A new medication that may be of benefit to you    Start taking Senna S 8.6-50 mg 1-2 tabs twice daily as needed.           What we talked about: What I should do:   The importance of taking your medication as intended    Education: Only use albuterol when you are having shortness of breath or wheezing.            What we talked about: What I should do:   A new medication that may be of benefit to you    Start taking pioglitazone (ACTOS) 15 mg once daily.           What we talked about: What I should do:                       "

## 2022-12-09 ENCOUNTER — TELEPHONE (OUTPATIENT)
Dept: PHARMACY | Facility: CLINIC | Age: 67
End: 2022-12-09

## 2022-12-09 DIAGNOSIS — F31.4 BIPOLAR 1 DISORDER, DEPRESSED, SEVERE (H): ICD-10-CM

## 2022-12-09 DIAGNOSIS — Z79.4 TYPE 2 DIABETES MELLITUS WITH COMPLICATION, WITH LONG-TERM CURRENT USE OF INSULIN (H): Primary | ICD-10-CM

## 2022-12-09 DIAGNOSIS — E11.8 TYPE 2 DIABETES MELLITUS WITH COMPLICATION, WITH LONG-TERM CURRENT USE OF INSULIN (H): Primary | ICD-10-CM

## 2022-12-09 RX ORDER — LITHIUM CARBONATE 450 MG
450 TABLET, EXTENDED RELEASE ORAL 2 TIMES DAILY
Qty: 60 TABLET | Refills: 1 | Status: SHIPPED | OUTPATIENT
Start: 2022-12-09 | End: 2022-12-26

## 2022-12-09 NOTE — TELEPHONE ENCOUNTER
"Returned call to patient. She did not read the AVS..     Reviewed instructions from AVS:   \"Start Pioglitazone 15 mg daily for blood glucose. Continue Rybelsus and insulin.\"      Will refill Rybelsus and Lithium   "

## 2022-12-09 NOTE — TELEPHONE ENCOUNTER
General Call      Reason for Call:  Med question/refill    What are your questions or concerns:  Pt called and was wondering if she should take her insulin toujeo with her new prescription pioglitazone. Also she said she also needs a refill on her lithium and for her rebosis. Please call and advise on the question and send the refill to pharmacy on file if appropriate. Thanks!    Date of last appointment with provider: 12/06/2022    Okay to leave a detailed message?: Yes at Home number on file [phone] (home)

## 2022-12-11 PROBLEM — J95.03 TRACHEAL STENOSIS FOLLOWING TRACHEOSTOMY (H): Status: ACTIVE | Noted: 2022-12-11

## 2022-12-11 PROBLEM — N25.1 NEPHROGENIC DIABETES INSIPIDUS (H): Status: ACTIVE | Noted: 2022-12-11

## 2022-12-11 ASSESSMENT — ENCOUNTER SYMPTOMS
HEMATOCHEZIA: 0
HEADACHES: 0
DYSURIA: 0
HEMATURIA: 0
CONSTIPATION: 0
CHILLS: 0
JOINT SWELLING: 0
PALPITATIONS: 0
ARTHRALGIAS: 0
BREAST MASS: 0
BRUISES/BLEEDS EASILY: 0
FEVER: 0
DIZZINESS: 0
PARESTHESIAS: 0
COUGH: 0
FREQUENCY: 0
WEAKNESS: 0
MYALGIAS: 0
SORE THROAT: 0
AGITATION: 1
ABDOMINAL PAIN: 0
NAUSEA: 0
NERVOUS/ANXIOUS: 0
EYE PAIN: 0
HEARTBURN: 0
DIARRHEA: 0
SHORTNESS OF BREATH: 0

## 2022-12-12 ENCOUNTER — PATIENT OUTREACH (OUTPATIENT)
Dept: CARE COORDINATION | Facility: CLINIC | Age: 67
End: 2022-12-12

## 2022-12-12 ASSESSMENT — ACTIVITIES OF DAILY LIVING (ADL): DEPENDENT_IADLS:: INDEPENDENT

## 2022-12-12 NOTE — PROGRESS NOTES
12/12/2022  Clinic Care Coordination Contact  Community Health Worker Initial Outreach  UTC/Voicemail    Call to re schedule Initial Assessment with CC ASHLEY due to CC SW out office today.     Left voice message for patient notify appt cancelled and to call back back to reschedule with CC ASHLEY    Called and left another VM regarding today's appt    Routed to CC ASHLEY as EMMANUEL Rollins  Community Health Worker  [facility]  Clinic Care Coordination  [email or web address]   Office: [phone]  Fax: [phone]
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
## 2022-12-12 NOTE — PROGRESS NOTES
12/12/2022  Clinic Care Coordination Contact  Community Health Worker Initial Outreach    CHW Initial Information Gathering:  Referral Source: Self-patient/Caregiver  Preferred Urgent Care: [facility], [phone]  Current living arrangement:: I live alone  Type of residence:: Apartment  No PCP office visit in Past Year: No  Transportation means:: Public transportation  CHW Additional Questions  If ED/Hospital discharge, follow-up appointment scheduled as recommended?: N/A  Medication changes made following ED/Hospital discharge?: N/A  MyChart active?: No  Patient agreeable to assistance with activating MyChart?: No    Patient accepts CC: Yes. Patient scheduled for assessment with CC SW on 12-12-22 at 10:30am. Patient noted desire to discuss with CC SW at assessment..     Clinic Care Coordination Contact  Community Health Worker Initial Outreach    Call to re schedule Initial Assessment with CC SW due to CC SW out today.    Spoke to patient and reschedule with JEANETTE Leigh covering for Myesha   Patient okay with telephone visit today. Stated to call her home number.  She switched PCP to Dr. Looney.  Reschedule initial Assessment for today at 10:30am with CC ASHLEY Oliva.      Routed to CC SW as EMMANUEL Rollins  Community Health Worker  [facility]  Clinic Care Coordination  [email or web address]   Office: [phone]  Fax: [phone]

## 2022-12-12 NOTE — LETTER
[facility]  Patient Centered Plan of Care  About Me:        Patient Name:  Annel Stoddard    YOB: 1955  Age:         67 year old   [facility] MRN:    [id number] Telephone Information:  Home Phone [phone]   Mobile [phone]       Address:  [address] [facility] [address]  [place] [address] Email address:  [email or web address]      Emergency Contact(s)    Name Relationship Lgl Grd Work Phone Home Phone Mobile Phone   1. BENOIT CASAREZNA Friend No   [phone]   2. JOSE FRANCISCO CHAMPION Friend No  [phone]   3. CYNDI TINSLEY* Daughter    [phone]   4. GAVIN GORDILLO Other               Primary language:  English     needed? No   [employer] [employer]:  [phone] op. 1  Other communication barriers:None    Preferred Method of Communication:     Current living arrangement: I live alone    Mobility Status/ Medical Equipment: Independent w/Device        Health Maintenance  Health Maintenance Reviewed and Overdue:    Diabetic Foot Exam  Hepatitis B Immunization  Covid Vaccine      My Access Plan  Medical Emergency 911   Primary Clinic Line [facility] [phone]   24 Hour Appointment Line [phone] or  [phone]) (toll-free)   24 Hour Nurse Line [phone] (toll-free)   Preferred Urgent Care [facility], [phone]     Preferred Hospital [facility]  [phone]     Preferred Pharmacy [facility] [place] [facility] [place] - [address] [facility]     Behavioral Health Crisis Line The National Suicide Prevention Lifeline at 1-524.558.2308 or Text/Call 778             My Care Team Members  Patient Care Team       Relationship Specialty Notifications Start End    Ayala Looney MD PCP - General Family Medicine  12/12/22     Phone: [phone] Fax: [phone]         [address] [place] [address]    Brynn Ibrahim DO Assigned PCP   6/16/21     Phone:  [phone] Fax: [phone]         [address] [place] [address]    Bradley Liao, PharmD Pharmacist Pharmacist  11/30/22     Phone: [phone]          [facility] [address] [place] [address]    Myesha Perez LSW Lead Care Coordinator  Admissions 12/5/22     Ayala Lawler MA Community Health Worker  Admissions 12/5/22             My Care Plans  Self Management and Treatment Plan  Care Plan  Care Plan: General     Problem: HP GENERAL PROBLEM     Goal: I will stay healthy to avoid hospitalizations     Start Date: 12/12/2022 Expected End Date: 3/10/2023    Priority: Medium    Note:     Barriers:  mental health diagnosis of bipolar disorder   Strengths:  Strong advocate for self  Patient expressed understanding of goal:  Yes  Action steps to achieve this goal:  1. I will eat healthy  2. I will continue to get my monthy Abilify injections  3.  I will attempt to get 7-8 hours of sleep  4. I will talk with my SW/CC monthly regarding progress and potential new needs.                           Action Plans on File:   None                      Advance Care Plans/Directives Type:   Advanced Directive - On File      My Medical and Care Information  Problem List   Patient Active Problem List   Diagnosis     Encounter for immunization     Type 2 diabetes mellitus with complication, with long-term current use of insulin (H)     Anatomical narrow angle glaucoma     Hyperactivity of bladder     Bilateral low back pain with left-sided sciatica     Callus of foot     Mixed hyperlipidemia     Simple chronic bronchitis (H)     Pulmonary emphysema (H)     Abnormal cervical Papanicolaou smear     Hernia of anterior abdominal wall     Human papilloma virus infection     Nonalcoholic steatohepatitis     Osteopenia     Thrombophlebitis     Botulism food poisoning     Cervical high risk HPV (human papillomavirus) test positive     History of colonic polyps     Hyponatremia     Sleep apnea     LPRD  (laryngopharyngeal reflux disease)     Lumbar spine pain     RUQ abdominal pain     Tracheal stenosis following tracheostomy (H)     Cirrhosis of liver without ascites, unspecified hepatic cirrhosis type (H)     Nephrogenic diabetes insipidus (H)      Current Medications and Allergies:  See printed Medication Report.    Care Coordination Start Date: 12/5/2022   Frequency of Care Coordination: monthly     Form Last Updated: 12/12/2022

## 2022-12-15 ENCOUNTER — TELEPHONE (OUTPATIENT)
Dept: FAMILY MEDICINE | Facility: CLINIC | Age: 67
End: 2022-12-15

## 2022-12-15 NOTE — TELEPHONE ENCOUNTER
Adama Huerta. Pt called in, wanted me to inform you that she will be establishing care with . She is scheduled with her on 1/17/23.  Thanks!

## 2022-12-16 ENCOUNTER — TELEPHONE (OUTPATIENT)
Dept: FAMILY MEDICINE | Facility: CLINIC | Age: 67
End: 2022-12-16

## 2022-12-16 NOTE — TELEPHONE ENCOUNTER
Pt called in, she stated that she forgot to ask Bradley what her A1c levels was?   Please give pt a call back regarding this at your earliest convenient.  Thanks!

## 2022-12-16 NOTE — TELEPHONE ENCOUNTER
Reason for Call:  Appointment Request    Patient requesting this type of appt:      Requested provider:     Reason patient unable to be scheduled: TC unable to schedule    When does patient want to be seen/preferred time: 1-2 days    Comments: Pt needs ppwk processed and would like SW to please reschedule.    Okay to leave a detailed message?: Yes at Home number on file [phone] (home)    Call taken on 12/16/2022 at 9:42 AM by Chris Darden

## 2022-12-19 ENCOUNTER — PATIENT OUTREACH (OUTPATIENT)
Dept: CARE COORDINATION | Facility: CLINIC | Age: 67
End: 2022-12-19

## 2022-12-19 DIAGNOSIS — J31.0 CHRONIC RHINITIS: ICD-10-CM

## 2022-12-19 DIAGNOSIS — Z79.4 TYPE 2 DIABETES MELLITUS WITH COMPLICATION, WITH LONG-TERM CURRENT USE OF INSULIN (H): ICD-10-CM

## 2022-12-19 DIAGNOSIS — F31.4 BIPOLAR 1 DISORDER, DEPRESSED, SEVERE (H): ICD-10-CM

## 2022-12-19 DIAGNOSIS — E11.8 TYPE 2 DIABETES MELLITUS WITH COMPLICATION, WITH LONG-TERM CURRENT USE OF INSULIN (H): ICD-10-CM

## 2022-12-19 DIAGNOSIS — K21.00 GASTROESOPHAGEAL REFLUX DISEASE WITH ESOPHAGITIS, UNSPECIFIED WHETHER HEMORRHAGE: Primary | ICD-10-CM

## 2022-12-19 NOTE — PROGRESS NOTES
"Clinic Care Coordination Contact:    Received message below from [facility] CHW George via staff message communication (note: message includes [facility] ASHLEY Brunner):   Per message from CHW George; \"Patient left me a VM 12-16-22 that she received a notice on her phone she has a medical bill from  [facility] for $556.52 she wants to know why she has [employer], Medicare and apply for roxana care.   There is a telephone note that patient is calling the clinic to talk to .   I am not on case team/care team.   Ayala you are listed as CHW.\"    Note/comment:   -CHW Ayala replied message above.    Pt chart reviewed:   -Patient completed enrollment into [facility] service on 12/12/2022 with [facility] ASHLEY Krause. Per  Jimi notes reviewed;   \"Pt indicated that she has applied for [employer] Care for outstanding medical bills and is awaiting response from them.  Plan:  Pt requested contact from regular Clinic SW/CC Myesha Perez in 2 weeks. This SW/CC communicated that to Myesha.  Then likely contacts will be moved out to every 30 days.\"  - Jimi notes encounter 12/12/2022 routed to [facility] ASHLEY Brunner at 4:02PM per pt chart reviewed.    CHW Plan:   Writer connect with patient.             "

## 2022-12-19 NOTE — PROGRESS NOTES
Clinic Care Coordination Contact:  Community Health Worker Follow Up    Today's date: 12/19/2022    Care Gaps:   Health Maintenance Due   Topic Date Due     DIABETIC FOOT EXAM  Never done     COPD ACTION PLAN  Never done     HEPATITIS B IMMUNIZATION (1 of 3 - Risk 3-dose series) 08/04/2015     COVID-19 Vaccine (4 - Booster for Moderna series) 12/17/2021     Patient have future appt scheduled for month of January 2023 with [facility] MA/nurse, Dr. Sullivan, PharmD, and Dr. Looney per appt desk reviewed. Pt is aware of these appt per pt confirmed. CHW suggested pt to discuss/review due care gaps with [facility] providers and MA/nurse on January 2023. Pt confirmed. Note/FYI: see future appt details below (CHW Plan) if need.     Care Plan:   Care Plan: General     Problem: HP GENERAL PROBLEM     Goal: I will stay healthy to avoid hospitalizations     Start Date: 12/12/2022 Expected End Date: 3/10/2023    Priority: Medium    Note:     Barriers:  mental health diagnosis of bipolar disorder   Strengths:  Strong advocate for self  Patient expressed understanding of goal:  Yes  Action steps to achieve this goal:  1. I will eat healthy  2. I will continue to get my monthy Abilify injections  3.  I will attempt to get 7-8 hours of sleep  4. I will talk with my SW/CC monthly regarding progress and potential new needs.                    Problem: HP GENERAL PROBLEM     Goal: General Goal - I need help with rent bill by the next 1-2 months.     Start Date: 12/19/2022    This Visit's Progress: 50%    Note:     Measure of Success:   Barriers: Lack of county help per pt on 12/19/2022.  Strengths: Working with my [employer] team for the next step.   Patient expressed understanding of goal: yes    Action steps to achieve this goal:  1. I will follow up with my [employer] team regarding to my rent bill. Spoke with them and they going to help me with this.   2. I will update status with my CHW/CCC team at next outreach follow up.                      Problem: HP  "GENERAL PROBLEM     Goal: General Goal - I would like to discuss medical bill and etelvina care form with [facility]  by the next 1 month.     Start Date: 12/19/2022    This Visit's Progress: 10%    Note:     Measure of Success:   Barriers: Lack of county help per pt on 12/19/2022.  Strengths: Working with [facility] ASHLEY Brunner for the next step.  Patient expressed understanding of goal: yes    Action steps to achieve this goal:  1. I will attend phone visit appt on 12/20/2022 at 11:00AM with [facility] ASHLEY Brunner. Aware of appt. (Updated: 12-)  2. I will attend appt on 1- at 3:00PM with [facility] ASHLEY Brunner in [facility] to discuss follow up visit from  appt on 12/20/2022 and discuss further detail in regards to paperwork including etelvina care form dropped it off to [facility] for ASHLEY Mckeonra. \"Prefer face to face appt.\"   3. I will update status with my CHW/CCC team at next outreach follow up.    Note/comment:   -Pt appt on 1/02/2022 at 3:00PM with [facility] ASHLEY via face to face scheduled per pt request.     (Updated: 12/19/2022)                  Problem: HP GENERAL PROBLEM     Goal: General Goal - I need help with changing prefer pharmacy by mid of January 2023 due to current pharmacy is closing before end of December 2022.     Start Date: 12/19/2022    This Visit's Progress: 10%    Note:     Measure of Success:   Barriers: Transfer all/current medication to [facility] pharmacy in [employer] [place] [address] per pt on 12/19/2023.   Strengths: Working with PCP/clinical nurse via phone visit and on 1- for a completion of transferring medication to [facility] pharmacy.   Patient expressed understanding of goal: yes    Action steps to achieve this goal:  1. I will wait to hear from PCP/clinical team for further assistance in regarding to transferring all med/current med to [facility] pharmacy.   2. I will wait to hear from PCP/clinical team for further assistance in regards to med \"pioglitazone 15 ml\" and nasal spray \"fluticasone\" send to [facility] pharmacy by this week.  3. " "I will connect with PCP/RSC office by the next 1-2 business days for follow up on this if do not hear from them.   4. I will connect with [facility] team to schedule an appt with [facility] RN if need.     Note/comment:   -12/19/2022: Message send/routed to PCP clinical team to connect with patient for further assistance per pt request on 12/19/2022. (CHW Ayala)                   Problem: HP GENERAL PROBLEM               Discussion:   [facility] CHW was able to connect with patient today with couples of phone called. Introduced self. Explain reason of called. Verified message below received from CHW George. Pt confirmed message below is regarding to rent bill and medical bill including etelvina care paperwork that dropped if to to [facility] SW (FYI: Pt did not shared date of dropped off to [facility]). Pt shared info below, added three new goals and request CHW send message to pcp clinical team to connect with her to update change of prefer pharmacy and transferring all medication to the the new pharmacy. Pt is aware to connect PCP/RSC office with any questions.     CHW reminded pt to appt 12/20/2022 at 11:00AM with [facility] SW via phone visit per appt desk reviewed. Pt confirmed that she plan to attend this appt with  and request additional SW face to face visit appt scheduled on January 02, 2022 at 3:00PM to discuss \"medical bill and etelvina care paperwork dropped off to [facility].\" Note: Pt appt scheduled 1/02/2022 after her [facility] nurse/MA appt per pt request.     CHW contact phone number given to patient today for any additional resources need.     Pt shared:  -Current pharmacy will be closing end of December 2022. Need to speak with the clinic team about changing pharmacy to [facility] in [employer] [place] [address]. Need to talk with clinic team about med \"pioglitazone\" and nasal spray \"fluticazone\" send to [facility] pharmacy before appt 1/02/2023 with [facility] nurse visit.   -Rent bill: Talked with my [employer] about this. They going to help me with my rent. Will connect with [facility] if " "need.  -May not much time to talk with SW tomorrow due to personal schedule appt. Request to keep tomorrow appt 12/20/2022 with SW via phone visit and appt on 1/02/2022 with SW via face to face visit.   -Prefer PCP is \"Dr. Looney.\"   -No other questions or concerns.      CHW Next Follow-up: goals follow up.     Outreach frequency: monthly     CHW Plan:   -Pt attend phone visit appt on 12/20/2022 at 11:00AM via phone visit per appt desk reviewed. Pt confirmed.   -Pt attend the following appt:  1/02/2023 at 2:15PM with [facility] nurse/MA;  1/02/2023 at 3:00PM with CCC SW in [facility];   1/09/2023 at 9:00AM with Dr. Sullivan for \"pap\" per appt desk reviewed;   1/11/2023 at 9:00AM with PharmD;   1/17/2023 at 9:30AM with Dr. Looney- \"establish care\" with new provider per appt desk reviewed.   -Next CHW outreach follow up: 1 month.        "

## 2022-12-20 ENCOUNTER — PATIENT OUTREACH (OUTPATIENT)
Dept: NURSING | Facility: CLINIC | Age: 67
End: 2022-12-20
Payer: COMMERCIAL

## 2022-12-20 NOTE — TELEPHONE ENCOUNTER
Please see CHW Ayala encounter dated 12/19/2022 for details if need.   Pt have an appt on 12/20/2022 at 11:00AM with JHOAN RAMIREZ via phone visit.   Pt schedule another appt on 1- at 3:00PM with JHOAN Brunner in [facility] per pt request.   Done task.

## 2022-12-21 ENCOUNTER — PATIENT OUTREACH (OUTPATIENT)
Dept: CARE COORDINATION | Facility: CLINIC | Age: 67
End: 2022-12-21

## 2022-12-21 NOTE — PROGRESS NOTES
Clinic Care Coordination Contact    Follow Up Progress Note      Assessment: CC SW follow up with pt regarding etelvina care and other updates    Care Gaps:    Health Maintenance Due   Topic Date Due     DIABETIC FOOT EXAM  Never done     COPD ACTION PLAN  Never done     HEPATITIS B IMMUNIZATION (1 of 3 - Risk 3-dose series) 08/04/2015     COVID-19 Vaccine (4 - Booster for Moderna series) 12/17/2021       Postponed to next visit as pt was on time constraint     Care Plans  Care Plan: General     Problem: HP GENERAL PROBLEM     Goal: I will stay healthy to avoid hospitalizations     Start Date: 12/12/2022 Expected End Date: 3/10/2023    Priority: Medium    Note:     Barriers:  mental health diagnosis of bipolar disorder   Strengths:  Strong advocate for self  Patient expressed understanding of goal:  Yes  Action steps to achieve this goal:  1. I will eat healthy  2. I will continue to get my monthy Abilify injections  3.  I will attempt to get 7-8 hours of sleep  4. I will talk with my SW/CC monthly regarding progress and potential new needs.                    Problem: HP GENERAL PROBLEM     Goal: General Goal - I need help with rent bill by the next 1-2 months.     Start Date: 12/19/2022    This Visit's Progress: 50%    Note:     Measure of Success:   Barriers: Lack of county help per pt on 12/19/2022.  Strengths: Working with my [employer] team for the next step.   Patient expressed understanding of goal: yes    Action steps to achieve this goal:  1. I will follow up with my [employer] team regarding to my rent bill. Spoke with them and they going to help me with this.   2. I will update status with my CHW/CCC team at next outreach follow up.                      Problem: HP GENERAL PROBLEM     Goal: General Goal - I would like to discuss medical bill and etelvina care form with [facility]  by the next 1 month.     Start Date: 12/19/2022    This Visit's Progress: 10%    Note:     Measure of Success:   Barriers: Lack  "[facility] help per pt on 12/19/2022.  Strengths: Working with [facility] ASHLEY Brunner for the next step.  Patient expressed understanding of goal: yes    Action steps to achieve this goal:  1. I will attend phone visit appt on 12/20/2022 at 11:00AM with [facility] ASHLEY Brunner. Aware of appt. (Updated: 12-)  2. I will attend appt on 1- at 3:00PM with [facility] ASHLEY Brunner in [facility] to discuss follow up visit from  appt on 12/20/2022 and discuss further detail in regards to paperwork including etelvina care form dropped it off to [facility] for ASHLEY Brunner. \"Prefer face to face appt.\"   3. I will update status with my CHW/CCC team at next outreach follow up.    Note/comment:   -Pt appt on 1/02/2022 at 3:00PM with [facility] ASHLEY via face to face scheduled per pt request.     (Updated: 12/19/2022)                  Problem: HP GENERAL PROBLEM     Goal: General Goal - I need help with changing prefer pharmacy by mid of January 2023 due to current pharmacy is closing before end of December 2022.     Start Date: 12/19/2022    This Visit's Progress: 10%    Note:     Measure of Success:   Barriers: [facility] [place] pt on 12/19/2023.   Strengths: Working with PCP/clinical nurse via phone visit and on 1- for a completion [facility]   Patient expressed understanding of goal: yes    Action steps to achieve this goal:  1. I will wait to hear from PCP/clinical team for further assistance in regarding to [facility] all [facility]   2. I will wait to hear from PCP/clinical team for further assistance in regards to med \"pioglitazone 15 ml\" and nasal spray \"flutic[facility]\" [facility].  3. I will connect with PCP/C office by the next 1-2 business days for follow up on this if do not hear from them.   4. I will connect with [facility] team to schedule an appt with CCC RN if need.     Note/comment:   -12/19/2022: Message send/routed to PCP " clinical team to connect with patient for further assistance per pt request on 12/19/2022. (CHW Ayala)                   Problem: HP GENERAL PROBLEM               CC ASHLEY called and spoke to pt this morning. Pt told SW that she had a luncheon at 11:30am and would only be available to talk for a little. Pt asked if it was alright that she went down her list of items she wanted to discuss with SW first. Pt updated SW on her rent, , [facility] referral, pharmacy change, dental and eye bills, new PCP, PharmD. Pt was wanting to  with PCP where her former one left off in regards to diabetic equipment/supplies and a neck pain that she is experiencing. CC ASHLEY asked pt about the first page of the roxana care application that she had as that had the information for where to send the application. Pt reported that she did not have it, but did have the number for Jorge from [facility].    Intervention/Education provided during outreach: [employer], Ta assistance through [employer],     Plan: JEANETTE RAMIREZ to call and connected with Jorge from [facility] to discuss turning in pt's application    Care Coordinator will follow up on 1/2 @ 3pm with pt in clinic

## 2022-12-22 NOTE — PROGRESS NOTES
"Clinic Care Coordination Contact:    Voicemail check on 12/21/2022:   Patient left a voice message on CHW voicemail on 12/20/2022 at 10:44AM.   Per voice message from pt; \"someone called me at 10:30AM today (fyi: 12/20/2022) and I did not recognized the phone number. Been waiting to hear from SW to call her at 11:00AM\" per pt.    Per pt chart reviewed;  [facility] SW contact pt on 12/20/2022 at appt time and spoke with pt \"regarding etelvina care and other updates\" per SW notes reviewed encounter dated 12/20/2022.     Done task due to [facility] SW already spoke with pt on 11/20/2022.    Note/comment:  [facility] SW Myesha discussed plan of care for pt on 12/20/2022 with [facility] CCC team at [facility] case review. SW plan to connect pt's  per SW shared.     CHW Next Follow-up: goals follow up.     Outreach frequency: monthly     CHW Plan per CHW encounter dated 12/19/2022 reviewed:   -Pt attend the following appt:  1/02/2023 at 2:15PM with [facility] nurse/MA;  1/02/2023 at 3:00PM with [facility] SW in [facility];   1/09/2023 at 9:00AM with Dr. Sullivan for \"pap\" per appt desk reviewed;   1/11/2023 at 9:00AM with PharmD;   1/17/2023 at 9:30AM with Dr. Looney- \"establish care\" with new provider per appt desk reviewed.   -Next CHW outreach follow up: 1 month.    "

## 2022-12-22 NOTE — PROGRESS NOTES
Clinic Care Coordination Contact:    Coordinate care:   This encounter routed to PCP clinical team to connect with patient to discuss med, pharmacy concerns/switching pharmacy and veggies program per pt request on 12/19/2022.

## 2022-12-26 ENCOUNTER — TELEPHONE (OUTPATIENT)
Dept: FAMILY MEDICINE | Facility: CLINIC | Age: 67
End: 2022-12-26

## 2022-12-26 ENCOUNTER — PATIENT OUTREACH (OUTPATIENT)
Dept: CARE COORDINATION | Facility: CLINIC | Age: 67
End: 2022-12-26

## 2022-12-26 DIAGNOSIS — Z79.4 TYPE 2 DIABETES MELLITUS WITH COMPLICATION, WITH LONG-TERM CURRENT USE OF INSULIN (H): Primary | ICD-10-CM

## 2022-12-26 DIAGNOSIS — E11.8 TYPE 2 DIABETES MELLITUS WITH COMPLICATION, WITH LONG-TERM CURRENT USE OF INSULIN (H): Primary | ICD-10-CM

## 2022-12-26 RX ORDER — LITHIUM CARBONATE 450 MG
450 TABLET, EXTENDED RELEASE ORAL 2 TIMES DAILY
Qty: 60 TABLET | Refills: 1 | Status: SHIPPED | OUTPATIENT
Start: 2022-12-26 | End: 2023-04-05

## 2022-12-26 RX ORDER — BLOOD-GLUCOSE METER
EACH MISCELLANEOUS ONCE
Qty: 1 KIT | Refills: 0 | Status: SHIPPED | OUTPATIENT
Start: 2022-12-26 | End: 2022-12-26

## 2022-12-26 RX ORDER — PIOGLITAZONEHYDROCHLORIDE 15 MG/1
15 TABLET ORAL DAILY
Qty: 90 TABLET | Refills: 3 | Status: SHIPPED | OUTPATIENT
Start: 2022-12-26 | End: 2023-01-11

## 2022-12-26 RX ORDER — FLUTICASONE PROPIONATE 50 MCG
SPRAY, SUSPENSION (ML) NASAL
Qty: 48 G | Refills: 0 | Status: SHIPPED | OUTPATIENT
Start: 2022-12-26 | End: 2023-08-22

## 2022-12-26 NOTE — TELEPHONE ENCOUNTER
Called and spoke with pt:    [facility] where she went is closed. [facility] lie[facility] [place] - needs pioglitazone 15mg daily and and Flonase 50mcg 2 in each nostrils BID prn, prilosec 2 tabs in the AM daily, lithium 400mg tab AM and bed time. Seeing Bradley soon. BS are not good - BID morning and at bed time. Running out of [employer].     [facility] - turned in papers to . Is wondering if the form has been faxed?  Has a bill for $500 if it isn't faxed. Will send message to  and CHW    Has not had heat in the building for a week. People are also urinating on chairs, which causing odor. Will send message to CHW.

## 2022-12-26 NOTE — TELEPHONE ENCOUNTER
Order/Referral Request    Who is requesting: Patient     Orders being requested: Referral for CHW    Reason service is needed/diagnosis: Pt have a dental bill that she can't afford. Pt is requesting for a referral for a care coordinator to assist in finding financial help with medical bills.     When are orders needed by: At your earliest convenient.    Has this been discussed with Provider: No    Does patient have a preference on a Group/Provider/Facility? n/a    Does patient have an appointment scheduled?: No    Where to send orders: N/A    Okay to leave a detailed message?: Yes at Home number on file [phone] (home)

## 2022-12-26 NOTE — PROGRESS NOTES
"Clinic Care Coordination Contact:    Today's date: 12/26/2022    CHW received a voice message from patient stated that pt dropped off medical bill of \"S556.62\" paperwork to [facility] office today. Spoke with  about this and can't do anything about this per voicemail from pt. Request help with this.    Note/comment:   No CHWs in clinic today or tomorrow.     CHW Plan:   Message route to CCC CHW George Rollisn to connect with patient due to CHW Ayala work hour and off time.   "

## 2022-12-28 DIAGNOSIS — J31.0 CHRONIC RHINITIS: ICD-10-CM

## 2022-12-28 DIAGNOSIS — E11.8 TYPE 2 DIABETES MELLITUS WITH COMPLICATION, WITH LONG-TERM CURRENT USE OF INSULIN (H): ICD-10-CM

## 2022-12-28 DIAGNOSIS — J43.9 PULMONARY EMPHYSEMA, UNSPECIFIED EMPHYSEMA TYPE (H): Primary | ICD-10-CM

## 2022-12-28 DIAGNOSIS — Z79.4 TYPE 2 DIABETES MELLITUS WITH COMPLICATION, WITH LONG-TERM CURRENT USE OF INSULIN (H): ICD-10-CM

## 2022-12-28 DIAGNOSIS — F31.4 BIPOLAR 1 DISORDER, DEPRESSED, SEVERE (H): ICD-10-CM

## 2022-12-28 RX ORDER — PIOGLITAZONEHYDROCHLORIDE 15 MG/1
15 TABLET ORAL DAILY
Qty: 90 TABLET | Refills: 3 | Status: CANCELLED | OUTPATIENT
Start: 2022-12-28

## 2022-12-28 RX ORDER — LITHIUM CARBONATE 450 MG
450 TABLET, EXTENDED RELEASE ORAL 2 TIMES DAILY
Qty: 60 TABLET | Refills: 1 | Status: CANCELLED | OUTPATIENT
Start: 2022-12-28

## 2022-12-28 RX ORDER — FLUTICASONE PROPIONATE 50 MCG
SPRAY, SUSPENSION (ML) NASAL
Qty: 48 G | Refills: 0 | Status: CANCELLED | OUTPATIENT
Start: 2022-12-28

## 2022-12-28 NOTE — TELEPHONE ENCOUNTER
Medication Question or Refill    Contacts       Type Contact Phone/Fax    12/28/2022 08:05 AM CST Phone (Incoming) Elizabeth Stoddard (Self) [phone] (H)          What medication are you calling about (include dose and sig)?: tiotropium-olodaterol 2.5-2.5 MCG/ACT AERS    Controlled Substance Agreement on file:   CSA -- Patient Level:    CSA: None found at the patient level.       Who prescribed the medication?: PCP    Do you need a refill? Yes: at Pharmacy now, asking if PCP can refill request now.    When did you use the medication last?     Patient offered an appointment? No    Do you have any questions or concerns?  No    Preferred Pharmacy:     [facility] [id number] [facility] - [place], [place] - [address] [place] [address]  Phone: [phone] Fax: [phone]      Okay to leave a detailed message?: Yes at Home number on file [phone] (home)

## 2022-12-28 NOTE — TELEPHONE ENCOUNTER
Patient is also asking for inhaler that I did not see on her list to order  Stiolto restamat and also she would like to know why a new meter was ordered for her she does not need one

## 2022-12-29 DIAGNOSIS — K21.00 GASTROESOPHAGEAL REFLUX DISEASE WITH ESOPHAGITIS, UNSPECIFIED WHETHER HEMORRHAGE: ICD-10-CM

## 2022-12-29 PROBLEM — K31.7 GASTRIC POLYP: Status: ACTIVE | Noted: 2022-12-29

## 2022-12-29 PROBLEM — R13.19 OTHER DYSPHAGIA: Status: ACTIVE | Noted: 2022-12-29

## 2023-01-02 ENCOUNTER — ALLIED HEALTH/NURSE VISIT (OUTPATIENT)
Dept: FAMILY MEDICINE | Facility: CLINIC | Age: 68
End: 2023-01-02
Payer: COMMERCIAL

## 2023-01-02 ENCOUNTER — PATIENT OUTREACH (OUTPATIENT)
Dept: CARE COORDINATION | Facility: CLINIC | Age: 68
End: 2023-01-02

## 2023-01-02 ENCOUNTER — ALLIED HEALTH/NURSE VISIT (OUTPATIENT)
Dept: NURSING | Facility: CLINIC | Age: 68
End: 2023-01-02
Payer: COMMERCIAL

## 2023-01-02 DIAGNOSIS — Z23 ENCOUNTER FOR IMMUNIZATION: Primary | ICD-10-CM

## 2023-01-02 DIAGNOSIS — E11.8 TYPE 2 DIABETES MELLITUS WITH COMPLICATION, WITH LONG-TERM CURRENT USE OF INSULIN (H): ICD-10-CM

## 2023-01-02 DIAGNOSIS — Z71.89 COUNSELING AND COORDINATION OF CARE: Primary | ICD-10-CM

## 2023-01-02 DIAGNOSIS — Z79.4 TYPE 2 DIABETES MELLITUS WITH COMPLICATION, WITH LONG-TERM CURRENT USE OF INSULIN (H): ICD-10-CM

## 2023-01-02 PROCEDURE — 99207 PR NO CHARGE NURSE ONLY: CPT

## 2023-01-02 PROCEDURE — 96372 THER/PROPH/DIAG INJ SC/IM: CPT | Performed by: PHARMACIST

## 2023-01-02 RX ORDER — LOSARTAN POTASSIUM 50 MG/1
50 TABLET ORAL DAILY
Qty: 90 TABLET | Refills: 0 | Status: CANCELLED | OUTPATIENT
Start: 2023-01-02

## 2023-01-02 NOTE — TELEPHONE ENCOUNTER
Medication Question or Refill        What medication are you calling about (include dose and sig)?:   pioglitazone (ACTOS) 15 MG tablet 90 tablet 3 12/26/2022  No   Sig - Route: Take 1 tablet (15 mg) by mouth daily - Oral         Controlled Substance Agreement on file:   CSA -- Patient Level:    CSA: None found at the patient level.       Who prescribed the medication?: Denise Smith    Do you need a refill? Yes: Pt is out of med and in need of med. Please look into refill request and order med if applicable. Thank you.     When did you use the medication last? Unknown     Patient offered an appointment? No    Do you have any questions or concerns?  No    Preferred Pharmacy:   [facility] [id number] [facility] - [place], [place] - [address] [place] [address]  Phone: [phone] Fax: [phone]      Okay to leave a detailed message?: Yes at Home number on file [phone] (home)

## 2023-01-02 NOTE — PROGRESS NOTES
Clinic Care Coordination Contact    Follow Up Progress Note      Assessment: CC SW met with pt at [facility] to discuss goal updates     Care Gaps:    Health Maintenance Due   Topic Date Due     DIABETIC FOOT EXAM  Never done     COPD ACTION PLAN  Never done     HEPATITIS B IMMUNIZATION (1 of 3 - Risk 3-dose series) 08/04/2015     COVID-19 Vaccine (4 - Booster for Moderna series) 12/17/2021     PHQ-2 (once per calendar year)  01/01/2023       Postponed to a later date. Pt was focused on housing concern     Care Plans:      Care Plan: General     Problem: HP GENERAL PROBLEM     Goal: I will stay healthy to avoid hospitalizations     Start Date: 12/12/2022 Expected End Date: 3/10/2023    Priority: Medium    Note:     Barriers:  mental health diagnosis of bipolar disorder   Strengths:  Strong advocate for self  Patient expressed understanding of goal:  Yes  Action steps to achieve this goal:  1. I will eat healthy  2. I will continue to get my monthy Abilify injections  3.  I will attempt to get 7-8 hours of sleep  4. I will talk with my SW/CC monthly regarding progress and potential new needs.                    Problem: HP GENERAL PROBLEM     Goal: General Goal - I need help with rent bill by the next 1-2 months.     Start Date: 12/19/2022    This Visit's Progress: 50%    Note:     Measure of Success:   Barriers: Lack of county help per pt on 12/19/2022.  Strengths: Working with my [employer] team for the next step.   Patient expressed understanding of goal: yes    Action steps to achieve this goal:  1. I will follow up with my [employer] team regarding to my rent bill. Spoke with them and they going to help me with this.   2. I will update status with my CHW/CCC team at next outreach follow up.                      Problem: HP GENERAL PROBLEM     Goal: General Goal - I would like to discuss medical bill and etelvina care form with [facility]  by the next 1 month.     Start Date: 12/19/2022    This Visit's Progress: 10%     "Note:     Measure of Success:   Barriers: Lack of county help per pt on 12/19/2022.  Strengths: Working with [facility] ASHLEY Brunner for the next step.  Patient expressed understanding of goal: yes    Action steps to achieve this goal:  1. I will attend phone visit appt on 12/20/2022 at 11:00AM with [facility] ASHLEY Brunner. Aware of appt. (Updated: 12-)  2. I will attend appt on 1- at 3:00PM with [facility] ASHLEY Brunner in [facility] to discuss follow up visit from  appt on 12/20/2022 and discuss further detail in regards to paperwork including etelvina care form dropped it off to [facility] for ASHLEY Mckeonra. \"Prefer face to face appt.\"   3. I will update status with my CHW/CCC team at next outreach follow up.    Note/comment:   -Pt appt on 1/02/2022 at 3:00PM with [facility] ASHLEY via face to face scheduled per pt request.     (Updated: 12/19/2022)                  Problem: HP GENERAL PROBLEM     Goal: General Goal - I need help with changing prefer pharmacy by mid of January 2023 due to current pharmacy is closing before end of December 2022.     Start Date: 12/19/2022    This Visit's Progress: 10%    Note:     Measure of Success:   Barriers: [facility] [place] pt on 12/19/2023.   Strengths: Working with PCP/clinical nurse via phone visit and on 1- for a completion of [facility].   Patient expressed understanding of goal: yes    Action steps to achieve this goal:  1. I will wait to hear from PCP/clinical team for further assistance in regarding to [facility] all [facility]   2. I will wait to hear from PCP/clinical team for further assistance in regards to med \"pioglitazone 15 ml\" and nasal spray \"fluticasone\" [facility].  3. I will connect with PCP/C office by the next 1-2 business days for follow up on this if do not hear from them.   4. I will connect with CCC team to schedule an appt with CCC RN if need. "     Note/comment:   -12/19/2022: Message send/routed to PCP clinical team to connect with patient for further assistance per pt request on 12/19/2022. (CHW Ayala)                   Problem: HP GENERAL PROBLEM               JEANETTE RAMIREZ met with pt @ [facility] this afternoon. Pt went straight into discussing the updates/concerns that she had. Pt gave CC ASHLEY another alternative phone number that she can be reached from in the event that anything happened to her. Pt also let SW know that her dental bill is being taken care of and that she will be paying the bill tomorrow. Pt brought up that she also had wrote a letter to her  voicing her concerns regarding heating and accessibility to maintenance in the event of emergencies as well as making sure that they had received the rental assistance pt had gotten from her [employer]. The  had wrote back and explained why things were how they were and had offered to set up a meeting on 1/9 with the  as well for pt to attend and voice those concerns. Pt mentioned that she had requested her ARM worker to stay on to have her help with the meeting on 1/9. SW updated pt that her etelvina care application was turned in this morning via email and that they should be contacting her sometime this week to update her regarding the status of her application and if they needed anything else    Intervention/Education provided during outreach: Housing update, alternative contact,    Plan: JEANETTE RAMIREZ and pt follow up on 1/23/23    Care Coordinator will follow up in 4 weeks to address and assess goals

## 2023-01-02 NOTE — TELEPHONE ENCOUNTER
Please call pt:    It looks like her losartan has not been refilled since 7/16/21 and it was for 90 days. Is she still taking this med daily and did she skip any doses? If she has not been consistently taking it, we have a follow up with me 1/17/22 and we can discuss it then too.

## 2023-01-02 NOTE — PROGRESS NOTES
"Clinic Care Coordination Contact:    Today's date: 1/2/2023    CHW Ayala received a fax from GORAN Vazquez (cover CHW) via e-mail message on 12/29/2022 at 4:11PM regarding to \"Subject: FW: A fax has arrived from remote ID .\" Message includes ASHLEY Brunner.   Per e-mail message reviewed;  \"Letter and note from Annel Vazquez.\"    CHW forward message to [facility] ASHLEY Brunner for further advise.     CHW Plan: next CHW outreach plan 1-           "

## 2023-01-02 NOTE — PROGRESS NOTES
"Clinic Care Coordination Contact:    Today's date: 1/2/2023    Voicemail check:   CHW received a voice message from patient on 12/30/2022 at 2:09PM.  VM from patient; have a situation concerns with current place where I live. Concerns with [facility]. Concerns about my welfare and housing. Have an appt with them today and wonder if CCC can meet with us. Note/fyi: CHW off 12/29/2022 & 12/30/2022.    Per pt chart reviewed;   Per CCC SW notes reviewed encounter dated 1- reviewed; \"Assessment: CC SW met with pt at [facility] to discuss goal updates.\"     Coordinate Care:   3:41PM: CHW message CCC SW via teams communication checking with SW see if pt discuss message above with SW.     CHW Plan:   -VM from pt above: waiting to hear from CCC SW via teams communication.  -Next CHW outreach plan 1- or sooner if need.       "

## 2023-01-04 DIAGNOSIS — E11.8 TYPE 2 DIABETES MELLITUS WITH COMPLICATION, WITH LONG-TERM CURRENT USE OF INSULIN (H): ICD-10-CM

## 2023-01-04 DIAGNOSIS — Z79.4 TYPE 2 DIABETES MELLITUS WITH COMPLICATION, WITH LONG-TERM CURRENT USE OF INSULIN (H): ICD-10-CM

## 2023-01-04 RX ORDER — LOSARTAN POTASSIUM 50 MG/1
50 TABLET ORAL DAILY
Qty: 90 TABLET | Refills: 0 | Status: SHIPPED | OUTPATIENT
Start: 2023-01-04 | End: 2023-10-10

## 2023-01-04 RX ORDER — LOSARTAN POTASSIUM 50 MG/1
1 TABLET ORAL DAILY
COMMUNITY
Start: 2022-08-03 | End: 2023-01-10

## 2023-01-05 DIAGNOSIS — E11.8 TYPE 2 DIABETES MELLITUS WITH COMPLICATION, WITH LONG-TERM CURRENT USE OF INSULIN (H): Primary | ICD-10-CM

## 2023-01-05 DIAGNOSIS — Z79.4 TYPE 2 DIABETES MELLITUS WITH COMPLICATION, WITH LONG-TERM CURRENT USE OF INSULIN (H): Primary | ICD-10-CM

## 2023-01-05 RX ORDER — MUPIROCIN CALCIUM 20 MG/G
CREAM TOPICAL 3 TIMES DAILY
Status: CANCELLED | OUTPATIENT
Start: 2023-01-05

## 2023-01-05 RX ORDER — ROSUVASTATIN CALCIUM 10 MG/1
10 TABLET, COATED ORAL DAILY
Qty: 90 TABLET | Refills: 3 | Status: SHIPPED | OUTPATIENT
Start: 2023-01-05 | End: 2023-01-09

## 2023-01-05 RX ORDER — KETOCONAZOLE 20 MG/G
CREAM TOPICAL
Status: CANCELLED | OUTPATIENT
Start: 2023-01-05

## 2023-01-05 NOTE — TELEPHONE ENCOUNTER
Medication Question or Refill    Contacts       Type Contact Phone/Fax    01/05/2023 01:53 PM CST Phone (Incoming) Elizabeth Stoddard (Self) [phone] (H)          What medication are you calling about (include dose and sig)?:    insulin aspart (NOVOLOG PEN) 100 UNIT/ML pen   rosuvastatin (CRESTOR) 10 MG tablet  ketoconazole (NIZORAL) 2 % external cream  mupirocin (BACTROBAN) 2 % external cream    Controlled Substance Agreement on file:   CSA -- Patient Level:    CSA: None found at the patient level.       Who prescribed the medication?: PCP    Do you need a refill? Yes    When did you use the medication last?     Patient offered an appointment? No    Do you have any questions or concerns?  No    Preferred Pharmacy:    [facility] [id number] [facility] - [place], [place] - [address] [place] [address]  Phone: [phone] Fax: [phone]      Okay to leave a detailed message?: Yes at Home number on file [phone] (home)

## 2023-01-05 NOTE — TELEPHONE ENCOUNTER
Please call pt to inform her rosuvastatin and novolog were refilled, however, she needs to be reevalauted for refills of ketoknoazole and mupirocin.

## 2023-01-06 ENCOUNTER — HOSPITAL ENCOUNTER (OUTPATIENT)
Dept: ULTRASOUND IMAGING | Facility: HOSPITAL | Age: 68
Discharge: HOME | End: 2023-01-06
Attending: INTERNAL MEDICINE
Payer: COMMERCIAL

## 2023-01-06 ENCOUNTER — LAB (OUTPATIENT)
Dept: LAB | Facility: HOSPITAL | Age: 68
End: 2023-01-06
Attending: INTERNAL MEDICINE
Payer: COMMERCIAL

## 2023-01-06 DIAGNOSIS — K75.81 LIVER CIRRHOSIS SECONDARY TO NASH (H): ICD-10-CM

## 2023-01-06 DIAGNOSIS — K74.60 UNSPECIFIED CIRRHOSIS OF LIVER (H): ICD-10-CM

## 2023-01-06 DIAGNOSIS — K74.60 LIVER CIRRHOSIS SECONDARY TO NASH (H): ICD-10-CM

## 2023-01-06 LAB
AFP SERPL-MCNC: 1.8 NG/ML
ALBUMIN SERPL BCG-MCNC: 4.4 G/DL (ref 3.5–5.2)
ALP SERPL-CCNC: 155 U/L (ref 35–104)
ALT SERPL W P-5'-P-CCNC: 75 U/L (ref 10–35)
AST SERPL W P-5'-P-CCNC: ABNORMAL U/L
BILIRUB DIRECT SERPL-MCNC: <0.2 MG/DL (ref 0–0.3)
BILIRUB SERPL-MCNC: 0.3 MG/DL
CREAT SERPL-MCNC: 0.46 MG/DL (ref 0.51–0.95)
ERYTHROCYTE [DISTWIDTH] IN BLOOD BY AUTOMATED COUNT: 12.6 % (ref 10–15)
GFR SERPL CREATININE-BSD FRML MDRD: >90 ML/MIN/1.73M2
HCT VFR BLD AUTO: 39.2 % (ref 35–47)
HGB BLD-MCNC: 12.7 G/DL (ref 11.7–15.7)
INR PPP: 1.04 (ref 0.85–1.15)
MCH RBC QN AUTO: 33 PG (ref 26.5–33)
MCHC RBC AUTO-ENTMCNC: 32.4 G/DL (ref 31.5–36.5)
MCV RBC AUTO: 102 FL (ref 78–100)
PLATELET # BLD AUTO: 235 10E3/UL (ref 150–450)
PROT SERPL-MCNC: 7.8 G/DL (ref 6.4–8.3)
RADIOLOGIST FLAGS: NORMAL
RBC # BLD AUTO: 3.85 10E6/UL (ref 3.8–5.2)
WBC # BLD AUTO: 6.8 10E3/UL (ref 4–11)

## 2023-01-06 PROCEDURE — 82565 ASSAY OF CREATININE: CPT

## 2023-01-06 PROCEDURE — 85610 PROTHROMBIN TIME: CPT

## 2023-01-06 PROCEDURE — 76705 ECHO EXAM OF ABDOMEN: CPT

## 2023-01-06 PROCEDURE — 85027 COMPLETE CBC AUTOMATED: CPT

## 2023-01-06 PROCEDURE — 82105 ALPHA-FETOPROTEIN SERUM: CPT

## 2023-01-06 PROCEDURE — 82248 BILIRUBIN DIRECT: CPT

## 2023-01-06 PROCEDURE — 84155 ASSAY OF PROTEIN SERUM: CPT

## 2023-01-06 PROCEDURE — 36415 COLL VENOUS BLD VENIPUNCTURE: CPT

## 2023-01-06 NOTE — TELEPHONE ENCOUNTER
Pt returned call back and I relay provider's msg below to pt as indicated below. Pt verbalize understanding and stated that she'll discuss with provider on 1/17/23 when she see provider.   Pt requesting if provider can refill her one touch verio lancets too. Please look into this and order rx if applicable. Thank you.    Preferred pharmacy:  [facility] [address] [facility] - [place], [place] - [address] [place] [address] [address]

## 2023-01-09 ENCOUNTER — OFFICE VISIT (OUTPATIENT)
Dept: FAMILY MEDICINE | Facility: CLINIC | Age: 68
End: 2023-01-09
Payer: COMMERCIAL

## 2023-01-09 VITALS
SYSTOLIC BLOOD PRESSURE: 124 MMHG | WEIGHT: 181.75 LBS | TEMPERATURE: 97.5 F | HEART RATE: 86 BPM | RESPIRATION RATE: 18 BRPM | BODY MASS INDEX: 32.2 KG/M2 | DIASTOLIC BLOOD PRESSURE: 76 MMHG | HEIGHT: 63 IN | OXYGEN SATURATION: 97 %

## 2023-01-09 DIAGNOSIS — B37.31 YEAST INFECTION OF THE VAGINA: ICD-10-CM

## 2023-01-09 DIAGNOSIS — N25.1 NEPHROGENIC DIABETES INSIPIDUS (H): ICD-10-CM

## 2023-01-09 DIAGNOSIS — F31.4 BIPOLAR 1 DISORDER, DEPRESSED, SEVERE (H): ICD-10-CM

## 2023-01-09 DIAGNOSIS — R87.810 ASCUS WITH POSITIVE HIGH RISK HPV CERVICAL: Primary | ICD-10-CM

## 2023-01-09 DIAGNOSIS — Z86.14 HISTORY OF MRSA INFECTION: ICD-10-CM

## 2023-01-09 DIAGNOSIS — Z79.4 TYPE 2 DIABETES MELLITUS WITH COMPLICATION, WITH LONG-TERM CURRENT USE OF INSULIN (H): ICD-10-CM

## 2023-01-09 DIAGNOSIS — R87.610 ASCUS WITH POSITIVE HIGH RISK HPV CERVICAL: Primary | ICD-10-CM

## 2023-01-09 DIAGNOSIS — E11.8 TYPE 2 DIABETES MELLITUS WITH COMPLICATION, WITH LONG-TERM CURRENT USE OF INSULIN (H): ICD-10-CM

## 2023-01-09 PROCEDURE — 87624 HPV HI-RISK TYP POOLED RSLT: CPT | Performed by: FAMILY MEDICINE

## 2023-01-09 PROCEDURE — 99214 OFFICE O/P EST MOD 30 MIN: CPT | Performed by: FAMILY MEDICINE

## 2023-01-09 PROCEDURE — G0145 SCR C/V CYTO,THINLAYER,RESCR: HCPCS | Performed by: FAMILY MEDICINE

## 2023-01-09 RX ORDER — ROSUVASTATIN CALCIUM 10 MG/1
10 TABLET, COATED ORAL DAILY
Qty: 90 TABLET | Refills: 1 | Status: SHIPPED | OUTPATIENT
Start: 2023-01-09 | End: 2023-01-17

## 2023-01-09 RX ORDER — MUPIROCIN CALCIUM 20 MG/G
CREAM TOPICAL 3 TIMES DAILY
Qty: 30 G | Refills: 0 | Status: SHIPPED | OUTPATIENT
Start: 2023-01-09 | End: 2023-01-12

## 2023-01-09 RX ORDER — KETOCONAZOLE 20 MG/G
CREAM TOPICAL
Qty: 30 G | Refills: 1 | Status: SHIPPED | OUTPATIENT
Start: 2023-01-09 | End: 2023-03-21

## 2023-01-09 ASSESSMENT — ENCOUNTER SYMPTOMS
POLYDIPSIA: 0
SHORTNESS OF BREATH: 0
MUSCULOSKELETAL NEGATIVE: 1
COUGH: 0
CHILLS: 0
ABDOMINAL PAIN: 0
FEVER: 0
NEUROLOGICAL NEGATIVE: 1
BRUISES/BLEEDS EASILY: 0
NERVOUS/ANXIOUS: 1
ALLERGIC/IMMUNOLOGIC NEGATIVE: 1

## 2023-01-09 NOTE — PROGRESS NOTES
"  1. ASCUS with positive high risk HPV cervical  This is a 66 yo female with history of abnormal pap smear - had colposcopy a year ago (at [facility]).  Here for follow up - had ASCUS pap with high risk HPV.  Today, cervix appears normal / vaginal tissue atrophic.  Will do pap /HPV cotest.  Review results when available.   - Pap Screen with HPV - recommended age 30 - 65 years; Future  - Pap Screen with HPV - recommended age 30 - 65 years    2. Type 2 diabetes mellitus with complication, with long-term current use of insulin (H)  Patient desires refill on Rosuvastatin - had gotten a #30 day supply and no refills; wants #90 day supply; states compliance;  Has future follow up with [facility] and Dr. Looney.    - rosuvastatin (CRESTOR) 10 MG tablet; Take 1 tablet (10 mg) by mouth daily  Dispense: 90 tablet; Refill: 1    3. History of MRSA infection  Patient uses Bactroban for cuts - has h/o MRSA - so worries about this; also uses this after a pedicure to prevent infections.  I have refilled x 1 time.   - mupirocin (BACTROBAN) 2 % external cream; Apply topically 3 times daily  Dispense: 30 g; Refill: 0    4. Yeast infection of the vagina  Patient keeps Ketoconazole cream around for \"yeast\" infections.  Does have underlying type II DM -   - ketoconazole (NIZORAL) 2 % external cream; Twice a day for two weeks  Dispense: 30 g; Refill: 1    5. Bipolar 1 disorder, depressed, severe (H)  Patient notes she is taking her injection monthly - trying to stay on track; has been involved in a [employer] that is helping her with social issues (housing/rent) and allowing her to play her violin    6. Nephrogenic diabetes insipidus (H)  H/o hyponatremia - is worried that her new medication may cause issues - reviewed last labs - one month ago - sodium was normal .  Has follow up scheduled with Dr. Looney.  Will defer further follow up.      Also - reviewed mammogram from October - up to date  And  - recent ultrasound - had liver lesions; " "follows with GI - they have ordered an open MRI -     Subjective   [place] is a 67 year old, presenting for the following health issues:  pap smear (Pap )      Sees Dr. Bennett for liver - has cirrhosis   Had scan recently - has liver lesion    Right arm is still not healed  Will continue to play violin    Uses Ketoconazole - only prn  Uses Bactroban - after pedicure or cut - had MRSA years ago  Crestor - needs 90 day supply     Wants special diet form from [facility] regarding diet needs - has liver cirrhosis - wants     Has h/o HPV - had colposcopy - had ASCUS pap as well - a year ago at [facility]    History of Present Illness       Reason for visit:  Hpv pap    She eats 2-3 servings of fruits and vegetables daily.She consumes 3 sweetened beverage(s) daily.She exercises with enough effort to increase her heart rate 60 or more minutes per day.  She exercises with enough effort to increase her heart rate 7 days per week.   She is taking medications regularly.             Review of Systems   Constitutional: Negative for chills and fever.   HENT: Negative.    Eyes: Negative for visual disturbance.   Respiratory: Negative for cough and shortness of breath.    Cardiovascular: Negative for chest pain.   Gastrointestinal: Negative for abdominal pain.        H/o cirrhosis  Recent abnormal liver ultrasound   Endocrine: Negative for polydipsia and polyuria.   Genitourinary:        H/o abnormal pap - colposcopy 1 year ago  Occasional yeast vaginitis     Musculoskeletal: Negative.    Skin:        H/o MRSA infection     Allergic/Immunologic: Negative.    Neurological: Negative.    Hematological: Does not bruise/bleed easily.   Psychiatric/Behavioral: The patient is nervous/anxious.    All other systems reviewed and are negative.           Objective    /76 (BP Location: Left arm, Patient Position: Sitting, Cuff Size: Adult Regular)   Pulse 86   Temp 97.5  F (36.4  C) (Temporal)   Resp 18   Ht 1.6 m (5' 2.99\")   Wt 82.4 kg " (181 lb 12 oz)   SpO2 97%   BMI 32.20 kg/m    Body mass index is 32.2 kg/m .  Physical Exam  Constitutional:       Appearance: Normal appearance.   HENT:      Head: Normocephalic.      Right Ear: Tympanic membrane normal.      Left Ear: Tympanic membrane normal.   Eyes:      Extraocular Movements: Extraocular movements intact.   Cardiovascular:      Rate and Rhythm: Normal rate and regular rhythm.   Pulmonary:      Effort: Pulmonary effort is normal.   Abdominal:      Tenderness: There is no abdominal tenderness.   Genitourinary:     Comments: PELVIC EXAM:External genitalia: normal  Vaginal mucosa pale,atrophic  Vaginal discharge: none  Speculum exam shows a normal appearing cervix .     Musculoskeletal:      Cervical back: Neck supple.   Skin:     General: Skin is warm and dry.   Neurological:      General: No focal deficit present.      Mental Status: She is alert.   Psychiatric:         Mood and Affect: Mood normal.            No results found for this or any previous visit (from the past 24 hour(s)).

## 2023-01-10 ENCOUNTER — OFFICE VISIT (OUTPATIENT)
Dept: PULMONOLOGY | Facility: CLINIC | Age: 68
End: 2023-01-10
Payer: COMMERCIAL

## 2023-01-10 VITALS
WEIGHT: 183.2 LBS | BODY MASS INDEX: 32.46 KG/M2 | HEART RATE: 98 BPM | SYSTOLIC BLOOD PRESSURE: 118 MMHG | DIASTOLIC BLOOD PRESSURE: 60 MMHG | OXYGEN SATURATION: 94 %

## 2023-01-10 DIAGNOSIS — J43.9 PULMONARY EMPHYSEMA, UNSPECIFIED EMPHYSEMA TYPE (H): ICD-10-CM

## 2023-01-10 DIAGNOSIS — G47.30 SLEEP-DISORDERED BREATHING: Primary | ICD-10-CM

## 2023-01-10 DIAGNOSIS — Z98.890 HISTORY OF TRACHEOSTOMY: ICD-10-CM

## 2023-01-10 PROCEDURE — 99214 OFFICE O/P EST MOD 30 MIN: CPT | Performed by: INTERNAL MEDICINE

## 2023-01-10 RX ORDER — MONTELUKAST SODIUM 10 MG/1
10 TABLET ORAL AT BEDTIME
Qty: 90 TABLET | Refills: 3 | Status: SHIPPED | OUTPATIENT
Start: 2023-01-10 | End: 2023-03-29

## 2023-01-10 ASSESSMENT — ASTHMA QUESTIONNAIRES
QUESTION_4 LAST FOUR WEEKS HOW OFTEN HAVE YOU USED YOUR RESCUE INHALER OR NEBULIZER MEDICATION (SUCH AS ALBUTEROL): ONE OR TWO TIMES PER DAY
QUESTION_1 LAST FOUR WEEKS HOW MUCH OF THE TIME DID YOUR ASTHMA KEEP YOU FROM GETTING AS MUCH DONE AT WORK, SCHOOL OR AT HOME: SOME OF THE TIME
QUESTION_2 LAST FOUR WEEKS HOW OFTEN HAVE YOU HAD SHORTNESS OF BREATH: ONCE A DAY
QUESTION_5 LAST FOUR WEEKS HOW WOULD YOU RATE YOUR ASTHMA CONTROL: SOMEWHAT CONTROLLED
ACT_TOTALSCORE: 12
QUESTION_3 LAST FOUR WEEKS HOW OFTEN DID YOUR ASTHMA SYMPTOMS (WHEEZING, COUGHING, SHORTNESS OF BREATH, CHEST TIGHTNESS OR PAIN) WAKE YOU UP AT NIGHT OR EARLIER THAN USUAL IN THE MORNING: TWO OR THREE NIGHTS A WEEK
ACT_TOTALSCORE: 12

## 2023-01-10 NOTE — PROGRESS NOTES
"LUNG NODULE [facility] INTERVENTIONAL PULMONARY CLINIC  [facility]     Annel Stoddard MRN# [id number]   Age: 67 year old YOB: 1955       Requesting Physician: Alexus Cherry MD  [address] [place] [id number]       Assessment and Plan:    1. Mild intermittent asthma: doesn't tolerate ICS but on a singulair/stiolto.    2. 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  No intervention indicated.  Monitor clinically.    3.RANDALL: she is not using her machine any more.  I explained the risks of untreated sleep apnea.  Since she is feeling okay and not having clinical symptoms she can follow-up with sleep clinic if she decides to reinitiate.             History:     Annel Stoddard is a 67 year old female with sig h/o for a frame tracheostomy who is here for evaluation/followup of same, asthma.  She had a complicated hospital stay last year but is overall improved.  She is exercising 5 times a week.  She is not having any problems of chest colds or bronchitis.    She stopped using her CPAP and overall feels like things are better.           Past Medical History:      Past Medical History:   Diagnosis Date     Anxiety      Bipolar disorder (H)      Botulism     1980     Cervical high risk HPV (human papillomavirus) test positive      Colon polyps      Depression      Diabetes mellitus (H)      Diabetes type 2, controlled (H)      Gallstones      History of tracheostomy 1/5/2021     Hyperlipidemia      Hyperlipidemia      Hypertension      Hypertension      Low back pain with left-sided sciatica      Pneumonia      Urinary incontinence            Past Surgical History:      Past Surgical History:   Procedure Laterality Date     ABDOMINOPLASTY       EYE SURGERY      bilateral with flap     HAND SURGERY Left     \"chipped off bone\"      trachestomy       TUBAL LIGATION            Social History:     Social History     Tobacco " Use     Smoking status: Never     Smokeless tobacco: Never   Substance Use Topics     Alcohol use: Not Currently          Family History:     Family History   Problem Relation Age of Onset     Other - See Comments Mother         severe edema in leg, millroid's disease     Cerebrovascular Disease Father      No Known Problems Maternal Grandmother      No Known Problems Maternal Grandfather      Cerebrovascular Disease Other      Lung Cancer Half-Sister         smoking related     Myocardial Infarction Paternal Half-Brother      Esophageal Cancer Sister      Coronary Artery Disease Brother            Allergies:      Allergies   Allergen Reactions     Desmopressin Swelling     LE         Estrogens Other (See Comments)     Hydrochlorothiazide Other (See Comments) and Unknown       Patient reports can't take this med due to increased urine output       Hydrocodone-Acetaminophen        Justin change       Lithium Diarrhea         Causing DI       Penicillins Unknown     Risperidone      Shellfish-Derived Products      Latex Other (See Comments) and Rash     rash       Valacyclovir Rash          Medications:     Current Outpatient Medications   Medication Sig     acetaminophen (TYLENOL) 500 MG tablet Take 500 mg by mouth 2 times daily Scheduled.     albuterol (PROAIR HFA/PROVENTIL HFA/VENTOLIN HFA) 108 (90 Base) MCG/ACT inhaler Inhale 2 puffs into the lungs every 4 hours as needed for shortness of breath / dyspnea or wheezing     aspirin (ASA) 81 MG chewable tablet Take 81 mg by mouth daily     BD GERARDO U/F 32G X 4 MM insulin pen needle Use as directed     Biotin 10 MG CAPS Take 1 capsule by mouth daily     blood glucose (NO BRAND SPECIFIED) lancets standard Use to test blood sugar 3 times daily or as directed.     blood glucose (NO BRAND SPECIFIED) test strip 1 strip by In Vitro route 4 times daily     Calcium Carbonate-Vitamin D 500-5 MG-MCG TABS Take 2 tablets by mouth daily     fluticasone (FLONASE) 50 MCG/ACT nasal  spray SHAKE LIQUID AND USE 2 SPRAYS IN EACH NOSTRIL DAILY AS NEEDED FOR RHINITIS OR ALLERGIES Strength: 50 MCG/ACT     insulin aspart (NOVOLOG PEN) 100 UNIT/ML pen Inject 35 Units Subcutaneous 3 times daily (before meals) 105 units per day, 7 boxes every 3 months     insulin glargine U-300 (TOUJEO) 300 UNIT/ML (1 units dial) pen Inject 85 Units Subcutaneous every morning     ketoconazole (NIZORAL) 2 % external cream Twice a day for two weeks (Patient taking differently: Twice a day for two weeks as needed)     lithium (ESKALITH CR/LITHOBID) 450 MG CR tablet Take 1 tablet (450 mg) by mouth 2 times daily     losartan (COZAAR) 50 MG tablet Take 1 tablet (50 mg) by mouth daily     magnesium oxide 400 MG CAPS Take 400 mg by mouth daily     montelukast (SINGULAIR) 10 MG tablet Take 10 mg by mouth At Bedtime     multivitamin (ONE-DAILY) tablet Take 1 tablet by mouth daily     mupirocin (BACTROBAN) 2 % external cream Apply topically 3 times daily (Patient taking differently: Apply topically 2 times daily To toes)     nystatin (MYCOSTATIN) 298153 UNIT/GM external ointment Apply topically 2 times daily Apply to affected areas only (right upper arm)     omeprazole (PRILOSEC) 20 MG DR capsule Take 2 capsules (40 mg) by mouth daily     pioglitazone (ACTOS) 15 MG tablet Take 1 tablet (15 mg) by mouth daily     rosuvastatin (CRESTOR) 10 MG tablet Take 1 tablet (10 mg) by mouth daily     Semaglutide 14 MG TABS Take 14 mg by mouth daily before breakfast     SENNA-docusate sodium (SENNA S) 8.6-50 MG tablet Take 1-2 tablets by mouth 2 times daily as needed (constipation)     sodium chloride (MILAN 128) 5 % ophthalmic ointment As needed     tiotropium-olodaterol 2.5-2.5 MCG/ACT AERS Inhale 2 puffs into the lungs daily     UNABLE TO FIND CPAP machine for home use at pressure 5-20 cm, full face mask x1/3month with a full face cushion x1/mo. Length of Need: 99 months; Frequency of use: Daily. (Patient not taking: Reported on 1/10/2023)      Current Facility-Administered Medications   Medication     ARIPiprazole ER (ABILIFY MAINTENA) extended release inj syringe 400 mg          Review of Systems:     See HPI         Physical Exam:   /60 (BP Location: Left arm, Patient Position: Chair, Cuff Size: Adult Regular)   Pulse 98   Wt 83.1 kg (183 lb 3.2 oz)   SpO2 94%   BMI 32.46 kg/m      Constitutional - looks well, in no apparent distress  Eyes - no redness or discharge  Respiratory -breathing appears comfortable. No wheeze or rhonchi.   Skin - No appreciable discoloration or lesions (very limited exam)  Neurological - No apparent tremors. Speech fluent and articlate  Psychiatric - no signs of delirium or anxiety          Current Laboratory Data:   All laboratory and imaging data reviewed.    No results found for this or any previous visit (from the past 24 hour(s)).

## 2023-01-11 ENCOUNTER — VIRTUAL VISIT (OUTPATIENT)
Dept: PHARMACY | Facility: CLINIC | Age: 68
End: 2023-01-11
Payer: COMMERCIAL

## 2023-01-11 DIAGNOSIS — K59.00 CONSTIPATION, UNSPECIFIED CONSTIPATION TYPE: ICD-10-CM

## 2023-01-11 DIAGNOSIS — K75.81 NONALCOHOLIC STEATOHEPATITIS: ICD-10-CM

## 2023-01-11 DIAGNOSIS — K21.9 LPRD (LARYNGOPHARYNGEAL REFLUX DISEASE): ICD-10-CM

## 2023-01-11 DIAGNOSIS — F31.4 BIPOLAR 1 DISORDER, DEPRESSED, SEVERE (H): ICD-10-CM

## 2023-01-11 DIAGNOSIS — M85.80 OSTEOPENIA, UNSPECIFIED LOCATION: ICD-10-CM

## 2023-01-11 DIAGNOSIS — R06.02 SOB (SHORTNESS OF BREATH): ICD-10-CM

## 2023-01-11 DIAGNOSIS — J31.0 CHRONIC RHINITIS: ICD-10-CM

## 2023-01-11 DIAGNOSIS — E78.2 MIXED HYPERLIPIDEMIA: ICD-10-CM

## 2023-01-11 DIAGNOSIS — Z78.9 TAKES DIETARY SUPPLEMENTS: ICD-10-CM

## 2023-01-11 DIAGNOSIS — E11.8 TYPE 2 DIABETES MELLITUS WITH COMPLICATION, WITH LONG-TERM CURRENT USE OF INSULIN (H): Primary | ICD-10-CM

## 2023-01-11 DIAGNOSIS — Z79.4 TYPE 2 DIABETES MELLITUS WITH COMPLICATION, WITH LONG-TERM CURRENT USE OF INSULIN (H): Primary | ICD-10-CM

## 2023-01-11 PROCEDURE — 99607 MTMS BY PHARM ADDL 15 MIN: CPT | Performed by: PHARMACIST

## 2023-01-11 PROCEDURE — 99605 MTMS BY PHARM NP 15 MIN: CPT | Performed by: PHARMACIST

## 2023-01-11 RX ORDER — POLYETHYLENE GLYCOL 3350 17 G/17G
1 POWDER, FOR SOLUTION ORAL DAILY
Qty: 100 EACH | Refills: 3 | Status: SHIPPED | OUTPATIENT
Start: 2023-01-11 | End: 2023-07-19

## 2023-01-11 RX ORDER — PIOGLITAZONEHYDROCHLORIDE 15 MG/1
15 TABLET ORAL DAILY
Qty: 90 TABLET | Refills: 3 | Status: SHIPPED | OUTPATIENT
Start: 2023-01-11 | End: 2023-01-12 | Stop reason: DRUGHIGH

## 2023-01-11 RX ORDER — ECHINACEA PURPUREA EXTRACT 125 MG
2 TABLET ORAL DAILY PRN
Qty: 480 ML | Refills: 1 | Status: SHIPPED | OUTPATIENT
Start: 2023-01-11 | End: 2023-08-02

## 2023-01-11 NOTE — Clinical Note
Seeing you on 1/11. I put in for Vit D and Lithium level (asked her to hold AM dose so we can get a trough). Increased Actos today - I'll recheck LFTs in Feb. If she wants, probably okay to start Jardiance or switch from Rybelsus to Mounjaro next week. She has been concerned about blood glucose. I have told her it will take time to get better control - see detailed long-term plan in notes.

## 2023-01-11 NOTE — PATIENT INSTRUCTIONS
"Recommendations from today's MTM visit:                                                         Increase Pioglitazone 45 mg daily for blood glucose. Continue Rybelsus and insulin.   Work on reducing orange juice and prune juice to help lower blood sugars.   On the morning of your appointment with Dr. Looney, hold your morning Lithium dose until after the visit. We will plan to check labs.   Avoid using Albuterol unless you're having shortness of breath or feeling closed up.   Decrease Flonase (Fluticasone) 50 mcg nasal spray 2 sprays each nostril once daily as needed for congestion.   Start Saline Nasal spray as needed.   Continue Senna-docusate. Add in Miralax 17 g once daily as needed. If you feel like daily is causing loose stools or frequent bowel movements, reduce frequency of doses.     Follow-up: Return in about 4 weeks (around 2/8/2023) for Medication Management Pharmacist, in person.    It was great speaking with you today.  I value your experience and would be very thankful for your time in providing feedback in our clinic survey. In the next few days, you may receive an email or text message from [employer] with a link to a survey related to your  clinical pharmacist.\"     To schedule another MTM appointment, please call the clinic directly or you may call the MTM scheduling line at [phone] or toll-free at [phone].     My Clinical Pharmacist's contact information:                                                      Please feel free to contact me with any questions or concerns you have.      Bradley Liao, PharmD  Medication Therapy Management (MTM) Pharmacist  [facility]      "

## 2023-01-11 NOTE — LETTER
"Recommended To-Do List      Prepared on: Jan 11, 2023       You can get the best results from your medications by completing the items on this \"To-Do List.\"      Bring your To-Do List when you go to your doctor. And, share it with your family or caregivers.    My To-Do List:  What we talked about: What I should do:   The importance of taking your medication as intended    Education: Do not use Flonase more than the 2 puffs in each nostril one daily that was prescribed.            What we talked about: What I should do:   A new medication that may be of benefit to you    Start taking sodium chloride (OCEAN) 0.65% solution - 1-2 sprays in each nostril as needed.           What we talked about: What I should do:   A new medication that may be of benefit to you    Start taking MiraLax 17 g daily as needed.           What we talked about: What I should do:   Your medication dosage being too low    Increase your dosage of pioglitazone (ACTOS) to 45 mg once daily.           What we talked about: What I should do:                       "
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
## 2023-01-11 NOTE — LETTER
January 11, 2023  Annel K Arlyn  [address]  [place] [address]    Dear Ms. Stoddard, [facility]     Thank you for talking with me on Jan 11, 2023 about your health and medications. As a follow-up to our conversation, I have included two documents:      1. Your Recommended To-Do List has steps you should take to get the best results from your medications.  2. Your Medication List will help you keep track of your medications and how to take them.    If you want to talk about these documents, please call Bradley Liao PharmD at phone: [phone], Monday-Friday 8-4:30pm.    I look forward to working with you and your doctors to make sure your medications work well for you.    Sincerely,  Bradley Liao PharmD  [facility] Pharmacist, [facility]

## 2023-01-12 ENCOUNTER — TELEPHONE (OUTPATIENT)
Dept: FAMILY MEDICINE | Facility: CLINIC | Age: 68
End: 2023-01-12

## 2023-01-12 DIAGNOSIS — Z79.4 TYPE 2 DIABETES MELLITUS WITH COMPLICATION, WITH LONG-TERM CURRENT USE OF INSULIN (H): ICD-10-CM

## 2023-01-12 DIAGNOSIS — Z86.14 HISTORY OF MRSA INFECTION: ICD-10-CM

## 2023-01-12 DIAGNOSIS — E11.8 TYPE 2 DIABETES MELLITUS WITH COMPLICATION, WITH LONG-TERM CURRENT USE OF INSULIN (H): ICD-10-CM

## 2023-01-12 DIAGNOSIS — Z79.4 TYPE 2 DIABETES MELLITUS WITH COMPLICATION, WITH LONG-TERM CURRENT USE OF INSULIN (H): Primary | ICD-10-CM

## 2023-01-12 DIAGNOSIS — E11.8 TYPE 2 DIABETES MELLITUS WITH COMPLICATION, WITH LONG-TERM CURRENT USE OF INSULIN (H): Primary | ICD-10-CM

## 2023-01-12 LAB
BKR LAB AP GYN ADEQUACY: NORMAL
BKR LAB AP GYN INTERPRETATION: NORMAL
BKR LAB AP HPV REFLEX: NORMAL
BKR LAB AP PREVIOUS ABNL DX: NORMAL
BKR LAB AP PREVIOUS ABNORMAL: NORMAL
PATH REPORT.COMMENTS IMP SPEC: NORMAL
PATH REPORT.COMMENTS IMP SPEC: NORMAL
PATH REPORT.RELEVANT HX SPEC: NORMAL

## 2023-01-12 RX ORDER — MUPIROCIN 20 MG/G
OINTMENT TOPICAL 3 TIMES DAILY
Qty: 15 G | Refills: 0 | Status: SHIPPED | OUTPATIENT
Start: 2023-01-12 | End: 2023-03-21

## 2023-01-12 RX ORDER — PIOGLITAZONEHYDROCHLORIDE 45 MG/1
45 TABLET ORAL DAILY
Qty: 90 TABLET | Refills: 3 | Status: SHIPPED | OUTPATIENT
Start: 2023-01-12 | End: 2023-05-10

## 2023-01-12 NOTE — TELEPHONE ENCOUNTER
General Call      Reason for Call:  Medication    What are your questions or concerns:  Pt called in, she stated that pt she had an appt with Bradley yesterday and he recommenced Pioglitazone 45mg. Pt stated that that the prescription was sent to pharmacy for 15mg which is incorrect. She is requesting medication get re sent over for the 45mg.  Also pt was prescribed mupirocin (BACTROBAN) 2 % external cream but she stated that her insurance doesn't cover cream but will cover ointment and is requesting ointment instead. Please look into this request and advise.  Pt would also like to know the results of her pap. Please give pt a call to discuss results.  Thanks!  PCP not in,addressing to DOD    Date of last appointment with provider: 1/9/23    Okay to leave a detailed message?: Yes at Home number on file [phone] (home)

## 2023-01-13 ENCOUNTER — TELEPHONE (OUTPATIENT)
Dept: FAMILY MEDICINE | Facility: CLINIC | Age: 68
End: 2023-01-13

## 2023-01-14 RX ORDER — PIOGLITAZONEHYDROCHLORIDE 15 MG/1
TABLET ORAL
Qty: 90 TABLET | Refills: 3 | OUTPATIENT
Start: 2023-01-14

## 2023-01-15 LAB
HUMAN PAPILLOMA VIRUS 16 DNA: NEGATIVE
HUMAN PAPILLOMA VIRUS 18 DNA: POSITIVE
HUMAN PAPILLOMA VIRUS FINAL DIAGNOSIS: ABNORMAL
HUMAN PAPILLOMA VIRUS OTHER HR: POSITIVE

## 2023-01-15 NOTE — TELEPHONE ENCOUNTER
"Routing refill request to provider for review/approval because:  Labs out of range:  ALT, AST, Serum creatinine  Dosage requested by pharmacy is not currently on medication list in chart. 45 mg daily last ordered 1/12/2023  Message sent to pharmacy.   Last Written Prescription Date: 1/12/2023  Last Fill Quantity: 90,  # refills: 3     Last office visit provider:  Virtual visit 1/11/2023 with A Keshawn PharmD     Requested Prescriptions   Pending Prescriptions Disp Refills     pioglitazone (ACTOS) 15 MG tablet [Pharmacy Med Name: PIOGLITAZONE HCL 15 MG TABLET] 90 tablet 3     Sig: TAKE 1 TABLET BY MOUTH EVERY DAY       Thiazolidinedione Agents (TZDs)  Failed - 1/12/2023 11:58 AM        Failed - Patient has a normal ALT within the past 12 mos.     Recent Labs   Lab Test 01/06/23  0709   ALT 75*             Failed - Patient has a normal AST within the past 12 mos.      Recent Labs   Lab Test 12/06/22  1105   AST 91*             Failed - Patient has a normal serum Creatinine in the past 12 months     Recent Labs   Lab Test 01/06/23  0709   CR 0.46*       Ok to refill medication if creatinine is low          Passed - Patient has documented A1c within the specified period of time.     If HgbA1C is 8 or greater, it needs to be on file within the past 3 months.  If less than 8, must be on file within the past 6 months.     Recent Labs   Lab Test 12/06/22  1105   A1C 7.9*             Passed - Diagnosis not CHF        Passed - Medication is active on med list        Passed - Patient is age 18 or older        Passed - Patient is not pregnant        Passed - Patient has not had a positive pregnancy test within the past 12 mos.        Passed - Recent (6 mo) or future (30 days) visit within the authorizing provider's specialty     Patient had office visit in the last 6 months or has a visit in the next 30 days with authorizing provider or within the authorizing provider's specialty.  See \"Patient Info\" tab in inbasket, or \"Choose " "Columns\" in Meds & Orders section of the refill encounter.                 Nayeli Chery RN 01/14/23 9:33 PM  "

## 2023-01-16 DIAGNOSIS — E11.8 TYPE 2 DIABETES MELLITUS WITH COMPLICATION, WITH LONG-TERM CURRENT USE OF INSULIN (H): ICD-10-CM

## 2023-01-16 DIAGNOSIS — Z79.4 TYPE 2 DIABETES MELLITUS WITH COMPLICATION, WITH LONG-TERM CURRENT USE OF INSULIN (H): ICD-10-CM

## 2023-01-16 PROBLEM — R87.810 CERVICAL HIGH RISK HPV (HUMAN PAPILLOMAVIRUS) TEST POSITIVE: Status: ACTIVE | Noted: 2020-08-14

## 2023-01-16 RX ORDER — PIOGLITAZONEHYDROCHLORIDE 15 MG/1
TABLET ORAL
Qty: 90 TABLET | OUTPATIENT
Start: 2023-01-16

## 2023-01-17 ENCOUNTER — OFFICE VISIT (OUTPATIENT)
Dept: FAMILY MEDICINE | Facility: CLINIC | Age: 68
End: 2023-01-17
Payer: COMMERCIAL

## 2023-01-17 ENCOUNTER — TELEPHONE (OUTPATIENT)
Dept: FAMILY MEDICINE | Facility: CLINIC | Age: 68
End: 2023-01-17

## 2023-01-17 VITALS
HEART RATE: 94 BPM | DIASTOLIC BLOOD PRESSURE: 78 MMHG | HEIGHT: 63 IN | SYSTOLIC BLOOD PRESSURE: 127 MMHG | BODY MASS INDEX: 32.25 KG/M2 | WEIGHT: 182 LBS | TEMPERATURE: 98.4 F | OXYGEN SATURATION: 97 % | RESPIRATION RATE: 14 BRPM

## 2023-01-17 DIAGNOSIS — Z79.4 TYPE 2 DIABETES MELLITUS WITH COMPLICATION, WITH LONG-TERM CURRENT USE OF INSULIN (H): ICD-10-CM

## 2023-01-17 DIAGNOSIS — E11.8 TYPE 2 DIABETES MELLITUS WITH COMPLICATION, WITH LONG-TERM CURRENT USE OF INSULIN (H): ICD-10-CM

## 2023-01-17 DIAGNOSIS — R87.810 CERVICAL HIGH RISK HPV (HUMAN PAPILLOMAVIRUS) TEST POSITIVE: Primary | ICD-10-CM

## 2023-01-17 DIAGNOSIS — F31.4 BIPOLAR 1 DISORDER, DEPRESSED, SEVERE (H): ICD-10-CM

## 2023-01-17 DIAGNOSIS — M85.80 OSTEOPENIA, UNSPECIFIED LOCATION: ICD-10-CM

## 2023-01-17 LAB — LITHIUM SERPL-SCNC: 0.5 MMOL/L (ref 0.6–1.2)

## 2023-01-17 PROCEDURE — 36415 COLL VENOUS BLD VENIPUNCTURE: CPT | Performed by: STUDENT IN AN ORGANIZED HEALTH CARE EDUCATION/TRAINING PROGRAM

## 2023-01-17 PROCEDURE — 82306 VITAMIN D 25 HYDROXY: CPT | Performed by: STUDENT IN AN ORGANIZED HEALTH CARE EDUCATION/TRAINING PROGRAM

## 2023-01-17 PROCEDURE — 80178 ASSAY OF LITHIUM: CPT | Performed by: STUDENT IN AN ORGANIZED HEALTH CARE EDUCATION/TRAINING PROGRAM

## 2023-01-17 PROCEDURE — 99214 OFFICE O/P EST MOD 30 MIN: CPT | Performed by: STUDENT IN AN ORGANIZED HEALTH CARE EDUCATION/TRAINING PROGRAM

## 2023-01-17 RX ORDER — PEN NEEDLE, DIABETIC 32GX 5/32"
NEEDLE, DISPOSABLE MISCELLANEOUS
Qty: 100 EACH | Refills: 3 | Status: SHIPPED | OUTPATIENT
Start: 2023-01-17 | End: 2023-08-02

## 2023-01-17 RX ORDER — ROSUVASTATIN CALCIUM 10 MG/1
10 TABLET, COATED ORAL DAILY
Qty: 90 TABLET | Refills: 3 | Status: SHIPPED | OUTPATIENT
Start: 2023-01-17 | End: 2023-10-10

## 2023-01-17 NOTE — TELEPHONE ENCOUNTER
Returned call to patient. Her GI doctor okay Hep B vaccine.     Reviewed that she is scheduled for abilify on 1/30.

## 2023-01-17 NOTE — PROGRESS NOTES
"  Assessment & Plan     Type 2 diabetes mellitus with complication, with long-term current use of insulin (H)  Chronic, well controlled.   - Continue current management  - Follow up [facility] pharmacist as scheduled  - BD GERARDO U/F 32G X 4 MM insulin pen needle  Dispense: 100 each; Refill: 3  - insulin glargine U-300 (TOUJEO) 300 UNIT/ML (1 units dial) pen  Dispense: 7.5 mL; Refill: 3  - rosuvastatin (CRESTOR) 10 MG tablet  Dispense: 90 tablet; Refill: 3    Bipolar 1 disorder, depressed, severe (H)  Recheck lithium level  - Lithium level  - Follow up with pharmacist    Osteopenia, unspecified location  Check vit d  - Vitamin D Deficiency  -supplement as indicated  - follow up with pharmacist    High Risk HPV  - discussed results from recent pap with pt. Will need colpo. Pt was agreeable.   - Schedule colpo sometime before 4/9/23         BMI:   Estimated body mass index is 32.37 kg/m  as calculated from the following:    Height as of this encounter: 1.597 m (5' 2.87\").    Weight as of this encounter: 82.6 kg (182 lb).       Return in about 4 weeks (around 2/14/2023) for Routine preventive.    Ayala Looney MD  [facility]    Venkata Johnson is a 67 year old accompanied by her self, presenting for the following health issues:  office visit (Est care, lab, )      History of Present Illness       Reason for visit:  Hpv pap    She eats 2-3 servings of fruits and vegetables daily.She consumes 3 sweetened beverage(s) daily.She exercises with enough effort to increase her heart rate 60 or more minutes per day.  She exercises with enough effort to increase her heart rate 7 days per week.   She is taking medications regularly.     DM  - needs refill for meds  - histrically well controlled.   Seeing pharmacist  Lab Results   Component Value Date    A1C 7.9 12/06/2022    A1C 7.9 05/05/2021    A1C 7.5 02/24/2021    A1C 7.2 11/18/2020    A1C 8.6 05/06/2020    A1C 8.4 01/27/2020     Special diet  - for DM, HLD, " "low fat for liver disease  - needs a form from the county to confirm  - still trying to get a hold of them    Deborx prn OTC for ear wax    Abilify shot on 1/30    Can talk to these people:  Father Adeline will sign a form with Myesha Merida  Pt likes to       Review of Systems   Constitutional: Negative for fever and chills.   Respiratory: Negative for cough or shortness of breath.    Cardiovascular: Negative for chest pain or chest pressure.   Gastrointestinal: Negative for nausea, vomiting, diarrhea or abdominal pain.        Objective    /78 (BP Location: Left arm, Patient Position: Sitting, Cuff Size: Adult Regular)   Pulse 94   Temp 98.4  F (36.9  C) (Temporal)   Resp 14   Ht 1.597 m (5' 2.87\")   Wt 82.6 kg (182 lb)   SpO2 97%   BMI 32.37 kg/m    Body mass index is 32.37 kg/m .  Physical Exam   PHYSICAL EXAM  General: Well developed, well nourished.  Skin:  Dry without rash.    Head:  Normocephalic-atraumatic.    Eye:  Normal conjunctivae.     Respiratory:  Normal respiratory effort.   Gastrointestinal:  Non-distended.    Musculoskeletal:  No deformity or edema.  Neurologic: No focal deficits.              "

## 2023-01-17 NOTE — TELEPHONE ENCOUNTER
General Call    Contacts       Type Contact Phone/Fax    01/17/2023 03:24 PM CST Phone (Incoming) Elizabeth Stoddard (Self) [phone] (M)        Reason for Call:  Request to talk to Bradley    What are your questions or concerns:  Patient states she had a virtual visit with you on 1/1/23 and on the AVS it says the Abilify will be give on 1/19/23. Patients says she get the shot every 28 days and not due until 1/30 which she has a nurse only appt.   She saw Dr. Looney today and provider recommend patient to get Hep B vaccine. Patient states she has liver disease. Should she still get the shot? Patient would like to discuss with Bradley. Please call her back at the number below.    Date of last appointment with provider: 1/11/22    Okay to leave a detailed message?: Yes at Cell number on file:    Telephone Information:   Mobile [phone]

## 2023-01-18 ENCOUNTER — TRANSFERRED RECORDS (OUTPATIENT)
Dept: HEALTH INFORMATION MANAGEMENT | Facility: CLINIC | Age: 68
End: 2023-01-18

## 2023-01-18 LAB — DEPRECATED CALCIDIOL+CALCIFEROL SERPL-MC: 35 UG/L (ref 20–75)

## 2023-01-19 ENCOUNTER — TELEPHONE (OUTPATIENT)
Dept: FAMILY MEDICINE | Facility: CLINIC | Age: 68
End: 2023-01-19
Payer: COMMERCIAL

## 2023-01-19 ENCOUNTER — PATIENT OUTREACH (OUTPATIENT)
Dept: FAMILY MEDICINE | Facility: CLINIC | Age: 68
End: 2023-01-19
Payer: COMMERCIAL

## 2023-01-19 DIAGNOSIS — R87.810 CERVICAL HIGH RISK HPV (HUMAN PAPILLOMAVIRUS) TEST POSITIVE: ICD-10-CM

## 2023-01-19 RX ORDER — CHOLECALCIFEROL (VITAMIN D3) 50 MCG
1 TABLET ORAL DAILY
Qty: 30 TABLET | Refills: 3 | Status: SHIPPED | OUTPATIENT
Start: 2023-01-19 | End: 2023-02-13
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
## 2023-01-19 NOTE — TELEPHONE ENCOUNTER
Tylenol affects her liver, ibuprofen doesn't.   If she has liver disease, she should not have more tylenol.

## 2023-01-20 ENCOUNTER — PATIENT OUTREACH (OUTPATIENT)
Dept: CARE COORDINATION | Facility: CLINIC | Age: 68
End: 2023-01-20
Payer: COMMERCIAL

## 2023-01-20 ENCOUNTER — TRANSFERRED RECORDS (OUTPATIENT)
Dept: HEALTH INFORMATION MANAGEMENT | Facility: CLINIC | Age: 68
End: 2023-01-20
Payer: COMMERCIAL

## 2023-01-20 NOTE — PROGRESS NOTES
1/20/2023  Clinic Care Coordination Contact  Care Team Conversations    Consulted with CC SW regarding today's outreach.  Plan: Per CCC SW to deferred today's outreach to next month/  CC SW will be meeting with patient on Monday 1-23-23    Routed to CHW as FYCAROL    CC SW follow up 1-23-23  CHW Follow up: Monthly  CHW Plan: Follow up on goals  CHW Next Follow Up: 2-16-23    George Rollins  Community Health Worker ( covering for GORAN Quinteros)  [facility] Care Coordination  [email or web address]   Office: [phone]  Fax: [phone]
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
## 2023-01-27 ENCOUNTER — TELEPHONE (OUTPATIENT)
Dept: FAMILY MEDICINE | Facility: CLINIC | Age: 68
End: 2023-01-27
Payer: COMMERCIAL

## 2023-01-27 DIAGNOSIS — F31.4 BIPOLAR 1 DISORDER, DEPRESSED, SEVERE (H): ICD-10-CM

## 2023-01-27 NOTE — TELEPHONE ENCOUNTER
Pt called in. She is requesting if  can write a letter stating that she has a medical disability, liver diease, diabetes and need to have a special diet so she can get SNAP through the state. Please look into this request and mail to pt home when completed. Pt states its urgent.  Thanks!

## 2023-01-27 NOTE — TELEPHONE ENCOUNTER
Please call to inform patient that I looked into the allowable special diets that would fit her medical needs and the only one listed was low cholesterol. Letter has been written. Please print and mail to patient.

## 2023-01-27 NOTE — LETTER
January 27, 2023      Annel Stoddard  [address]  [place] [address]        To Whom It May Concern:      Annel Stoddard requires a special diet (low cholesterol diet) due to her medical problems.        Sincerely,        Ayala Looeny MD

## 2023-01-30 ENCOUNTER — ALLIED HEALTH/NURSE VISIT (OUTPATIENT)
Dept: FAMILY MEDICINE | Facility: CLINIC | Age: 68
End: 2023-01-30
Payer: COMMERCIAL

## 2023-01-30 DIAGNOSIS — Z23 ENCOUNTER FOR IMMUNIZATION: Primary | ICD-10-CM

## 2023-01-30 PROCEDURE — 96372 THER/PROPH/DIAG INJ SC/IM: CPT | Performed by: FAMILY MEDICINE

## 2023-01-30 PROCEDURE — 99207 PR NO CHARGE NURSE ONLY: CPT
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
## 2023-02-08 ENCOUNTER — OFFICE VISIT (OUTPATIENT)
Dept: PHARMACY | Facility: CLINIC | Age: 68
End: 2023-02-08
Payer: COMMERCIAL

## 2023-02-08 VITALS — SYSTOLIC BLOOD PRESSURE: 121 MMHG | HEART RATE: 69 BPM | DIASTOLIC BLOOD PRESSURE: 76 MMHG

## 2023-02-08 DIAGNOSIS — R06.02 SOB (SHORTNESS OF BREATH): ICD-10-CM

## 2023-02-08 DIAGNOSIS — F31.4 BIPOLAR 1 DISORDER, DEPRESSED, SEVERE (H): ICD-10-CM

## 2023-02-08 DIAGNOSIS — Z78.9 TAKES DIETARY SUPPLEMENTS: ICD-10-CM

## 2023-02-08 DIAGNOSIS — K75.81 NONALCOHOLIC STEATOHEPATITIS: ICD-10-CM

## 2023-02-08 DIAGNOSIS — K21.9 LPRD (LARYNGOPHARYNGEAL REFLUX DISEASE): ICD-10-CM

## 2023-02-08 DIAGNOSIS — E11.8 TYPE 2 DIABETES MELLITUS WITH COMPLICATION, WITH LONG-TERM CURRENT USE OF INSULIN (H): Primary | ICD-10-CM

## 2023-02-08 DIAGNOSIS — J31.0 CHRONIC RHINITIS: ICD-10-CM

## 2023-02-08 DIAGNOSIS — K59.00 CONSTIPATION, UNSPECIFIED CONSTIPATION TYPE: ICD-10-CM

## 2023-02-08 DIAGNOSIS — E78.2 MIXED HYPERLIPIDEMIA: ICD-10-CM

## 2023-02-08 DIAGNOSIS — Z79.4 TYPE 2 DIABETES MELLITUS WITH COMPLICATION, WITH LONG-TERM CURRENT USE OF INSULIN (H): Primary | ICD-10-CM

## 2023-02-08 PROCEDURE — 99607 MTMS BY PHARM ADDL 15 MIN: CPT | Performed by: PHARMACIST

## 2023-02-08 PROCEDURE — 99606 MTMS BY PHARM EST 15 MIN: CPT | Performed by: PHARMACIST

## 2023-02-08 NOTE — PATIENT INSTRUCTIONS
"Recommendations from today's MTM visit:                                                         Continue your current diabetes medicines -Toujeo, Novolog, Rybelsus, and Pioglitazone. Add in Jardiance 10 mg daily.   Shift Flonase to bedtime - see if this helps to reduce nighttime congestion and dry mouth.   Increase vegetable intake to reduce constipation.   You will ask your GI doctor to recheck LFT (liver function tests) because we increased Actos on 1/12. Otherwise, I can place the order for you to have it drawn here and we check in March.     Follow-up: Return in about 4 weeks (around 3/8/2023) for Medication Management Pharmacist, in person.    It was great speaking with you today.  I value your experience and would be very thankful for your time in providing feedback in our clinic survey. In the next few days, you may receive an email or text message from [employer] with a link to a survey related to your  clinical pharmacist.\"     To schedule another MTM appointment, please call the clinic directly or you may call the MTM scheduling line at [phone] or toll-free at [phone].     My Clinical Pharmacist's contact information:                                                      Please feel free to contact me with any questions or concerns you have.      Bradley Liao, PharmD  Medication Therapy Management (MTM) Pharmacist  [facility]      "

## 2023-02-09 ENCOUNTER — TRANSFERRED RECORDS (OUTPATIENT)
Dept: HEALTH INFORMATION MANAGEMENT | Facility: CLINIC | Age: 68
End: 2023-02-09
Payer: COMMERCIAL

## 2023-02-09 LAB
ALT SERPL-CCNC: 50 IU/L (ref 0–32)
AST SERPL-CCNC: 40 IU/L (ref 0–40)

## 2023-02-11 DIAGNOSIS — M85.80 OSTEOPENIA, UNSPECIFIED LOCATION: ICD-10-CM

## 2023-02-13 ENCOUNTER — OFFICE VISIT (OUTPATIENT)
Dept: FAMILY MEDICINE | Facility: CLINIC | Age: 68
End: 2023-02-13
Payer: COMMERCIAL

## 2023-02-13 VITALS
WEIGHT: 186 LBS | HEART RATE: 83 BPM | SYSTOLIC BLOOD PRESSURE: 116 MMHG | RESPIRATION RATE: 18 BRPM | OXYGEN SATURATION: 97 % | BODY MASS INDEX: 32.96 KG/M2 | HEIGHT: 63 IN | DIASTOLIC BLOOD PRESSURE: 74 MMHG

## 2023-02-13 DIAGNOSIS — R87.810 CERVICAL HIGH RISK HPV (HUMAN PAPILLOMAVIRUS) TEST POSITIVE: Primary | ICD-10-CM

## 2023-02-13 RX ORDER — CHOLECALCIFEROL (VITAMIN D3) 50 MCG
TABLET ORAL
Qty: 90 TABLET | Refills: 3 | Status: SHIPPED | OUTPATIENT
Start: 2023-02-13 | End: 2023-06-26
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
## 2023-02-13 NOTE — TELEPHONE ENCOUNTER
"Last Written Prescription Date:  1/19/23  Last Fill Quantity: 30,  # refills: 3   Last office visit provider:  1/17/23     Requested Prescriptions   Pending Prescriptions Disp Refills     vitamin D3 (CHOLECALCIFEROL) 50 mcg (2000 units) tablet [Pharmacy Med Name: VITAMIN D3 50 MCG TABLET] 90 tablet 1     Sig: TAKE 1 TABLET BY MOUTH EVERY DAY       Vitamin Supplements (Adult) Protocol Passed - 2/11/2023 11:31 AM        Passed - High dose Vitamin D not ordered        Passed - Recent (12 mo) or future (30 days) visit within the authorizing provider's specialty     Patient has had an office visit with the authorizing provider or a provider within the authorizing providers department within the previous 12 mos or has a future within next 30 days. See \"Patient Info\" tab in inbasket, or \"Choose Columns\" in Meds & Orders section of the refill encounter.              Passed - Medication is active on med list             Jayashree Meza RN 02/13/23 10:40 AM  "

## 2023-02-14 ENCOUNTER — PATIENT OUTREACH (OUTPATIENT)
Dept: CARE COORDINATION | Facility: CLINIC | Age: 68
End: 2023-02-14

## 2023-02-14 NOTE — LETTER
[facility] CARE COORDINATION  [address] [place] [address]    February 14, 2023    Annel Stoddard  [address]  [place] [address]    Dear Annel,  Your Care Team congratulates you on your journey to maintain wellness. This document will help guide you on your journey to maintain a healthy lifestyle.  You can use this to help you overcome any barriers you may encounter.  If you should have any questions or concerns, you can contact the members of your Care Team or contact your Primary Care Clinic for assistance.     Health Maintenance  Health Maintenance Reviewed:      My Access Plan  Medical Emergency 911   Primary Clinic Line [facility] - [phone]   24 Hour Appointment Line [phone] or  [phone] ([phone]) (toll-free)   24 Hour Nurse Line [phone] (toll-free)   Preferred Urgent Care     Preferred Hospital     Preferred Pharmacy [id number] - [place], [place] - [address]     Behavioral Health Crisis Line The National Suicide Prevention Lifeline at 1-764.966.3746 or 911     My Care Team Members  Patient Care Team       Relationship Specialty Notifications Start End    Alexus Cherry MD PCP - General Family Medicine  1/30/23     Phone: [phone] Fax: [phone]         [address] [place] [address]    Bradley Liao, PharmD Pharmacist Pharmacist  11/30/22     Phone: [phone]          [facility] [address] [place] [address]    Myesha Perez LSW Lead Care Coordinator  Admissions 12/5/22 2/14/23    Ayala Lawler MA Community Health Worker  Admissions 12/5/22 2/14/23    Bradley Liao, PharmD Assigned MTM Pharmacist   12/17/22     Phone: [phone]          [facility] [address] [place] [address]    Alexus Cherry MD Assigned PCP   12/17/22     Phone: [phone] Fax: [phone]         [address] [place] [address]    Lele Oliveira MD Assigned Pulmonology  Provider   1/14/23     Phone: [phone] Fax: [phone]         [address] [facility] [address]               Goals        COMPLETED: Other (pt-stated)             Goal Statement: I have established care with pulmonary and speciality providers to address sleep and pulmonary within my insurance network    Date Goal set: 02/02/21 updated 5/28/2021     Barriers: location,scheduling barriers  Strengths: patient engagement  Date to Achieve By:Completed 7-16-21   Patient expressed understanding of goal: yes  Personal Personal:    I will continue to follow up with  I will continue to follow up with  pulmomary at [facility] with Dr. Perdomo (sp?)  I will continue to follow up with sleep study clinic at [facility].                            It has been your Clinic Care Team's pleasure to work with you on your goals.    Regards,  Your Clinic Care Team

## 2023-02-14 NOTE — PROGRESS NOTES
Clinic Care Coordination Contact    Assessment: Care Coordinator contacted patient for 2 month follow up.  Patient has continued to follow the plan of care and assessment is negative for any new needs or concerns.    Enrollment status: Graduated.      Plan: No further outreaches at this time.  Patient will continue to follow the plan of care.  If new needs arise a new Care Coordination referral may be placed.  FYI to PCP    NU Mascorro   Social Work Care Coordinator   [facility]    [phone]

## 2023-02-17 ENCOUNTER — OFFICE VISIT (OUTPATIENT)
Dept: FAMILY MEDICINE | Facility: CLINIC | Age: 68
End: 2023-02-17
Payer: COMMERCIAL

## 2023-02-17 VITALS
WEIGHT: 186 LBS | BODY MASS INDEX: 32.96 KG/M2 | DIASTOLIC BLOOD PRESSURE: 61 MMHG | HEIGHT: 63 IN | RESPIRATION RATE: 20 BRPM | SYSTOLIC BLOOD PRESSURE: 107 MMHG | OXYGEN SATURATION: 96 % | HEART RATE: 79 BPM

## 2023-02-17 DIAGNOSIS — K74.60 CIRRHOSIS OF LIVER WITHOUT ASCITES, UNSPECIFIED HEPATIC CIRRHOSIS TYPE (H): ICD-10-CM

## 2023-02-17 DIAGNOSIS — E11.8 TYPE 2 DIABETES MELLITUS WITH COMPLICATION, WITH LONG-TERM CURRENT USE OF INSULIN (H): ICD-10-CM

## 2023-02-17 DIAGNOSIS — F31.4 BIPOLAR 1 DISORDER, DEPRESSED, SEVERE (H): ICD-10-CM

## 2023-02-17 DIAGNOSIS — J95.03 TRACHEAL STENOSIS FOLLOWING TRACHEOSTOMY (H): ICD-10-CM

## 2023-02-17 DIAGNOSIS — Z79.4 TYPE 2 DIABETES MELLITUS WITH COMPLICATION, WITH LONG-TERM CURRENT USE OF INSULIN (H): ICD-10-CM

## 2023-02-17 DIAGNOSIS — Z00.00 ENCOUNTER FOR MEDICARE ANNUAL WELLNESS EXAM: Primary | ICD-10-CM

## 2023-02-17 DIAGNOSIS — A05.1: ICD-10-CM

## 2023-02-17 PROCEDURE — G0438 PPPS, INITIAL VISIT: HCPCS | Performed by: FAMILY MEDICINE

## 2023-02-17 ASSESSMENT — ENCOUNTER SYMPTOMS
CONSTIPATION: 1
EYE PAIN: 0
FEVER: 0
PARESTHESIAS: 0
BREAST MASS: 0
COUGH: 1
CHILLS: 0
ABDOMINAL PAIN: 0
NAUSEA: 0
SHORTNESS OF BREATH: 0
WEAKNESS: 0
MYALGIAS: 0
ARTHRALGIAS: 0
JOINT SWELLING: 1
FREQUENCY: 1
HEMATOCHEZIA: 0
HEADACHES: 0
DIZZINESS: 0
HEARTBURN: 0
SORE THROAT: 0
NERVOUS/ANXIOUS: 0
HEMATURIA: 0
DIARRHEA: 0
DYSURIA: 0
PALPITATIONS: 0

## 2023-02-17 ASSESSMENT — ACTIVITIES OF DAILY LIVING (ADL): CURRENT_FUNCTION: NO ASSISTANCE NEEDED

## 2023-02-17 NOTE — PROGRESS NOTES
RESPIRATORY CARE NOTE     Patient Name: Annel Stoddard  Today's Date: 10/8/2020     Problem List  Patient Active Problem List   Diagnosis     Health maintenance examination     Type 2 diabetes mellitus with complication, with long-term current use of insulin (H)     Anatomical narrow angle glaucoma     Hyperactivity of bladder     Bilateral low back pain with left-sided sciatica     Bipolar 2 disorder (H)     Callus of foot     Mixed hyperlipidemia     Simple chronic bronchitis (H)     Pulmonary emphysema (H)             PFT NOTE    Pt gave good effort & was cooperative, was able to meet ATS standards for acceptability and repeatability. albuterol was given for the bronchodilator. Patient left in no distress.    Angélica Prieto, LRT                Angélica Prieto   Patient

## 2023-02-17 NOTE — PROGRESS NOTES
"SUBJECTIVE:   Elizabeth is a 67 year old who presents for Preventive Visit.  Patient has been advised of split billing requirements and indicates understanding: Yes  Are you in the first 12 months of your Medicare coverage?  No    Seeing Bradley once a month -   Adjusting meds -   Sugars - much better on Jardiance -   Checking 2-3 x/day   120, 147,   Mornings:  170 this morning (a little higher than usual)  Rybelsus, pioglitazone, Jardiance, novolog,  Toujeo     Exercise - 5 days/week, 20 minutes each - at gym;   Walks everywhere she goes -     Declines COVID booster -   \"got so deathly sick with 3rd COVID vaccine\" - was sick, throwing up x 3 days     Gets flu shot by end of August every year - otherwise \"I'm sick as a dog soon\"  Has appointment with another office for hepatitis B shot -     Abnormal liver labs   Patient's mom had jaundice when she carried patient  Patient had botulism, 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  Fair    Frequency of exercise:  6-7 days/week    Duration of exercise:  Greater than 60 minutes    Do you usually eat at least 4 servings of fruit and vegetables a day, include whole grains    & fiber and avoid regularly eating high fat or \"junk\" foods?  Yes    Taking medications regularly:  Yes    Medication side effects:  Not applicable    Ability to successfully perform activities of daily living:  No assistance needed    Home Safety:  No safety concerns identified    Hearing Impairment:  Difficulty following a conversation in a noisy restaurant or crowded room, find that men's voices are easier to understand than woman's and difficulty understanding soft or whispered speech    In the past 6 months, have you been bothered by leaking of urine?  " No    In general, how would you rate your overall mental or emotional health?  Fair      PHQ-2 Total Score: 0    Additional concerns today:  Yes      Have you ever done Advance Care Planning? (For example, a Health Directive, POLST, or a discussion with a medical provider or your loved ones about your wishes): Yes, advance care planning is on file.    Fall risk  Fallen 2 or more times in the past year?: Yes  Any fall with injury in the past year?: Yes    Cognitive Screening   1) Repeat 3 items (Leader, Season, Table)    2) Clock draw: NORMAL  3) 3 item recall: Recalls 3 objects  Results: 3 items recalled: COGNITIVE IMPAIRMENT LESS LIKELY    Mini-CogTM Copyright S Darby. Licensed by the author for use in [facility]; reprinted with permission (soob@[facility]). All rights reserved.      Do you have sleep apnea, excessive snoring or daytime drowsiness?: yes    Reviewed and updated as needed this visit by clinical staff   Tobacco  Allergies  Meds              Reviewed and updated as needed this visit by Provider                 Social History     Tobacco Use     Smoking status: Never     Smokeless tobacco: Never   Substance Use Topics     Alcohol use: Not Currently     If you drink alcohol do you typically have >3 drinks per day or >7 drinks per week? No    Alcohol Use 2/17/2023   Prescreen: >3 drinks/day or >7 drinks/week? Not Applicable       Current providers sharing in care for this patient include:   Patient Care Team:  Alexus Cherry MD as PCP - General (Family Medicine)  Bradley Liao, PharmD as Pharmacist (Pharmacist)  Bradley Liao PharmD as Assigned [place] Pharmacist  Alexus Cherry MD as Assigned PCP  Lele Oliveira MD as Assigned Pulmonology Provider    The following health maintenance items are reviewed in Epic and correct as of today:  Health Maintenance   Topic Date Due     DIABETIC FOOT EXAM  Never done     COPD ACTION PLAN  Never done     COVID-19  Vaccine (4 - Booster for Moderna series) 12/17/2021     MEDICARE ANNUAL WELLNESS VISIT  02/11/2023     COLPOSCOPY  04/09/2023     HEPATITIS B IMMUNIZATION (2 of 3 - Risk 3-dose series) 03/09/2023     A1C  06/06/2023     EYE EXAM  11/21/2023     BMP  12/06/2023     LIPID  12/06/2023     MICROALBUMIN  12/06/2023     ANNUAL REVIEW OF HM ORDERS  01/17/2024     FALL RISK ASSESSMENT  02/17/2024     MAMMO SCREENING  10/21/2024     ADVANCE CARE PLANNING  07/13/2026     DTAP/TDAP/TD IMMUNIZATION (4 - Td or Tdap) 09/05/2029     COLORECTAL CANCER SCREENING  11/05/2031     DEXA  10/12/2035     SPIROMETRY  Completed     HEPATITIS C SCREENING  Completed     PHQ-2 (once per calendar year)  Completed     INFLUENZA VACCINE  Completed     Pneumococcal Vaccine: 65+ Years  Completed     ZOSTER IMMUNIZATION  Completed     IPV IMMUNIZATION  Aged Out     MENINGITIS IMMUNIZATION  Aged Out     PAP  Discontinued     BP Readings from Last 3 Encounters:   02/17/23 107/61   02/13/23 116/74   02/08/23 121/76    Wt Readings from Last 3 Encounters:   02/17/23 84.4 kg (186 lb)   02/13/23 84.4 kg (186 lb)   01/17/23 82.6 kg (182 lb)                  Patient Active Problem List   Diagnosis     Encounter for immunization     Type 2 diabetes mellitus with complication, with long-term current use of insulin (H)     Anatomical narrow angle glaucoma     Hyperactivity of bladder     Bilateral low back pain with left-sided sciatica     Callus of foot     Mixed hyperlipidemia     Simple chronic bronchitis (H)     Pulmonary emphysema (H)     Abnormal cervical Papanicolaou smear     Hernia of anterior abdominal wall     Human papilloma virus infection     Nonalcoholic steatohepatitis     Osteopenia     Thrombophlebitis     Botulism food poisoning     Cervical high risk HPV (human papillomavirus) test positive     History of colonic polyps (colonoscopy due 11/2027)     Hyponatremia     Sleep apnea     LPRD (laryngopharyngeal reflux disease)     Lumbar spine  "pain     RUQ abdominal pain     Tracheal stenosis following tracheostomy (H)     Cirrhosis of liver without ascites, unspecified hepatic cirrhosis type (H)     Nephrogenic diabetes insipidus (H)     Other dysphagia     Gastric polyp     Bipolar 1 disorder, depressed, severe (H)     Past Surgical History:   Procedure Laterality Date     ABDOMINOPLASTY       EYE SURGERY      bilateral with flap     HAND SURGERY Left     \"chipped off bone\"      trachestomy       TUBAL LIGATION         Social History     Tobacco Use     Smoking status: Never     Smokeless tobacco: Never   Substance Use Topics     Alcohol use: Not Currently     Family History   Problem Relation Age of Onset     Other - See Comments Mother         severe edema in leg, millroid's disease     Cerebrovascular Disease Father      No Known Problems Maternal Grandmother      No Known Problems Maternal Grandfather      Cerebrovascular Disease Other      Lung Cancer Half-Sister         smoking related     Myocardial Infarction Paternal Half-Brother      Esophageal Cancer Sister      Coronary Artery Disease Brother          Current Outpatient Medications   Medication Sig Dispense Refill     acetaminophen (TYLENOL) 500 MG tablet Take 500 mg by mouth 2 times daily Scheduled.       albuterol (PROAIR HFA/PROVENTIL HFA/VENTOLIN HFA) 108 (90 Base) MCG/ACT inhaler Inhale 2 puffs into the lungs every 4 hours as needed for shortness of breath / dyspnea or wheezing       aspirin (ASA) 81 MG chewable tablet Take 81 mg by mouth daily       BD GERARDO U/F 32G X 4 MM insulin pen needle Use as directed 100 each 3     Biotin 10 MG CAPS Take 1 capsule by mouth daily       blood glucose (NO BRAND SPECIFIED) lancets standard Use to test blood sugar 3 times daily or as directed. 3 each 3     blood glucose (NO BRAND SPECIFIED) test strip 1 strip by In Vitro route 4 times daily 100 strip 3     Calcium Carbonate-Vitamin D 500-5 MG-MCG TABS Take 2 tablets by mouth daily       empagliflozin " (JARDIANCE) 10 MG TABS tablet Take 1 tablet (10 mg) by mouth daily 30 tablet 1     fluticasone (FLONASE) 50 MCG/ACT nasal spray SHAKE LIQUID AND USE 2 SPRAYS IN EACH NOSTRIL DAILY AS NEEDED FOR RHINITIS OR ALLERGIES Strength: 50 MCG/ACT 48 g 0     insulin aspart (NOVOLOG PEN) 100 UNIT/ML pen Inject 35 Units Subcutaneous 3 times daily (before meals) 105 units per day, 7 boxes every 3 months 105 mL 3     insulin glargine U-300 (TOUJEO) 300 UNIT/ML (1 units dial) pen Inject 85 Units Subcutaneous every morning 7.5 mL 3     ketoconazole (NIZORAL) 2 % external cream Twice a day for two weeks (Patient taking differently: Twice a day for two weeks as needed) 30 g 1     lithium (ESKALITH CR/LITHOBID) 450 MG CR tablet Take 1 tablet (450 mg) by mouth 2 times daily 60 tablet 1     losartan (COZAAR) 50 MG tablet Take 1 tablet (50 mg) by mouth daily 90 tablet 0     magnesium oxide 400 MG CAPS Take 400 mg by mouth daily       montelukast (SINGULAIR) 10 MG tablet Take 1 tablet (10 mg) by mouth At Bedtime 90 tablet 3     multivitamin (ONE-DAILY) tablet Take 1 tablet by mouth daily       mupirocin (BACTROBAN) 2 % external ointment Apply topically 3 times daily 15 g 0     nystatin (MYCOSTATIN) 848466 UNIT/GM external ointment Apply topically 2 times daily Apply to affected areas only (right upper arm) 30 g 1     omeprazole (PRILOSEC) 20 MG DR capsule Take 2 capsules (40 mg) by mouth daily 90 capsule 1     pioglitazone (ACTOS) 45 MG tablet Take 1 tablet (45 mg) by mouth daily 90 tablet 3     polyethylene glycol (MIRALAX) 17 g packet Take 17 g by mouth daily 100 each 3     rosuvastatin (CRESTOR) 10 MG tablet Take 1 tablet (10 mg) by mouth daily 90 tablet 3     Semaglutide 14 MG TABS Take 14 mg by mouth daily before breakfast 30 tablet 3     SENNA-docusate sodium (SENNA S) 8.6-50 MG tablet Take 1-2 tablets by mouth 2 times daily as needed (constipation) 120 tablet 3     sodium chloride (MILAN 128) 5 % ophthalmic ointment As needed        sodium chloride (OCEAN) 0.65 % nasal spray Spray 2 sprays in nostril daily as needed for congestion 480 mL 1     tiotropium-olodaterol 2.5-2.5 MCG/ACT AERS Inhale 2 puffs into the lungs daily 4 g 3     UNABLE TO FIND        vitamin D3 (CHOLECALCIFEROL) 50 mcg (2000 units) tablet TAKE 1 TABLET BY MOUTH EVERY DAY 90 tablet 3     Allergies   Allergen Reactions     Desmopressin Swelling     LE         Estrogens Other (See Comments)     Hydrochlorothiazide Other (See Comments) and Unknown       Patient reports can't take this med due to increased urine output       Hydrocodone-Acetaminophen        Justin change       Lithium Diarrhea         Causing DI       Penicillins Unknown     Risperidone      Shellfish-Derived Products      Decongestant Inhaler Rash     Latex Other (See Comments) and Rash     rash       Niacin Rash     Qvar [Beclomethasone] Rash     Valacyclovir Rash     Reviewed      Breast CA Risk Assessment (FHS-7) 2/17/2023   Do you have a family history of breast, colon, or ovarian cancer? No / Unknown         Mammogram Screening: Recommended mammography every 1-2 years with patient discussion and risk factor consideration  Pertinent mammograms are reviewed under the imaging tab.    Review of Systems   Constitutional: Negative for chills and fever.   HENT: Positive for congestion and hearing loss. Negative for ear pain and sore throat.    Eyes: Negative for pain and visual disturbance.   Respiratory: Positive for cough. Negative for shortness of breath.    Cardiovascular: Positive for peripheral edema. Negative for chest pain and palpitations.   Gastrointestinal: Positive for constipation. Negative for abdominal pain, diarrhea, heartburn, hematochezia and nausea.   Breasts:  Negative for tenderness, breast mass and discharge.   Genitourinary: Positive for frequency. Negative for dysuria, genital sores, hematuria, pelvic pain, urgency, vaginal bleeding and vaginal discharge.   Musculoskeletal: Positive for  "joint swelling. Negative for arthralgias and myalgias.   Skin: Negative for rash.   Neurological: Negative for dizziness, weakness, headaches and paresthesias.   Psychiatric/Behavioral: Negative for mood changes. The patient is not nervous/anxious.          OBJECTIVE:   /61 (BP Location: Left arm, Patient Position: Sitting, Cuff Size: Adult Large)   Pulse 79   Resp 20   Ht 1.6 m (5' 3\")   Wt 84.4 kg (186 lb)   SpO2 96%   BMI 32.95 kg/m   Estimated body mass index is 32.95 kg/m  as calculated from the following:    Height as of this encounter: 1.6 m (5' 3\").    Weight as of this encounter: 84.4 kg (186 lb).  Physical Exam  Vitals reviewed.   Constitutional:       General: She is not in acute distress.     Appearance: Normal appearance.   HENT:      Head: Normocephalic.      Right Ear: Tympanic membrane, ear canal and external ear normal.      Left Ear: Tympanic membrane, ear canal and external ear normal.      Nose: Nose normal.      Mouth/Throat:      Mouth: Mucous membranes are moist.      Pharynx: No posterior oropharyngeal erythema.   Eyes:      Extraocular Movements: Extraocular movements intact.      Conjunctiva/sclera: Conjunctivae normal.      Pupils: Pupils are equal, round, and reactive to light.   Cardiovascular:      Rate and Rhythm: Normal rate and regular rhythm.      Pulses:           Dorsalis pedis pulses are 3+ on the right side and 3+ on the left side.        Posterior tibial pulses are 3+ on the right side and 3+ on the left side.      Heart sounds: Normal heart sounds. No murmur heard.  Pulmonary:      Effort: Pulmonary effort is normal.      Breath sounds: Normal breath sounds.   Chest:   Breasts:     Right: Normal.      Left: Normal.   Abdominal:      Palpations: Abdomen is soft. There is no mass.      Tenderness: There is no abdominal tenderness. There is no guarding or rebound.   Musculoskeletal:         General: No deformity. Normal range of motion.      Cervical back: Normal " "range of motion and neck supple.   Feet:      Right foot:      Protective Sensation: 10 sites tested. 10 sites sensed.      Skin integrity: Skin integrity normal.      Toenail Condition: Right toenails are normal.      Left foot:      Protective Sensation: 10 sites tested. 10 sites sensed.      Skin integrity: Skin integrity normal.      Toenail Condition: Left toenails are normal.   Lymphadenopathy:      Cervical: No cervical adenopathy.   Skin:     General: Skin is warm and dry.   Neurological:      General: No focal deficit present.      Mental Status: She is alert.   Psychiatric:         Mood and Affect: Mood normal.         Behavior: Behavior normal.           Diagnostic Test Results:  Labs reviewed in Epic  No results found for any visits on 02/17/23.    ASSESSMENT / PLAN:   1. Encounter for Medicare annual wellness exam  66 yo female here for AWV -     2. Tracheal stenosis following tracheostomy (H)  H/o tracheal stenosis - due to tracheostomy due to botulism - recovered, but reports ongoing chronic respiratory issues - generally stable    3. Cirrhosis of liver without ascites, unspecified hepatic cirrhosis type (H)  H/o cirrhosis of liver - patient reports non-alcoholic    4. Type 2 diabetes mellitus with complication, with long-term current use of insulin (H)  H/o type II DM - has been working with our PharmD - prefers this     5. Bipolar 1 disorder, depressed, severe (H)  H/o bipolar 1 disorder - was hospitalized in last year - stable currently -     6. Botulism food poisoning  Reports h/o botulism - years ago - recovered -         Patient has been advised of split billing requirements and indicates understanding: Yes      COUNSELING:  Reviewed preventive health counseling, as reflected in patient instructions       Regular exercise       Healthy diet/nutrition      BMI:   Estimated body mass index is 32.95 kg/m  as calculated from the following:    Height as of this encounter: 1.6 m (5' 3\").    Weight as " of this encounter: 84.4 kg (186 lb).   Weight management plan: Discussed healthy diet and exercise guidelines      She reports that she has never smoked. She has never used smokeless tobacco.      Appropriate preventive services were discussed with this patient, including applicable screening as appropriate for cardiovascular disease, diabetes, osteopenia/osteoporosis, and glaucoma.  As appropriate for age/gender, discussed screening for colorectal cancer, prostate cancer, breast cancer, and cervical cancer. Checklist reviewing preventive services available has been given to the patient.    Reviewed patients plan of care and provided an AVS. The Basic Care Plan (routine screening as documented in Health Maintenance) for Annel meets the Care Plan requirement. This Care Plan has been established and reviewed with the Patient.      NADIR FLETCHER MD  [facility]    Identified Health Risks:

## 2023-02-19 PROCEDURE — 57456 ENDOCERV CURETTAGE W/SCOPE: CPT | Performed by: FAMILY MEDICINE

## 2023-02-21 ENCOUNTER — TELEPHONE (OUTPATIENT)
Dept: FAMILY MEDICINE | Facility: CLINIC | Age: 68
End: 2023-02-21
Payer: COMMERCIAL

## 2023-02-21 NOTE — TELEPHONE ENCOUNTER
General Call      Reason for Call:  Letter    What are your questions or concerns:  Pt called in, she stated that she received a letter in the mail that she has to to attend jury duty, she is requesting  to write up a letter stating she cannot attend, need to be excused because of her condition and disability. Pt is adamant and says it is urgent. She would like it faxed to Caroline at [phone] please call pt back if you have any questions.  Thanks!    Date of last appointment with provider: 2/17/23    Okay to leave a detailed message?: Yes at Home number on file [phone] (home)

## 2023-02-21 NOTE — LETTER
2/23/2023     Annel Stoddard   1955  [address]  [place] [address]       To Whom It May Concern:    Annel Stoddard reports that she was called to jury duty.  She is unable to participate in jury duty - due to mental health issues (still fragile, but generally stable).  She also has ambulation and transportation issues that make it difficult for her to attend to these duties in an appropriate manner.      If you have further questions, please do not hesitate to contact me.     Sincerely,        NADIR FLETCHER MD

## 2023-02-22 NOTE — TELEPHONE ENCOUNTER
Patient calls back inquiring about status of request. Writer inform caller message was sent to provider- awaiting for response. Writer will send provider another message. Caller verbalizes understanding.     Patient also request for colposcopy result.    Ayala Almeida,   [facility]  February 22, 2023 8:35 AM

## 2023-02-23 ENCOUNTER — TELEPHONE (OUTPATIENT)
Dept: PHARMACY | Facility: CLINIC | Age: 68
End: 2023-02-23
Payer: COMMERCIAL

## 2023-02-23 NOTE — TELEPHONE ENCOUNTER
Pt calling back to check on the status of this letter. Pt would like this to be done ASAP so she doesn't get a fine for not responding for jury duty. Also please call pt regarding her colposcopy result. Thanks!

## 2023-02-23 NOTE — TELEPHONE ENCOUNTER
General Call      Reason for Call:  Med question    What are your questions or concerns:  Pt has an appt. for her shot for her abilify on 2/27/2023 but since there are no MA's on that day, pt says she needs to take her pills for that instead. She would like for it to be filled at [facility] on [address]. Also pt would like a call back, thanks!    Date of last appointment with provider: 2/17/2023    Okay to leave a detailed message?: Yes at Home number on file [phone] (home)

## 2023-02-23 NOTE — CONFIDENTIAL NOTE
I have written a letter (in Mychart).  Unfortunately, I don't think I've seen any details of when the jury duty is supposed to commence. It is interesting that she has been twice in the last week and didn't share this concern at that time.  We don't usually get emergency summons to jury duty, so I'm not sure the urgency in this!  As well, I don't see any details of why she feels she can't do jury duty, so had to provide reasons that I thought may pertain.     Also - apparently her biopsy (from colposcopy exam) didn't make it to the lab.  So - there is no result.  Given my observations on that exam and her history, I think it is okay to simply repeat a pap smear in 6-12 months.  (If she'd like to repeat the colposcopy, she could do this at no charge).

## 2023-02-23 NOTE — TELEPHONE ENCOUNTER
LVM for patient:     Per discussion with Adelia. Will keep 2/27 appt. RN to administer.     Schedule March injection per MA schedule availability. Will discuss need for oral coverage as 3/8 MTM visit.

## 2023-02-24 NOTE — TELEPHONE ENCOUNTER
Pt called back to follow up on request as indicated below. I faxed jury duty letter as requested below by pt to Caroline at [phone] and mailed copy to pt. I relay provider's msg below to pt about the colposcopy results. Per pt, ok to do pap smear in about 6-12 months and don't want to get colposcopy re-done. Completing task.

## 2023-02-27 ENCOUNTER — ALLIED HEALTH/NURSE VISIT (OUTPATIENT)
Dept: FAMILY MEDICINE | Facility: CLINIC | Age: 68
End: 2023-02-27
Payer: COMMERCIAL

## 2023-02-27 DIAGNOSIS — F32.A DEPRESSION: Primary | ICD-10-CM

## 2023-02-27 PROCEDURE — 99207 PR NO CHARGE NURSE ONLY: CPT

## 2023-02-27 PROCEDURE — 96372 THER/PROPH/DIAG INJ SC/IM: CPT | Performed by: FAMILY MEDICINE

## 2023-02-27 NOTE — PROGRESS NOTES
Clinic Administered Medication Documentation    Administrations This Visit     ARIPiprazole ER (ABILIFY MAINTENA) extended release inj syringe 400 mg     Admin Date  02/27/2023 Action  $Given Dose  400 mg Route  Intramuscular Site  Right Ventrogluteal Administered By  Julia Gonzalez RN    Ordering Provider: Alexus Cherry MD    Patient Supplied?: No                  Injectable Medication Documentation    Patient was given Abilify. Prior to medication administration, verified patients identity using patient s name and date of birth. Please see MAR and medication order for additional information. Patient instructed to remain in clinic for 15 minutes and report any adverse reaction to staff immediately .      Was entire vial of medication used? Yes  Vial/Syringe: Single dose vial  Expiration Date:  03/2025  Was this medication supplied by the patient? No     Julia Gonzalez RN [facility]

## 2023-03-03 NOTE — TELEPHONE ENCOUNTER
"Hi Dr. Nate Rivas,   66 yo that had a [facility] [facility] done on 02/13/23 note says a ECC was done but I'm not seeing that a ECC was done. Please advise.     Pap Hx:  10/22/2015 NIL/HPV+  03/27/17 NIL Pap, Neg HR HPV   08/07/18 NIL Pap, Neg HR HPV   12/16/2019 NIL Pap, + HR HPV type 18   08/14/2020 NIL/HPV+  08/14/2020 [facility]: Benign, ECC inconclusive Provider plan: LEEP.  See Telephone encounter dated 8/24/2020:  \"It came back essentially inconclusive because of atrophy and that I couldn't really get enough tissue.  Her pap came back completely benign but HPV +.  In this case, I had discussed with her that the optimal way to proceed may be to have a leep so that we could get rid of any of the diseased portion of her cervix.\"  11/10/20 LEEP listed in care everywhere surgical section no records to review  11/22/21 ASC[facility] Pap, + HR HPV (not 16 or 18)   01/11/22 [facility] Bx benign ECC Focal squamous atypia suggestive of, but not definitively diagnostic for, low grade squamous intraepithelial lesion (KINSEY 1) (care everywhere)  01/09/23 NIL Pap, + HR HPV types 18 and other Plan [facility] due bef 04/09/23 01/19/23 Pt was advised.  02/13/23 [facility] Note says ECC done but I'm not seeing a ECC done     "
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
## 2023-03-08 ENCOUNTER — LAB (OUTPATIENT)
Dept: LAB | Facility: CLINIC | Age: 68
End: 2023-03-08
Payer: COMMERCIAL

## 2023-03-08 ENCOUNTER — OFFICE VISIT (OUTPATIENT)
Dept: PHARMACY | Facility: CLINIC | Age: 68
End: 2023-03-08
Payer: COMMERCIAL

## 2023-03-08 VITALS — DIASTOLIC BLOOD PRESSURE: 80 MMHG | HEART RATE: 87 BPM | SYSTOLIC BLOOD PRESSURE: 122 MMHG

## 2023-03-08 DIAGNOSIS — Z79.4 TYPE 2 DIABETES MELLITUS WITH COMPLICATION, WITH LONG-TERM CURRENT USE OF INSULIN (H): Primary | ICD-10-CM

## 2023-03-08 DIAGNOSIS — J31.0 CHRONIC RHINITIS: ICD-10-CM

## 2023-03-08 DIAGNOSIS — K59.00 CONSTIPATION, UNSPECIFIED CONSTIPATION TYPE: ICD-10-CM

## 2023-03-08 DIAGNOSIS — E11.8 TYPE 2 DIABETES MELLITUS WITH COMPLICATION, WITH LONG-TERM CURRENT USE OF INSULIN (H): Primary | ICD-10-CM

## 2023-03-08 DIAGNOSIS — M85.80 OSTEOPENIA, UNSPECIFIED LOCATION: ICD-10-CM

## 2023-03-08 DIAGNOSIS — R06.02 SOB (SHORTNESS OF BREATH): ICD-10-CM

## 2023-03-08 DIAGNOSIS — Z78.9 TAKES DIETARY SUPPLEMENTS: ICD-10-CM

## 2023-03-08 DIAGNOSIS — K75.81 NONALCOHOLIC STEATOHEPATITIS: ICD-10-CM

## 2023-03-08 DIAGNOSIS — Z79.4 TYPE 2 DIABETES MELLITUS WITH COMPLICATION, WITH LONG-TERM CURRENT USE OF INSULIN (H): ICD-10-CM

## 2023-03-08 DIAGNOSIS — F31.4 BIPOLAR 1 DISORDER, DEPRESSED, SEVERE (H): ICD-10-CM

## 2023-03-08 DIAGNOSIS — E78.2 MIXED HYPERLIPIDEMIA: ICD-10-CM

## 2023-03-08 DIAGNOSIS — E11.8 TYPE 2 DIABETES MELLITUS WITH COMPLICATION, WITH LONG-TERM CURRENT USE OF INSULIN (H): ICD-10-CM

## 2023-03-08 LAB
ANION GAP SERPL CALCULATED.3IONS-SCNC: 14 MMOL/L (ref 7–15)
BUN SERPL-MCNC: 19 MG/DL (ref 8–23)
CALCIUM SERPL-MCNC: 10.2 MG/DL (ref 8.8–10.2)
CHLORIDE SERPL-SCNC: 104 MMOL/L (ref 98–107)
CREAT SERPL-MCNC: 0.6 MG/DL (ref 0.51–0.95)
CREAT UR-MCNC: 29.6 MG/DL
DEPRECATED HCO3 PLAS-SCNC: 24 MMOL/L (ref 22–29)
GFR SERPL CREATININE-BSD FRML MDRD: >90 ML/MIN/1.73M2
GLUCOSE SERPL-MCNC: 114 MG/DL (ref 70–99)
MICROALBUMIN UR-MCNC: <12 MG/L
MICROALBUMIN/CREAT UR: NORMAL MG/G{CREAT}
POTASSIUM SERPL-SCNC: 4.5 MMOL/L (ref 3.4–5.3)
SODIUM SERPL-SCNC: 142 MMOL/L (ref 136–145)

## 2023-03-08 PROCEDURE — 82570 ASSAY OF URINE CREATININE: CPT

## 2023-03-08 PROCEDURE — 80048 BASIC METABOLIC PNL TOTAL CA: CPT

## 2023-03-08 PROCEDURE — 36415 COLL VENOUS BLD VENIPUNCTURE: CPT

## 2023-03-08 PROCEDURE — 99606 MTMS BY PHARM EST 15 MIN: CPT | Performed by: PHARMACIST

## 2023-03-08 PROCEDURE — 82043 UR ALBUMIN QUANTITATIVE: CPT

## 2023-03-08 PROCEDURE — 99607 MTMS BY PHARM ADDL 15 MIN: CPT | Performed by: PHARMACIST

## 2023-03-08 NOTE — PATIENT INSTRUCTIONS
"Recommendations from today's MTM visit:                                                         Increase Jardiance to 25 mg daily   Decrease Novolog to 30 units three times daily with meals.   Repeat BMP, urine microalbuminuria     Follow-up: Return in about 4 weeks (around 4/5/2023) for Medication Management Pharmacist, in person.    It was great speaking with you today.  I value your experience and would be very thankful for your time in providing feedback in our clinic survey. In the next few days, you may receive an email or text message from [employer] with a link to a survey related to your  clinical pharmacist.\"     To schedule another MTM appointment, please call the clinic directly or you may call the MTM scheduling line at [phone] or toll-free at [phone].     My Clinical Pharmacist's contact information:                                                      Please feel free to contact me with any questions or concerns you have.      Bradley Liao, PharmD  Medication Therapy Management (MTM) Pharmacist  [facility]     "

## 2023-03-09 ENCOUNTER — TELEPHONE (OUTPATIENT)
Dept: FAMILY MEDICINE | Facility: CLINIC | Age: 68
End: 2023-03-09
Payer: COMMERCIAL

## 2023-03-09 NOTE — TELEPHONE ENCOUNTER
Called and informed patient     ----- Message from Bradley Liao PharmD sent at 3/9/2023  3:59 PM CST -----  Please call patient and inform kidney function and electrolytes are stable.  Continue with medication adjustments as discussed at MTM visit yesterday.

## 2023-03-09 NOTE — TELEPHONE ENCOUNTER
"Copied and pasted per result note of the provider,  I did complete an ECC, but it didn't get to lab, and never got ordered.  I have offered to \"re-do\" a colposcopy to re-collect - at no charge to patient.        "

## 2023-03-13 ENCOUNTER — TELEPHONE (OUTPATIENT)
Dept: FAMILY MEDICINE | Facility: CLINIC | Age: 68
End: 2023-03-13
Payer: COMMERCIAL

## 2023-03-13 NOTE — TELEPHONE ENCOUNTER
Returned call patient,     No other s/sx of UTI - no increased frequency, discharge, burning/pain with urination.     Really likes Jardiance and doesn't want to discontinue. Will continue to monitor for adverse effects.

## 2023-03-13 NOTE — TELEPHONE ENCOUNTER
General Call    Contacts       Type Contact Phone/Fax    03/13/2023 12:50 PM CDT Phone (Incoming) Elizabeth Stoddard (Self) [phone] (M)        Reason for Call:  Request call from Bradley    What are your questions or concerns:  Patient wants to update pharmacist that Jardiance is working very well for her. Her blood sugar is 126 today. She was informed Jardiance causes vaginal itching, which she did experience and apply ketoconazole cream to areas. Patient ask to speak to you and would like a call back.    Date of last appointment with provider: 3/8/23    Okay to leave a detailed message?: Yes at Cell number on file:    Telephone Information:   Mobile [phone]

## 2023-03-20 ENCOUNTER — OFFICE VISIT (OUTPATIENT)
Dept: FAMILY MEDICINE | Facility: CLINIC | Age: 68
End: 2023-03-20
Payer: COMMERCIAL

## 2023-03-20 ENCOUNTER — TELEPHONE (OUTPATIENT)
Dept: FAMILY MEDICINE | Facility: CLINIC | Age: 68
End: 2023-03-20

## 2023-03-20 VITALS
DIASTOLIC BLOOD PRESSURE: 75 MMHG | OXYGEN SATURATION: 96 % | HEIGHT: 63 IN | WEIGHT: 188 LBS | SYSTOLIC BLOOD PRESSURE: 128 MMHG | BODY MASS INDEX: 33.31 KG/M2 | HEART RATE: 78 BPM | RESPIRATION RATE: 18 BRPM | TEMPERATURE: 97.6 F

## 2023-03-20 DIAGNOSIS — B97.7 HIGH RISK HPV INFECTION: Primary | ICD-10-CM

## 2023-03-20 PROCEDURE — 88305 TISSUE EXAM BY PATHOLOGIST: CPT | Performed by: PATHOLOGY

## 2023-03-20 PROCEDURE — 57456 ENDOCERV CURETTAGE W/SCOPE: CPT | Performed by: FAMILY MEDICINE

## 2023-03-20 NOTE — PROGRESS NOTES
1. High risk HPV infection  This is a 68 yo female with recent colposcopy exam related to high risk HPV infection - patient notes this has been long-standing in nature.  We collected an ECC at her last visit, but this never apparently reached the lab, and never generated an order.  I asked her to come back - at no charge for exam - to re-collect this specimen.  ECC performed and sent today.  Difficult visualization of cervix, but generally normal.  Will notify patient when results available.    - Surgical Pathology Exam  - GYN: Colposcopy/LEEP    GYN: Colposcopy/LEEP    Date/Time: 3/20/2023 3:35 PM  Performed by: Alexus Cherry MD  Authorized by: Alexus Cherry MD     Consent:     Consent obtained:  Verbal    Consent given by:  Patient    Procedural risks discussed:  Bleeding    Patient questions answered: yes      Patient agrees, verbalizes understanding, and wants to proceed: yes      Educational handouts given: yes      Instructions and paperwork completed: yes    Pre-procedure:     Pre-procedure timeout performed: yes      Premeds:  Acetaminophen    Prepped with: acetic acid    Indication:     Indication:  HPV  Procedure:     Procedure: Colposcopy w/ endocervical curettage      Under satisfactory analgesia the patient was prepped and draped in the dorsal lithotomy position: yes      [place] speculum was placed in the vagina: yes      Under colposcopic examination the transition zone was seen in entirety: yes      Endocervix was curetted using a Kevorkian curette: yes      Specimen to pathology: yes    Post-procedure:     Findings: Negative      Impression: Normal appearing cervix    Comments:      ECC collected and resent (previous ECC never made it to lab).  No charge for exam.            Venkata Johnson is a 67 year old, presenting for the following health issues:  Colposcopy and Neck Pain (For past few months, previous injury last summer/)      HPI           Review of  "Systems         Objective    /75 (BP Location: Left arm, Patient Position: Sitting, Cuff Size: Adult Regular)   Pulse 78   Temp 97.6  F (36.4  C) (Temporal)   Resp 18   Ht 1.6 m (5' 2.99\")   Wt 85.3 kg (188 lb)   SpO2 96%   BMI 33.31 kg/m    Body mass index is 33.31 kg/m .  Physical Exam       Results for orders placed or performed in visit on 03/20/23   Surgical Pathology Exam     Status: None   Result Value Ref Range    Case Report       Surgical Pathology Report                         Case: [id number]                                  Authorizing Provider:  Dilip,         Collected:           03/20/2023 03:35 PM                                 Alexus RUDOLPH MD                                                               Ordering Location:     [facility]   Received:            03/20/2023 03:43 PM                                 [facility]                                                                  Pathologist:           Lele Guillen MD                                                      Specimen:    Endocervix                                                                                 Final Diagnosis       ENDOCERVICAL CURETTINGS:        -  LARGE FRAGMENTS OF ECTOCERVICAL AND METAPLASTIC ENDOCERVICAL                EPITHELIUM SHOWING LGSIL (MILD DYSPLASIA, KINSEY-1) WITH KOILOCYTOSIS,                CONSISTENT WITH HPV CYTOPATHIC EFFECT        -  NEGATIVE FOR HIGH-GRADE EPITHELIAL DYSPLASIA          Clinical Information       Clinical history: nl pap, + HR HPV (18 and other)  Reason for procedure: colposcopy exam with ECC      Gross Description       A(A). Endocervix, :  The specimen is received in formalin, labeled with patient's name and \"ECC.\"  It consists of relatively scant tan tissue fragments measuring 0.6 x 0.4 x 0.1 cm in aggregate.  Filtered TE-1C   Flash Gama MD:aka-d      Microscopic Description       Microscopic examination performed, " substantiating the above diagnosis.       Performing Labs       The technical component of this testing was completed at [facility]      Case Images

## 2023-03-20 NOTE — LETTER
"March 23, 2023      Elizabeth KLAUDIA Stoddard  [address]  [place] [address]        Dear ,    We are writing to inform you of your test results.    Your biopsy is suggestive of low grade changes - like we'd expect from your previous pap smear results.  Ok to wait 6-12 months to repeat pap smear.      Resulted Orders   Surgical Pathology Exam   Result Value Ref Range    Case Report       Surgical Pathology Report                         Case: [id number]                                  Authorizing Provider:  Dilip,         Collected:           03/20/2023 03:35 PM                                 Alexus RUDOLPH MD                                                               Ordering Location:     [facility]   Received:            03/20/2023 03:43 PM                                 [facility]                                                                  Pathologist:           Lele Guillen MD                                                      Specimen:    Endocervix                                                                                 Final Diagnosis       ENDOCERVICAL CURETTINGS:        -  LARGE FRAGMENTS OF ECTOCERVICAL AND METAPLASTIC ENDOCERVICAL                EPITHELIUM SHOWING LGSIL (MILD DYSPLASIA, KINSEY-1) WITH KOILOCYTOSIS,                CONSISTENT WITH HPV CYTOPATHIC EFFECT        -  NEGATIVE FOR HIGH-GRADE EPITHELIAL DYSPLASIA          Clinical Information       Clinical history: nl pap, + HR HPV (18 and other)  Reason for procedure: colposcopy exam with ECC      Gross Description       A(A). Endocervix, :  The specimen is received in formalin, labeled with patient's name and \"ECC.\"  It consists of relatively scant tan tissue fragments measuring 0.6 x 0.4 x 0.1 cm in aggregate.  Filtered TE-1C   Flash Gama MD:aka-d      Microscopic Description       Microscopic examination performed, substantiating the above diagnosis.       " Performing Labs       The technical component of this testing was completed at [facility]      Case Images         If you have any questions or concerns, please call the clinic at the number listed above.       Sincerely,      Alexus Cherry MD

## 2023-03-20 NOTE — TELEPHONE ENCOUNTER
Noted.     Decreased Novolog at last visit and increased Jardiance. Has MTM follow-up scheduled. Will consider Mounjaro at that time. No further action needed at this time.

## 2023-03-20 NOTE — TELEPHONE ENCOUNTER
Pt called in to inform Bradley that her blood sugar went up to 190-200 in the evening and morning before she eats her breakfast. Please give her a call back if you have any questions.  Thanks!

## 2023-03-21 DIAGNOSIS — Z86.14 HISTORY OF MRSA INFECTION: ICD-10-CM

## 2023-03-21 DIAGNOSIS — B37.31 YEAST INFECTION OF THE VAGINA: ICD-10-CM

## 2023-03-21 DIAGNOSIS — L30.9 DERMATITIS: ICD-10-CM

## 2023-03-21 RX ORDER — NYSTATIN 100000 U/G
OINTMENT TOPICAL 2 TIMES DAILY
Qty: 30 G | Refills: 1 | Status: SHIPPED | OUTPATIENT
Start: 2023-03-21

## 2023-03-21 RX ORDER — KETOCONAZOLE 20 MG/G
CREAM TOPICAL
Qty: 30 G | Refills: 1 | Status: SHIPPED | OUTPATIENT
Start: 2023-03-21 | End: 2023-07-19

## 2023-03-21 RX ORDER — MUPIROCIN 20 MG/G
OINTMENT TOPICAL 3 TIMES DAILY
Qty: 15 G | Refills: 0 | Status: SHIPPED | OUTPATIENT
Start: 2023-03-21 | End: 2023-10-25

## 2023-03-21 NOTE — TELEPHONE ENCOUNTER
Medication Question or Refill        What medication are you calling about (include dose and sig)?:   mupirocin (BACTROBAN) 2 % external ointment 15 g 0 1/12/2023  --   Sig - Route: Apply topically 3 times daily - Topical     ketoconazole (NIZORAL) 2 % external cream 30 g 1 1/9/2023  No   Sig: Twice a day for two weeks     nystatin (MYCOSTATIN) 877968 UNIT/GM external ointment 30 g 1 12/6/2022  No   Sig - Route: Apply topically 2 times daily Apply to affected areas only (right upper arm) - Topical           Preferred Pharmacy:   [id number] - [place], [place] - [address] [place] [address] AT [facility] & [address] [place] [address]  Phone: [phone] Fax: [phone]      Controlled Substance Agreement on file:   CSA -- Patient Level:    CSA: None found at the patient level.       Who prescribed the medication?:      Do you need a refill? Yes    When did you use the medication last? Unknown     Patient offered an appointment? No    Do you have any questions or concerns?  No      Okay to leave a detailed message?: Yes at Home number on file [phone] (home)

## 2023-03-21 NOTE — TELEPHONE ENCOUNTER
"All the meds pass routing to provider because they were to be used for 2 weeks       Last Written Prescription Date:  1/9/2023  Last Fill Quantity: 30g,  # refills: 1   Last office visit provider:  3/20/2023   Requested Prescriptions   Pending Prescriptions Disp Refills     ketoconazole (NIZORAL) 2 % external cream 30 g 1     Sig: Twice a day for two weeks       Antifungal Agents Passed - 3/21/2023  3:34 PM        Passed - Recent (12 mo) or future (30 days) visit within the authorizing provider's specialty     Patient has had an office visit with the authorizing provider or a provider within the authorizing providers department within the previous 12 mos or has a future within next 30 days. See \"Patient Info\" tab in inbasket, or \"Choose Columns\" in Meds & Orders section of the refill encounter.              Passed - Not Fluconazole or Terconazole      If oral Fluconazole or Terconazole, may refill if indicated in progress notes.           Passed - Medication is active on med list           mupirocin (BACTROBAN) 2 % external ointment 15 g 0     Sig: Apply topically 3 times daily   Last Written Prescription Date:  1/12/2023  Last Fill Quantity: 15g,  # refills: 0   Last office visit provider:  3/20/2023     There is no refill protocol information for this order        nystatin (MYCOSTATIN) 247790 UNIT/GM external ointment 30 g 1     Sig: Apply topically 2 times daily Apply to affected areas only (right upper arm)   Last Written Prescription Date:  12/9/2022  Last Fill Quantity: 30g,  # refills: 1   Last office visit provider:  3/20/2023     Antifungal Agents Passed - 3/21/2023  3:34 PM        Passed - Recent (12 mo) or future (30 days) visit within the authorizing provider's specialty     Patient has had an office visit with the authorizing provider or a provider within the authorizing providers department within the previous 12 mos or has a future within next 30 days. See \"Patient Info\" tab in inbasket, or \"Choose " "Columns\" in Meds & Orders section of the refill encounter.              Passed - Not Fluconazole or Terconazole      If oral Fluconazole or Terconazole, may refill if indicated in progress notes.           Passed - Medication is active on med list             Aarti Jean Baptiste RN 03/21/23 3:34 PM  "

## 2023-03-22 LAB
PATH REPORT.COMMENTS IMP SPEC: NORMAL
PATH REPORT.COMMENTS IMP SPEC: NORMAL
PATH REPORT.FINAL DX SPEC: NORMAL
PATH REPORT.GROSS SPEC: NORMAL
PATH REPORT.MICROSCOPIC SPEC OTHER STN: NORMAL
PATH REPORT.RELEVANT HX SPEC: NORMAL
PHOTO IMAGE: NORMAL

## 2023-03-27 ENCOUNTER — ALLIED HEALTH/NURSE VISIT (OUTPATIENT)
Dept: FAMILY MEDICINE | Facility: CLINIC | Age: 68
End: 2023-03-27
Payer: COMMERCIAL

## 2023-03-27 DIAGNOSIS — Z23 ENCOUNTER FOR IMMUNIZATION: Primary | ICD-10-CM

## 2023-03-27 PROCEDURE — 99207 PR NO CHARGE NURSE ONLY: CPT

## 2023-03-27 PROCEDURE — 96372 THER/PROPH/DIAG INJ SC/IM: CPT | Performed by: FAMILY MEDICINE

## 2023-03-27 NOTE — PROGRESS NOTES
Clinic Administered Medication Documentation      Injectable Medication Documentation    Is there an active order (written within the past 365 days, with administrations remaining, not ) in the chart? Yes.     Patient was given Abilify Maintena 400 mg. Prior to medication administration, verified patient's identity using patient s name and date of birth. Please see MAR and medication order for additional information. Patient instructed to remain in clinic for 15 minutes and report any adverse reaction to staff immediately.    Vial/Syringe: Single dose vial. Was entire vial of medication used? Yes  Was this medication supplied by the patient? No  Is this a medication the patient will need to receive again? Yes. Verified that the patient has refills remaining in their prescription.

## 2023-03-29 DIAGNOSIS — J43.9 PULMONARY EMPHYSEMA, UNSPECIFIED EMPHYSEMA TYPE (H): ICD-10-CM

## 2023-03-29 DIAGNOSIS — Z76.0 ENCOUNTER FOR MEDICATION REFILL: Primary | ICD-10-CM

## 2023-03-29 RX ORDER — ALBUTEROL SULFATE 90 UG/1
2 AEROSOL, METERED RESPIRATORY (INHALATION) EVERY 4 HOURS PRN
Qty: 18 G | Refills: 10 | Status: SHIPPED | OUTPATIENT
Start: 2023-03-29 | End: 2023-08-02

## 2023-03-29 RX ORDER — MONTELUKAST SODIUM 10 MG/1
10 TABLET ORAL AT BEDTIME
Qty: 90 TABLET | Refills: 3 | Status: SHIPPED | OUTPATIENT
Start: 2023-03-29 | End: 2023-05-23

## 2023-03-29 RX ORDER — INSULIN GLARGINE 300 U/ML
85 INJECTION, SOLUTION SUBCUTANEOUS AT BEDTIME
Qty: 7.5 ML | Refills: 3 | Status: SHIPPED | OUTPATIENT
Start: 2023-03-29 | End: 2023-04-05

## 2023-03-29 NOTE — TELEPHONE ENCOUNTER
Patient calls back inquiring about status of why different providers write for her meds and that her Troujo insulin should be MAX and not regular.    Also would like her meds to be 90 days instead of 30 unless it is an insurance generated thing.       Writer inform caller message sent to Pharm D and PCP Caller verbalizes understanding.     Renae Tarango CMA ,   [facility]  March 29, 2023 9:51 AM

## 2023-04-04 DIAGNOSIS — K21.00 GASTROESOPHAGEAL REFLUX DISEASE WITH ESOPHAGITIS, UNSPECIFIED WHETHER HEMORRHAGE: ICD-10-CM

## 2023-04-04 NOTE — TELEPHONE ENCOUNTER
"Last Written Prescription Date:  12/26/2022  Last Fill Quantity: 90,  # refills: 1   Last office visit provider:  3/20/2023     Requested Prescriptions   Pending Prescriptions Disp Refills     omeprazole (PRILOSEC) 20 MG DR capsule 90 capsule 1     Sig: Take 2 capsules (40 mg) by mouth daily       PPI Protocol Passed - 4/4/2023 10:04 AM        Passed - Not on Clopidogrel (unless Pantoprazole ordered)        Passed - No diagnosis of osteoporosis on record        Passed - Recent (12 mo) or future (30 days) visit within the authorizing provider's specialty     Patient has had an office visit with the authorizing provider or a provider within the authorizing providers department within the previous 12 mos or has a future within next 30 days. See \"Patient Info\" tab in inbasket, or \"Choose Columns\" in Meds & Orders section of the refill encounter.              Passed - Medication is active on med list        Passed - Patient is age 18 or older        Passed - No active pregnacy on record        Passed - No positive pregnancy test in past 12 months             Peggy Brink RN 04/04/23 3:26 PM  "
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
## 2023-04-04 NOTE — TELEPHONE ENCOUNTER
Medication Question or Refill        What medication are you calling about (include dose and sig)?:   omeprazole (PRILOSEC) 20 MG DR capsule 90 capsule 1 12/26/2022  No   Sig - Route: Take 2 capsules (40 mg) by mouth daily - Oral         Preferred Pharmacy:   [id number] - [place], [place] - [address] [place] [address] AT [facility] & [address] [place] [address]  Phone: [phone] Fax: [phone]      Controlled Substance Agreement on file:   CSA -- Patient Level:    CSA: None found at the patient level.       Who prescribed the medication?:      Do you need a refill? Yes    When did you use the medication last? unknown    Patient offered an appointment? No    Do you have any questions or concerns?  No      Okay to leave a detailed message?: Yes at Home number on file [phone] (home)

## 2023-04-05 ENCOUNTER — OFFICE VISIT (OUTPATIENT)
Dept: PHARMACY | Facility: CLINIC | Age: 68
End: 2023-04-05
Payer: COMMERCIAL

## 2023-04-05 ENCOUNTER — TELEPHONE (OUTPATIENT)
Dept: FAMILY MEDICINE | Facility: CLINIC | Age: 68
End: 2023-04-05
Payer: COMMERCIAL

## 2023-04-05 ENCOUNTER — LAB (OUTPATIENT)
Dept: LAB | Facility: CLINIC | Age: 68
End: 2023-04-05
Payer: COMMERCIAL

## 2023-04-05 VITALS — DIASTOLIC BLOOD PRESSURE: 77 MMHG | SYSTOLIC BLOOD PRESSURE: 124 MMHG | HEART RATE: 80 BPM

## 2023-04-05 DIAGNOSIS — F31.4 BIPOLAR 1 DISORDER, DEPRESSED, SEVERE (H): ICD-10-CM

## 2023-04-05 DIAGNOSIS — Z78.9 TAKES DIETARY SUPPLEMENTS: ICD-10-CM

## 2023-04-05 DIAGNOSIS — K21.9 LPRD (LARYNGOPHARYNGEAL REFLUX DISEASE): ICD-10-CM

## 2023-04-05 DIAGNOSIS — E11.8 TYPE 2 DIABETES MELLITUS WITH COMPLICATION, WITH LONG-TERM CURRENT USE OF INSULIN (H): ICD-10-CM

## 2023-04-05 DIAGNOSIS — K75.81 NONALCOHOLIC STEATOHEPATITIS: ICD-10-CM

## 2023-04-05 DIAGNOSIS — Z79.4 TYPE 2 DIABETES MELLITUS WITH COMPLICATION, WITH LONG-TERM CURRENT USE OF INSULIN (H): ICD-10-CM

## 2023-04-05 DIAGNOSIS — E78.2 MIXED HYPERLIPIDEMIA: ICD-10-CM

## 2023-04-05 DIAGNOSIS — E55.9 VITAMIN D DEFICIENCY: ICD-10-CM

## 2023-04-05 DIAGNOSIS — J31.0 CHRONIC RHINITIS: ICD-10-CM

## 2023-04-05 DIAGNOSIS — K59.00 CONSTIPATION, UNSPECIFIED CONSTIPATION TYPE: ICD-10-CM

## 2023-04-05 DIAGNOSIS — E11.8 TYPE 2 DIABETES MELLITUS WITH COMPLICATION, WITH LONG-TERM CURRENT USE OF INSULIN (H): Primary | ICD-10-CM

## 2023-04-05 DIAGNOSIS — R06.02 SOB (SHORTNESS OF BREATH): ICD-10-CM

## 2023-04-05 DIAGNOSIS — Z79.4 TYPE 2 DIABETES MELLITUS WITH COMPLICATION, WITH LONG-TERM CURRENT USE OF INSULIN (H): Primary | ICD-10-CM

## 2023-04-05 LAB
ANION GAP SERPL CALCULATED.3IONS-SCNC: 11 MMOL/L (ref 7–15)
BUN SERPL-MCNC: 19 MG/DL (ref 8–23)
CALCIUM SERPL-MCNC: 10.2 MG/DL (ref 8.8–10.2)
CHLORIDE SERPL-SCNC: 104 MMOL/L (ref 98–107)
CREAT SERPL-MCNC: 0.57 MG/DL (ref 0.51–0.95)
DEPRECATED HCO3 PLAS-SCNC: 25 MMOL/L (ref 22–29)
GFR SERPL CREATININE-BSD FRML MDRD: >90 ML/MIN/1.73M2
GLUCOSE SERPL-MCNC: 114 MG/DL (ref 70–99)
HBA1C MFR BLD: 6.9 % (ref 0–5.6)
POTASSIUM SERPL-SCNC: 4.5 MMOL/L (ref 3.4–5.3)
SODIUM SERPL-SCNC: 140 MMOL/L (ref 136–145)

## 2023-04-05 PROCEDURE — 82306 VITAMIN D 25 HYDROXY: CPT

## 2023-04-05 PROCEDURE — 99606 MTMS BY PHARM EST 15 MIN: CPT | Performed by: PHARMACIST

## 2023-04-05 PROCEDURE — 83036 HEMOGLOBIN GLYCOSYLATED A1C: CPT

## 2023-04-05 PROCEDURE — 99607 MTMS BY PHARM ADDL 15 MIN: CPT | Performed by: PHARMACIST

## 2023-04-05 PROCEDURE — 36415 COLL VENOUS BLD VENIPUNCTURE: CPT

## 2023-04-05 PROCEDURE — 80048 BASIC METABOLIC PNL TOTAL CA: CPT

## 2023-04-05 RX ORDER — TIRZEPATIDE 5 MG/.5ML
5 INJECTION, SOLUTION SUBCUTANEOUS
Qty: 2 ML | Refills: 0 | Status: SHIPPED | OUTPATIENT
Start: 2023-04-05 | End: 2023-04-17

## 2023-04-05 RX ORDER — LITHIUM CARBONATE 450 MG
TABLET, EXTENDED RELEASE ORAL
Qty: 180 TABLET | Refills: 1 | Status: SHIPPED | OUTPATIENT
Start: 2023-04-05 | End: 2023-04-25

## 2023-04-05 RX ORDER — INSULIN GLARGINE 300 U/ML
85 INJECTION, SOLUTION SUBCUTANEOUS AT BEDTIME
Qty: 30 ML | Refills: 1 | Status: SHIPPED | OUTPATIENT
Start: 2023-04-05 | End: 2023-06-01

## 2023-04-05 RX ORDER — OMEPRAZOLE 40 MG/1
40 CAPSULE, DELAYED RELEASE ORAL DAILY
Qty: 90 CAPSULE | Refills: 1 | Status: SHIPPED | OUTPATIENT
Start: 2023-04-05 | End: 2023-10-17

## 2023-04-05 NOTE — PATIENT INSTRUCTIONS
"Recommendations from today's MTM visit:                                                         We are checking Vitamin D, kidney function and electrolytes (including potassium), and A1C   Stop Rybelsus and Start Mounjaro 5 mg once weekly.   After starting Mounjaro, for every 3 episodes of low blood sugars (anything below 80), decrease Toujeo by 2 units. Continue decreasing after every 3 episodes until no longer having lows.   If your evening blood glucose is at 130, check again at bedtime, then again in the morning. We are doing this to help determine if you are having any overnight lows.     Follow-up: Return in about 5 weeks (around 5/10/2023) for Medication Management Pharmacist, in person.    It was great speaking with you today.  I value your experience and would be very thankful for your time in providing feedback in our clinic survey. In the next few days, you may receive an email or text message from [employer] with a link to a survey related to your  clinical pharmacist.\"     To schedule another MTM appointment, please call the clinic directly or you may call the MTM scheduling line at [phone] or toll-free at [phone].     My Clinical Pharmacist's contact information:                                                      Please feel free to contact me with any questions or concerns you have.      Bradley Liao, PharmD  Medication Therapy Management (MTM) Pharmacist  [facility]     "

## 2023-04-05 NOTE — TELEPHONE ENCOUNTER
RAMIRO PA REQUEST    Prior Authorization Retail Medication Request    Medication/Dose: Mounjaro  ICD code (if different than what is on RX):  NA  Previously Tried and Failed:  Rybelsus. Previously on victoza at outside system.   Rationale:  On Novolog, Toujeo, Actos, Jardiance. Cannot use metformin due to liver dieasese.     Insurance Name:  [employer]  Insurance ID:  [id number]

## 2023-04-06 LAB — DEPRECATED CALCIDIOL+CALCIFEROL SERPL-MC: 43 UG/L (ref 20–75)

## 2023-04-07 ENCOUNTER — TELEPHONE (OUTPATIENT)
Dept: FAMILY MEDICINE | Facility: CLINIC | Age: 68
End: 2023-04-07
Payer: COMMERCIAL

## 2023-04-07 NOTE — TELEPHONE ENCOUNTER
Pt  Informed of message, she said that she picked up her Mounjaro and took her first dose today. Her test strips were not covered.

## 2023-04-07 NOTE — TELEPHONE ENCOUNTER
----- Message from Bradley Liao, Anup sent at 4/6/2023  3:47 PM CDT -----  Please call patient and inform.     A1C at goal <7% - continue the medication adjustments as discussed at MTM visit (still working on PA for Mounjaro).   BMP stable  - continue Jardiance.   Vit D is now at a healthy level- continue vitamin D supplement.

## 2023-04-11 NOTE — TELEPHONE ENCOUNTER
Patient returns call. Patient state patient picked up the test strips from the pharmacy. Caller will call if there is anything else patient need.     Ayala Almeida,   [facility]  April 11, 2023 8:24 AM

## 2023-04-13 ENCOUNTER — PATIENT OUTREACH (OUTPATIENT)
Dept: FAMILY MEDICINE | Facility: CLINIC | Age: 68
End: 2023-04-13
Payer: COMMERCIAL

## 2023-04-17 ENCOUNTER — TELEPHONE (OUTPATIENT)
Dept: PHARMACY | Facility: CLINIC | Age: 68
End: 2023-04-17
Payer: COMMERCIAL

## 2023-04-17 DIAGNOSIS — Z79.4 TYPE 2 DIABETES MELLITUS WITH COMPLICATION, WITH LONG-TERM CURRENT USE OF INSULIN (H): ICD-10-CM

## 2023-04-17 DIAGNOSIS — E11.8 TYPE 2 DIABETES MELLITUS WITH COMPLICATION, WITH LONG-TERM CURRENT USE OF INSULIN (H): ICD-10-CM

## 2023-04-17 RX ORDER — TIRZEPATIDE 5 MG/.5ML
5 INJECTION, SOLUTION SUBCUTANEOUS
Qty: 2 ML | Refills: 0 | Status: SHIPPED | OUTPATIENT
Start: 2023-04-17 | End: 2023-05-10

## 2023-04-17 NOTE — TELEPHONE ENCOUNTER
Returned call to patient.     Having hypoglycemia without awareness. She has decreased Toujeo as instructed as last MTM visit. Will have patient continue dose decreases.     Will have patient continue to monitor her blood glucose. If blood glucose get below 60, again, patient to call back to PharmD for larger decreases in insulin.     Again reviewed use of CGM as it has alarms that can alert patient to low blood glucose.     Notes she's having more difficulties with her living facility. Has a  that she's working with.

## 2023-04-17 NOTE — TELEPHONE ENCOUNTER
General Call      Reason for Call:  FYI    What are your questions or concerns:  Pt called and stated that with her new diabetes RX, her blood sugar has drop. Her blood sugar last night was 25 at 4pm. After dinner it was at 84 and then this morning it is at 82. Pt states she is feeling fine, felt no differences at all when she should. Any questions or concern, please contact pt. Thanks!    Date of last appointment with provider: 3/20/2023    Okay to leave a detailed message?: Yes at Home number on file [phone] (home)

## 2023-04-18 ENCOUNTER — TELEPHONE (OUTPATIENT)
Dept: FAMILY MEDICINE | Facility: CLINIC | Age: 68
End: 2023-04-18

## 2023-04-18 NOTE — TELEPHONE ENCOUNTER
General Call    Contacts       Type Contact Phone/Fax    04/18/2023 08:24 AM CDT Phone (Incoming) Elizabeth Stoddard (Self) [phone] (M)        Reason for Call:  FYCAROL    What are your questions or concerns:  Patient called to inform you that she is ok with you speaking to a Kassandra Joshua in regards to her housing situation, but is now unsure about if she wants to move. Stated that you knew what she was talking about.    Date of last appointment with provider: 4/5/2023    Okay to leave a detailed message?: Yes at Cell number on file:    Telephone Information:   Mobile [phone]

## 2023-04-24 ENCOUNTER — TELEPHONE (OUTPATIENT)
Dept: FAMILY MEDICINE | Facility: CLINIC | Age: 68
End: 2023-04-24
Payer: COMMERCIAL

## 2023-04-24 NOTE — TELEPHONE ENCOUNTER
Bradley is out of the office until Wednesday, and I am one of the pharmacists helping to cover his messages today while he is out.  I tried calling patient, but there was no answer.  Left a message with my phone number to call me at, and explained Bradley is back in clinic on Wednesday, so if preference is to speak with him, she could call back on Wednesday as well to do that.    Thank you,  Janine Harvey, Pharm.D.,[facility]  Board Certified Geriatric Pharmacist  Medication Therapy Management Pharmacist  [phone]

## 2023-04-24 NOTE — TELEPHONE ENCOUNTER
General Call    Contacts       Type Contact Phone/Fax    04/24/2023 08:11 AM CDT Phone (Incoming) Elizabeth Stoddard (Self) [phone] ()        Reason for Call:  Patient requesting to speak to pharmacist    What are your questions or concerns:  Patient is planning to move to [place] and had several questions to ask the pharmacist about medications. Patient wouldn't state some of the questions but just asked if the pharmacist could give her a call back.     Date of last appointment with provider: n/a    Okay to leave a detailed message?: Yes at Home number on file [phone] (home)

## 2023-04-24 NOTE — TELEPHONE ENCOUNTER
Patient called back and is requesting a phone call from [facility] when he returns to clinic on Wednesday.  Will route request to [facility] for his return.

## 2023-04-25 ENCOUNTER — DOCUMENTATION ONLY (OUTPATIENT)
Dept: OTHER | Facility: CLINIC | Age: 68
End: 2023-04-25

## 2023-04-25 ENCOUNTER — ALLIED HEALTH/NURSE VISIT (OUTPATIENT)
Dept: FAMILY MEDICINE | Facility: CLINIC | Age: 68
End: 2023-04-25
Payer: COMMERCIAL

## 2023-04-25 ENCOUNTER — TELEPHONE (OUTPATIENT)
Dept: FAMILY MEDICINE | Facility: CLINIC | Age: 68
End: 2023-04-25

## 2023-04-25 DIAGNOSIS — M85.80 OSTEOPENIA, UNSPECIFIED LOCATION: Primary | ICD-10-CM

## 2023-04-25 PROCEDURE — 99207 PR NO CHARGE NURSE ONLY: CPT

## 2023-04-25 PROCEDURE — 96372 THER/PROPH/DIAG INJ SC/IM: CPT | Performed by: FAMILY MEDICINE

## 2023-04-25 NOTE — PROGRESS NOTES
Clinic Administered Medication Documentation      Injectable Medication Documentation    Is there an active order (written within the past 365 days, with administrations remaining, not ) in the chart? Yes.     Patient was given Abilify 400 mg. Prior to medication administration, verified patient's identity using patient s name and date of birth. Please see MAR and medication order for additional information. Patient instructed to remain in clinic for 15 minutes and report any adverse reaction to staff immediately.    Vial/Syringe: Single dose vial. Was entire vial of medication used? Yes  Was this medication supplied by the patient? No  Is this a medication the patient will need to receive again? Yes. Verified that the patient has refills remaining in their prescription.

## 2023-04-25 NOTE — TELEPHONE ENCOUNTER
Travel questionnaire was asked. Verified that they have no signs of COVID-19 symptoms.    Patient dropped off [employer]  for Dr. Sullivan to fill out. Placed the original copies in the 's slot.    When forms are completed, patient would like it:    -Please call patient to let them know it's ready    Ok to leave a detailed message if unable to get a hold of the Patient.    Please re-route task back to the  to shred the copied forms and complete the task. Thanks!

## 2023-04-26 NOTE — TELEPHONE ENCOUNTER
Will be moving to [place]. Has contacted a Dr in [place]. Doesn't have an appointment set up yet. Gucci Vasquez in [place].     Has friends in [place]. Daughters are in [facility], but access to health care is a long time out.     Doesn't have a date set yet. Hoping around the beginning of June, but depends on her getting housing, and insurance set.     Also notes that she's now done to 80 units of Toujeo.     Has MTM scheduled on 5/10     Still needs [facility] mobility form for the duration of when she's here and also has a form for transportation in [place] that she's going to drop off for PCP.

## 2023-04-27 ENCOUNTER — TELEPHONE (OUTPATIENT)
Dept: FAMILY MEDICINE | Facility: CLINIC | Age: 68
End: 2023-04-27
Payer: COMMERCIAL

## 2023-04-27 NOTE — TELEPHONE ENCOUNTER
Travel questionnaire was asked. Verified that they have no signs of COVID-19 symptoms.    Patient dropped off [employer] for Dr. Sullivan to fill out. Placed the original copies in the 's slot.    When forms are completed, patient would like it:    -Please call patient to let them know it's ready    Ok to leave a detailed message if unable to get a hold of the Patient.    Please re-route task back to the  to shred the copied forms and complete the task. Thanks!

## 2023-05-01 ENCOUNTER — TRANSFERRED RECORDS (OUTPATIENT)
Dept: HEALTH INFORMATION MANAGEMENT | Facility: CLINIC | Age: 68
End: 2023-05-01
Payer: COMMERCIAL

## 2023-05-01 NOTE — TELEPHONE ENCOUNTER
Patient informed both packets are at the FD ready for , copy made for the chart and placed in urgent scan.     Sent to scheduling to complete task.

## 2023-05-01 NOTE — TELEPHONE ENCOUNTER
Patient called back inquiring about status of forms request. Writer inform caller message was sent to provider- awaiting for response. Writer will send provider another message. Caller verbalizes understanding.

## 2023-05-02 ENCOUNTER — TELEPHONE (OUTPATIENT)
Dept: FAMILY MEDICINE | Facility: CLINIC | Age: 68
End: 2023-05-02
Payer: COMMERCIAL

## 2023-05-02 NOTE — TELEPHONE ENCOUNTER
General Call    Contacts       Type Contact Phone/Fax    05/02/2023 12:29 PM CDT Phone (Incoming) Elizabeth Stoddard (Self) [phone] (M)        Reason for Call:  Concerns about metro mobility and a question    What are your questions or concerns:  Pt stated she got 5 boxes of pen needles from her pharmacy and wanted to know what she can do with the rest of the boxes if she doesn't finish using them in case she moves. Pt stated she doesn't think she can get on the plane with 5 boxes.     Pt also stated she's concerned about her metro mobility transportation being affected due to Dr forgetting to write that pt uses a cane. Pt is also concerned how the Dr wrote if not in control, pt needs to find other transportation alternatives. Please advise.    Okay to leave a detailed message?: Yes at Home number on file [phone] (home)

## 2023-05-03 NOTE — TELEPHONE ENCOUNTER
"This is why it is helpful to have a visit to complete paperwork rather than just leaving it at the clinic - I can only \"guess\" when I don't have a patient in front of me.  I'd be happy to re-do her paperwork if she wants to bring it back    "

## 2023-05-04 ENCOUNTER — TELEPHONE (OUTPATIENT)
Dept: FAMILY MEDICINE | Facility: CLINIC | Age: 68
End: 2023-05-04
Payer: COMMERCIAL

## 2023-05-04 NOTE — TELEPHONE ENCOUNTER
Spoke with the patient and she already sent in the paperwork, nothing needs to be done at this point. EMMANUEL

## 2023-05-04 NOTE — TELEPHONE ENCOUNTER
General Call    Contacts       Type Contact Phone/Fax    05/04/2023 11:32 AM CDT Phone (Incoming) Elina StoddardDavid KLAUDIA (Self) [phone] (M)        Reason for Call:  Athletes Foot    What are your questions or concerns:  Patient believes that she has athletes foot. Was wondering if the provider could send something to the pharmacy to help with that. If so, patient is requesting that it be sent over to the [id number] - [place], [place] - [address] AT [facility] & [address] [place] [address]  Phone: [phone] Fax: [phone].    Date of last appointment with provider: 3/20/2023    Okay to leave a detailed message?: Yes at Home number on file [phone] (home)

## 2023-05-04 NOTE — TELEPHONE ENCOUNTER
Contacted patient and she has an appointment scheduled on 5/8/23 with Dr Sullivan and will discuss at her appointment.  Also patient will try OTC product from her pharmacy.  No further action needed.    Senia Rosenberg RN  [facility]

## 2023-05-08 ENCOUNTER — OFFICE VISIT (OUTPATIENT)
Dept: FAMILY MEDICINE | Facility: CLINIC | Age: 68
End: 2023-05-08
Payer: COMMERCIAL

## 2023-05-08 VITALS
RESPIRATION RATE: 18 BRPM | HEART RATE: 70 BPM | BODY MASS INDEX: 33.84 KG/M2 | WEIGHT: 191 LBS | DIASTOLIC BLOOD PRESSURE: 67 MMHG | OXYGEN SATURATION: 97 % | SYSTOLIC BLOOD PRESSURE: 107 MMHG | TEMPERATURE: 97.7 F | HEIGHT: 63 IN

## 2023-05-08 DIAGNOSIS — B35.1 ONYCHOMYCOSIS: ICD-10-CM

## 2023-05-08 DIAGNOSIS — S42.331S CLOSED DISPLACED OBLIQUE FRACTURE OF SHAFT OF RIGHT HUMERUS, SEQUELA: Primary | ICD-10-CM

## 2023-05-08 PROCEDURE — 99213 OFFICE O/P EST LOW 20 MIN: CPT | Performed by: FAMILY MEDICINE

## 2023-05-08 RX ORDER — PRENATAL VIT 91/IRON/FOLIC/DHA 28-975-200
COMBINATION PACKAGE (EA) ORAL 2 TIMES DAILY
Qty: 60 G | Refills: 1 | Status: SHIPPED | OUTPATIENT
Start: 2023-05-08
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
## 2023-05-08 NOTE — PROGRESS NOTES
Prior to immunization administration, verified patients identity using patient s name and date of birth. Please see Immunization Activity for additional information.     Screening Questionnaire for Adult Immunization    Are you sick today?   No   Do you have allergies to medications, food, a vaccine component or latex?   Yes   Have you ever had a serious reaction after receiving a vaccination?   Yes   Do you have a long-term health problem with heart, lung, kidney, or metabolic disease (e.g., diabetes), asthma, a blood disorder, no spleen, complement component deficiency, a cochlear implant, or a spinal fluid leak?  Are you on long-term aspirin therapy?   Yes   Do you have cancer, leukemia, HIV/AIDS, or any other immune system problem?   Don't Know   Do you have a parent, brother, or sister with an immune system problem?   No   In the past 3 months, have you taken medications that affect  your immune system, such as prednisone, other steroids, or anticancer drugs; drugs for the treatment of rheumatoid arthritis, Crohn s disease, or psoriasis; or have you had radiation treatments?   No   Have you had a seizure, or a brain or other nervous system problem?   No   During the past year, have you received a transfusion of blood or blood    products, or been given immune (gamma) globulin or antiviral drug?   No   For women: Are you pregnant or is there a chance you could become       pregnant during the next month?   No   Have you received any vaccinations in the past 4 weeks?   No     Immunization questionnaire was positive for at least one answer.  Notified Dr. Sullivan.      Screening performed by Cathy Graham CMA on 5/8/2023 at 9:59 AM.
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
## 2023-05-10 ENCOUNTER — OFFICE VISIT (OUTPATIENT)
Dept: PHARMACY | Facility: CLINIC | Age: 68
End: 2023-05-10
Payer: COMMERCIAL

## 2023-05-10 ENCOUNTER — TELEPHONE (OUTPATIENT)
Dept: FAMILY MEDICINE | Facility: CLINIC | Age: 68
End: 2023-05-10
Payer: COMMERCIAL

## 2023-05-10 VITALS — HEART RATE: 70 BPM | SYSTOLIC BLOOD PRESSURE: 110 MMHG | DIASTOLIC BLOOD PRESSURE: 70 MMHG

## 2023-05-10 DIAGNOSIS — J31.0 CHRONIC RHINITIS: ICD-10-CM

## 2023-05-10 DIAGNOSIS — E11.8 TYPE 2 DIABETES MELLITUS WITH COMPLICATION, WITH LONG-TERM CURRENT USE OF INSULIN (H): Primary | ICD-10-CM

## 2023-05-10 DIAGNOSIS — E55.9 VITAMIN D DEFICIENCY: ICD-10-CM

## 2023-05-10 DIAGNOSIS — E78.2 MIXED HYPERLIPIDEMIA: ICD-10-CM

## 2023-05-10 DIAGNOSIS — R06.02 SOB (SHORTNESS OF BREATH): ICD-10-CM

## 2023-05-10 DIAGNOSIS — K59.00 CONSTIPATION, UNSPECIFIED CONSTIPATION TYPE: ICD-10-CM

## 2023-05-10 DIAGNOSIS — F31.4 BIPOLAR 1 DISORDER, DEPRESSED, SEVERE (H): ICD-10-CM

## 2023-05-10 DIAGNOSIS — Z79.4 TYPE 2 DIABETES MELLITUS WITH COMPLICATION, WITH LONG-TERM CURRENT USE OF INSULIN (H): Primary | ICD-10-CM

## 2023-05-10 DIAGNOSIS — K75.81 NONALCOHOLIC STEATOHEPATITIS: ICD-10-CM

## 2023-05-10 DIAGNOSIS — Z78.9 TAKES DIETARY SUPPLEMENTS: ICD-10-CM

## 2023-05-10 PROCEDURE — 99606 MTMS BY PHARM EST 15 MIN: CPT | Performed by: PHARMACIST

## 2023-05-10 PROCEDURE — 99607 MTMS BY PHARM ADDL 15 MIN: CPT | Performed by: PHARMACIST

## 2023-05-10 RX ORDER — TIRZEPATIDE 7.5 MG/.5ML
7.5 INJECTION, SOLUTION SUBCUTANEOUS
Qty: 2 ML | Refills: 1 | Status: SHIPPED | OUTPATIENT
Start: 2023-05-10 | End: 2023-06-01

## 2023-05-10 NOTE — TELEPHONE ENCOUNTER
Forms/Letter Request    Type of form/letter: School    Have you been seen for this request: N/A    Do we have the form/letter: No    Who is the form from? Patient needs a letter for reasonable accomodation for school. Patient will be going to [employer]. Patient is asking for letter to list her disabilities, that she has hearing loss and is a diabetic. Also that she will need help with technology and extra help with homework. Please address letter to miladys Balbuena who is the academic accomodation liason at [employer] [employer]. (if other please explain)    Where did/will the form come from? Patient or family brought in       When is form/letter needed by: as soon as possible    How would you like the form/letter returned:     Patient Notified form requests are processed in 3-5 business days:Yes    Okay to leave a detailed message?: Yes at Cell number on file:    Telephone Information:   Mobile [phone]

## 2023-05-10 NOTE — PATIENT INSTRUCTIONS
"Recommendations from today's MTM visit:                                                          Increase Mounjaro to 7.5 mg once weekly.   Stop Pioglitazone   After starting Mounjaro, for every 3 episodes of low blood sugars (anything below 80), decrease Toujeo by 2 units. Continue decreasing after every 3 episodes until no longer having lows.     Follow-up: Return in about 6 weeks (around 6/21/2023) for Medication Management Pharmacist, in person.    It was great speaking with you today.  I value your experience and would be very thankful for your time in providing feedback in our clinic survey. In the next few days, you may receive an email or text message from [employer] with a link to a survey related to your  clinical pharmacist.\"     To schedule another MTM appointment, please call the clinic directly or you may call the MTM scheduling line at [phone] or toll-free at [phone].     My Clinical Pharmacist's contact information:                                                      Please feel free to contact me with any questions or concerns you have.      Bradley Liao, PharmD  Medication Therapy Management (MTM) Pharmacist  [facility]      "

## 2023-05-11 NOTE — TELEPHONE ENCOUNTER
Patient calls back inquiring about status of request.      Pt says the name of  at [facility] is:    Richie Balbuena  Please send letter to his email - [email or web address]     Writer inform caller message was sent to provider- awaiting for response. Writer will send provider another message.    Please call pt when letter completed and sent.  Caller verbalizes understanding.     Renae Tarango CMA ,   [facility]  May 11, 2023 2:34 PM

## 2023-05-11 NOTE — TELEPHONE ENCOUNTER
Returned call to patient. She has not yet been accepted to the program. Discussed waiting on an accomodation letter until after she has been accepted. Patient was in agreement with this plan.

## 2023-05-14 ASSESSMENT — ENCOUNTER SYMPTOMS
SHORTNESS OF BREATH: 0
POLYDIPSIA: 0
ALLERGIC/IMMUNOLOGIC NEGATIVE: 1
PSYCHIATRIC NEGATIVE: 1
ABDOMINAL PAIN: 0
CHILLS: 0
ACTIVITY CHANGE: 0
FEVER: 0
COUGH: 0
NEUROLOGICAL NEGATIVE: 1
HEMATOLOGIC/LYMPHATIC NEGATIVE: 1

## 2023-05-16 ENCOUNTER — TELEPHONE (OUTPATIENT)
Dept: PHARMACY | Facility: CLINIC | Age: 68
End: 2023-05-16
Payer: COMMERCIAL

## 2023-05-16 ENCOUNTER — TELEPHONE (OUTPATIENT)
Dept: FAMILY MEDICINE | Facility: CLINIC | Age: 68
End: 2023-05-16
Payer: COMMERCIAL

## 2023-05-16 NOTE — TELEPHONE ENCOUNTER
Pt called back and stated that she needs the letter sent to the school by June 1st at the latest, but she would like for it be done sooner.Pt asked if clay could give Dr. Strange the email address and possibly have  Dr Strange do the letter instead. Please advise

## 2023-05-16 NOTE — TELEPHONE ENCOUNTER
Pt called in stated that she spoke to  regarding a prescription for water pills. She stated that has an appt on Monday on 5/22/23 with another doctor who she will be able to talk to about getting a prescription. Pt wanted me to pass this along.

## 2023-05-17 ENCOUNTER — TELEPHONE (OUTPATIENT)
Dept: FAMILY MEDICINE | Facility: CLINIC | Age: 68
End: 2023-05-17
Payer: COMMERCIAL

## 2023-05-17 NOTE — TELEPHONE ENCOUNTER
LVM for patient.     Plan at last week's MTM visit was to discontinue Pioglitazone as this may be contributing to edema. Would prefer not to start diuretic at this time, so we can appropriately evaluate whether or not swelling is related to the pioglitazone.     If Dr. Salazar prescribes the diuretics, PharmD can assist with ongoing monitoring/labs.

## 2023-05-17 NOTE — TELEPHONE ENCOUNTER
Pt called back and wanted you to know that she's going to have Dr. Salazar prescribe the water pill, and she stated that she was told it will be a low dosage.

## 2023-05-17 NOTE — TELEPHONE ENCOUNTER
New Medication Request    Contacts       Type Contact Phone/Fax    05/16/2023 02:56 PM CDT Phone (Incoming) Elina StoddardVanesaCleveland RUDOLPH (Self) [phone] (M)          What medication are you requesting?: water pill    Reason for medication request: to help with swelling. Patient is requesting for PharmD Bradley Liao to send prescription for water pill. Patient added that Liver specialist Sabrina Salazar recommends for patient to take a small dose to help with swelling and does not want sodium to drop.    Please advise.    Have you taken this medication before?: Yes: patient does not remember the name of the water pill.    Controlled Substance Agreement on file:   CSA -- Patient Level:    CSA: None found at the patient level.         Patient offered an appointment? No    Preferred Pharmacy:   [id number] - [place], [place] - [address] AT [facility] [address] [place] [address]  Phone: [phone] Fax: [phone]      Okay to leave a detailed message?: Yes at Cell number on file:    Telephone Information:   Mobile [phone]

## 2023-05-17 NOTE — TELEPHONE ENCOUNTER
Patient did not spoke with Dr. Ayala Looney, patient spoke to Ayala Grant . Writer informed caller writer will send message to Anup Liao regarding request for water pill.

## 2023-05-17 NOTE — TELEPHONE ENCOUNTER
Spoke with Richie as admissions office. He recommended waiting on accommodation letters until patient has heard back from admissions department.     Called and informed patient of Richie's recommendation. As previously discussed, will hold off on the letter until after admission.

## 2023-05-18 NOTE — TELEPHONE ENCOUNTER
Patient stopped a week ago, and restarted Pioglitazone this morning. She thinks pioglitazone helped. Unclear why this would be. Will have patient continue off.     Did not start diuretic yet. Has an appointment with Dr Salazar, on Monday.

## 2023-05-18 NOTE — TELEPHONE ENCOUNTER
Pt returned called back, stated that she started back on the Pioglitazone this morning. Pt stated the swelling is down considerably. I relay pharmacist msg below to pt and advise that pharmacist stated to discontinue Pioglitazone. Per pt, she was advise to go back on pioglitazone on vm by pharmacist, so she did? Please follow back up with pt to advise.Thank you.     Bradley Liao, PharmD     AP    5/17/23  2:57 PM  Note  LVM for patient.      Plan at last week's MTM visit was to discontinue Pioglitazone as this may be contributing to edema. Would prefer not to start diuretic at this time, so we can appropriately evaluate whether or not swelling is related to the pioglitazone.     If Dr. Salazar prescribes the diuretics, PharmD can assist with ongoing monitoring/labs.

## 2023-05-22 ENCOUNTER — MEDICAL CORRESPONDENCE (OUTPATIENT)
Dept: HEALTH INFORMATION MANAGEMENT | Facility: CLINIC | Age: 68
End: 2023-05-22

## 2023-05-22 ENCOUNTER — TELEPHONE (OUTPATIENT)
Dept: FAMILY MEDICINE | Facility: CLINIC | Age: 68
End: 2023-05-22

## 2023-05-22 ENCOUNTER — TRANSFERRED RECORDS (OUTPATIENT)
Dept: HEALTH INFORMATION MANAGEMENT | Facility: CLINIC | Age: 68
End: 2023-05-22

## 2023-05-22 NOTE — TELEPHONE ENCOUNTER
Patient Returning Call    Reason for call:  Pt was speaking about AJ monitoring labs by Dr. Suero - due to low Sodium.  He is supposed to place a standing order for checking her sodium, but TC sees nothing so far.     She has shots on Thursday with LEVY and unable to see Dr. Strange till 6/1/23    Patient has additional questions:  No .  This is more of an FYI for LEVY.     Okay to leave a detailed message?: Yes if has questions.    Call taken on 5/22/23 at 4 pm by ROSALINA Durbin

## 2023-05-23 ENCOUNTER — ALLIED HEALTH/NURSE VISIT (OUTPATIENT)
Dept: FAMILY MEDICINE | Facility: CLINIC | Age: 68
End: 2023-05-23
Payer: COMMERCIAL

## 2023-05-23 DIAGNOSIS — F31.4 BIPOLAR 1 DISORDER, DEPRESSED, SEVERE (H): Primary | ICD-10-CM

## 2023-05-23 DIAGNOSIS — J43.9 PULMONARY EMPHYSEMA, UNSPECIFIED EMPHYSEMA TYPE (H): ICD-10-CM

## 2023-05-23 PROCEDURE — 99207 PR NO CHARGE NURSE ONLY: CPT

## 2023-05-23 PROCEDURE — 96372 THER/PROPH/DIAG INJ SC/IM: CPT | Performed by: FAMILY MEDICINE

## 2023-05-23 RX ORDER — MONTELUKAST SODIUM 10 MG/1
10 TABLET ORAL AT BEDTIME
Qty: 90 TABLET | Refills: 3 | Status: SHIPPED | OUTPATIENT
Start: 2023-05-23 | End: 2024-05-13

## 2023-05-23 NOTE — PROGRESS NOTES
Clinic Administered Medication Documentation      Injectable Medication Documentation    Is there an active order (written within the past 365 days, with administrations remaining, not ) in the chart? Yes.     Patient was given Abilify. Prior to medication administration, verified patient's identity using patient s name and date of birth. Please see MAR and medication order for additional information. Patient instructed to remain in clinic for 15 minutes and report any adverse reaction to staff immediately but patient declined.    Vial/Syringe: Single dose vial. Was entire vial of medication used? Yes  Was this medication supplied by the patient? No  Is this a medication the patient will need to receive again? Yes. Verified that the patient has refills remaining in their prescription.    Senia Rosenberg RN  [facility]

## 2023-05-25 DIAGNOSIS — S42.331A: Primary | ICD-10-CM

## 2023-05-30 ENCOUNTER — LAB (OUTPATIENT)
Dept: LAB | Facility: CLINIC | Age: 68
End: 2023-05-30
Payer: COMMERCIAL

## 2023-05-30 DIAGNOSIS — K75.81 NONALCOHOLIC STEATOHEPATITIS: Primary | ICD-10-CM

## 2023-05-30 LAB
ANION GAP SERPL CALCULATED.3IONS-SCNC: 12 MMOL/L (ref 7–15)
BUN SERPL-MCNC: 11 MG/DL (ref 8–23)
CALCIUM SERPL-MCNC: 9.6 MG/DL (ref 8.8–10.2)
CHLORIDE SERPL-SCNC: 105 MMOL/L (ref 98–107)
CREAT SERPL-MCNC: 0.65 MG/DL (ref 0.51–0.95)
DEPRECATED HCO3 PLAS-SCNC: 23 MMOL/L (ref 22–29)
ERYTHROCYTE [DISTWIDTH] IN BLOOD BY AUTOMATED COUNT: 13.2 % (ref 10–15)
GFR SERPL CREATININE-BSD FRML MDRD: >90 ML/MIN/1.73M2
GLUCOSE SERPL-MCNC: 199 MG/DL (ref 70–99)
HCT VFR BLD AUTO: 41.7 % (ref 35–47)
HGB BLD-MCNC: 13.2 G/DL (ref 11.7–15.7)
MCH RBC QN AUTO: 31.7 PG (ref 26.5–33)
MCHC RBC AUTO-ENTMCNC: 31.7 G/DL (ref 31.5–36.5)
MCV RBC AUTO: 100 FL (ref 78–100)
PLATELET # BLD AUTO: 219 10E3/UL (ref 150–450)
POTASSIUM SERPL-SCNC: 4.1 MMOL/L (ref 3.4–5.3)
RBC # BLD AUTO: 4.17 10E6/UL (ref 3.8–5.2)
SODIUM SERPL-SCNC: 140 MMOL/L (ref 136–145)
WBC # BLD AUTO: 7.2 10E3/UL (ref 4–11)

## 2023-05-30 PROCEDURE — 85027 COMPLETE CBC AUTOMATED: CPT

## 2023-05-30 PROCEDURE — 36415 COLL VENOUS BLD VENIPUNCTURE: CPT

## 2023-05-30 PROCEDURE — 80048 BASIC METABOLIC PNL TOTAL CA: CPT

## 2023-05-31 NOTE — TELEPHONE ENCOUNTER
Action May 31, 2023 2:17 PM MT   Action Taken Called [facility] to push imaging, lvm for both. Sent a request for imaging from [facility].      Action 06/02/23 10:01AM MMT   Action Taken  CSS called [facility] and [facility] and had images pushed to PACS. Images resolved. Records complete.        DIAGNOSIS: Closed displaced oblique fracture of shaft of right humerus, sequela [S42.331S]  - Primary    APPOINTMENT DATE: 06/02/2023   NOTES STATUS DETAILS   OFFICE NOTE from referring provider Internal Dr. Cherry - [facility]  - f/u closed displaced obliwue fracture of shaft of right humerus    OFFICE NOTE from other specialist Care Everywhere Dr. Mcmillan - [facility] - 10/11/22, 8/25/22, 8/23/22 9/26/22 - RIVKA Lockett CNP - [facility]     Dr. Mehdi Barcenas - 9/15/22    PT - [facility] - 10/26/22-8/1/22    MEDICATION LIST Internal  Care Everywhere    LABS     CT SCAN PACS [facility]:  06/12/2022 - C Spine   XRAYS (IMAGES & REPORTS) PACS [facility]:  09/26/2022, 06/28/2022, 06/12/2022 - RT Humerus    [facility]:  09/15/2022, 08/23/2022, 08/03/2022, 06/28/2022, 06/12/2022 - RT Humerus

## 2023-06-01 ENCOUNTER — TELEPHONE (OUTPATIENT)
Dept: FAMILY MEDICINE | Facility: CLINIC | Age: 68
End: 2023-06-01

## 2023-06-01 ENCOUNTER — OFFICE VISIT (OUTPATIENT)
Dept: FAMILY MEDICINE | Facility: CLINIC | Age: 68
End: 2023-06-01
Payer: COMMERCIAL

## 2023-06-01 ENCOUNTER — TRANSFERRED RECORDS (OUTPATIENT)
Dept: HEALTH INFORMATION MANAGEMENT | Facility: CLINIC | Age: 68
End: 2023-06-01

## 2023-06-01 VITALS
BODY MASS INDEX: 33.09 KG/M2 | TEMPERATURE: 98.1 F | OXYGEN SATURATION: 96 % | SYSTOLIC BLOOD PRESSURE: 114 MMHG | HEART RATE: 78 BPM | RESPIRATION RATE: 16 BRPM | WEIGHT: 186.75 LBS | DIASTOLIC BLOOD PRESSURE: 69 MMHG | HEIGHT: 63 IN

## 2023-06-01 DIAGNOSIS — M54.2 CERVICALGIA: Primary | ICD-10-CM

## 2023-06-01 DIAGNOSIS — E78.2 MIXED HYPERLIPIDEMIA: ICD-10-CM

## 2023-06-01 DIAGNOSIS — E11.8 TYPE 2 DIABETES MELLITUS WITH COMPLICATION, WITH LONG-TERM CURRENT USE OF INSULIN (H): ICD-10-CM

## 2023-06-01 DIAGNOSIS — R87.810 CERVICAL HIGH RISK HPV (HUMAN PAPILLOMAVIRUS) TEST POSITIVE: ICD-10-CM

## 2023-06-01 DIAGNOSIS — Z00.00 HEALTHCARE MAINTENANCE: ICD-10-CM

## 2023-06-01 DIAGNOSIS — K59.00 CONSTIPATION, UNSPECIFIED CONSTIPATION TYPE: ICD-10-CM

## 2023-06-01 DIAGNOSIS — Z79.4 TYPE 2 DIABETES MELLITUS WITH COMPLICATION, WITH LONG-TERM CURRENT USE OF INSULIN (H): ICD-10-CM

## 2023-06-01 DIAGNOSIS — F31.4 BIPOLAR 1 DISORDER, DEPRESSED, SEVERE (H): ICD-10-CM

## 2023-06-01 PROCEDURE — 99214 OFFICE O/P EST MOD 30 MIN: CPT | Performed by: FAMILY MEDICINE

## 2023-06-01 RX ORDER — INSULIN GLARGINE 300 U/ML
74 INJECTION, SOLUTION SUBCUTANEOUS AT BEDTIME
Start: 2023-06-01 | End: 2023-06-14

## 2023-06-01 RX ORDER — TIRZEPATIDE 7.5 MG/.5ML
7.5 INJECTION, SOLUTION SUBCUTANEOUS
Qty: 2 ML | Refills: 1 | Status: SHIPPED | OUTPATIENT
Start: 2023-06-01 | End: 2023-06-21

## 2023-06-01 RX ORDER — SENNA AND DOCUSATE SODIUM 50; 8.6 MG/1; MG/1
1-2 TABLET, FILM COATED ORAL 2 TIMES DAILY PRN
Qty: 360 TABLET | Refills: 3 | Status: SHIPPED | OUTPATIENT
Start: 2023-06-01 | End: 2024-09-16

## 2023-06-01 NOTE — TELEPHONE ENCOUNTER
GM AJ - please see below message.  Unsure if you have called pt.  Thanks    ROSALINA Macdonald  [facility]

## 2023-06-01 NOTE — TELEPHONE ENCOUNTER
----- Message from Gerald Strange MD sent at 6/1/2023  3:20 PM CDT -----  Please contact patient to advise her that instead of a Pap smear she is supposed to get a colposcopy for follow-up of her HPV infection.    I spoke with her today and agreed that she could see Denise for follow-up Pap  But then I looked at the chart more carefully and the recommended follow-up is colposcopy    She used to see Dr. Sullivan and wants to see someone else in general, I assume this would apply for colposcopy is well.  Could we get her set up with Dr. Mari?    Thanks

## 2023-06-01 NOTE — ASSESSMENT & PLAN NOTE
Al is monitoring and following.  Currently on insulin Toujeo and NovoLog and Mounjaro and Crestor and Jardiance and aspirin    Last A1c was 6.9 on 4/5/2023.  She reports doing well.  Last lipids were in December    No changes, continue to follow with the J, follow-up with me in September or October

## 2023-06-01 NOTE — TELEPHONE ENCOUNTER
Patient had sodium checked 2 days ago and wnl.  No urgent need for additional orders. Will forward message for pharmacist to review on his return to office.    Ana Cristina Alfaro, PharmD, BCACP (covering for Bradley HoustonD)  Medication Management Pharmacist   [facility]  [phone]

## 2023-06-01 NOTE — TELEPHONE ENCOUNTER
General Call      Reason for Call:  Please fax labs to Dr. Salazar     Date of last appointment with provider: today     Okay to leave a detailed message?: No just fax BMP and CBC to Dr. Salazar.      Call taken on 6/1/23 at 357 pm by Renae Tarango CMA TC  
Faxed.  
[phone]

## 2023-06-01 NOTE — ASSESSMENT & PLAN NOTE
2 weeks, left-sided, not getting better, no neuropathic symptoms.  Patient is seeing Ortho for chronic shoulder pain following injury last year.  Will refer to physical therapy

## 2023-06-01 NOTE — PROGRESS NOTES
Assessment/ Plan  Type 2 diabetes mellitus with complication, with long-term current use of insulin (H)  Al is monitoring and following.  Currently on insulin Toujeo and NovoLog and Mounjaro and Crestor and Jardiance and aspirin    Last A1c was 6.9 on 4/5/2023.  She reports doing well.  Last lipids were in December    No changes, continue to follow with the J, follow-up with me in September or October    Mixed hyperlipidemia  Crestor, last lipids 12/6/2022.  No changes.    Healthcare maintenance  Declines COVID-vaccine.    Cervical high risk HPV (human papillomavirus) test positive  Needs colposcopy, we will get her set up for this.    Bipolar 1 disorder, depressed, severe (H)  Managed by Bradley-lithium and monthly Abilify, doing well    Cervicalgia  2 weeks, left-sided, not getting better, no neuropathic symptoms.  Patient is seeing Ortho for chronic shoulder pain following injury last year.  Will refer to physical therapy     Addendum  I am treating this patient under comprehensive plan of care for diabetes.  She has diabetes and preulcerative calluses on her feet.  Diabetes code is E11.8  Subjective  CC:  chief complaint  HPI:  67-year-old here to establish care with new provider.  Concerned about about 2 weeks of right shoulder pain.  This pain is in her trapezius.  She also has chronic pain after fracture of her humerus.  She is going to be seeing Ortho in the near future to discuss what is to be done about this-indicates she is not interested in surgery.  Reviewed her medical problems as above.    PFSH:  Patient Active Problem List   Diagnosis     Healthcare maintenance     Type 2 diabetes mellitus with complication, with long-term current use of insulin (H)     Anatomical narrow angle glaucoma     Hyperactivity of bladder     Bilateral low back pain with left-sided sciatica     Callus of foot     Mixed hyperlipidemia     Simple chronic bronchitis (H)     Pulmonary emphysema (H)     Abnormal cervical  Papanicolaou smear     Hernia of anterior abdominal wall     Human papilloma virus infection     Nonalcoholic steatohepatitis     Osteopenia     Thrombophlebitis     Botulism food poisoning     Cervical high risk HPV (human papillomavirus) test positive     History of colonic polyps (colonoscopy due 11/2027)     Hyponatremia     Sleep apnea     LPRD (laryngopharyngeal reflux disease)     Lumbar spine pain     RUQ abdominal pain     Tracheal stenosis following tracheostomy (H)     Cirrhosis of liver without ascites, unspecified hepatic cirrhosis type (H)     Nephrogenic diabetes insipidus (H)     Other dysphagia     Gastric polyp     Bipolar 1 disorder, depressed, severe (H)     Cervicalgia     acetaminophen (TYLENOL) 500 MG tablet, Take 500 mg by mouth 2 times daily Scheduled.  albuterol (PROAIR HFA/PROVENTIL HFA/VENTOLIN HFA) 108 (90 Base) MCG/ACT inhaler, Inhale 2 puffs into the lungs every 4 hours as needed for shortness of breath or wheezing  aspirin (ASA) 81 MG chewable tablet, Take 81 mg by mouth daily  BD GERARDO U/F 32G X 4 MM insulin pen needle, Use as directed  Biotin 10 MG CAPS, Take 1 capsule by mouth daily  blood glucose (NO BRAND SPECIFIED) lancets standard, Use to test blood sugar 3 times daily or as directed.  blood glucose (NO BRAND SPECIFIED) test strip, 1 strip by In Vitro route 4 times daily  Calcium Carbonate-Vitamin D 500-5 MG-MCG TABS, Take 2 tablets by mouth daily  empagliflozin (JARDIANCE) 25 MG TABS tablet, Take 1 tablet (25 mg) by mouth daily  fluticasone (FLONASE) 50 MCG/ACT nasal spray, SHAKE LIQUID AND USE 2 SPRAYS IN EACH NOSTRIL DAILY AS NEEDED FOR RHINITIS OR ALLERGIES Strength: 50 MCG/ACT  insulin aspart (NOVOLOG PEN) 100 UNIT/ML pen, Inject 30 Units Subcutaneous 3 times daily (before meals) 105 units per day, 7 boxes every 3 months  ketoconazole (NIZORAL) 2 % external cream, Twice a day for two weeks  lithium (ESKALITH CR/LITHOBID) 450 MG CR tablet, TAKE 1 TABLET(450 MG) BY MOUTH  TWICE DAILY  losartan (COZAAR) 50 MG tablet, Take 1 tablet (50 mg) by mouth daily  magnesium oxide 400 MG CAPS, Take 400 mg by mouth daily  montelukast (SINGULAIR) 10 MG tablet, Take 1 tablet (10 mg) by mouth At Bedtime  multivitamin (ONE-DAILY) tablet, Take 1 tablet by mouth daily  mupirocin (BACTROBAN) 2 % external ointment, Apply topically 3 times daily  nystatin (MYCOSTATIN) 423576 UNIT/GM external ointment, Apply topically 2 times daily Apply to affected areas only (right upper arm)  omeprazole (PRILOSEC) 40 MG DR capsule, Take 1 capsule (40 mg) by mouth daily  polyethylene glycol (MIRALAX) 17 g packet, Take 17 g by mouth daily  rosuvastatin (CRESTOR) 10 MG tablet, Take 1 tablet (10 mg) by mouth daily  sodium chloride (OCEAN) 0.65 % nasal spray, Spray 2 sprays in nostril daily as needed for congestion  terbinafine (LAMISIL) 1 % external cream, Apply topically 2 times daily  tiotropium-olodaterol 2.5-2.5 MCG/ACT AERS, Inhale 2 puffs into the lungs daily  vitamin D3 (CHOLECALCIFEROL) 50 mcg (2000 units) tablet, TAKE 1 TABLET BY MOUTH EVERY DAY    ARIPiprazole ER (ABILIFY MAINTENA) extended release inj syringe 400 mg         History   Smoking Status     Never   Smokeless Tobacco     Never     Social History     Social History Narrative    Originally from [facility] [place] [facility]. Moved from [place].  4 times . Has 5 children ,9 grandchildren . Her daughters were taken away from her by biological dad when she was ill with botulism . All adults now . All of them are in [facility].  Youngest daughter is 38 . Initially estranged but has a closer relationship .Hadnt worked for a long time . Is a violinist and plays in [employer] . Wasn't diagnosed with bipolar much later in life . 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 Is on disability due to  ipolar .       She plays the violin.      Patient Care Team:  Gerald Strange MD as PCP - General (Family  "Medicine)  Bradley Liao PharmSEAMUS as Pharmacist (Pharmacist)  Bradley Liao PharmD as Assigned MTM Pharmacist  Alexus Cherry MD as Assigned PCP  Lele Oliveira MD as Assigned Pulmonology Provider      Objective  Physical Exam  Vitals:    06/01/23 1430   BP: 114/69   BP Location: Left arm   Patient Position: Sitting   Cuff Size: Adult Regular   Pulse: 78   Resp: 16   Temp: 98.1  F (36.7  C)   SpO2: 96%   Weight: 84.7 kg (186 lb 12 oz)   Height: 1.594 m (5' 2.76\")     Body mass index is 33.34 kg/m .  Inspection:   Neck and shoulder musculature is grossly symmetric, no obvious spasm, no obvious spinal curvature or signs of trauma.    Overlying skin is normal      Palpation  right  Spinous process: Normal  base of the occiput:Normal  paraspinous muscle(s) of neck: Abnormal -mild tenderness  trapezius muscle(s): Abnormal -mild tenderness  pain parascapular region(s) Normal  Range of motion  Neck rotation:  Normal with some discomfort  Neck flexion and extension:Normal    Upper extremity reflexes done?  No    Strength in upper extremities tested?:  No          Diagnostics:   None      Please note: Voice recognition software was used in this dictation.  It may therefore contain typographical errors.      "

## 2023-06-02 ENCOUNTER — TRANSFERRED RECORDS (OUTPATIENT)
Dept: HEALTH INFORMATION MANAGEMENT | Facility: CLINIC | Age: 68
End: 2023-06-02

## 2023-06-02 ENCOUNTER — TELEPHONE (OUTPATIENT)
Dept: FAMILY MEDICINE | Facility: CLINIC | Age: 68
End: 2023-06-02

## 2023-06-02 ENCOUNTER — ANCILLARY PROCEDURE (OUTPATIENT)
Dept: GENERAL RADIOLOGY | Facility: CLINIC | Age: 68
End: 2023-06-02
Attending: ORTHOPAEDIC SURGERY
Payer: COMMERCIAL

## 2023-06-02 ENCOUNTER — OFFICE VISIT (OUTPATIENT)
Dept: ORTHOPEDICS | Facility: CLINIC | Age: 68
End: 2023-06-02
Attending: FAMILY MEDICINE
Payer: COMMERCIAL

## 2023-06-02 ENCOUNTER — MEDICAL CORRESPONDENCE (OUTPATIENT)
Dept: HEALTH INFORMATION MANAGEMENT | Facility: CLINIC | Age: 68
End: 2023-06-02

## 2023-06-02 ENCOUNTER — PRE VISIT (OUTPATIENT)
Dept: ORTHOPEDICS | Facility: CLINIC | Age: 68
End: 2023-06-02

## 2023-06-02 DIAGNOSIS — E11.8 TYPE 2 DIABETES MELLITUS WITH COMPLICATION, WITH LONG-TERM CURRENT USE OF INSULIN (H): ICD-10-CM

## 2023-06-02 DIAGNOSIS — M47.812 CERVICAL SPONDYLOSIS: Primary | ICD-10-CM

## 2023-06-02 DIAGNOSIS — S42.331A: ICD-10-CM

## 2023-06-02 DIAGNOSIS — Z79.4 TYPE 2 DIABETES MELLITUS WITH COMPLICATION, WITH LONG-TERM CURRENT USE OF INSULIN (H): ICD-10-CM

## 2023-06-02 DIAGNOSIS — S42.331S CLOSED DISPLACED OBLIQUE FRACTURE OF SHAFT OF RIGHT HUMERUS, SEQUELA: ICD-10-CM

## 2023-06-02 PROCEDURE — 99203 OFFICE O/P NEW LOW 30 MIN: CPT | Performed by: ORTHOPAEDIC SURGERY

## 2023-06-02 PROCEDURE — 73060 X-RAY EXAM OF HUMERUS: CPT | Mod: RT | Performed by: RADIOLOGY

## 2023-06-02 RX ORDER — FUROSEMIDE 20 MG
30 TABLET ORAL
COMMUNITY
Start: 2023-05-23 | End: 2023-11-06

## 2023-06-02 NOTE — TELEPHONE ENCOUNTER
Test Results        Who ordered the test:       Type of test: Lab    Date of test:  5/30/23    Where was the test performed:  [facility]     What are your questions/concerns?:  Pt requsting call back about her test results. Please follow back up with at your earliest convenience.   Pt also requesting if provider can recommend a massage therapist to help with neck pain.     Okay to leave a detailed message?: Yes at Home number on file [phone] (home)

## 2023-06-02 NOTE — NURSING NOTE
Reason For Visit:   Chief Complaint   Patient presents with     Consult     New patient consult for right humerus fracture.  DOI: 6/11/22.  Had been seen by Dr. Cherry.  Her XR series is in PACS, new XR today.  Patient reports only mild pain, which she manages with Tylenol.  She has a sling that she uses PRN.  Right hand dominant.  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  Her main concern is falling on it again.       LMP  (LMP Unknown)     Pain Assessment  Patient Currently in Pain: Yes  0-10 Pain Scale: 2  Pain Descriptors: Dull    Garett Cortes, ATC

## 2023-06-02 NOTE — TELEPHONE ENCOUNTER
"Routing refill request to provider for review/approval because:  A break in medication    Last Written Prescription Date:  22  Last Fill Quantity: 100,  # refills: 3   Last office visit provider:  23     Requested Prescriptions   Pending Prescriptions Disp Refills     blood glucose (NO BRAND SPECIFIED) test strip 100 strip 3     Si strip by In Vitro route 4 times daily       Diabetic Supplies Protocol Passed - 2023  3:15 PM        Passed - Medication is active on med list        Passed - Patient is 18 years of age or older        Passed - Recent (6 mo) or future (30 days) visit within the authorizing provider's specialty     Patient had office visit in the last 6 months or has a visit in the next 30 days with authorizing provider.  See \"Patient Info\" tab in inbasket, or \"Choose Columns\" in Meds & Orders section of the refill encounter.                 Vin Smith RN 23 3:15 PM  "

## 2023-06-02 NOTE — LETTER
6/2/2023         RE: Annel Stoddard  [address]  [place] [address]        Dear Colleague,    Thank you for referring your patient, Annel Stoddard, to the [facility] [place]. Please see a copy of my visit note below.    S:  Hx R humeral fx and cervical spine injury when caring for  A patient.  Has diabetes and feels it is relatively well controlled.  Some liver disease as well.         Patient Active Problem List   Diagnosis    Healthcare maintenance    Type 2 diabetes mellitus with complication, with long-term current use of insulin (H)    Anatomical narrow angle glaucoma    Hyperactivity of bladder    Bilateral low back pain with left-sided sciatica    Callus of foot    Mixed hyperlipidemia    Simple chronic bronchitis (H)    Pulmonary emphysema (H)    Abnormal cervical Papanicolaou smear    Hernia of anterior abdominal wall    Human papilloma virus infection    Nonalcoholic steatohepatitis    Osteopenia    Thrombophlebitis    Botulism food poisoning    Cervical high risk HPV (human papillomavirus) test positive    History of colonic polyps (colonoscopy due 11/2027)    Hyponatremia    Sleep apnea    LPRD (laryngopharyngeal reflux disease)    Lumbar spine pain    RUQ abdominal pain    Tracheal stenosis following tracheostomy (H)    Cirrhosis of liver without ascites, unspecified hepatic cirrhosis type (H)    Nephrogenic diabetes insipidus (H)    Other dysphagia    Gastric polyp    Bipolar 1 disorder, depressed, severe (H)    Cervicalgia            Past Medical History:   Diagnosis Date    Anxiety     Bipolar disorder (H)     Botulism     1980    Cervical high risk HPV (human papillomavirus) test positive     Colon polyps     Depression     Diabetes mellitus (H)     Diabetes type 2, controlled (H)     Gallstones     History of tracheostomy 1/5/2021    Hyperlipidemia     Hyperlipidemia     Hypertension     Hypertension     Low back pain with left-sided sciatica      "Pneumonia     Urinary incontinence             Past Surgical History:   Procedure Laterality Date    ABDOMINOPLASTY      EYE SURGERY      bilateral with flap    HAND SURGERY Left     \"chipped off bone\"     trachestomy      TUBAL LIGATION              Social History     Tobacco Use    Smoking status: Never     Passive exposure: Never    Smokeless tobacco: Never   Vaping Use    Vaping status: Never Used   Substance Use Topics    Alcohol use: Not Currently            Family History   Problem Relation Age of Onset    Other - See Comments Mother         severe edema in leg, millroid's disease    Cerebrovascular Disease Father     No Known Problems Maternal Grandmother     No Known Problems Maternal Grandfather     Cerebrovascular Disease Other     Lung Cancer Half-Sister         smoking related    Myocardial Infarction Paternal Half-Brother     Esophageal Cancer Sister     Coronary Artery Disease Brother                Allergies   Allergen Reactions    Desmopressin Swelling     LE        Estrogens Other (See Comments)    Hydrochlorothiazide Other (See Comments) and Unknown       Patient reports can't take this med due to increased urine output      Hydrocodone-Acetaminophen        Justin change      Lithium Diarrhea         Causing DI      Penicillins Unknown     Has tolerated amoxicillin     Risperidone     Shellfish-Derived Products     Aromatic Inhalants Rash    Latex Other (See Comments) and Rash     rash      Niacin Rash    Qvar [Beclomethasone] Rash    Valacyclovir Rash            Current Outpatient Medications   Medication Sig Dispense Refill    acetaminophen (TYLENOL) 500 MG tablet Take 500 mg by mouth 2 times daily Scheduled.      albuterol (PROAIR HFA/PROVENTIL HFA/VENTOLIN HFA) 108 (90 Base) MCG/ACT inhaler Inhale 2 puffs into the lungs every 4 hours as needed for shortness of breath or wheezing 18 g 10    aspirin (ASA) 81 MG chewable tablet Take 81 mg by mouth daily      BD GERARDO U/F 32G X 4 MM insulin pen " needle Use as directed 100 each 3    Biotin 10 MG CAPS Take 1 capsule by mouth daily      blood glucose (NO BRAND SPECIFIED) lancets standard Use to test blood sugar 3 times daily or as directed. 3 each 3    blood glucose (NO BRAND SPECIFIED) test strip 1 strip by In Vitro route 4 times daily 100 strip 3    Calcium Carbonate-Vitamin D 500-5 MG-MCG TABS Take 2 tablets by mouth daily      empagliflozin (JARDIANCE) 25 MG TABS tablet Take 1 tablet (25 mg) by mouth daily 90 tablet 1    fluticasone (FLONASE) 50 MCG/ACT nasal spray SHAKE LIQUID AND USE 2 SPRAYS IN EACH NOSTRIL DAILY AS NEEDED FOR RHINITIS OR ALLERGIES Strength: 50 MCG/ACT 48 g 0    insulin aspart (NOVOLOG PEN) 100 UNIT/ML pen Inject 30 Units Subcutaneous 3 times daily (before meals) 105 units per day, 7 boxes every 3 months 90 mL 3    insulin glargine U-300 (TOUJEO MAX SOLOSTAR) 300 UNIT/ML (2 units dial) pen Inject 74 Units Subcutaneous At Bedtime      ketoconazole (NIZORAL) 2 % external cream Twice a day for two weeks 30 g 1    lithium (ESKALITH CR/LITHOBID) 450 MG CR tablet TAKE 1 TABLET(450 MG) BY MOUTH TWICE DAILY 60 tablet 1    losartan (COZAAR) 50 MG tablet Take 1 tablet (50 mg) by mouth daily 90 tablet 0    magnesium oxide 400 MG CAPS Take 400 mg by mouth daily      montelukast (SINGULAIR) 10 MG tablet Take 1 tablet (10 mg) by mouth At Bedtime 90 tablet 3    multivitamin (ONE-DAILY) tablet Take 1 tablet by mouth daily      mupirocin (BACTROBAN) 2 % external ointment Apply topically 3 times daily 15 g 0    nystatin (MYCOSTATIN) 015297 UNIT/GM external ointment Apply topically 2 times daily Apply to affected areas only (right upper arm) 30 g 1    omeprazole (PRILOSEC) 40 MG DR capsule Take 1 capsule (40 mg) by mouth daily 90 capsule 1    polyethylene glycol (MIRALAX) 17 g packet Take 17 g by mouth daily 100 each 3    rosuvastatin (CRESTOR) 10 MG tablet Take 1 tablet (10 mg) by mouth daily 90 tablet 3    SENNA-docusate sodium (SENNA S) 8.6-50 MG  tablet Take 1-2 tablets by mouth 2 times daily as needed (constipation) 360 tablet 3    sodium chloride (OCEAN) 0.65 % nasal spray Spray 2 sprays in nostril daily as needed for congestion 480 mL 1    terbinafine (LAMISIL) 1 % external cream Apply topically 2 times daily 60 g 1    tiotropium-olodaterol 2.5-2.5 MCG/ACT AERS Inhale 2 puffs into the lungs daily 12 g 3    tirzepatide (MOUNJARO) 7.5 MG/0.5ML pen Inject 7.5 mg Subcutaneous every 7 days 2 mL 1    vitamin D3 (CHOLECALCIFEROL) 50 mcg (2000 units) tablet TAKE 1 TABLET BY MOUTH EVERY DAY 90 tablet 3          Review Of Systems  Skin: negative  Eyes: negative  Ears/Nose/Throat: negative  Respiratory: No shortness of breath, dyspnea on exertion, cough, or hemoptysis    O: Physical Exam:  No motion or pain to palpation at R humeral diaphyseal fx site.  Radial/median/ulnar nerve function intact.  Cervical spine motion adequate but some tenderness to palpation R C4-5, 5-6 facets.      Lab:    Images:2 views right humerus radiographs 6/2/2023 12:56 PM     History: Closed displaced oblique fracture of shaft of right humerus     Comparison: 12/6/2022     Findings:     AP and lateral views of the right humerus were obtained.      Since 12/2022, further healing of the mid humeral diaphysis fracture  with stable alignment/deformity      Shoulder and elbow joints are incompletely assessed but grossly  congruent. No substantial degenerative change in shoulder or elbow.  Mild-to-moderate acromioclavicular joint degenerative change.     Soft tissue is unremarkable.                                                                      Impression: Since 12/2022, further healing of the mid humeral  diaphysis fracture with stable alignment/deformity.            A:  Well healed R humeral diaphyseal fracture without neural issues, cervical spondylosis    P:  Continue to work on rehab R arm, including violin  PT for Cervical spondylosis with traction and suboccipital release if  indicated, manual traction with neck flexed 30 degrees.  DEXA scan.  Discussed ca and vit D supplementation.  See back after study.           In addition to the above assessment and plan each active problem on Annel's problem list was evaluated today. This included the questioning of Annel for any medication problems. We will continue the current treatment plan for these active problems except as noted.          CARL HOFF MD

## 2023-06-02 NOTE — TELEPHONE ENCOUNTER
The labs were ordered by an outside provider, that provider should be the one letting the patient know about those results. Left detailed VM for the pt letting her know that and that I would send a message to Dr. Strange about the massage therapist.

## 2023-06-02 NOTE — TELEPHONE ENCOUNTER
Medication Question or Refill        What medication are you calling about (include dose and sig)?:    Disp Refills Start End CHIQUI   blood glucose (NO BRAND SPECIFIED) test strip 100 strip 3 12/29/2022  No   Sig - Route: 1 strip by In Vitro route 4 times daily - In Vitro         Preferred Pharmacy:   [id number] - [place], [place] - [address]  [address] [place] [address]  Phone: [phone] Fax: [phone]      Controlled Substance Agreement on file:   CSA -- Patient Level:    CSA: None found at the patient level.       Who prescribed the medication?:      Do you need a refill? Yes    When did you use the medication last? Unknown     Patient offered an appointment? No    Do you have any questions or concerns?  Yes: Pt requesting for 90 days supply per fill.       Okay to leave a detailed message?: Yes at Home number on file [phone] (home)

## 2023-06-02 NOTE — PROGRESS NOTES
"S:  Hx R humeral fx and cervical spine injury when caring for  A patient.  Has diabetes and feels it is relatively well controlled.  Some liver disease as well.         Patient Active Problem List   Diagnosis     Healthcare maintenance     Type 2 diabetes mellitus with complication, with long-term current use of insulin (H)     Anatomical narrow angle glaucoma     Hyperactivity of bladder     Bilateral low back pain with left-sided sciatica     Callus of foot     Mixed hyperlipidemia     Simple chronic bronchitis (H)     Pulmonary emphysema (H)     Abnormal cervical Papanicolaou smear     Hernia of anterior abdominal wall     Human papilloma virus infection     Nonalcoholic steatohepatitis     Osteopenia     Thrombophlebitis     Botulism food poisoning     Cervical high risk HPV (human papillomavirus) test positive     History of colonic polyps (colonoscopy due 11/2027)     Hyponatremia     Sleep apnea     LPRD (laryngopharyngeal reflux disease)     Lumbar spine pain     RUQ abdominal pain     Tracheal stenosis following tracheostomy (H)     Cirrhosis of liver without ascites, unspecified hepatic cirrhosis type (H)     Nephrogenic diabetes insipidus (H)     Other dysphagia     Gastric polyp     Bipolar 1 disorder, depressed, severe (H)     Cervicalgia            Past Medical History:   Diagnosis Date     Anxiety      Bipolar disorder (H)      Botulism     1980     Cervical high risk HPV (human papillomavirus) test positive      Colon polyps      Depression      Diabetes mellitus (H)      Diabetes type 2, controlled (H)      Gallstones      History of tracheostomy 1/5/2021     Hyperlipidemia      Hyperlipidemia      Hypertension      Hypertension      Low back pain with left-sided sciatica      Pneumonia      Urinary incontinence             Past Surgical History:   Procedure Laterality Date     ABDOMINOPLASTY       EYE SURGERY      bilateral with flap     HAND SURGERY Left     \"chipped off bone\"      trachestomy   "     TUBAL LIGATION              Social History     Tobacco Use     Smoking status: Never     Passive exposure: Never     Smokeless tobacco: Never   Vaping Use     Vaping status: Never Used   Substance Use Topics     Alcohol use: Not Currently            Family History   Problem Relation Age of Onset     Other - See Comments Mother         severe edema in leg, millroid's disease     Cerebrovascular Disease Father      No Known Problems Maternal Grandmother      No Known Problems Maternal Grandfather      Cerebrovascular Disease Other      Lung Cancer Half-Sister         smoking related     Myocardial Infarction Paternal Half-Brother      Esophageal Cancer Sister      Coronary Artery Disease Brother                Allergies   Allergen Reactions     Desmopressin Swelling     LE         Estrogens Other (See Comments)     Hydrochlorothiazide Other (See Comments) and Unknown       Patient reports can't take this med due to increased urine output       Hydrocodone-Acetaminophen        Justin change       Lithium Diarrhea         Causing DI       Penicillins Unknown     Has tolerated amoxicillin      Risperidone      Shellfish-Derived Products      Aromatic Inhalants Rash     Latex Other (See Comments) and Rash     rash       Niacin Rash     Qvar [Beclomethasone] Rash     Valacyclovir Rash            Current Outpatient Medications   Medication Sig Dispense Refill     acetaminophen (TYLENOL) 500 MG tablet Take 500 mg by mouth 2 times daily Scheduled.       albuterol (PROAIR HFA/PROVENTIL HFA/VENTOLIN HFA) 108 (90 Base) MCG/ACT inhaler Inhale 2 puffs into the lungs every 4 hours as needed for shortness of breath or wheezing 18 g 10     aspirin (ASA) 81 MG chewable tablet Take 81 mg by mouth daily       BD GERARDO U/F 32G X 4 MM insulin pen needle Use as directed 100 each 3     Biotin 10 MG CAPS Take 1 capsule by mouth daily       blood glucose (NO BRAND SPECIFIED) lancets standard Use to test blood sugar 3 times daily or as  directed. 3 each 3     blood glucose (NO BRAND SPECIFIED) test strip 1 strip by In Vitro route 4 times daily 100 strip 3     Calcium Carbonate-Vitamin D 500-5 MG-MCG TABS Take 2 tablets by mouth daily       empagliflozin (JARDIANCE) 25 MG TABS tablet Take 1 tablet (25 mg) by mouth daily 90 tablet 1     fluticasone (FLONASE) 50 MCG/ACT nasal spray SHAKE LIQUID AND USE 2 SPRAYS IN EACH NOSTRIL DAILY AS NEEDED FOR RHINITIS OR ALLERGIES Strength: 50 MCG/ACT 48 g 0     insulin aspart (NOVOLOG PEN) 100 UNIT/ML pen Inject 30 Units Subcutaneous 3 times daily (before meals) 105 units per day, 7 boxes every 3 months 90 mL 3     insulin glargine U-300 (TOUJEO MAX SOLOSTAR) 300 UNIT/ML (2 units dial) pen Inject 74 Units Subcutaneous At Bedtime       ketoconazole (NIZORAL) 2 % external cream Twice a day for two weeks 30 g 1     lithium (ESKALITH CR/LITHOBID) 450 MG CR tablet TAKE 1 TABLET(450 MG) BY MOUTH TWICE DAILY 60 tablet 1     losartan (COZAAR) 50 MG tablet Take 1 tablet (50 mg) by mouth daily 90 tablet 0     magnesium oxide 400 MG CAPS Take 400 mg by mouth daily       montelukast (SINGULAIR) 10 MG tablet Take 1 tablet (10 mg) by mouth At Bedtime 90 tablet 3     multivitamin (ONE-DAILY) tablet Take 1 tablet by mouth daily       mupirocin (BACTROBAN) 2 % external ointment Apply topically 3 times daily 15 g 0     nystatin (MYCOSTATIN) 324744 UNIT/GM external ointment Apply topically 2 times daily Apply to affected areas only (right upper arm) 30 g 1     omeprazole (PRILOSEC) 40 MG DR capsule Take 1 capsule (40 mg) by mouth daily 90 capsule 1     polyethylene glycol (MIRALAX) 17 g packet Take 17 g by mouth daily 100 each 3     rosuvastatin (CRESTOR) 10 MG tablet Take 1 tablet (10 mg) by mouth daily 90 tablet 3     SENNA-docusate sodium (SENNA S) 8.6-50 MG tablet Take 1-2 tablets by mouth 2 times daily as needed (constipation) 360 tablet 3     sodium chloride (OCEAN) 0.65 % nasal spray Spray 2 sprays in nostril daily as needed  for congestion 480 mL 1     terbinafine (LAMISIL) 1 % external cream Apply topically 2 times daily 60 g 1     tiotropium-olodaterol 2.5-2.5 MCG/ACT AERS Inhale 2 puffs into the lungs daily 12 g 3     tirzepatide (MOUNJARO) 7.5 MG/0.5ML pen Inject 7.5 mg Subcutaneous every 7 days 2 mL 1     vitamin D3 (CHOLECALCIFEROL) 50 mcg (2000 units) tablet TAKE 1 TABLET BY MOUTH EVERY DAY 90 tablet 3          Review Of Systems  Skin: negative  Eyes: negative  Ears/Nose/Throat: negative  Respiratory: No shortness of breath, dyspnea on exertion, cough, or hemoptysis    O: Physical Exam:  No motion or pain to palpation at R humeral diaphyseal fx site.  Radial/median/ulnar nerve function intact.  Cervical spine motion adequate but some tenderness to palpation R C4-5, 5-6 facets.      Lab:    Images:2 views right humerus radiographs 6/2/2023 12:56 PM     History: Closed displaced oblique fracture of shaft of right humerus     Comparison: 12/6/2022     Findings:     AP and lateral views of the right humerus were obtained.      Since 12/2022, further healing of the mid humeral diaphysis fracture  with stable alignment/deformity      Shoulder and elbow joints are incompletely assessed but grossly  congruent. No substantial degenerative change in shoulder or elbow.  Mild-to-moderate acromioclavicular joint degenerative change.     Soft tissue is unremarkable.                                                                      Impression: Since 12/2022, further healing of the mid humeral  diaphysis fracture with stable alignment/deformity.            A:  Well healed R humeral diaphyseal fracture without neural issues, cervical spondylosis    P:  Continue to work on rehab R arm, including violin  PT for Cervical spondylosis with traction and suboccipital release if indicated, manual traction with neck flexed 30 degrees.  DEXA scan.  Discussed ca and vit D supplementation.  See back after study.           In addition to the above  assessment and plan each active problem on Annel's problem list was evaluated today. This included the questioning of Annel for any medication problems. We will continue the current treatment plan for these active problems except as noted.

## 2023-06-05 ENCOUNTER — TELEPHONE (OUTPATIENT)
Dept: FAMILY MEDICINE | Facility: CLINIC | Age: 68
End: 2023-06-05
Payer: COMMERCIAL

## 2023-06-05 NOTE — TELEPHONE ENCOUNTER
General Call      Reason for Call:  Medication questions    What are your questions or concerns:  Pt stated that her ortho  is having her taking vitamin D, dose between 2,000 to 6,000 per day. Pt want to know if this is necessary and want pharmacist input on this. Please follow back up with pt at your earliest convenience to advise. Thank you.           Okay to leave a detailed message?: Yes at Home number on file [phone] (home)

## 2023-06-05 NOTE — TELEPHONE ENCOUNTER
Patient called Angelica plata back and lvm. Patient states in the mesage not an emergency and would be like message returned by Bradley.    Clementina Solorio, PharmD  Medication Therapy Management Pharmacist

## 2023-06-06 ENCOUNTER — TRANSFERRED RECORDS (OUTPATIENT)
Dept: HEALTH INFORMATION MANAGEMENT | Facility: CLINIC | Age: 68
End: 2023-06-06
Payer: COMMERCIAL

## 2023-06-07 NOTE — TELEPHONE ENCOUNTER
Patient already prescribed Vitamin D3 2000 units daily.     Reviewed that 50 mg = 2000 units.   Last levels 43 in April is WNL.     Continue current dose.

## 2023-06-12 ENCOUNTER — LAB (OUTPATIENT)
Dept: LAB | Facility: CLINIC | Age: 68
End: 2023-06-12
Payer: COMMERCIAL

## 2023-06-12 DIAGNOSIS — K74.60 LIVER CIRRHOSIS SECONDARY TO NASH (H): ICD-10-CM

## 2023-06-12 DIAGNOSIS — K75.81 LIVER CIRRHOSIS SECONDARY TO NASH (H): ICD-10-CM

## 2023-06-12 LAB
AFP SERPL-MCNC: 2.1 NG/ML
ALBUMIN SERPL BCG-MCNC: 4.7 G/DL (ref 3.5–5.2)
ALP SERPL-CCNC: 111 U/L (ref 35–104)
ALT SERPL W P-5'-P-CCNC: 43 U/L (ref 10–35)
AST SERPL W P-5'-P-CCNC: 41 U/L (ref 10–35)
BILIRUB DIRECT SERPL-MCNC: <0.2 MG/DL (ref 0–0.3)
BILIRUB SERPL-MCNC: 0.4 MG/DL
CREAT SERPL-MCNC: 0.56 MG/DL (ref 0.51–0.95)
ERYTHROCYTE [DISTWIDTH] IN BLOOD BY AUTOMATED COUNT: 13.5 % (ref 10–15)
GFR SERPL CREATININE-BSD FRML MDRD: >90 ML/MIN/1.73M2
HCT VFR BLD AUTO: 42.6 % (ref 35–47)
HGB BLD-MCNC: 13.6 G/DL (ref 11.7–15.7)
MCH RBC QN AUTO: 31.5 PG (ref 26.5–33)
MCHC RBC AUTO-ENTMCNC: 31.9 G/DL (ref 31.5–36.5)
MCV RBC AUTO: 99 FL (ref 78–100)
PLATELET # BLD AUTO: 220 10E3/UL (ref 150–450)
PROT SERPL-MCNC: 7.7 G/DL (ref 6.4–8.3)
RBC # BLD AUTO: 4.32 10E6/UL (ref 3.8–5.2)
WBC # BLD AUTO: 6.2 10E3/UL (ref 4–11)

## 2023-06-12 PROCEDURE — 36415 COLL VENOUS BLD VENIPUNCTURE: CPT

## 2023-06-12 PROCEDURE — 85027 COMPLETE CBC AUTOMATED: CPT

## 2023-06-12 PROCEDURE — 85610 PROTHROMBIN TIME: CPT

## 2023-06-12 PROCEDURE — 82105 ALPHA-FETOPROTEIN SERUM: CPT

## 2023-06-12 PROCEDURE — 80053 COMPREHEN METABOLIC PANEL: CPT

## 2023-06-12 PROCEDURE — 82248 BILIRUBIN DIRECT: CPT

## 2023-06-13 ENCOUNTER — MEDICAL CORRESPONDENCE (OUTPATIENT)
Dept: HEALTH INFORMATION MANAGEMENT | Facility: CLINIC | Age: 68
End: 2023-06-13
Payer: COMMERCIAL

## 2023-06-13 LAB — INR PPP: 1.01 (ref 0.85–1.15)

## 2023-06-14 ENCOUNTER — VIRTUAL VISIT (OUTPATIENT)
Dept: FAMILY MEDICINE | Facility: CLINIC | Age: 68
End: 2023-06-14
Payer: COMMERCIAL

## 2023-06-14 DIAGNOSIS — M71.339 SYNOVIAL CYST OF WRIST: ICD-10-CM

## 2023-06-14 DIAGNOSIS — E11.8 TYPE 2 DIABETES MELLITUS WITH COMPLICATION, WITH LONG-TERM CURRENT USE OF INSULIN (H): ICD-10-CM

## 2023-06-14 DIAGNOSIS — E87.1 HYPONATREMIA: Primary | ICD-10-CM

## 2023-06-14 DIAGNOSIS — B37.31 CANDIDIASIS OF VAGINA: ICD-10-CM

## 2023-06-14 DIAGNOSIS — Z79.4 TYPE 2 DIABETES MELLITUS WITH COMPLICATION, WITH LONG-TERM CURRENT USE OF INSULIN (H): ICD-10-CM

## 2023-06-14 LAB
ANION GAP SERPL CALCULATED.3IONS-SCNC: 18 MMOL/L (ref 7–15)
BUN SERPL-MCNC: 11.5 MG/DL (ref 8–23)
CALCIUM SERPL-MCNC: 9.7 MG/DL (ref 8.8–10.2)
CHLORIDE SERPL-SCNC: 105 MMOL/L (ref 98–107)
CREAT SERPL-MCNC: 0.59 MG/DL (ref 0.51–0.95)
DEPRECATED HCO3 PLAS-SCNC: 17 MMOL/L (ref 22–29)
GFR SERPL CREATININE-BSD FRML MDRD: >90 ML/MIN/1.73M2
GLUCOSE SERPL-MCNC: 176 MG/DL (ref 70–99)
POTASSIUM SERPL-SCNC: 4.5 MMOL/L (ref 3.4–5.3)
SODIUM SERPL-SCNC: 140 MMOL/L (ref 136–145)

## 2023-06-14 PROCEDURE — 99441 PR PHYSICIAN TELEPHONE EVALUATION 5-10 MIN: CPT | Mod: 95 | Performed by: FAMILY MEDICINE

## 2023-06-14 RX ORDER — FLUCONAZOLE 150 MG/1
150 TABLET ORAL ONCE
Qty: 1 TABLET | Refills: 0 | Status: SHIPPED | OUTPATIENT
Start: 2023-06-14 | End: 2023-06-14

## 2023-06-14 RX ORDER — INSULIN GLARGINE 300 U/ML
74 INJECTION, SOLUTION SUBCUTANEOUS AT BEDTIME
Qty: 18 ML | Refills: 3 | Status: SHIPPED | OUTPATIENT
Start: 2023-06-14 | End: 2023-12-26

## 2023-06-14 NOTE — LETTER
June 19, 2023      Elizabeth Stoddard  [address]  [place] [address]        Dear ,    We are writing to inform you of your test results.    Tests look okay, Annel    Resulted Orders   Basic metabolic panel  (Ca, Cl, CO2, Creat, Gluc, K, Na, BUN)   Result Value Ref Range    Sodium 140 136 - 145 mmol/L    Potassium 4.5 3.4 - 5.3 mmol/L    Chloride 105 98 - 107 mmol/L    Carbon Dioxide (CO2) 17 (L) 22 - 29 mmol/L    Anion Gap 18 (H) 7 - 15 mmol/L    Urea Nitrogen 11.5 8.0 - 23.0 mg/dL    Creatinine 0.59 0.51 - 0.95 mg/dL    Calcium 9.7 8.8 - 10.2 mg/dL    Glucose 176 (H) 70 - 99 mg/dL    GFR Estimate >90 >60 mL/min/1.73m2      Comment:      eGFR calculated using 2021 CKD-EPI equation.  eGFR calculated using 2021 CKD-EPI equation.       If you have any questions or concerns, please call the clinic at the number listed above.       Sincerely,      Gerald Strange MD

## 2023-06-14 NOTE — PROGRESS NOTES
Elizabeth is a 67 year old who is being evaluated via a billable telephone visit.      What phone number would you like to be contacted at? [phone]  How would you like to obtain your AVS? Mail a copy    Distant Location (provider location):  On-site      Subjective   Elizabeth is a 67 year old, presenting for the following health issues:  Toe Injury (Improved- right foot), Wrist Problem (Cyst- no pain just there), Neck Problem (Going to  in [place]- in the next few weeks), and Vaginal Itching        6/14/2023     5:08 PM   Additional Questions   Roomed by Velia SANDERS         6/14/2023     5:08 PM   Patient Reported Additional Medications   Patient reports taking the following new medications nebulizer   1. Hyponatremia  History of this, apparently GI doctor had wished to recheck sodium level last visit and it was not done-able to add this to recent labs  - Basic metabolic panel  (Ca, Cl, CO2, Creat, Gluc, K, Na, BUN)    2. Type 2 diabetes mellitus with complication, with long-term current use of insulin (H)  Needs renewal of Toujeo, recent A1c was 6.9  - insulin glargine U-300 (TOUJEO MAX SOLOSTAR) 300 UNIT/ML (2 units dial) pen; Inject 74 Units Subcutaneous At Bedtime  Dispense: 18 mL; Refill: 3    3. Candidiasis of vagina  Treating with topical external cream, no discharge.  Nonetheless, probably in incompletely treated vaginal yeast infection, oral fluconazole  - fluconazole (DIFLUCAN) 150 MG tablet; Take 1 tablet (150 mg) by mouth once for 1 dose  Dispense: 1 tablet; Refill: 0    4. Synovial cyst of wrist  And has had synovial cyst in the past, wishes to watch this 1, I certainly agree     Phone call duration: 8 minutes

## 2023-06-16 ENCOUNTER — TELEPHONE (OUTPATIENT)
Dept: FAMILY MEDICINE | Facility: CLINIC | Age: 68
End: 2023-06-16
Payer: COMMERCIAL

## 2023-06-16 NOTE — TELEPHONE ENCOUNTER
Test Results    Contacts       Type Contact Phone/Fax    06/16/2023 01:14 PM CDT Phone (Incoming) Elizabeth Stoddard (Self) [phone] (M)          Who ordered the test:  Dr Strange    Type of test: Lab, BMP    Date of test:  6/12/23    Where was the test performed:  Dr Strange    What are your questions/concerns?:  What was sodium level elevated?    Okay to leave a detailed message?: Yes at Cell number on file:    Telephone Information:   Mobile [phone]     Message sent to Dr Aayush ANTONIO as Dr Strange out of clinic

## 2023-06-19 ENCOUNTER — TELEPHONE (OUTPATIENT)
Dept: FAMILY MEDICINE | Facility: CLINIC | Age: 68
End: 2023-06-19
Payer: COMMERCIAL

## 2023-06-19 DIAGNOSIS — B37.31 CANDIDIASIS OF VAGINA: Primary | ICD-10-CM

## 2023-06-19 RX ORDER — FLUCONAZOLE 150 MG/1
150 TABLET ORAL ONCE
Qty: 1 TABLET | Refills: 0 | Status: SHIPPED | OUTPATIENT
Start: 2023-06-19 | End: 2023-06-19

## 2023-06-19 NOTE — TELEPHONE ENCOUNTER
Returned call.     Planning to move to [place] in September. Maybe around 9/13     Plans to switch insurance coverage in August to [place]. Wondering how to manage her Abilify injections for September.     Would be due 7/18 and 8/15 and 9/12.    Discussed that we can still get the July and August dose at [facility]. In august will write for 1 week of oral Abilify to carry patient through the move until she can get her September dose in [place].       Dr Strange gave her 1 dose of Fluconazole. It helped, but she's still having some itching. Wondering what to do.

## 2023-06-19 NOTE — TELEPHONE ENCOUNTER
Pt called in to request to speak with Bradley about her moving out of state. Pt states she has an appt on 6/21/23 but needs to speak to bradley before appt. She also states she needs a refill on insulin glargine U-300 (TOUJEO MAX SOLOSTAR) 300 UNIT/ML (2 units dial) pen 74 units in the morning. Please advise.  Thanks!

## 2023-06-20 ENCOUNTER — ALLIED HEALTH/NURSE VISIT (OUTPATIENT)
Dept: FAMILY MEDICINE | Facility: CLINIC | Age: 68
End: 2023-06-20
Payer: COMMERCIAL

## 2023-06-20 DIAGNOSIS — F31.4 BIPOLAR 1 DISORDER, DEPRESSED, SEVERE (H): Primary | ICD-10-CM

## 2023-06-20 PROCEDURE — 96372 THER/PROPH/DIAG INJ SC/IM: CPT | Performed by: FAMILY MEDICINE

## 2023-06-20 PROCEDURE — 99207 PR NO CHARGE NURSE ONLY: CPT

## 2023-06-20 NOTE — PROGRESS NOTES
Clinic Administered Medication Documentation      Injectable Medication Documentation    Given right Ventrogluteal    Is there an active order (written within the past 365 days, with administrations remaining, not ) in the chart? Yes.     Patient was given Abilify. Prior to medication administration, verified patient's identity using patient s name and date of birth. Please see MAR and medication order for additional information. Patient instructed to remain in clinic for 15 minutes and report any adverse reaction to staff immediately.    Vial/Syringe: Single dose vial. Was entire vial of medication used? Yes  Was this medication supplied by the patient? No  Is this a medication the patient will need to receive again? Yes. Verified that the patient has refills remaining in their prescription.    eSnia Rosenberg RN  [facility]
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
## 2023-06-21 ENCOUNTER — OFFICE VISIT (OUTPATIENT)
Dept: PHARMACY | Facility: CLINIC | Age: 68
End: 2023-06-21
Payer: COMMERCIAL

## 2023-06-21 VITALS
SYSTOLIC BLOOD PRESSURE: 121 MMHG | DIASTOLIC BLOOD PRESSURE: 72 MMHG | WEIGHT: 185.25 LBS | BODY MASS INDEX: 33.07 KG/M2 | HEART RATE: 69 BPM

## 2023-06-21 DIAGNOSIS — Z78.9 TAKES DIETARY SUPPLEMENTS: ICD-10-CM

## 2023-06-21 DIAGNOSIS — R06.02 SOB (SHORTNESS OF BREATH): ICD-10-CM

## 2023-06-21 DIAGNOSIS — J31.0 CHRONIC RHINITIS: ICD-10-CM

## 2023-06-21 DIAGNOSIS — E78.2 MIXED HYPERLIPIDEMIA: ICD-10-CM

## 2023-06-21 DIAGNOSIS — E11.8 TYPE 2 DIABETES MELLITUS WITH COMPLICATION, WITH LONG-TERM CURRENT USE OF INSULIN (H): Primary | ICD-10-CM

## 2023-06-21 DIAGNOSIS — K59.00 CONSTIPATION, UNSPECIFIED CONSTIPATION TYPE: ICD-10-CM

## 2023-06-21 DIAGNOSIS — E55.9 VITAMIN D DEFICIENCY: ICD-10-CM

## 2023-06-21 DIAGNOSIS — F31.4 BIPOLAR 1 DISORDER, DEPRESSED, SEVERE (H): ICD-10-CM

## 2023-06-21 DIAGNOSIS — Z79.4 TYPE 2 DIABETES MELLITUS WITH COMPLICATION, WITH LONG-TERM CURRENT USE OF INSULIN (H): Primary | ICD-10-CM

## 2023-06-21 DIAGNOSIS — K75.81 NONALCOHOLIC STEATOHEPATITIS: ICD-10-CM

## 2023-06-21 PROCEDURE — 99607 MTMS BY PHARM ADDL 15 MIN: CPT | Performed by: PHARMACIST

## 2023-06-21 PROCEDURE — 99606 MTMS BY PHARM EST 15 MIN: CPT | Performed by: PHARMACIST

## 2023-06-21 RX ORDER — TIRZEPATIDE 10 MG/.5ML
10 INJECTION, SOLUTION SUBCUTANEOUS
Qty: 2 ML | Refills: 0 | Status: SHIPPED | OUTPATIENT
Start: 2023-06-21 | End: 2023-06-30

## 2023-06-21 NOTE — PATIENT INSTRUCTIONS
"Recommendations from today's MTM visit:                                                         Increase Mounjaro to 10 mg once weekly.   After increasing Mounjaro, for every 3 episodes of low blood sugars (anything below 80), decrease Toujeo by 2 units. Continue decreasing after every 3 episodes until no longer having lows.   In the near future (once stable on Mounjaro), we will discuss adjusting insulin doses to reset to 50:50 basal:bolus. [place] may not have Tresiba and Novolog. Other options in place of Tresiba include glargine (brand name Lantus or Basaglar). Options in place of Novolog may include Humalog.     Follow-up: Return in about 4 weeks (around 7/19/2023) for Medication Management Pharmacist, in person.    It was great speaking with you today.  I value your experience and would be very thankful for your time in providing feedback in our clinic survey. In the next few days, you may receive an email or text message from [employer] with a link to a survey related to your  clinical pharmacist.\"     To schedule another MTM appointment, please call the clinic directly or you may call the MTM scheduling line at [phone] or toll-free at [phone].     My Clinical Pharmacist's contact information:                                                      Please feel free to contact me with any questions or concerns you have.      Bradley Liao, PharmD  Medication Therapy Management (MTM) Pharmacist  [facility]      "
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
## 2023-06-26 ENCOUNTER — TELEPHONE (OUTPATIENT)
Dept: PHARMACY | Facility: CLINIC | Age: 68
End: 2023-06-26

## 2023-06-26 ENCOUNTER — THERAPY VISIT (OUTPATIENT)
Dept: PHYSICAL THERAPY | Facility: CLINIC | Age: 68
End: 2023-06-26
Attending: FAMILY MEDICINE
Payer: COMMERCIAL

## 2023-06-26 DIAGNOSIS — M54.2 CERVICALGIA: ICD-10-CM

## 2023-06-26 DIAGNOSIS — M85.80 OSTEOPENIA, UNSPECIFIED LOCATION: ICD-10-CM

## 2023-06-26 PROCEDURE — 97161 PT EVAL LOW COMPLEX 20 MIN: CPT | Mod: GP

## 2023-06-26 PROCEDURE — 97110 THERAPEUTIC EXERCISES: CPT | Mod: GP

## 2023-06-26 RX ORDER — CHOLECALCIFEROL (VITAMIN D3) 50 MCG
1 TABLET ORAL DAILY
Qty: 90 TABLET | Refills: 3 | Status: SHIPPED | OUTPATIENT
Start: 2023-06-26 | End: 2023-07-19

## 2023-06-26 NOTE — TELEPHONE ENCOUNTER
Medication Question or Refill        What medication are you calling about (include dose and sig)?:    Disp Refills Start End CHIQUI   tirzepatide (MOUNJARO) 10 MG/0.5ML pen 2 mL 0 6/21/2023  No   Sig - Route: Inject 10 mg Subcutaneous every 7 days - Subcutaneous         Preferred Pharmacy:   [id number] - [place], [place] - [address] AT [address]   [address] [place] [address]  Phone: [phone] Fax: [phone]      Controlled Substance Agreement on file:   CSA -- Patient Level:    CSA: None found at the patient level.         Do you have any questions or concerns?  Yes: Pt called and states that pharmacy haven't got med in stock yet. Pt requesting call back from pharmacist to discuss this concerns of not being able to get med. Please follow back up with pt at your earliest convenience. Thank you.       Okay to leave a detailed message?: Yes at Home number on file [phone] (home)

## 2023-06-26 NOTE — TELEPHONE ENCOUNTER
Den did not say when they would get the higher dose of Mounjaro. She still has 1 pen of 7.5 that she will use on Friday.     If she doesn't hear from [facility] by next Monday (7/3), she will call back to PharmD and we can send a prescription to a different pharmacy.

## 2023-06-28 DIAGNOSIS — E11.8 TYPE 2 DIABETES MELLITUS WITH COMPLICATION, WITH LONG-TERM CURRENT USE OF INSULIN (H): Primary | ICD-10-CM

## 2023-06-28 DIAGNOSIS — Z79.4 TYPE 2 DIABETES MELLITUS WITH COMPLICATION, WITH LONG-TERM CURRENT USE OF INSULIN (H): Primary | ICD-10-CM

## 2023-06-28 RX ORDER — TIRZEPATIDE 7.5 MG/.5ML
7.5 INJECTION, SOLUTION SUBCUTANEOUS
Qty: 2 ML | Refills: 3 | Status: SHIPPED | OUTPATIENT
Start: 2023-06-28 | End: 2023-07-19

## 2023-06-28 NOTE — TELEPHONE ENCOUNTER
Mounjaro 10mg/0.5ML PF Pen Inj    Is on long term back order- highest strength available at this time is 7.5mg

## 2023-06-30 ENCOUNTER — TELEPHONE (OUTPATIENT)
Dept: FAMILY MEDICINE | Facility: CLINIC | Age: 68
End: 2023-06-30
Payer: COMMERCIAL

## 2023-06-30 RX ORDER — TIRZEPATIDE 10 MG/.5ML
10 INJECTION, SOLUTION SUBCUTANEOUS
Qty: 2 ML | Refills: 0 | Status: SHIPPED | OUTPATIENT
Start: 2023-06-30 | End: 2023-07-19

## 2023-06-30 NOTE — TELEPHONE ENCOUNTER
Pt called this morning stating that her pharmacy is out of the Mounjaro 10mg/0.5ML pen and asked if a new Rx could be sent someone else. She's not completely sure where they might have it. She said maybe [facility] [place] [address]. Pt asked if Bradley could give her a call. Please advise

## 2023-07-02 DIAGNOSIS — Z79.4 TYPE 2 DIABETES MELLITUS WITH COMPLICATION, WITH LONG-TERM CURRENT USE OF INSULIN (H): ICD-10-CM

## 2023-07-02 DIAGNOSIS — F31.4 BIPOLAR 1 DISORDER, DEPRESSED, SEVERE (H): ICD-10-CM

## 2023-07-02 DIAGNOSIS — E11.8 TYPE 2 DIABETES MELLITUS WITH COMPLICATION, WITH LONG-TERM CURRENT USE OF INSULIN (H): ICD-10-CM

## 2023-07-03 ENCOUNTER — NURSE TRIAGE (OUTPATIENT)
Dept: FAMILY MEDICINE | Facility: CLINIC | Age: 68
End: 2023-07-03

## 2023-07-03 DIAGNOSIS — Z79.4 TYPE 2 DIABETES MELLITUS WITH COMPLICATION, WITH LONG-TERM CURRENT USE OF INSULIN (H): Primary | ICD-10-CM

## 2023-07-03 DIAGNOSIS — E11.8 TYPE 2 DIABETES MELLITUS WITH COMPLICATION, WITH LONG-TERM CURRENT USE OF INSULIN (H): Primary | ICD-10-CM

## 2023-07-03 RX ORDER — LITHIUM CARBONATE 450 MG
TABLET, EXTENDED RELEASE ORAL
Qty: 60 TABLET | Refills: 1 | Status: SHIPPED | OUTPATIENT
Start: 2023-07-03 | End: 2023-08-28

## 2023-07-03 NOTE — TELEPHONE ENCOUNTER
t calling. Pharmacy notified Pt that the medication could take up to a month before they can get it for her. Rx is not available currently. Pt is wondering how PCP would like to to proceed.

## 2023-07-03 NOTE — TELEPHONE ENCOUNTER
Returned patient's call but she was unable to talk at this time. She asked that Bradley call her back on Wednesday late afternoon as she has an appointment at the [place] in the morning.    Peggy Luis, Pharm.D, BCACP  Medication Therapy Management Pharmacist

## 2023-07-03 NOTE — TELEPHONE ENCOUNTER
Pt calling to request medications. Pharmacy already called to request. Pt is also asking if PCP can please advise, Pt is still having vaginal itching. She is not gale eif she should wait for her appt on 08/01/23 or if PCP can prescribe something.

## 2023-07-03 NOTE — TELEPHONE ENCOUNTER
Pt called in to request to speak to Bradley. Please give her a call back at your earliest convenient.   Thanks!.

## 2023-07-05 ENCOUNTER — THERAPY VISIT (OUTPATIENT)
Dept: PHYSICAL THERAPY | Facility: CLINIC | Age: 68
End: 2023-07-05
Attending: FAMILY MEDICINE
Payer: COMMERCIAL

## 2023-07-05 ENCOUNTER — ANCILLARY PROCEDURE (OUTPATIENT)
Dept: BONE DENSITY | Facility: CLINIC | Age: 68
End: 2023-07-05
Attending: ORTHOPAEDIC SURGERY
Payer: COMMERCIAL

## 2023-07-05 DIAGNOSIS — M54.2 CERVICALGIA: Primary | ICD-10-CM

## 2023-07-05 DIAGNOSIS — S42.331S CLOSED DISPLACED OBLIQUE FRACTURE OF SHAFT OF RIGHT HUMERUS, SEQUELA: ICD-10-CM

## 2023-07-05 PROCEDURE — 97112 NEUROMUSCULAR REEDUCATION: CPT | Mod: GP | Performed by: PHYSICAL THERAPIST

## 2023-07-05 PROCEDURE — 97110 THERAPEUTIC EXERCISES: CPT | Mod: GP | Performed by: PHYSICAL THERAPIST

## 2023-07-05 PROCEDURE — 77080 DXA BONE DENSITY AXIAL: CPT | Performed by: INTERNAL MEDICINE

## 2023-07-05 RX ORDER — TIRZEPATIDE 10 MG/.5ML
10 INJECTION, SOLUTION SUBCUTANEOUS
Qty: 2 ML | Refills: 0 | Status: SHIPPED | OUTPATIENT
Start: 2023-07-05 | End: 2023-07-19

## 2023-07-05 NOTE — TELEPHONE ENCOUNTER
Pt calling, asking Bradley to call back regarding med for mounjaro. Pt has not been able to take medication since it is on back order. Pt is worried.     Pt also waiting for call from PCP about vaginal itch. Wondering if PCP can prescribe or advise, or wait until appt on 08/01/2023.

## 2023-07-05 NOTE — TELEPHONE ENCOUNTER
Unable to get Mounaro 10 mg from [facility] or [facility].     Will trial [facility]. Provided phone number.      Awaiting PCP input on ongoing vaginal itching.

## 2023-07-06 NOTE — TELEPHONE ENCOUNTER
"Received call from patient following up on her vaginal itching.     This has been ongoing for 2 months per patient. Has been treated with 2 round of fluconazole and topical ketoconazole cream. While her symptoms did resolve after each round of fluconazole, they have since redeveloped. Endorses itching to labia with occasional white discharge. Requesting to be seen by female provider to reassess as current treatment regimen is failing.     Scheduled with Denise next week on 7/13/23 as patient's schedule and provider preferences would allow. Patient declined walk-in clinic or UC to be seen sooner. Will call back if symptoms become severe prior to appointment.     Reason for Disposition    Vaginal itching and not improved > 3 days following CARE ADVICE    Additional Information    Negative: MILD-MODERATE pain and present > 24 hours  (Exception: Chronic pain.)    Negative: Genital area looks infected (e.g., draining sore, spreading redness)    Negative: Rash with painful tiny water blisters    Negative: MODERATE-SEVERE itching (i.e., interferes with school, work, or sleep)    Negative: Rash (e.g., redness, tiny bumps, sore) of genital area and present > 24 hours    Negative: Tender lump (swelling or \"ball\") at vaginal opening    Negative: SEVERE pain and not improved 2 hours after pain medicine    Negative: Genital area looks infected (e.g., draining sore, spreading redness) and fever    Negative: Something is hanging out of the vagina and can't easily be pushed back inside    Negative: Patient sounds very sick or weak to the triager    Protocols used: VAGINAL SYMPTOMS-A-OH      "

## 2023-07-07 ENCOUNTER — TELEPHONE (OUTPATIENT)
Dept: FAMILY MEDICINE | Facility: CLINIC | Age: 68
End: 2023-07-07
Payer: COMMERCIAL

## 2023-07-07 NOTE — TELEPHONE ENCOUNTER
Pt called, she stated that where she lives, the  is unable to sign for her medications and medications going forward should be sent to [facility].  Thanks!

## 2023-07-12 ENCOUNTER — THERAPY VISIT (OUTPATIENT)
Dept: PHYSICAL THERAPY | Facility: CLINIC | Age: 68
End: 2023-07-12
Attending: FAMILY MEDICINE
Payer: COMMERCIAL

## 2023-07-12 DIAGNOSIS — M54.2 CERVICALGIA: Primary | ICD-10-CM

## 2023-07-12 PROCEDURE — 97112 NEUROMUSCULAR REEDUCATION: CPT | Mod: GP

## 2023-07-12 PROCEDURE — 97140 MANUAL THERAPY 1/> REGIONS: CPT | Mod: GP

## 2023-07-13 ENCOUNTER — OFFICE VISIT (OUTPATIENT)
Dept: FAMILY MEDICINE | Facility: CLINIC | Age: 68
End: 2023-07-13
Payer: COMMERCIAL

## 2023-07-13 VITALS
HEART RATE: 67 BPM | HEIGHT: 63 IN | WEIGHT: 182 LBS | RESPIRATION RATE: 18 BRPM | SYSTOLIC BLOOD PRESSURE: 111 MMHG | BODY MASS INDEX: 32.25 KG/M2 | DIASTOLIC BLOOD PRESSURE: 69 MMHG | OXYGEN SATURATION: 94 % | TEMPERATURE: 98.4 F

## 2023-07-13 DIAGNOSIS — R87.619 ABNORMAL CERVICAL PAPANICOLAOU SMEAR, UNSPECIFIED ABNORMAL PAP FINDING: ICD-10-CM

## 2023-07-13 DIAGNOSIS — Z12.31 ENCOUNTER FOR SCREENING MAMMOGRAM FOR BREAST CANCER: ICD-10-CM

## 2023-07-13 DIAGNOSIS — Z11.3 SCREEN FOR STD (SEXUALLY TRANSMITTED DISEASE): ICD-10-CM

## 2023-07-13 DIAGNOSIS — N89.8 VAGINAL IRRITATION: Primary | ICD-10-CM

## 2023-07-13 LAB
CLUE CELLS: ABNORMAL
T PALLIDUM AB SER QL: NONREACTIVE
TRICHOMONAS, WET PREP: ABNORMAL
WBC'S/HIGH POWER FIELD, WET PREP: ABNORMAL
YEAST, WET PREP: ABNORMAL

## 2023-07-13 PROCEDURE — G0145 SCR C/V CYTO,THINLAYER,RESCR: HCPCS | Performed by: PHYSICIAN ASSISTANT

## 2023-07-13 PROCEDURE — 86780 TREPONEMA PALLIDUM: CPT | Performed by: PHYSICIAN ASSISTANT

## 2023-07-13 PROCEDURE — 86803 HEPATITIS C AB TEST: CPT | Performed by: PHYSICIAN ASSISTANT

## 2023-07-13 PROCEDURE — 87210 SMEAR WET MOUNT SALINE/INK: CPT | Performed by: PHYSICIAN ASSISTANT

## 2023-07-13 PROCEDURE — 36415 COLL VENOUS BLD VENIPUNCTURE: CPT | Performed by: PHYSICIAN ASSISTANT

## 2023-07-13 PROCEDURE — 87389 HIV-1 AG W/HIV-1&-2 AB AG IA: CPT | Performed by: PHYSICIAN ASSISTANT

## 2023-07-13 PROCEDURE — 99213 OFFICE O/P EST LOW 20 MIN: CPT | Performed by: PHYSICIAN ASSISTANT

## 2023-07-13 PROCEDURE — 87591 N.GONORRHOEAE DNA AMP PROB: CPT | Performed by: PHYSICIAN ASSISTANT

## 2023-07-13 PROCEDURE — 87491 CHLMYD TRACH DNA AMP PROBE: CPT | Performed by: PHYSICIAN ASSISTANT

## 2023-07-13 PROCEDURE — 87624 HPV HI-RISK TYP POOLED RSLT: CPT | Performed by: PHYSICIAN ASSISTANT

## 2023-07-13 RX ORDER — FLUCONAZOLE 150 MG/1
TABLET ORAL
Qty: 2 TABLET | Refills: 0 | Status: SHIPPED | OUTPATIENT
Start: 2023-07-13 | End: 2023-07-19

## 2023-07-13 NOTE — PROGRESS NOTES
Subjective:      Annel Stoddard is a 67 year old female with chief complaint of vaginal irritation and other concerns.    1.  Vaginal irritation  Significant vaginal irritation for 3 months.  Worst for the past 2 weeks.  Vaginal area is very itchy and inflamed external vaginal area is very itchy and inflamed.  No discharge.  She was given 2 oral fluconazole pills about a month ago and took both of those.  She says that helped symptoms a little bit, but then they got worse again.  She is on Jardiance and Mounjaro for diabetes.  She has been on these medicines for a while.  She increased Mounjaro dose about 1 week ago.    2.  Follow-up abnormal Pap smear  Colposcopy done on 3/20/2023.  Patient was told she needed to get a follow-up Pap smear in August.  When she called to schedule this appointment,  told her that we could do Pap smear today.  She would like this done.    3.  STD screening  Had a new partner recently.  Does not think she has any infections, would like to check today.      Patient Active Problem List   Diagnosis     Healthcare maintenance     Type 2 diabetes mellitus with complication, with long-term current use of insulin (H)     Anatomical narrow angle glaucoma     Hyperactivity of bladder     Bilateral low back pain with left-sided sciatica     Callus of foot     Mixed hyperlipidemia     Simple chronic bronchitis (H)     Pulmonary emphysema (H)     Abnormal cervical Papanicolaou smear     Hernia of anterior abdominal wall     Human papilloma virus infection     Nonalcoholic steatohepatitis     Osteopenia     Thrombophlebitis     Botulism food poisoning     Cervical high risk HPV (human papillomavirus) test positive     History of colonic polyps (colonoscopy due 11/2027)     Hyponatremia     Sleep apnea     LPRD (laryngopharyngeal reflux disease)     Lumbar spine pain     RUQ abdominal pain     Tracheal stenosis following tracheostomy (H)     Cirrhosis of liver without ascites,  unspecified hepatic cirrhosis type (H)     Nephrogenic diabetes insipidus (H)     Other dysphagia     Gastric polyp     Bipolar 1 disorder, depressed, severe (H)     Cervicalgia        Current Outpatient Medications:      acetaminophen (TYLENOL) 500 MG tablet, Take 500 mg by mouth 2 times daily Scheduled., Disp: , Rfl:      aspirin (ASA) 81 MG chewable tablet, Take 81 mg by mouth daily, Disp: , Rfl:      BD GERARDO U/F 32G X 4 MM insulin pen needle, Use as directed, Disp: 100 each, Rfl: 3     Biotin 10 MG CAPS, Take 1 capsule by mouth daily, Disp: , Rfl:      blood glucose (NO BRAND SPECIFIED) lancets standard, Use to test blood sugar 3 times daily or as directed., Disp: 3 each, Rfl: 3     blood glucose (NO BRAND SPECIFIED) test strip, 1 strip by In Vitro route 4 times daily, Disp: 400 strip, Rfl: 1     Calcium Carbonate-Vitamin D 500-5 MG-MCG TABS, Take 2 tablets by mouth daily, Disp: , Rfl:      empagliflozin (JARDIANCE) 25 MG TABS tablet, Take 1 tablet (25 mg) by mouth daily, Disp: 90 tablet, Rfl: 1     fluticasone (FLONASE) 50 MCG/ACT nasal spray, SHAKE LIQUID AND USE 2 SPRAYS IN EACH NOSTRIL DAILY AS NEEDED FOR RHINITIS OR ALLERGIES Strength: 50 MCG/ACT (Patient taking differently: Spray 2 sprays into both nostrils every evening), Disp: 48 g, Rfl: 0     furosemide (LASIX) 20 MG tablet, Take 30 mg by mouth daily at 2 pm, Disp: , Rfl:      insulin aspart (NOVOLOG PEN) 100 UNIT/ML pen, Inject 30 Units Subcutaneous 3 times daily (before meals) 105 units per day, 7 boxes every 3 months, Disp: 90 mL, Rfl: 3     insulin glargine U-300 (TOUJEO MAX SOLOSTAR) 300 UNIT/ML (2 units dial) pen, Inject 74 Units Subcutaneous At Bedtime, Disp: 18 mL, Rfl: 3     ketoconazole (NIZORAL) 2 % external cream, Twice a day for two weeks, Disp: 30 g, Rfl: 1     lithium (ESKALITH CR/LITHOBID) 450 MG CR tablet, TAKE 1 TABLET(450 MG) BY MOUTH TWICE DAILY, Disp: 60 tablet, Rfl: 1     losartan (COZAAR) 50 MG tablet, Take 1 tablet (50 mg) by  mouth daily, Disp: 90 tablet, Rfl: 0     magnesium oxide 400 MG CAPS, Take 400 mg by mouth daily, Disp: , Rfl:      montelukast (SINGULAIR) 10 MG tablet, Take 1 tablet (10 mg) by mouth At Bedtime, Disp: 90 tablet, Rfl: 3     multivitamin (ONE-DAILY) tablet, Take 1 tablet by mouth daily, Disp: , Rfl:      mupirocin (BACTROBAN) 2 % external ointment, Apply topically 3 times daily, Disp: 15 g, Rfl: 0     nystatin (MYCOSTATIN) 253960 UNIT/GM external ointment, Apply topically 2 times daily Apply to affected areas only (right upper arm), Disp: 30 g, Rfl: 1     omeprazole (PRILOSEC) 40 MG DR capsule, Take 1 capsule (40 mg) by mouth daily, Disp: 90 capsule, Rfl: 1     polyethylene glycol (MIRALAX) 17 g packet, Take 17 g by mouth daily, Disp: 100 each, Rfl: 3     rosuvastatin (CRESTOR) 10 MG tablet, Take 1 tablet (10 mg) by mouth daily, Disp: 90 tablet, Rfl: 3     SENNA-docusate sodium (SENNA S) 8.6-50 MG tablet, Take 1-2 tablets by mouth 2 times daily as needed (constipation), Disp: 360 tablet, Rfl: 3     sodium chloride (OCEAN) 0.65 % nasal spray, Spray 2 sprays in nostril daily as needed for congestion, Disp: 480 mL, Rfl: 1     tiotropium-olodaterol 2.5-2.5 MCG/ACT AERS, Inhale 2 puffs into the lungs daily, Disp: 12 g, Rfl: 3     tirzepatide (MOUNJARO) 10 MG/0.5ML pen, Inject 10 mg Subcutaneous every 7 days, Disp: 2 mL, Rfl: 0     vitamin D3 (CHOLECALCIFEROL) 50 mcg (2000 units) tablet, Take 1 tablet (50 mcg) by mouth daily, Disp: 90 tablet, Rfl: 3     albuterol (PROAIR HFA/PROVENTIL HFA/VENTOLIN HFA) 108 (90 Base) MCG/ACT inhaler, Inhale 2 puffs into the lungs every 4 hours as needed for shortness of breath or wheezing (Patient not taking: Reported on 7/13/2023), Disp: 18 g, Rfl: 10     terbinafine (LAMISIL) 1 % external cream, Apply topically 2 times daily (Patient not taking: Reported on 7/13/2023), Disp: 60 g, Rfl: 1     tirzepatide (MOUNJARO) 10 MG/0.5ML pen, Inject 10 mg Subcutaneous every 7 days (Patient not  "taking: Reported on 7/13/2023), Disp: 2 mL, Rfl: 0     tirzepatide (MOUNJARO) 7.5 MG/0.5ML pen, Inject 7.5 mg Subcutaneous every 7 days (Patient not taking: Reported on 7/13/2023), Disp: 2 mL, Rfl: 3    Current Facility-Administered Medications:      ARIPiprazole ER (ABILIFY MAINTENA) extended release inj syringe 400 mg, 400 mg, Intramuscular, Q28 Days, Alexus Cherry MD, 400 mg at 06/20/23 0850      Objective:     Allergies:  Desmopressin, Estrogens, Hydrochlorothiazide, Hydrocodone-acetaminophen, Lithium, Penicillins, Risperidone, Shellfish-derived products, Aromatic inhalants, Latex, Niacin, Qvar [beclomethasone], and Valacyclovir    /69 (BP Location: Left arm, Patient Position: Sitting, Cuff Size: Adult Regular)   Pulse 67   Temp 98.4  F (36.9  C) (Temporal)   Resp 18   Ht 1.59 m (5' 2.6\")   Wt 82.6 kg (182 lb)   LMP  (LMP Unknown)   SpO2 94%   BMI 32.66 kg/m    Body mass index is 32.66 kg/m .    General: Alert and oriented x 3, in no apparent distress  Genitourinary: External genitalia is minimally erythematous diffusely, possibly small amount of edema, no vaginal discharge, vaginal walls are healthy, cervix is fairly well visualized, no discharge    Recent Results (from the past 24 hour(s))   Wet preparation    Collection Time: 07/13/23  2:09 PM    Specimen: Vagina; Swab   Result Value Ref Range    Trichomonas Absent Absent    Yeast Absent Absent    Clue Cells Absent Absent    WBCs/high power field 1+ (A) None     Other labs pending.      Assessment and Plan:     1. Vaginal irritation  Chronic, worsening.  Some external vaginal erythema and irritation.  Would be most consistent with a yeast infection.  Wet prep was negative.  She has already tried 2 doses of oral fluconazole, only helped mildly temporarily.  She does have OTC topical yeast treatment which she is going to start tonight.  We reviewed sometimes this can be a side effect of medication.  I will review this with her " pharmacist.  She is hoping this is not the case, as she wants to keep taking her same diabetic medicines.    2. Abnormal cervical Papanicolaou smear, unspecified abnormal pap finding  Last done on 3/20/2023.  Patient was told she should be seen for repeat Pap smear in August.  Since she is here today for pelvic exam, she wants Pap done today.  - Pap imaged thin layer screen with HPV - recommended age 30 - 65 years    3. Screen for STD (sexually transmitted disease)  Had unprotected sex with a partner recently.  Does not think she has any STDs, but wants to check.  We will follow-up with results.  - Wet preparation  - Chlamydia trachomatis/Neisseria gonorrhoeae by PCR  - HIV Antigen Antibody Combo; Future  - Treponema Abs w Reflex to RPR and Titer; Future  - Hepatitis C antibody; Future  - HIV Antigen Antibody Combo  - Treponema Abs w Reflex to RPR and Titer  - Hepatitis C antibody    4. Encounter for screening mammogram for breast cancer  Scheduling will call her to set up appointment.  - HC TOMOSYNTHESIS BREAST, SCREENING DIGITAL, BILATERAL        This dictation uses voice recognition software, which may contain typographical errors.

## 2023-07-14 ENCOUNTER — OFFICE VISIT (OUTPATIENT)
Dept: ORTHOPEDICS | Facility: CLINIC | Age: 68
End: 2023-07-14
Payer: COMMERCIAL

## 2023-07-14 DIAGNOSIS — S42.332D CLOSED DISPLACED OBLIQUE FRACTURE OF SHAFT OF LEFT HUMERUS WITH ROUTINE HEALING, SUBSEQUENT ENCOUNTER: Primary | ICD-10-CM

## 2023-07-14 LAB
C TRACH DNA SPEC QL PROBE+SIG AMP: NEGATIVE
HCV AB SERPL QL IA: NONREACTIVE
HIV 1+2 AB+HIV1 P24 AG SERPL QL IA: NONREACTIVE
N GONORRHOEA DNA SPEC QL NAA+PROBE: NEGATIVE

## 2023-07-14 PROCEDURE — 99213 OFFICE O/P EST LOW 20 MIN: CPT | Performed by: ORTHOPAEDIC SURGERY

## 2023-07-14 NOTE — PROGRESS NOTES
"S:  Patient states she is doing well.  Stopped Jardiance due to yeast infection. Believes she'll be moving to [facility] in September.         Patient Active Problem List   Diagnosis     Healthcare maintenance     Type 2 diabetes mellitus with complication, with long-term current use of insulin (H)     Anatomical narrow angle glaucoma     Hyperactivity of bladder     Bilateral low back pain with left-sided sciatica     Callus of foot     Mixed hyperlipidemia     Simple chronic bronchitis (H)     Pulmonary emphysema (H)     Abnormal cervical Papanicolaou smear     Hernia of anterior abdominal wall     Human papilloma virus infection     Nonalcoholic steatohepatitis     Osteopenia     Thrombophlebitis     Botulism food poisoning     Cervical high risk HPV (human papillomavirus) test positive     History of colonic polyps (colonoscopy due 11/2027)     Hyponatremia     Sleep apnea     LPRD (laryngopharyngeal reflux disease)     Lumbar spine pain     RUQ abdominal pain     Tracheal stenosis following tracheostomy (H)     Cirrhosis of liver without ascites, unspecified hepatic cirrhosis type (H)     Nephrogenic diabetes insipidus (H)     Other dysphagia     Gastric polyp     Bipolar 1 disorder, depressed, severe (H)     Cervicalgia            Past Medical History:   Diagnosis Date     Anxiety      Bipolar disorder (H)      Botulism     1980     Cervical high risk HPV (human papillomavirus) test positive      Colon polyps      Depression      Diabetes mellitus (H)      Diabetes type 2, controlled (H)      Gallstones      History of tracheostomy 1/5/2021     Hyperlipidemia      Hyperlipidemia      Hypertension      Hypertension      Low back pain with left-sided sciatica      Pneumonia      Urinary incontinence             Past Surgical History:   Procedure Laterality Date     ABDOMINOPLASTY       EYE SURGERY      bilateral with flap     HAND SURGERY Left     \"chipped off bone\"      trachestomy       TUBAL " LIGATION              Social History     Tobacco Use     Smoking status: Never     Passive exposure: Never     Smokeless tobacco: Never   Substance Use Topics     Alcohol use: Not Currently            Family History   Problem Relation Age of Onset     Other - See Comments Mother         severe edema in leg, millroid's disease     Cerebrovascular Disease Father      No Known Problems Maternal Grandmother      No Known Problems Maternal Grandfather      Cerebrovascular Disease Other      Lung Cancer Half-Sister         smoking related     Myocardial Infarction Paternal Half-Brother      Esophageal Cancer Sister      Coronary Artery Disease Brother                Allergies   Allergen Reactions     Desmopressin Swelling     LE         Estrogens Other (See Comments)     Hydrochlorothiazide Other (See Comments) and Unknown       Patient reports can't take this med due to increased urine output       Hydrocodone-Acetaminophen        Justin change       Lithium Diarrhea         Causing DI       Penicillins Unknown     Has tolerated amoxicillin      Risperidone      Shellfish-Derived Products      Aromatic Inhalants Rash     Latex Other (See Comments) and Rash     rash       Niacin Rash     Qvar [Beclomethasone] Rash     Valacyclovir Rash            Current Outpatient Medications   Medication Sig Dispense Refill     acetaminophen (TYLENOL) 500 MG tablet Take 500 mg by mouth 2 times daily Scheduled.       albuterol (PROAIR HFA/PROVENTIL HFA/VENTOLIN HFA) 108 (90 Base) MCG/ACT inhaler Inhale 2 puffs into the lungs every 4 hours as needed for shortness of breath or wheezing (Patient not taking: Reported on 7/13/2023) 18 g 10     aspirin (ASA) 81 MG chewable tablet Take 81 mg by mouth daily       BD GERARDO U/F 32G X 4 MM insulin pen needle Use as directed 100 each 3     Biotin 10 MG CAPS Take 1 capsule by mouth daily       blood glucose (NO BRAND SPECIFIED) lancets standard Use to test blood sugar 3 times daily or as directed. 3  each 3     blood glucose (NO BRAND SPECIFIED) test strip 1 strip by In Vitro route 4 times daily 400 strip 1     Calcium Carbonate-Vitamin D 500-5 MG-MCG TABS Take 2 tablets by mouth daily       empagliflozin (JARDIANCE) 25 MG TABS tablet Take 1 tablet (25 mg) by mouth daily 90 tablet 1     fluconazole (DIFLUCAN) 150 MG tablet Take 1 tablet by mouth now.  Take second tablet in 2 days. 2 tablet 0     fluticasone (FLONASE) 50 MCG/ACT nasal spray SHAKE LIQUID AND USE 2 SPRAYS IN EACH NOSTRIL DAILY AS NEEDED FOR RHINITIS OR ALLERGIES Strength: 50 MCG/ACT (Patient taking differently: Spray 2 sprays into both nostrils every evening) 48 g 0     furosemide (LASIX) 20 MG tablet Take 30 mg by mouth daily at 2 pm       insulin aspart (NOVOLOG PEN) 100 UNIT/ML pen Inject 30 Units Subcutaneous 3 times daily (before meals) 105 units per day, 7 boxes every 3 months 90 mL 3     insulin glargine U-300 (TOUJEO MAX SOLOSTAR) 300 UNIT/ML (2 units dial) pen Inject 74 Units Subcutaneous At Bedtime 18 mL 3     ketoconazole (NIZORAL) 2 % external cream Twice a day for two weeks 30 g 1     lithium (ESKALITH CR/LITHOBID) 450 MG CR tablet TAKE 1 TABLET(450 MG) BY MOUTH TWICE DAILY 60 tablet 1     losartan (COZAAR) 50 MG tablet Take 1 tablet (50 mg) by mouth daily 90 tablet 0     magnesium oxide 400 MG CAPS Take 400 mg by mouth daily       montelukast (SINGULAIR) 10 MG tablet Take 1 tablet (10 mg) by mouth At Bedtime 90 tablet 3     multivitamin (ONE-DAILY) tablet Take 1 tablet by mouth daily       mupirocin (BACTROBAN) 2 % external ointment Apply topically 3 times daily 15 g 0     nystatin (MYCOSTATIN) 406688 UNIT/GM external ointment Apply topically 2 times daily Apply to affected areas only (right upper arm) 30 g 1     omeprazole (PRILOSEC) 40 MG DR capsule Take 1 capsule (40 mg) by mouth daily 90 capsule 1     polyethylene glycol (MIRALAX) 17 g packet Take 17 g by mouth daily 100 each 3     rosuvastatin (CRESTOR) 10 MG tablet Take 1 tablet  (10 mg) by mouth daily 90 tablet 3     SENNA-docusate sodium (SENNA S) 8.6-50 MG tablet Take 1-2 tablets by mouth 2 times daily as needed (constipation) 360 tablet 3     sodium chloride (OCEAN) 0.65 % nasal spray Spray 2 sprays in nostril daily as needed for congestion 480 mL 1     terbinafine (LAMISIL) 1 % external cream Apply topically 2 times daily (Patient not taking: Reported on 7/13/2023) 60 g 1     tiotropium-olodaterol 2.5-2.5 MCG/ACT AERS Inhale 2 puffs into the lungs daily 12 g 3     tirzepatide (MOUNJARO) 10 MG/0.5ML pen Inject 10 mg Subcutaneous every 7 days (Patient not taking: Reported on 7/13/2023) 2 mL 0     tirzepatide (MOUNJARO) 10 MG/0.5ML pen Inject 10 mg Subcutaneous every 7 days 2 mL 0     tirzepatide (MOUNJARO) 7.5 MG/0.5ML pen Inject 7.5 mg Subcutaneous every 7 days (Patient not taking: Reported on 7/13/2023) 2 mL 3     vitamin D3 (CHOLECALCIFEROL) 50 mcg (2000 units) tablet Take 1 tablet (50 mcg) by mouth daily 90 tablet 3          Review Of Systems  Skin: negative  Eyes: negative  Ears/Nose/Throat: negative  Respiratory: No shortness of breath, dyspnea on exertion, cough, or hemoptysis    O: Physical exam: no motion or pain at healing humeral fracture site.  CMS intact to upper extremity.  Adequate abduction/forward flexion/IR/ER.    Images:  2 views right humerus radiographs 6/2/2023 12:56 PM     History: Closed displaced oblique fracture of shaft of right humerus     Comparison: 12/6/2022     Findings:     AP and lateral views of the right humerus were obtained.      Since 12/2022, further healing of the mid humeral diaphysis fracture  with stable alignment/deformity      Shoulder and elbow joints are incompletely assessed but grossly  congruent. No substantial degenerative change in shoulder or elbow.  Mild-to-moderate acromioclavicular joint degenerative change.     Soft tissue is unremarkable.                                                                      Impression: Since  12/2022, further healing of the mid humeral  diaphysis fracture with stable alignment/deformity.       Impression  The most negative and valid T-score of -1.7 at the level of the left femoral neck corresponds with low bone density according to WHO criteria for postmenopausal females and men age 50 and over.      Results   Lumbar spine   T-score -0.1 , BMD 1.168 g/cm2.      Left Femur neck  T-score -1.7 , BMD 0.806 g/cm2.  Left Total hip  T-score -1.2 , BMD 0.852 g/cm2.     Right Femur neck  T-score -1.4, BMD  0.847 g/cm2.  Right Total hip  T-score -0.7 , BMD  0.915 g/cm2.     Interval change  Comparisons from different scanners that have not been cross calibrated, are not necessarily valid.      Fracture risk   The estimated 10-year risk for a major osteoporotic fracture is 15.4% and for a hip fracture is 2.1%. FRAX was calculated based on information provided by the patient on the DXA questionnaire. Ref. 4  FRAX may not accurately predict risk in patient on bisphosphonate and should not be used to assess the reduction in fracture risk in patients on treatment   The risk of osteoporotic fracture increases approximately 2-fold for each 1.0 SD decrease in T-score.  Low bone density is not the only risk factor for fracture; consider factors such as patient's age, fall risk, injury risk, previous osteoporotic fracture, family history of osteoporosis, etc.       Repeat  For patients eligible for Medicare, routine testing is allowed once every 2 years.   Testing frequency can be increased for patients on corticosteroids.     Clinical correlation recommended     A:  Osteopaenia with adequate 25 OH Vit D.  Healing L humeral fracture.    P: Discussed Vit D and Calcium supplementation, diet.  Discussed rehab upper extremity.    Moving to [place] and she will discuss osteoporosis prevention with [place]/primary care in [place].  Notify if exacerbation symptoms.  See back PRN and we will send records when patient  requests.           In addition to the above assessment and plan each active problem on Annel's problem list was evaluated today. This included the questioning of Annel for any medication problems. We will continue the current treatment plan for these active problems except as noted.

## 2023-07-14 NOTE — LETTER
7/14/2023         RE: Annel Stoddard  [address]  [place] [address]        Dear Colleague,    Thank you for referring your patient, Annel Stoddard, to the [facility] [place]. Please see a copy of my visit note below.    S:  Patient states she is doing well.  Stopped Jardiance due to yeast infection. Believes she'll be moving to [facility] in September.         Patient Active Problem List   Diagnosis    Healthcare maintenance    Type 2 diabetes mellitus with complication, with long-term current use of insulin (H)    Anatomical narrow angle glaucoma    Hyperactivity of bladder    Bilateral low back pain with left-sided sciatica    Callus of foot    Mixed hyperlipidemia    Simple chronic bronchitis (H)    Pulmonary emphysema (H)    Abnormal cervical Papanicolaou smear    Hernia of anterior abdominal wall    Human papilloma virus infection    Nonalcoholic steatohepatitis    Osteopenia    Thrombophlebitis    Botulism food poisoning    Cervical high risk HPV (human papillomavirus) test positive    History of colonic polyps (colonoscopy due 11/2027)    Hyponatremia    Sleep apnea    LPRD (laryngopharyngeal reflux disease)    Lumbar spine pain    RUQ abdominal pain    Tracheal stenosis following tracheostomy (H)    Cirrhosis of liver without ascites, unspecified hepatic cirrhosis type (H)    Nephrogenic diabetes insipidus (H)    Other dysphagia    Gastric polyp    Bipolar 1 disorder, depressed, severe (H)    Cervicalgia            Past Medical History:   Diagnosis Date    Anxiety     Bipolar disorder (H)     Botulism     1980    Cervical high risk HPV (human papillomavirus) test positive     Colon polyps     Depression     Diabetes mellitus (H)     Diabetes type 2, controlled (H)     Gallstones     History of tracheostomy 1/5/2021    Hyperlipidemia     Hyperlipidemia     Hypertension     Hypertension     Low back pain with left-sided sciatica     Pneumonia      "Urinary incontinence             Past Surgical History:   Procedure Laterality Date    ABDOMINOPLASTY      EYE SURGERY      bilateral with flap    HAND SURGERY Left     \"chipped off bone\"     trachestomy      TUBAL LIGATION              Social History     Tobacco Use    Smoking status: Never     Passive exposure: Never    Smokeless tobacco: Never   Substance Use Topics    Alcohol use: Not Currently            Family History   Problem Relation Age of Onset    Other - See Comments Mother         severe edema in leg, millroid's disease    Cerebrovascular Disease Father     No Known Problems Maternal Grandmother     No Known Problems Maternal Grandfather     Cerebrovascular Disease Other     Lung Cancer Half-Sister         smoking related    Myocardial Infarction Paternal Half-Brother     Esophageal Cancer Sister     Coronary Artery Disease Brother                Allergies   Allergen Reactions    Desmopressin Swelling     LE        Estrogens Other (See Comments)    Hydrochlorothiazide Other (See Comments) and Unknown       Patient reports can't take this med due to increased urine output      Hydrocodone-Acetaminophen        Justin change      Lithium Diarrhea         Causing DI      Penicillins Unknown     Has tolerated amoxicillin     Risperidone     Shellfish-Derived Products     Aromatic Inhalants Rash    Latex Other (See Comments) and Rash     rash      Niacin Rash    Qvar [Beclomethasone] Rash    Valacyclovir Rash            Current Outpatient Medications   Medication Sig Dispense Refill    acetaminophen (TYLENOL) 500 MG tablet Take 500 mg by mouth 2 times daily Scheduled.      albuterol (PROAIR HFA/PROVENTIL HFA/VENTOLIN HFA) 108 (90 Base) MCG/ACT inhaler Inhale 2 puffs into the lungs every 4 hours as needed for shortness of breath or wheezing (Patient not taking: Reported on 7/13/2023) 18 g 10    aspirin (ASA) 81 MG chewable tablet Take 81 mg by mouth daily      BD GERARDO U/F 32G X 4 MM insulin pen needle Use as " directed 100 each 3    Biotin 10 MG CAPS Take 1 capsule by mouth daily      blood glucose (NO BRAND SPECIFIED) lancets standard Use to test blood sugar 3 times daily or as directed. 3 each 3    blood glucose (NO BRAND SPECIFIED) test strip 1 strip by In Vitro route 4 times daily 400 strip 1    Calcium Carbonate-Vitamin D 500-5 MG-MCG TABS Take 2 tablets by mouth daily      empagliflozin (JARDIANCE) 25 MG TABS tablet Take 1 tablet (25 mg) by mouth daily 90 tablet 1    fluconazole (DIFLUCAN) 150 MG tablet Take 1 tablet by mouth now.  Take second tablet in 2 days. 2 tablet 0    fluticasone (FLONASE) 50 MCG/ACT nasal spray SHAKE LIQUID AND USE 2 SPRAYS IN EACH NOSTRIL DAILY AS NEEDED FOR RHINITIS OR ALLERGIES Strength: 50 MCG/ACT (Patient taking differently: Spray 2 sprays into both nostrils every evening) 48 g 0    furosemide (LASIX) 20 MG tablet Take 30 mg by mouth daily at 2 pm      insulin aspart (NOVOLOG PEN) 100 UNIT/ML pen Inject 30 Units Subcutaneous 3 times daily (before meals) 105 units per day, 7 boxes every 3 months 90 mL 3    insulin glargine U-300 (TOUJEO MAX SOLOSTAR) 300 UNIT/ML (2 units dial) pen Inject 74 Units Subcutaneous At Bedtime 18 mL 3    ketoconazole (NIZORAL) 2 % external cream Twice a day for two weeks 30 g 1    lithium (ESKALITH CR/LITHOBID) 450 MG CR tablet TAKE 1 TABLET(450 MG) BY MOUTH TWICE DAILY 60 tablet 1    losartan (COZAAR) 50 MG tablet Take 1 tablet (50 mg) by mouth daily 90 tablet 0    magnesium oxide 400 MG CAPS Take 400 mg by mouth daily      montelukast (SINGULAIR) 10 MG tablet Take 1 tablet (10 mg) by mouth At Bedtime 90 tablet 3    multivitamin (ONE-DAILY) tablet Take 1 tablet by mouth daily      mupirocin (BACTROBAN) 2 % external ointment Apply topically 3 times daily 15 g 0    nystatin (MYCOSTATIN) 044097 UNIT/GM external ointment Apply topically 2 times daily Apply to affected areas only (right upper arm) 30 g 1    omeprazole (PRILOSEC) 40 MG DR capsule Take 1 capsule (40  mg) by mouth daily 90 capsule 1    polyethylene glycol (MIRALAX) 17 g packet Take 17 g by mouth daily 100 each 3    rosuvastatin (CRESTOR) 10 MG tablet Take 1 tablet (10 mg) by mouth daily 90 tablet 3    SENNA-docusate sodium (SENNA S) 8.6-50 MG tablet Take 1-2 tablets by mouth 2 times daily as needed (constipation) 360 tablet 3    sodium chloride (OCEAN) 0.65 % nasal spray Spray 2 sprays in nostril daily as needed for congestion 480 mL 1    terbinafine (LAMISIL) 1 % external cream Apply topically 2 times daily (Patient not taking: Reported on 7/13/2023) 60 g 1    tiotropium-olodaterol 2.5-2.5 MCG/ACT AERS Inhale 2 puffs into the lungs daily 12 g 3    tirzepatide (MOUNJARO) 10 MG/0.5ML pen Inject 10 mg Subcutaneous every 7 days (Patient not taking: Reported on 7/13/2023) 2 mL 0    tirzepatide (MOUNJARO) 10 MG/0.5ML pen Inject 10 mg Subcutaneous every 7 days 2 mL 0    tirzepatide (MOUNJARO) 7.5 MG/0.5ML pen Inject 7.5 mg Subcutaneous every 7 days (Patient not taking: Reported on 7/13/2023) 2 mL 3    vitamin D3 (CHOLECALCIFEROL) 50 mcg (2000 units) tablet Take 1 tablet (50 mcg) by mouth daily 90 tablet 3          Review Of Systems  Skin: negative  Eyes: negative  Ears/Nose/Throat: negative  Respiratory: No shortness of breath, dyspnea on exertion, cough, or hemoptysis    O: Physical exam: no motion or pain at healing humeral fracture site.  CMS intact to upper extremity.  Adequate abduction/forward flexion/IR/ER.    Images:  2 views right humerus radiographs 6/2/2023 12:56 PM     History: Closed displaced oblique fracture of shaft of right humerus     Comparison: 12/6/2022     Findings:     AP and lateral views of the right humerus were obtained.      Since 12/2022, further healing of the mid humeral diaphysis fracture  with stable alignment/deformity      Shoulder and elbow joints are incompletely assessed but grossly  congruent. No substantial degenerative change in shoulder or elbow.  Mild-to-moderate  acromioclavicular joint degenerative change.     Soft tissue is unremarkable.                                                                      Impression: Since 12/2022, further healing of the mid humeral  diaphysis fracture with stable alignment/deformity.       Impression  The most negative and valid T-score of -1.7 at the level of the left femoral neck corresponds with low bone density according to WHO criteria for postmenopausal females and men age 50 and over.      Results   Lumbar spine   T-score -0.1 , BMD 1.168 g/cm2.      Left Femur neck  T-score -1.7 , BMD 0.806 g/cm2.  Left Total hip  T-score -1.2 , BMD 0.852 g/cm2.     Right Femur neck  T-score -1.4, BMD  0.847 g/cm2.  Right Total hip  T-score -0.7 , BMD  0.915 g/cm2.     Interval change  Comparisons from different scanners that have not been cross calibrated, are not necessarily valid.      Fracture risk   The estimated 10-year risk for a major osteoporotic fracture is 15.4% and for a hip fracture is 2.1%. FRAX was calculated based on information provided by the patient on the DXA questionnaire. Ref. 4  FRAX may not accurately predict risk in patient on bisphosphonate and should not be used to assess the reduction in fracture risk in patients on treatment   The risk of osteoporotic fracture increases approximately 2-fold for each 1.0 SD decrease in T-score.  Low bone density is not the only risk factor for fracture; consider factors such as patient's age, fall risk, injury risk, previous osteoporotic fracture, family history of osteoporosis, etc.       Repeat  For patients eligible for Medicare, routine testing is allowed once every 2 years.   Testing frequency can be increased for patients on corticosteroids.     Clinical correlation recommended     A:  Osteopaenia with adequate 25 OH Vit D.  Healing L humeral fracture.    P: Discussed Vit D and Calcium supplementation, diet.  Discussed rehab upper extremity.    Moving to [place] and she will  discuss osteoporosis prevention with [place]/primary care in [place].  Notify if exacerbation symptoms.  See back PRN and we will send records when patient requests.           In addition to the above assessment and plan each active problem on Annel's problem list was evaluated today. This included the questioning of Annel for any medication problems. We will continue the current treatment plan for these active problems except as noted.      CARL HOFF MD

## 2023-07-14 NOTE — NURSING NOTE
Reason For Visit:   Chief Complaint   Patient presents with     RECHECK     Follow-up after Dexa Scan // patient planning to move out to [place]        LMP  (LMP Unknown)     Pain Assessment  Patient Currently in Pain: Denies (states PT has been helping with her Pain level)    Mike Quispe, EMT

## 2023-07-16 RX ORDER — FLUCONAZOLE 150 MG/1
TABLET ORAL
Qty: 2 TABLET | Refills: 0 | Status: SHIPPED | OUTPATIENT
Start: 2023-07-16 | End: 2023-08-02

## 2023-07-17 ENCOUNTER — TELEPHONE (OUTPATIENT)
Dept: FAMILY MEDICINE | Facility: CLINIC | Age: 68
End: 2023-07-17
Payer: COMMERCIAL

## 2023-07-17 NOTE — TELEPHONE ENCOUNTER
----- Message from Denise Smith PA-C sent at 7/16/2023 10:55 PM CDT -----  Her STD testing is all negative.  The test for yeast was also negative, but I think that is what she has.  If she wants to continue using the topical medicine, she can.  I also sent the pills to her pharmacy for her to try again if she wants.

## 2023-07-17 NOTE — TELEPHONE ENCOUNTER
Medication Question or Refill    Contacts       Type Contact Phone/Fax    07/17/2023 02:49 PM CDT Phone (Incoming) Elizabeth Stoddard (Self) [phone] (M)          What medication are you calling about (include dose and sig)?: ketoconazole (NIZORAL) 2 % external cream    Preferred Pharmacy:  [id number] - [place], [place] - [address] [place] [address]  Phone: [phone] Fax: [phone]    Controlled Substance Agreement on file:   CSA -- Patient Level:    CSA: None found at the patient level.       Who prescribed the medication?: [place]    Do you need a refill? No    When did you use the medication last? n/a    Patient offered an appointment? No    Do you have any questions or concerns?  Yes: patient asked if she could take something else instead of the cream listed above because she doesn't like how it fees stingy.       Okay to leave a detailed message?: Yes at Home number on file [phone] (home)

## 2023-07-18 ENCOUNTER — ALLIED HEALTH/NURSE VISIT (OUTPATIENT)
Dept: FAMILY MEDICINE | Facility: CLINIC | Age: 68
End: 2023-07-18
Payer: COMMERCIAL

## 2023-07-18 DIAGNOSIS — F31.4 BIPOLAR 1 DISORDER, DEPRESSED, SEVERE (H): Primary | ICD-10-CM

## 2023-07-18 PROCEDURE — 99207 PR NO CHARGE NURSE ONLY: CPT

## 2023-07-18 PROCEDURE — 96372 THER/PROPH/DIAG INJ SC/IM: CPT | Performed by: FAMILY MEDICINE
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
## 2023-07-18 NOTE — PROGRESS NOTES
Clinic Administered Medication Documentation      Injectable Medication Documentation    Is there an active order (written within the past 365 days, with administrations remaining, not ) in the chart? Yes.     Patient was given Left Ventrogluteal site  Abilify. Prior to medication administration, verified patient's identity using patient s name and date of birth. Please see MAR and medication order for additional information. Patient instructed to remain in clinic for 15 minutes and report any adverse reaction to staff immediately.    Vial/Syringe: Single dose vial. Was entire vial of medication used? Yes   Was this medication supplied by the patient? No  Is this a medication the patient will need to receive again? Yes. Verified that the patient has refills remaining in their prescription.    Senia Rosenberg RN  [facility]

## 2023-07-19 ENCOUNTER — OFFICE VISIT (OUTPATIENT)
Dept: PHARMACY | Facility: CLINIC | Age: 68
End: 2023-07-19
Payer: COMMERCIAL

## 2023-07-19 VITALS — DIASTOLIC BLOOD PRESSURE: 71 MMHG | SYSTOLIC BLOOD PRESSURE: 112 MMHG | HEART RATE: 66 BPM

## 2023-07-19 DIAGNOSIS — E55.9 VITAMIN D DEFICIENCY: ICD-10-CM

## 2023-07-19 DIAGNOSIS — E11.8 TYPE 2 DIABETES MELLITUS WITH COMPLICATION, WITH LONG-TERM CURRENT USE OF INSULIN (H): Primary | ICD-10-CM

## 2023-07-19 DIAGNOSIS — M85.80 OSTEOPENIA, UNSPECIFIED LOCATION: ICD-10-CM

## 2023-07-19 DIAGNOSIS — J31.0 CHRONIC RHINITIS: ICD-10-CM

## 2023-07-19 DIAGNOSIS — K75.81 NONALCOHOLIC STEATOHEPATITIS: ICD-10-CM

## 2023-07-19 DIAGNOSIS — Z79.4 TYPE 2 DIABETES MELLITUS WITH COMPLICATION, WITH LONG-TERM CURRENT USE OF INSULIN (H): Primary | ICD-10-CM

## 2023-07-19 DIAGNOSIS — R06.02 SOB (SHORTNESS OF BREATH): ICD-10-CM

## 2023-07-19 DIAGNOSIS — K59.00 CONSTIPATION, UNSPECIFIED CONSTIPATION TYPE: ICD-10-CM

## 2023-07-19 DIAGNOSIS — F31.4 BIPOLAR 1 DISORDER, DEPRESSED, SEVERE (H): ICD-10-CM

## 2023-07-19 DIAGNOSIS — Z78.9 TAKES DIETARY SUPPLEMENTS: ICD-10-CM

## 2023-07-19 DIAGNOSIS — E78.2 MIXED HYPERLIPIDEMIA: ICD-10-CM

## 2023-07-19 PROCEDURE — 99607 MTMS BY PHARM ADDL 15 MIN: CPT | Performed by: PHARMACIST

## 2023-07-19 PROCEDURE — 99606 MTMS BY PHARM EST 15 MIN: CPT | Performed by: PHARMACIST

## 2023-07-19 RX ORDER — TIRZEPATIDE 12.5 MG/.5ML
12.5 INJECTION, SOLUTION SUBCUTANEOUS
Qty: 2 ML | Refills: 0 | Status: SHIPPED | OUTPATIENT
Start: 2023-07-19 | End: 2023-08-16

## 2023-07-19 RX ORDER — PETROLATUM,WHITE/LANOLIN
OINTMENT (GRAM) TOPICAL 4 TIMES DAILY PRN
Qty: 56.7 G | Refills: 1 | Status: SHIPPED | OUTPATIENT
Start: 2023-07-19 | End: 2023-10-18

## 2023-07-19 RX ORDER — POLYETHYLENE GLYCOL 3350 17 G/17G
17 POWDER, FOR SOLUTION ORAL 2 TIMES DAILY PRN
Qty: 510 G | Refills: 1 | Status: SHIPPED | OUTPATIENT
Start: 2023-07-19 | End: 2023-11-10

## 2023-07-19 NOTE — PATIENT INSTRUCTIONS
"Recommendations from today's MTM visit:                                                         Increase Mounjaro to 12.5 mg once weekly.   Decrease Novolog to 25 units three times daily.   After increasing Mounjaro, if you continue to have lower evening readings and higher morning readings, decrease Novolog by 2 units per meal every 3 days. Continue this until morning sugars are lower than evening sugars.   Okay to increase Miralax 17 g to twice daily as needed   5. Okay to use Vitamin D3 5000 units daily     Follow-up: Return in about 4 weeks (around 8/16/2023) for Medication Management Pharmacist, in person.    It was great speaking with you today.  I value your experience and would be very thankful for your time in providing feedback in our clinic survey. In the next few days, you may receive an email or text message from [employer] with a link to a survey related to your  clinical pharmacist.\"     To schedule another MTM appointment, please call the clinic directly or you may call the MTM scheduling line at [phone] or toll-free at [phone].     My Clinical Pharmacist's contact information:                                                      Please feel free to contact me with any questions or concerns you have.      Bradley Liao, PharmD  Medication Therapy Management (MTM) Pharmacist  [facility]      "

## 2023-07-20 ENCOUNTER — ANCILLARY PROCEDURE (OUTPATIENT)
Dept: MAMMOGRAPHY | Facility: CLINIC | Age: 68
End: 2023-07-20
Attending: PHYSICIAN ASSISTANT
Payer: COMMERCIAL

## 2023-07-20 DIAGNOSIS — Z12.31 ENCOUNTER FOR SCREENING MAMMOGRAM FOR BREAST CANCER: ICD-10-CM

## 2023-07-20 PROCEDURE — 77063 BREAST TOMOSYNTHESIS BI: CPT

## 2023-07-20 PROCEDURE — 77067 SCR MAMMO BI INCL CAD: CPT

## 2023-07-20 NOTE — TELEPHONE ENCOUNTER
Attempted to call patient, left message. If she calls back, please ask why she is still using this medication.   This was prescribed by Dr. Sullivan in March and was only intended to be used for 2 weeks.   Per office visit with Bradley yesterday, patient's yeast infection has resolved.

## 2023-07-21 ENCOUNTER — PATIENT OUTREACH (OUTPATIENT)
Dept: FAMILY MEDICINE | Facility: CLINIC | Age: 68
End: 2023-07-21
Payer: COMMERCIAL

## 2023-07-24 ENCOUNTER — TELEPHONE (OUTPATIENT)
Dept: FAMILY MEDICINE | Facility: CLINIC | Age: 68
End: 2023-07-24
Payer: COMMERCIAL

## 2023-07-24 NOTE — TELEPHONE ENCOUNTER
General Call    Contacts         Type Contact Phone/Fax    07/24/2023 03:00 PM CDT Phone (Incoming) Elizabeth Stoddard (Self) [phone] (M)          Reason for Call:  questions regarding medications    What are your questions or concerns:  medication for itching. Patient is requesting for Anup Huerta to sia patient back to discuss about the itching cream.     Date of last appointment with provider: 07/13/2023    Okay to leave a detailed message?: Yes at Cell number on file:    Telephone Information:   Mobile [phone]

## 2023-07-24 NOTE — TELEPHONE ENCOUNTER
Order/Referral Request    Who is requesting: Patient    Orders being requested: lab test for herpes    Reason service is needed/diagnosis: itching has not gone away    When are orders needed by: as soon as possible    Has this been discussed with Provider: Yes    Does patient have a preference on a Group/Provider/Facility? [facility]    Does patient have an appointment scheduled?: No    Where to send orders: Place orders within Epic    Okay to leave a detailed message?: Yes at Cell number on file:    Telephone Information:   Mobile [phone]

## 2023-07-24 NOTE — TELEPHONE ENCOUNTER
Contacted patient. She no longer needs this medication, she was prescribed an alternative by Dr. Strange and her symptoms have resolved.

## 2023-07-24 NOTE — TELEPHONE ENCOUNTER
Bradley is out until tomorrow, 7/25.  I called patient, and patient just wanted to update Bradley that  (sp?) did not want her to use ketoconazole cream.  Is using A&D now which has been helpful for itching.    Will update Bradley.    Janine Harvey, Pharm.D.,[facility] [place]  Board Certified Geriatric Pharmacist  Medication Therapy Management Pharmacist  [phone]

## 2023-07-24 NOTE — TELEPHONE ENCOUNTER
The lab test for herpes is done on sores on the skin- I assume she doesn't have any sores- LMK if I am wrong  There is a blood test but it would not be helpful at all in this scenario-   We should probably see her again, retest for yeast and other things- because I am not sure what is causing this

## 2023-07-25 NOTE — TELEPHONE ENCOUNTER
----- Message from Carolina Monique RN sent at 7/21/2023  8:57 AM CDT -----  Please call the pt to assist her in scheduling a colposcopy with Dr. Barron's prior to 10/13/23.

## 2023-08-01 ENCOUNTER — OFFICE VISIT (OUTPATIENT)
Dept: FAMILY MEDICINE | Facility: CLINIC | Age: 68
End: 2023-08-01
Payer: COMMERCIAL

## 2023-08-01 ENCOUNTER — TELEPHONE (OUTPATIENT)
Dept: FAMILY MEDICINE | Facility: CLINIC | Age: 68
End: 2023-08-01

## 2023-08-01 VITALS
SYSTOLIC BLOOD PRESSURE: 127 MMHG | OXYGEN SATURATION: 97 % | HEIGHT: 63 IN | WEIGHT: 183 LBS | BODY MASS INDEX: 32.43 KG/M2 | RESPIRATION RATE: 18 BRPM | HEART RATE: 69 BPM | DIASTOLIC BLOOD PRESSURE: 74 MMHG

## 2023-08-01 DIAGNOSIS — Z79.4 TYPE 2 DIABETES MELLITUS WITH COMPLICATION, WITH LONG-TERM CURRENT USE OF INSULIN (H): ICD-10-CM

## 2023-08-01 DIAGNOSIS — E11.8 TYPE 2 DIABETES MELLITUS WITH COMPLICATION, WITH LONG-TERM CURRENT USE OF INSULIN (H): ICD-10-CM

## 2023-08-01 DIAGNOSIS — Z11.3 SCREENING EXAMINATION FOR STD (SEXUALLY TRANSMITTED DISEASE): ICD-10-CM

## 2023-08-01 DIAGNOSIS — R87.810 CERVICAL HIGH RISK HPV (HUMAN PAPILLOMAVIRUS) TEST POSITIVE: Primary | ICD-10-CM

## 2023-08-01 LAB — HBA1C MFR BLD: 6.9 % (ref 0–5.6)

## 2023-08-01 PROCEDURE — 86695 HERPES SIMPLEX TYPE 1 TEST: CPT | Performed by: FAMILY MEDICINE

## 2023-08-01 PROCEDURE — 83036 HEMOGLOBIN GLYCOSYLATED A1C: CPT | Performed by: FAMILY MEDICINE

## 2023-08-01 PROCEDURE — 88305 TISSUE EXAM BY PATHOLOGIST: CPT | Performed by: PATHOLOGY

## 2023-08-01 PROCEDURE — 36415 COLL VENOUS BLD VENIPUNCTURE: CPT | Performed by: FAMILY MEDICINE

## 2023-08-01 PROCEDURE — 86696 HERPES SIMPLEX TYPE 2 TEST: CPT | Performed by: FAMILY MEDICINE
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
## 2023-08-01 NOTE — PROGRESS NOTES
1. 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  Had recent colposcopy - came back for pap smear - still abnormal findings - so, back for colposcopy exam ([place] in next [place]).  Discussed - she is quite insistent about her anxiety regarding these findings.  Tell sme she believes she hasn't had previous surgical procedures - talked about core (?cone) .  Patient's cervix is slightly abnormal in appearance on exam.      Colposcopy performed.      GYN: Colposcopy/LEEP    Date/Time: 8/6/2023 4:14 PM    Performed by: Alexus Cherry MD  Authorized by: Alexus Cherry MD    Consent:     Consent obtained:  Verbal    Consent given by:  Patient    Procedural risks discussed:  Bleeding    Patient questions answered: yes      Patient agrees, verbalizes understanding, and wants to proceed: yes      Instructions and paperwork completed: yes    Pre-procedure:     Pre-procedure timeout performed: yes      Prepped with: acetic acid    Indication:     Indication:  HPV    HPV Indications:  Other high risk and 18  Procedure:     Procedure: Colposcopy w/ endocervical curettage      Under satisfactory analgesia the patient was prepped and draped in the dorsal lithotomy position: yes      [place] speculum was placed in the vagina: yes      Under colposcopic examination the transition zone was seen in entirety: yes      Endocervix was curetted using a Kevorkian curette: yes      Specimen to pathology: yes    Post-procedure:     Findings: Negative      Impression: Low grade cervical dysplasia      Patient tolerance of procedure:  Patient tolerated the procedure well with no immediate complications      - Surgical Pathology Exam    2. Screening examination for STD (sexually transmitted disease)  Patient desires screening for herpes virus - no clear symptoms - but is quite  "adamant that she needs this information.  We discussed that this testing is not particularly useful.    - Herpes Simplex Virus 1 and 2 IgG; Future  - Herpes Simplex Virus 1 and 2 IgG    3. Type 2 diabetes mellitus with complication, with long-term current use of insulin (H)  Due for A1c - ordered.   - Hemoglobin A1c      Subjective   [place] is a 67 year old, presenting for the following health issues:  Colposcopy (Pt also wants Herpes test done.)        8/1/2023    11:36 AM   Additional Questions   Roomed by Cathy       H/o pap in January 2023 - positive for high risk HPV (both 18 and non 16,18)  Had colposcopy.    Then, pap collected 6 months later - (July 2023) - same findings.  So - here for colposcopy - plans to move to [place] in September 2023, so wanted to get this done now.    Exam shows abnormal shape/surface of cervix (?previous cone) -                    Review of Systems   Constitutional: Negative.  Negative for chills and fever.   Genitourinary:  Negative for genital sores, menstrual problem and pelvic pain.            Objective    /74 (BP Location: Left arm, Patient Position: Sitting, Cuff Size: Adult Large)   Pulse 69   Resp 18   Ht 1.59 m (5' 2.6\")   Wt 83 kg (183 lb)   LMP  (LMP Unknown)   SpO2 97%   BMI 32.83 kg/m    Body mass index is 32.83 kg/m .  Physical Exam  Vitals reviewed.   Constitutional:       General: She is not in acute distress.     Appearance: Normal appearance.   Genitourinary:     Comments: PELVIC EXAM:External genitalia: normal  Vaginal mucosa normal  Vaginal discharge: none  Speculum exam shows an  abnormal appearing cervix - surface is irregularly shaped (but not dysplasia) .   Bimanual exam: Cervix closed, firm, non tender  to motion.  Neurological:      Mental Status: She is alert.            Results for orders placed or performed in visit on 08/01/23   Hemoglobin A1c     Status: Abnormal   Result Value Ref Range    Hemoglobin A1C 6.9 (H) 0.0 - 5.6 % " "  Herpes Simplex Virus 1 and 2 IgG     Status: Abnormal   Result Value Ref Range    HSV Type 1 IgG Instrument Value 58.40 (H) <0.90 Index    Herpes Simplex Virus Type 1 IgG Antibody Positive.  IgG antibody to HSV-1 detected. (A) No HSV-1 IgG antibodies detected    HSV Type 2 IgG Instrument Value 0.18 <0.90 Index    Herpes Simplex Virus Type 2 IgG Antibody No HSV-2 IgG antibodies detected. No HSV-2 IgG antibodies detected   Surgical Pathology Exam     Status: None   Result Value Ref Range    Case Report       Surgical Pathology Report                         Case: [id number]                                  Authorizing Provider:  Dilip,         Collected:           08/01/2023 12:11 PM                                 Alexus RUDOLPH MD                                                               Ordering Location:     [facility]   Received:            08/01/2023 12:40 PM                                 [facility]                                                                  Pathologist:           Lele Guillen MD                                                      Specimen:    Endocervix                                                                                 Final Diagnosis       ENDOCERVICAL CURETTINGS:        -  LGSIL (MILD DYSPLASIA, KINSEY-1) WITH FOCAL KOILOCYTOSIS,                CONSISTENT WITH HPV CYTOPATHIC EFFECT        -  NEGATIVE FOR HIGH-GRADE DYSPLASIA        Clinical Information       Clinical history: HR HPV, nl Pap - follow up  Reason for procedure: Colposcopy       Gross Description       A(A). Endocervix, :  The specimen is received in formalin with proper patient identification, labeled \"endocervix.\"  The specimen consists of a 1.5 x 0.5 x 0.2-cm aggregate of white-tan, irregular and scant soft tissue fragments that are filtered and entirely submitted in cassette A1. HODA Durán:klj-d       Microscopic Description       Microscopic examination " performed, substantiating the above diagnosis.       Performing Labs       The technical component of this testing was completed at [facility]      Case Images

## 2023-08-02 ENCOUNTER — OFFICE VISIT (OUTPATIENT)
Dept: FAMILY MEDICINE | Facility: CLINIC | Age: 68
End: 2023-08-02
Payer: COMMERCIAL

## 2023-08-02 VITALS
OXYGEN SATURATION: 97 % | RESPIRATION RATE: 24 BRPM | DIASTOLIC BLOOD PRESSURE: 73 MMHG | HEART RATE: 64 BPM | WEIGHT: 183.5 LBS | BODY MASS INDEX: 32.92 KG/M2 | SYSTOLIC BLOOD PRESSURE: 119 MMHG | TEMPERATURE: 97.8 F

## 2023-08-02 DIAGNOSIS — Z00.00 HEALTHCARE MAINTENANCE: ICD-10-CM

## 2023-08-02 DIAGNOSIS — Z79.4 TYPE 2 DIABETES MELLITUS WITH COMPLICATION, WITH LONG-TERM CURRENT USE OF INSULIN (H): Primary | ICD-10-CM

## 2023-08-02 DIAGNOSIS — E11.8 TYPE 2 DIABETES MELLITUS WITH COMPLICATION, WITH LONG-TERM CURRENT USE OF INSULIN (H): Primary | ICD-10-CM

## 2023-08-02 LAB
HSV1 IGG SERPL QL IA: 58.4 INDEX
HSV1 IGG SERPL QL IA: ABNORMAL
HSV2 IGG SERPL QL IA: 0.18 INDEX
HSV2 IGG SERPL QL IA: ABNORMAL
PATH REPORT.COMMENTS IMP SPEC: NORMAL
PATH REPORT.COMMENTS IMP SPEC: NORMAL
PATH REPORT.FINAL DX SPEC: NORMAL
PATH REPORT.GROSS SPEC: NORMAL
PATH REPORT.MICROSCOPIC SPEC OTHER STN: NORMAL
PATH REPORT.RELEVANT HX SPEC: NORMAL
PHOTO IMAGE: NORMAL

## 2023-08-02 PROCEDURE — 99213 OFFICE O/P EST LOW 20 MIN: CPT | Performed by: FAMILY MEDICINE

## 2023-08-02 RX ORDER — PEN NEEDLE, DIABETIC 32GX 5/32"
NEEDLE, DISPOSABLE MISCELLANEOUS
Qty: 200 EACH | Refills: 3 | Status: SHIPPED | OUTPATIENT
Start: 2023-08-02 | End: 2023-12-26

## 2023-08-02 NOTE — ASSESSMENT & PLAN NOTE
Njes COVID-vaccine because of previous reaction to it.  Patient is sure that she has had both hepatitis a and B vaccines from gastroenterologist which she sees for cirrhosis.

## 2023-08-02 NOTE — ASSESSMENT & PLAN NOTE
DMII  controlled  Hemoglobin A1C   Date Value Ref Range Status   08/01/2023 6.9 (H) 0.0 - 5.6 % Final     Comment:     Normal <5.7%   Prediabetes 5.7-6.4%    Diabetes 6.5% or higher     Note: Adopted from ADA consensus guidelines.   01/27/2020 8.4 (H) 0 - 5.6 % Final     Comment:     Normal <5.7% Prediabetes 5.7-6.4%  Diabetes 6.5% or higher - adopted from ADA   consensus guidelines.        Diabetes meds:  NovoLog 25 3 times daily, Toujeo 74 daily, Mounjaro 12.5 weekly.      Checking blood sugars? Yes  none      Additional diabetes objectives reviewed   Statin medication (>39, 10 year risk > 7.5%) Yes: Rosuvastatin 10  Blood pressure goal Yes  BP Readings from Last 6 Encounters:   08/02/23 119/73   08/01/23 127/74   07/19/23 112/71   07/13/23 111/69   06/21/23 121/72   06/01/23 114/69      Lab Results   Component Value Date    MICROALBUR <0.50 06/16/2021     Ace/ARB if indicated-Yes: Losartan 50    Yearly dilated retina evaluation -Yes    Plan: No changes  Follow-up: In [place]

## 2023-08-02 NOTE — PROGRESS NOTES
Assessment/ Plan  Type 2 diabetes mellitus with complication, with long-term current use of insulin (H)  DMII  controlled  Hemoglobin A1C   Date Value Ref Range Status   08/01/2023 6.9 (H) 0.0 - 5.6 % Final     Comment:     Normal <5.7%   Prediabetes 5.7-6.4%    Diabetes 6.5% or higher     Note: Adopted from ADA consensus guidelines.   01/27/2020 8.4 (H) 0 - 5.6 % Final     Comment:     Normal <5.7% Prediabetes 5.7-6.4%  Diabetes 6.5% or higher - adopted from ADA   consensus guidelines.        Diabetes meds:  NovoLog 25 3 times daily, Toujeo 74 daily, Mounjaro 12.5 weekly.      Checking blood sugars? Yes  none      Additional diabetes objectives reviewed   Statin medication (>39, 10 year risk > 7.5%) Yes: Rosuvastatin 10  Blood pressure goal Yes  BP Readings from Last 6 Encounters:   08/02/23 119/73   08/01/23 127/74   07/19/23 112/71   07/13/23 111/69   06/21/23 121/72   06/01/23 114/69      Lab Results   Component Value Date    MICROALBUR <0.50 06/16/2021     Ace/ARB if indicated-Yes: Losartan 50    Yearly dilated retina evaluation -Yes    Plan: No changes  Follow-up: In [place]        Healthcare maintenance  Clines COVID-vaccine because of previous reaction to it.  Patient is sure that she has had both hepatitis a and B vaccines from gastroenterologist which she sees for cirrhosis.   Subjective  CC:  chief complaint  HPI:  67-year-old, has plans to move to [place] in a couple of weeks.  Housing still needs to come through.  Recently lived near [place] and will be moving back.  No new questions, needs refill on insulin needles.  Recently saw MTM who increased her NovoLog dose.  PFSH:  Patient Active Problem List   Diagnosis    Healthcare maintenance    Type 2 diabetes mellitus with complication, with long-term current use of insulin (H)    Anatomical narrow angle glaucoma    Hyperactivity of bladder    Bilateral low back pain with left-sided sciatica    Callus of foot    Mixed hyperlipidemia    Simple  chronic bronchitis (H)    Pulmonary emphysema (H)    Abnormal cervical Papanicolaou smear    Hernia of anterior abdominal wall    Human papilloma virus infection    Nonalcoholic steatohepatitis    Osteopenia    Thrombophlebitis    Botulism food poisoning    Cervical high risk HPV (human papillomavirus) test positive    History of colonic polyps (colonoscopy due 11/2027)    Hyponatremia    Sleep apnea    LPRD (laryngopharyngeal reflux disease)    Lumbar spine pain    RUQ abdominal pain    Tracheal stenosis following tracheostomy (H)    Cirrhosis of liver without ascites, unspecified hepatic cirrhosis type (H)    Nephrogenic diabetes insipidus (H)    Other dysphagia    Gastric polyp    Bipolar 1 disorder, depressed, severe (H)    Cervicalgia     acetaminophen (TYLENOL) 500 MG tablet, Take 500 mg by mouth 2 times daily Scheduled.  aspirin (ASA) 81 MG chewable tablet, Take 81 mg by mouth daily  Biotin 10 MG CAPS, Take 1 capsule by mouth daily  blood glucose (NO BRAND SPECIFIED) lancets standard, Use to test blood sugar 3 times daily or as directed.  blood glucose (NO BRAND SPECIFIED) test strip, 1 strip by In Vitro route 4 times daily  Calcium Carbonate-Vitamin D 600-5 MG-MCG TABS, Take 2 tablets by mouth daily  fluticasone (FLONASE) 50 MCG/ACT nasal spray, SHAKE LIQUID AND USE 2 SPRAYS IN EACH NOSTRIL DAILY AS NEEDED FOR RHINITIS OR ALLERGIES Strength: 50 MCG/ACT (Patient taking differently: Spray 2 sprays into both nostrils every evening)  furosemide (LASIX) 20 MG tablet, Take 30 mg by mouth daily at 2 pm  insulin aspart (NOVOLOG PEN) 100 UNIT/ML pen, Inject 25 Units Subcutaneous 3 times daily (before meals) 105 units per day, 7 boxes every 3 months  insulin glargine U-300 (TOUJEO MAX SOLOSTAR) 300 UNIT/ML (2 units dial) pen, Inject 74 Units Subcutaneous At Bedtime  lithium (ESKALITH CR/LITHOBID) 450 MG CR tablet, TAKE 1 TABLET(450 MG) BY MOUTH TWICE DAILY  losartan (COZAAR) 50 MG tablet, Take 1 tablet (50 mg) by  mouth daily  magnesium oxide 400 MG CAPS, Take 400 mg by mouth daily  montelukast (SINGULAIR) 10 MG tablet, Take 1 tablet (10 mg) by mouth At Bedtime  multivitamin (ONE-DAILY) tablet, Take 1 tablet by mouth daily  mupirocin (BACTROBAN) 2 % external ointment, Apply topically 3 times daily  nystatin (MYCOSTATIN) 771565 UNIT/GM external ointment, Apply topically 2 times daily Apply to affected areas only (right upper arm)  omeprazole (PRILOSEC) 40 MG DR capsule, Take 1 capsule (40 mg) by mouth daily  polyethylene glycol (MIRALAX) 17 GM/Dose powder, Take 17 g by mouth 2 times daily as needed for constipation  rosuvastatin (CRESTOR) 10 MG tablet, Take 1 tablet (10 mg) by mouth daily  SENNA-docusate sodium (SENNA S) 8.6-50 MG tablet, Take 1-2 tablets by mouth 2 times daily as needed (constipation)  terbinafine (LAMISIL) 1 % external cream, Apply topically 2 times daily  tiotropium-olodaterol 2.5-2.5 MCG/ACT AERS, Inhale 2 puffs into the lungs daily  tirzepatide (MOUNJARO) 12.5 MG/0.5ML pen, Inject 12.5 mg Subcutaneous every 7 days  vitamin A & D (BABY) external ointment, Apply topically 4 times daily as needed for dry skin or irritation  vitamin D3 (CHOLECALCIFEROL) 125 MCG (5000 UT) tablet, Take 1 tablet (125 mcg) by mouth daily  sodium chloride (OCEAN) 0.65 % nasal spray, Spray 2 sprays in nostril daily as needed for congestion (Patient not taking: Reported on 8/2/2023)    ARIPiprazole ER (ABILIFY MAINTENA) extended release inj syringe 400 mg         History   Smoking Status    Never   Smokeless Tobacco    Never     Social History     Social History Narrative    Originally from [facility]. Moved from [place].  4 times . Has 5 children ,9 grandchildren . Her daughters were taken away from her by biological dad when she was ill with botulism . All adults now . All of them are in [facility].  Youngest daughter is 38 . Initially estranged but has a closer relationship .Hadnt worked for a long time .  Is a violinist and plays in [employer] . Wasn't diagnosed with bipolar much later in life . 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 Is on disability due to  ipolar .       She plays the violin.      Patient Care Team:  Gerald Strange MD as PCP - General (Family Medicine)  Bradley Liao, PharmD as Pharmacist (Pharmacist)  Bradley Liao PharmD as Assigned [place] Pharmacist  Alexus Cherry MD as Assigned PCP  Lele Oliveira MD as Assigned Pulmonology Provider  Garett Archer MD as Assigned Musculoskeletal Provider        Objective  Physical Exam  Vitals:    08/02/23 1103   BP: 119/73   BP Location: Right arm   Patient Position: Sitting   Cuff Size: Adult Large   Pulse: 64   Resp: 24   Temp: 97.8  F (36.6  C)   SpO2: 97%   Weight: 83.2 kg (183 lb 8 oz)     Body mass index is 32.92 kg/m .  Gen- alert, oriented x3  No acute distress  Chest- Normal inspiration and expiration.    Clear to ascultation.    No chest wall deformity or scar.  CV- Heart regular rate and rhythm  normal tones   no murmurs   No gallops or rubs.  Ext- warm and dry   no edema  Skin-  no visualized rash  Foot monofilament test performed-  Sensation intact      Diagnostics:   A1c 6.9 yesterday      Please note: Voice recognition software was used in this dictation.  It may therefore contain typographical errors.

## 2023-08-03 ENCOUNTER — TELEPHONE (OUTPATIENT)
Dept: FAMILY MEDICINE | Facility: CLINIC | Age: 68
End: 2023-08-03
Payer: COMMERCIAL

## 2023-08-03 NOTE — TELEPHONE ENCOUNTER
Called patient and relayed provider message. Patient verbalized understanding and denied questions.

## 2023-08-03 NOTE — TELEPHONE ENCOUNTER
----- Message from Alexus Cherry MD sent at 8/2/2023  7:00 PM CDT -----  Please call patient and let her know that:  1.  Lab shows evidence of HSV 1 - this is generally the herpes virus that causes cold sores.  This indicates some exposure to this virus in the past.   2.  Her colposcopy biopsy is similar to the last one - low grade changes on the cervix.  She should be encouraged to establish care once she is in [place] - recheck pap smear in 6-12 months.

## 2023-08-04 ENCOUNTER — TELEPHONE (OUTPATIENT)
Dept: FAMILY MEDICINE | Facility: CLINIC | Age: 68
End: 2023-08-04
Payer: COMMERCIAL

## 2023-08-04 NOTE — TELEPHONE ENCOUNTER
FYI - Status Update    Who is Calling: patient    Update: pt called to let pcp know that she received her double flu shot today at [facility]. She also stated that [facility] told her to call pcp regarding her medication Toujeo, she stated pharmacy has medication listed in there systems as wrong dosage. Medication should be 74 units at bedtime.  Thanks!     Does caller want a call/response back: No

## 2023-08-06 PROCEDURE — 57456 ENDOCERV CURETTAGE W/SCOPE: CPT | Performed by: FAMILY MEDICINE

## 2023-08-06 ASSESSMENT — ENCOUNTER SYMPTOMS
CHILLS: 0
FEVER: 0
CONSTITUTIONAL NEGATIVE: 1

## 2023-08-07 ENCOUNTER — TELEPHONE (OUTPATIENT)
Dept: FAMILY MEDICINE | Facility: CLINIC | Age: 68
End: 2023-08-07
Payer: COMMERCIAL

## 2023-08-07 DIAGNOSIS — E11.8 TYPE 2 DIABETES MELLITUS WITH COMPLICATION, WITH LONG-TERM CURRENT USE OF INSULIN (H): ICD-10-CM

## 2023-08-07 DIAGNOSIS — Z79.4 TYPE 2 DIABETES MELLITUS WITH COMPLICATION, WITH LONG-TERM CURRENT USE OF INSULIN (H): ICD-10-CM

## 2023-08-07 NOTE — TELEPHONE ENCOUNTER
RN attempted to contact patient, but no answer. Left message on patient's voice mail to call clinic back.    If patient calls back, please relay message below and help schedule an appointment if needed.    Senia Rosenberg RN  [facility]    Lucy  Recommend being seen in clinic. If there are signs of abscess (red, fluctuant) will need to be seen sooner in urgent care.

## 2023-08-07 NOTE — TELEPHONE ENCOUNTER
Recommend being seen in clinic. If there are signs of abscess (red, fluctuant) will need to be seen sooner in urgent care.

## 2023-08-07 NOTE — TELEPHONE ENCOUNTER
"Refused because:  Should have refills on file  Clinic team: please follow up as needed as it was the patient requesting the refill - did she contact her pharmacy yet?    Last Written Prescription Date:  7/3/23  Last Fill Quantity: 400,  # refills: 1   Last office visit provider:   23    Requested Prescriptions   Pending Prescriptions Disp Refills    blood glucose (NO BRAND SPECIFIED) test strip 400 strip 1     Si strip by In Vitro route 4 times daily       Diabetic Supplies Protocol Passed - 2023  1:17 PM        Passed - Medication is active on med list        Passed - Patient is 18 years of age or older        Passed - Recent (6 mo) or future (30 days) visit within the authorizing provider's specialty     Patient had office visit in the last 6 months or has a visit in the next 30 days with authorizing provider.  See \"Patient Info\" tab in inbasket, or \"Choose Columns\" in Meds & Orders section of the refill encounter.                 VANDA FAULKNER RN 23 1:17 PM  "

## 2023-08-07 NOTE — TELEPHONE ENCOUNTER
Patient returned call. States she has been dealing with ingrown toenails for 6 years, but gets pedicures every month which helps alleviate her symptoms temporarily. She currently endorses redness and tenderness but currently no concern for abscess (although she has had them in the past). Appointment scheduled for this week. Will be seen in urgent care sooner if symptoms worsen.

## 2023-08-07 NOTE — TELEPHONE ENCOUNTER
General Call    Contacts         Type Contact Phone/Fax    08/07/2023 09:34 AM CDT Phone (Incoming) Elina StoddardVanesaCleveland RUDOLPH (Self) [phone] (M)          Reason for Call:  Ingrown Toe Nails    What are your questions or concerns:  patient is complaining about ingrown toenails on her both of her big toes. States that she forgot to bring it up when she last saw the provider. Patient was offered an appointment and declined and stated that if provider felt like she needs to come in she will, also declined speaking with a triage nurse. Wondering what she should do in the meantime. Sending to [facility] since provider is out.     Date of last appointment with provider: 8/2/2023    Okay to leave a detailed message?: Yes at Cell number on file:    Telephone Information:   Mobile [phone]

## 2023-08-07 NOTE — TELEPHONE ENCOUNTER
Medication Question or Refill    Contacts         Type Contact Phone/Fax    08/07/2023 09:32 AM CDT Phone (Incoming) Elizabeth Stoddard (Self) [phone] (M)            What medication are you calling about (include dose and sig)?: blood glucose (NO BRAND SPECIFIED) test strip     Preferred Pharmacy:   [id number] - [place], [place] - [address] [place] [address] AT [facility] [address] [place] [address]  Phone: [phone] Fax: [phone]      Controlled Substance Agreement on file:   CSA -- Patient Level:    CSA: None found at the patient level.       Who prescribed the medication?: Dr. Strange    Do you need a refill? Yes    When did you use the medication last? 8/6/2023    Patient offered an appointment? Yes: patient declined    Do you have any questions or concerns?  No      Okay to leave a detailed message?: Yes at Home number on file [phone] (home)

## 2023-08-09 NOTE — TELEPHONE ENCOUNTER
Called pt., Pt. Will  test strips today. When she spoke to [facility] they had told her that she would need to contact her provider to put in orders for test strips that's why she called. Reassured her there's already orders sent there.       Tina Corona, MSN, RN   [facility]

## 2023-08-10 ENCOUNTER — TRANSFERRED RECORDS (OUTPATIENT)
Dept: HEALTH INFORMATION MANAGEMENT | Facility: CLINIC | Age: 68
End: 2023-08-10

## 2023-08-10 LAB
ALT SERPL-CCNC: 42 IU/L (ref 0–32)
AST SERPL-CCNC: 39 IU/L (ref 0–40)
CREATININE (EXTERNAL): 0.59 MG/DL (ref 0.57–1)
GFR ESTIMATED (EXTERNAL): 98 ML/MIN/1.73
INR (EXTERNAL): 1 (ref 0.9–1.2)

## 2023-08-11 DIAGNOSIS — E11.8 TYPE 2 DIABETES MELLITUS WITH COMPLICATION, WITH LONG-TERM CURRENT USE OF INSULIN (H): ICD-10-CM

## 2023-08-11 DIAGNOSIS — Z79.4 TYPE 2 DIABETES MELLITUS WITH COMPLICATION, WITH LONG-TERM CURRENT USE OF INSULIN (H): ICD-10-CM

## 2023-08-11 NOTE — TELEPHONE ENCOUNTER
Medication Question or Refill        What medication are you calling about (include dose and sig)?:   blood glucose (NO BRAND SPECIFIED) lancets standard     Preferred Pharmacy:     [id number] - [place], [place] - [address] [place] [address] AT [facility]   [address] [place] [address]  Phone: [phone] Fax: [phone]    Controlled Substance Agreement on file:   CSA -- Patient Level:    CSA: None found at the patient level.       Who prescribed the medication?: PCP    Do you need a refill? Yes    When did you use the medication last? NA    Patient offered an appointment? No    Do you have any questions or concerns?  No      Okay to leave a detailed message?: Yes at Home number on file [phone] (home)

## 2023-08-15 ENCOUNTER — ALLIED HEALTH/NURSE VISIT (OUTPATIENT)
Dept: FAMILY MEDICINE | Facility: CLINIC | Age: 68
End: 2023-08-15
Payer: COMMERCIAL

## 2023-08-15 DIAGNOSIS — F31.4 BIPOLAR 1 DISORDER, DEPRESSED, SEVERE (H): Primary | ICD-10-CM

## 2023-08-15 PROCEDURE — 96372 THER/PROPH/DIAG INJ SC/IM: CPT | Performed by: FAMILY MEDICINE

## 2023-08-15 PROCEDURE — 99207 PR NO CHARGE NURSE ONLY: CPT

## 2023-08-15 NOTE — PROGRESS NOTES
Clinic Administered Medication Documentation      Injectable Medication Documentation    Is there an active order (written within the past 365 days, with administrations remaining, not ) in the chart? Yes.     Patient was given  Abilify . Prior to medication administration, verified patient's identity using patient s name and date of birth. Please see MAR and medication order for additional information. Patient instructed to remain in clinic for 15 minutes and report any adverse reaction to staff immediately.    Vial/Syringe: Single dose vial. Was entire vial of medication used? Yes  Was this medication supplied by the patient? Yes, Medication was received directly from a [facility] in a staff to staff handoff or via  delivery and the patient HAS already been billed for the medication (follow site specific policies)     Is this a medication the patient will need to receive again? Yes. Verified that the patient has refills remaining in their prescription.      Senia Rosenberg RN  [facility]
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
## 2023-08-16 ENCOUNTER — OFFICE VISIT (OUTPATIENT)
Dept: PHARMACY | Facility: CLINIC | Age: 68
End: 2023-08-16
Payer: COMMERCIAL

## 2023-08-16 VITALS
BODY MASS INDEX: 32.83 KG/M2 | WEIGHT: 183 LBS | SYSTOLIC BLOOD PRESSURE: 118 MMHG | HEART RATE: 76 BPM | DIASTOLIC BLOOD PRESSURE: 73 MMHG

## 2023-08-16 DIAGNOSIS — K75.81 NONALCOHOLIC STEATOHEPATITIS: ICD-10-CM

## 2023-08-16 DIAGNOSIS — Z78.9 TAKES DIETARY SUPPLEMENTS: ICD-10-CM

## 2023-08-16 DIAGNOSIS — J31.0 CHRONIC RHINITIS: ICD-10-CM

## 2023-08-16 DIAGNOSIS — E55.9 VITAMIN D DEFICIENCY: ICD-10-CM

## 2023-08-16 DIAGNOSIS — E78.2 MIXED HYPERLIPIDEMIA: ICD-10-CM

## 2023-08-16 DIAGNOSIS — Z79.4 TYPE 2 DIABETES MELLITUS WITH COMPLICATION, WITH LONG-TERM CURRENT USE OF INSULIN (H): Primary | ICD-10-CM

## 2023-08-16 DIAGNOSIS — R06.02 SOB (SHORTNESS OF BREATH): ICD-10-CM

## 2023-08-16 DIAGNOSIS — E11.8 TYPE 2 DIABETES MELLITUS WITH COMPLICATION, WITH LONG-TERM CURRENT USE OF INSULIN (H): Primary | ICD-10-CM

## 2023-08-16 DIAGNOSIS — M85.80 OSTEOPENIA, UNSPECIFIED LOCATION: ICD-10-CM

## 2023-08-16 DIAGNOSIS — F31.4 BIPOLAR 1 DISORDER, DEPRESSED, SEVERE (H): ICD-10-CM

## 2023-08-16 DIAGNOSIS — K59.00 CONSTIPATION, UNSPECIFIED CONSTIPATION TYPE: ICD-10-CM

## 2023-08-16 PROCEDURE — 99607 MTMS BY PHARM ADDL 15 MIN: CPT | Performed by: PHARMACIST

## 2023-08-16 PROCEDURE — 99606 MTMS BY PHARM EST 15 MIN: CPT | Performed by: PHARMACIST

## 2023-08-16 RX ORDER — TIRZEPATIDE 15 MG/.5ML
15 INJECTION, SOLUTION SUBCUTANEOUS
Qty: 6 ML | Refills: 3 | Status: SHIPPED | OUTPATIENT
Start: 2023-08-16 | End: 2023-12-26

## 2023-08-16 NOTE — PATIENT INSTRUCTIONS
"Recommendations from today's MTM visit:                                                         Increase Mounjaro to 15 mg once weekly.     Follow-up: Return in about 27 days (around 9/12/2023) for Medication Management Pharmacist, in person.    It was great speaking with you today.  I value your experience and would be very thankful for your time in providing feedback in our clinic survey. In the next few days, you may receive an email or text message from [employer] with a link to a survey related to your  clinical pharmacist.\"     To schedule another MTM appointment, please call the clinic directly or you may call the MTM scheduling line at [phone] or toll-free at [phone].     My Clinical Pharmacist's contact information:                                                      Please feel free to contact me with any questions or concerns you have.      Bradley Liao, PharmD  Medication Therapy Management (MTM) Pharmacist  [facility]     "

## 2023-08-21 ENCOUNTER — TRANSFERRED RECORDS (OUTPATIENT)
Dept: HEALTH INFORMATION MANAGEMENT | Facility: CLINIC | Age: 68
End: 2023-08-21
Payer: COMMERCIAL

## 2023-08-22 ENCOUNTER — OFFICE VISIT (OUTPATIENT)
Dept: FAMILY MEDICINE | Facility: CLINIC | Age: 68
End: 2023-08-22
Payer: COMMERCIAL

## 2023-08-22 VITALS
WEIGHT: 186 LBS | TEMPERATURE: 98.3 F | BODY MASS INDEX: 32.96 KG/M2 | HEART RATE: 65 BPM | DIASTOLIC BLOOD PRESSURE: 67 MMHG | HEIGHT: 63 IN | OXYGEN SATURATION: 97 % | RESPIRATION RATE: 16 BRPM | SYSTOLIC BLOOD PRESSURE: 108 MMHG

## 2023-08-22 DIAGNOSIS — L60.0 INGROWING TOENAIL: Primary | ICD-10-CM

## 2023-08-22 DIAGNOSIS — J31.0 CHRONIC RHINITIS: ICD-10-CM

## 2023-08-22 DIAGNOSIS — M67.431 GANGLION CYST OF WRIST, RIGHT: ICD-10-CM

## 2023-08-22 DIAGNOSIS — L98.9 FACIAL SKIN LESION: ICD-10-CM

## 2023-08-22 PROCEDURE — 99214 OFFICE O/P EST MOD 30 MIN: CPT | Performed by: FAMILY MEDICINE

## 2023-08-22 RX ORDER — FLUTICASONE PROPIONATE 50 MCG
SPRAY, SUSPENSION (ML) NASAL
Qty: 48 G | Refills: 3 | Status: SHIPPED | OUTPATIENT
Start: 2023-08-22

## 2023-08-22 NOTE — PROGRESS NOTES
"  Assessment & Plan     Ingrowing toenail  Overall improved, discussed diabetes foot care, continue to follow-up with podiatrist or primary care.    Chronic rhinitis    - fluticasone (FLONASE) 50 MCG/ACT nasal spray; SHAKE LIQUID AND USE 2 SPRAYS IN EACH NOSTRIL DAILY AS NEEDED FOR RHINITIS OR ALLERGIES Strength: 50 MCG/ACT    Facial skin lesion  Wart to lesion treated with liquid nitrogen x3, she tolerated well, follow-up as needed 3 to 4 weeks.    Ganglion cyst of wrist, right  Appears small about 1/2 cm, Asymptomatic,conservative treatment discussed, instructed to follow-up with increasing in size or causing more discomfort.    Review of external notes as documented elsewhere in note  30 minutes spent by me on the date of the encounter doing chart review, review of outside records, review of test results, interpretation of tests, patient visit, and documentation        BMI:   Estimated body mass index is 33.12 kg/m  as calculated from the following:    Height as of this encounter: 1.596 m (5' 2.84\").    Weight as of this encounter: 84.4 kg (186 lb).   Weight management plan: Discussed healthy diet and exercise guidelines        MD JANICE Dobson [facility]    Venkata Johnson is a 68 year old, presenting for the following health issues:  Ingrown Toenail and Derm Problem (On forehead and right side rib. Stated that would like to frozen if possible.)      8/22/2023     9:53 AM   Additional Questions   Roomed by Davina CAMACHO   Accompanied by self       History of Present Illness       Reason for visit:  Ingrown toenail    She eats 2-3 servings of fruits and vegetables daily.She consumes 0 sweetened beverage(s) daily.She exercises with enough effort to increase her heart rate 9 or less minutes per day.  She exercises with enough effort to increase her heart rate 3 or less days per week.   She is taking medications regularly.       Initially scheduled for bilateral toenail ingrowth, but her " "symptoms subsided, toenail pain improved.  She has a small growth on the left side of her face, she would like me to take a look and eventually freeze it for her.  She has a bump the right wrist area.  Very small, not getting bigger or not tender.  Small rash on the right side of her chest, using nystatin with improvement.    Diabetes type 2 has been stable, A1c done about 3 weeks ago was 6.9% 4 months ago was 6.9%.  She is currently doing Mounjaro once a week, Lantus 74 units and NovoLog 3 times a day with meals.  Moving to [place] in the next couple of months.    Review of Systems   Constitutional, HEENT, cardiovascular, pulmonary, gi and gu systems are negative, except as otherwise noted.      Objective    /67 (BP Location: Left arm, Patient Position: Sitting, Cuff Size: Adult Large)   Pulse 65   Temp 98.3  F (36.8  C) (Temporal)   Resp 16   Ht 1.596 m (5' 2.84\")   Wt 84.4 kg (186 lb)   LMP  (LMP Unknown)   SpO2 97%   BMI 33.12 kg/m    Body mass index is 33.12 kg/m .  Physical Exam   GENERAL: healthy, alert and no distress  NECK: no adenopathy, no asymmetry, masses, or scars and thyroid normal to palpation  RESP: lungs clear to auscultation - no rales, rhonchi or wheezes  CV: regular rate and rhythm, normal S1 S2, no S3 or S4, no murmur, click or rub, no peripheral edema and peripheral pulses strong  ABDOMEN: soft, nontender, no hepatosplenomegaly, no masses and bowel sounds normal  MS: no gross musculoskeletal defects noted, no edema  SKIN: 3-4 wart - left temple area        This note was completed in part using a voice recognition software, any grammatical or context distortion are unintentional and inherent to the software.        Prior to immunization administration, verified patients identity using patient s name and date of birth. Please see Immunization Activity for additional information.     Screening Questionnaire for Adult Immunization    Are you sick today?   No   Do you have " allergies to medications, food, a vaccine component or latex?   Yes   Have you ever had a serious reaction after receiving a vaccination?   Yes   Do you have a long-term health problem with heart, lung, kidney, or metabolic disease (e.g., diabetes), asthma, a blood disorder, no spleen, complement component deficiency, a cochlear implant, or a spinal fluid leak?  Are you on long-term aspirin therapy?   Yes   Do you have cancer, leukemia, HIV/AIDS, or any other immune system problem?   No   Do you have a parent, brother, or sister with an immune system problem?   No   In the past 3 months, have you taken medications that affect  your immune system, such as prednisone, other steroids, or anticancer drugs; drugs for the treatment of rheumatoid arthritis, Crohn s disease, or psoriasis; or have you had radiation treatments?   No   Have you had a seizure, or a brain or other nervous system problem?   No   During the past year, have you received a transfusion of blood or blood    products, or been given immune (gamma) globulin or antiviral drug?   No   For women: Are you pregnant or is there a chance you could become       pregnant during the next month?   No   Have you received any vaccinations in the past 4 weeks?   Yes     Immunization questionnaire was positive for at least one answer.  Notified Dr. Raman.      Patient instructed to remain in clinic for 15 minutes afterwards, and to report any adverse reactions.     Screening performed by Davina Oshea MA on 8/22/2023 at 10:03 AM.

## 2023-08-24 DIAGNOSIS — J31.0 CHRONIC RHINITIS: ICD-10-CM

## 2023-08-24 RX ORDER — FLUTICASONE PROPIONATE 50 MCG
SPRAY, SUSPENSION (ML) NASAL
Qty: 48 G | Refills: 3 | OUTPATIENT
Start: 2023-08-24

## 2023-08-24 NOTE — TELEPHONE ENCOUNTER
Refused because:  Duplicate. Rx sent 8/22/23 48 g/3 refills.    Ute Akhtar RN/[facility] Nurse Advisor

## 2023-08-28 ENCOUNTER — MEDICAL CORRESPONDENCE (OUTPATIENT)
Dept: HEALTH INFORMATION MANAGEMENT | Facility: CLINIC | Age: 68
End: 2023-08-28
Payer: COMMERCIAL

## 2023-08-28 DIAGNOSIS — F31.4 BIPOLAR 1 DISORDER, DEPRESSED, SEVERE (H): ICD-10-CM

## 2023-08-28 RX ORDER — LITHIUM CARBONATE 450 MG
TABLET, EXTENDED RELEASE ORAL
Qty: 60 TABLET | Refills: 1 | Status: SHIPPED | OUTPATIENT
Start: 2023-08-28 | End: 2023-10-11
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
## 2023-09-12 ENCOUNTER — TELEPHONE (OUTPATIENT)
Dept: FAMILY MEDICINE | Facility: CLINIC | Age: 68
End: 2023-09-12

## 2023-09-12 ENCOUNTER — OFFICE VISIT (OUTPATIENT)
Dept: PHARMACY | Facility: CLINIC | Age: 68
End: 2023-09-12
Payer: COMMERCIAL

## 2023-09-12 ENCOUNTER — PATIENT OUTREACH (OUTPATIENT)
Dept: FAMILY MEDICINE | Facility: CLINIC | Age: 68
End: 2023-09-12

## 2023-09-12 ENCOUNTER — ALLIED HEALTH/NURSE VISIT (OUTPATIENT)
Dept: FAMILY MEDICINE | Facility: CLINIC | Age: 68
End: 2023-09-12
Payer: COMMERCIAL

## 2023-09-12 VITALS
WEIGHT: 180.5 LBS | SYSTOLIC BLOOD PRESSURE: 126 MMHG | BODY MASS INDEX: 32.14 KG/M2 | HEART RATE: 60 BPM | DIASTOLIC BLOOD PRESSURE: 78 MMHG

## 2023-09-12 DIAGNOSIS — J31.0 CHRONIC RHINITIS: ICD-10-CM

## 2023-09-12 DIAGNOSIS — K59.00 CONSTIPATION, UNSPECIFIED CONSTIPATION TYPE: ICD-10-CM

## 2023-09-12 DIAGNOSIS — F31.4 BIPOLAR 1 DISORDER, DEPRESSED, SEVERE (H): ICD-10-CM

## 2023-09-12 DIAGNOSIS — K75.81 NONALCOHOLIC STEATOHEPATITIS: ICD-10-CM

## 2023-09-12 DIAGNOSIS — Z79.4 TYPE 2 DIABETES MELLITUS WITH COMPLICATION, WITH LONG-TERM CURRENT USE OF INSULIN (H): Primary | ICD-10-CM

## 2023-09-12 DIAGNOSIS — E78.2 MIXED HYPERLIPIDEMIA: ICD-10-CM

## 2023-09-12 DIAGNOSIS — E11.8 TYPE 2 DIABETES MELLITUS WITH COMPLICATION, WITH LONG-TERM CURRENT USE OF INSULIN (H): Primary | ICD-10-CM

## 2023-09-12 DIAGNOSIS — E55.9 VITAMIN D DEFICIENCY: ICD-10-CM

## 2023-09-12 DIAGNOSIS — M85.80 OSTEOPENIA, UNSPECIFIED LOCATION: ICD-10-CM

## 2023-09-12 DIAGNOSIS — Z78.9 TAKES DIETARY SUPPLEMENTS: ICD-10-CM

## 2023-09-12 DIAGNOSIS — R06.02 SOB (SHORTNESS OF BREATH): ICD-10-CM

## 2023-09-12 DIAGNOSIS — F31.4 BIPOLAR 1 DISORDER, DEPRESSED, SEVERE (H): Primary | ICD-10-CM

## 2023-09-12 PROCEDURE — 99607 MTMS BY PHARM ADDL 15 MIN: CPT | Performed by: PHARMACIST

## 2023-09-12 PROCEDURE — 99606 MTMS BY PHARM EST 15 MIN: CPT | Performed by: PHARMACIST

## 2023-09-12 PROCEDURE — 99207 PR NO CHARGE NURSE ONLY: CPT

## 2023-09-12 PROCEDURE — 96372 THER/PROPH/DIAG INJ SC/IM: CPT | Performed by: FAMILY MEDICINE

## 2023-09-12 NOTE — TELEPHONE ENCOUNTER
Patient calls back from missed call. Writer will send Bradley Liao another message to call patient back. Caller verbalizes understanding.     Ayala Almeida,   [facility]  September 12, 2023 2:07 PM

## 2023-09-12 NOTE — PROGRESS NOTES
LVM for patient with additional questions regarding her plan for moving. Request patient call back to [facility].

## 2023-09-12 NOTE — CONFIDENTIAL NOTE
Patient reports that she has shared her personal information about her health with the musician. Cautioned patient against sharing information until she has verified the identify of the musician.     Patient denies hypomania symptoms. Reviewed concerns of engaging in high risk situations as a symptom of dangelo/hypomania. Patient denies this is a high risk situation, despite having paused her plans of moving to [place] or sharing information with an unknown person. Will continue to monitor.

## 2023-09-12 NOTE — PROGRESS NOTES
Clinic Administered Medication Documentation      Injectable Medication Documentation    Is there an active order (written within the past 365 days, with administrations remaining, not ) in the chart? Yes.     Patient was given  Abilify 400mg . Prior to medication administration, verified patient's identity using patient s name and date of birth. Please see MAR and medication order for additional information. Patient instructed to remain in clinic for 15 minutes and report any adverse reaction to staff immediately.    Vial/Syringe: Single dose vial. Was entire vial of medication used? Yes  Was this medication supplied by the patient? No  Is this a medication the patient will need to receive again? Yes. Verified that the patient has refills remaining in their prescription.

## 2023-09-12 NOTE — PATIENT INSTRUCTIONS
"Recommendations from today's MTM visit:                                                       Decrease Novolog to 20 units daily.     Follow-up: Return in about 4 weeks (around 10/10/2023) for Medication Management Pharmacist.   9/14 With Dr. Raman.   10/10 RN Visit for Abilify.     It was great speaking with you today.  I value your experience and would be very thankful for your time in providing feedback in our clinic survey. In the next few days, you may receive an email or text message from [email or web address] with a link to a survey related to your  clinical pharmacist.\"     To schedule another MTM appointment, please call the clinic directly or you may call the MTM scheduling line at [phone] or toll-free at [phone].     My Clinical Pharmacist's contact information:                                                      Please feel free to contact me with any questions or concerns you have.      Bradley Liao, PharmD  Medication Therapy Management (MTM) Pharmacist  [facility]      "

## 2023-09-12 NOTE — TELEPHONE ENCOUNTER
08/1/23 [place] [facility] KINSE[facility] 1 Plan cotest in 6-12 months due by 08/1/24 of note looks like pt may be moving to [place]

## 2023-09-13 ENCOUNTER — TELEPHONE (OUTPATIENT)
Dept: FAMILY MEDICINE | Facility: CLINIC | Age: 68
End: 2023-09-13
Payer: COMMERCIAL

## 2023-09-13 NOTE — TELEPHONE ENCOUNTER
FYI - Status Update    Who is Calling: patient    Update: pt called to let clay know that she is ok/well and is doing better and today. Please call pt back if you have any questions.  Thanks!    Does caller want a call/response back: No

## 2023-09-14 ENCOUNTER — OFFICE VISIT (OUTPATIENT)
Dept: FAMILY MEDICINE | Facility: CLINIC | Age: 68
End: 2023-09-14
Payer: COMMERCIAL

## 2023-09-14 VITALS
SYSTOLIC BLOOD PRESSURE: 119 MMHG | OXYGEN SATURATION: 96 % | DIASTOLIC BLOOD PRESSURE: 73 MMHG | HEART RATE: 66 BPM | WEIGHT: 181 LBS | BODY MASS INDEX: 32.07 KG/M2 | HEIGHT: 63 IN | RESPIRATION RATE: 16 BRPM | TEMPERATURE: 97.8 F

## 2023-09-14 DIAGNOSIS — L98.9 FACIAL SKIN LESION: Primary | ICD-10-CM

## 2023-09-14 DIAGNOSIS — S99.922A TOE INJURY, LEFT, INITIAL ENCOUNTER: ICD-10-CM

## 2023-09-14 PROCEDURE — 99214 OFFICE O/P EST MOD 30 MIN: CPT | Performed by: FAMILY MEDICINE

## 2023-09-14 NOTE — PROGRESS NOTES
"  Assessment & Plan     Facial skin lesion  Left facial skin lesion, differential diagnosis discussed included verruca etc  Liquid nitrogen treatment today, consider referral to dermatologist if not improving.    Toe injury, left, initial encounter  Continue symptomatic care, follow-up with PCP if not fully improved.    Review of external notes as documented elsewhere in note  30 minutes spent by me on the date of the encounter doing chart review, review of outside records, review of test results, interpretation of tests, patient visit, and documentation        Work on weight loss  Regular exercise    MD JANICE Dobson [facility]    Venkata Johnson is a 68 year old, presenting for the following health issues:  Follow Up (Toe nail, kin on forehead, and nose)      9/14/2023     8:29 AM   Additional Questions   Roomed by Davina CAMACHO   Accompanied by self       HPI     She has a left facial skin lesion, has been there for a few years, was treated with liquid nitrogen few weeks ago, mild improvement, she is here today for another treatment.  She also mention a left toe injury, has been soaking it with normal improvement.  No sign of infection.      Review of Systems   Constitutional, HEENT, cardiovascular, pulmonary, gi and gu systems are negative, except as otherwise noted.      Objective    /73 (BP Location: Left arm, Patient Position: Sitting, Cuff Size: Adult Large)   Pulse 66   Temp 97.8  F (36.6  C) (Oral)   Resp 16   Ht 1.595 m (5' 2.8\")   Wt 82.1 kg (181 lb)   LMP  (LMP Unknown)   SpO2 96%   BMI 32.27 kg/m    Body mass index is 32.27 kg/m .  Physical Exam   GENERAL: healthy, alert and no distress  NECK: no adenopathy, no asymmetry, masses, or scars and thyroid normal to palpation  RESP: lungs clear to auscultation - no rales, rhonchi or wheezes  CV: regular rate and rhythm, normal S1 S2, no S3 or S4, no murmur, click or rub, no peripheral edema and peripheral pulses " strong  ABDOMEN: soft, nontender, no hepatosplenomegaly, no masses and bowel sounds normal  MS: Mild left great toe tenderness, but no open wound.  No swelling.  SKIN: Left facial multiple raised lesion, treated with liquid nitrogen x3 patient tolerated well.    No results found for any visits on 09/14/23.    This note was completed in part using a voice recognition software, any grammatical or context distortion are unintentional and inherent to the software.

## 2023-09-15 NOTE — TELEPHONE ENCOUNTER
Medication Question or Clarification  Who is calling: Pharmacist  What medication are you calling about (include dose and sig)?: liraglutide (VICTOZA) 0.6 mg/0.1 mL (18 mg/3 mL) injection  Who prescribed the medication?: Gaye Treviño MD    What is your question/concern?: Pharmacist would like clarification of medication. Patient stated to pharmacist that medication has been increased to 1.2 mg every morning. Please advise  [facility]  Okay to leave a detailed message?: Yes         Information: Selecting Yes will display possible errors in your note based on the variables you have selected. This validation is only offered as a suggestion for you. PLEASE NOTE THAT THE VALIDATION TEXT WILL BE REMOVED WHEN YOU FINALIZE YOUR NOTE. IF YOU WANT TO FAX A PRELIMINARY NOTE YOU WILL NEED TO TOGGLE THIS TO 'NO' IF YOU DO NOT WANT IT IN YOUR FAXED NOTE.

## 2023-09-28 NOTE — TELEPHONE ENCOUNTER
Johana- she is returning a phone call to you. She said that you had tried calling her, but she was out of town.     If Johana isn't available to return a call today, okay to wait until Johana is back in the office.     She can be reached @ [phone]   No

## 2023-10-02 ENCOUNTER — TELEPHONE (OUTPATIENT)
Dept: FAMILY MEDICINE | Facility: CLINIC | Age: 68
End: 2023-10-02
Payer: COMMERCIAL

## 2023-10-02 NOTE — TELEPHONE ENCOUNTER
General Call    Contacts         Type Contact Phone/Fax    10/02/2023 07:54 AM CDT Phone (Incoming) Elizabeth Stoddard (Self) [phone] (M)          Reason for Call:  Patient wanting to speak directly to provider    What are your questions or concerns:  Patient came in wanted to speak with provider and only wanted to speak with provider. Patient was informed that provider was not in at t his time and that he could call her back. Patient stated that she currently doesn't have a phone, or insurance and really wants to speak with provider. Patient is requesting that provider comes to see her at her home. Patient also attempted to leave items here for provider. When asked how we should contact her if provider was willing to come out to see her in her home, she stated that he could get in touch with Delfin who is the  of her building at [phone]. Patient was not specific in what she wanted to speak with provider about. Patient was asked if she wanted to get a referral for social work to speak to a  about whatever is going on, and patient declined stating she only wanted to speak to provider directly.     Date of last appointment with provider: 8/2/2023    Okay to leave a detailed message?: No

## 2023-10-02 NOTE — TELEPHONE ENCOUNTER
Called and spoke to pt and she was just calling to inform us she was getting the sleep study done no action is needed.  
Can we clarify what she wants me to do? She is getting her sleep study at [place]?  
PT came into clinic asking for Dr. Ibrahim to confirm/clarify with other clinic that she is being seen for a sleep study regarding her breathing.     Any questions or concerns, please call PT @ [phone]    Thank you!  
Please advise   
self-care/home management

## 2023-10-03 NOTE — CONFIDENTIAL NOTE
Tried to call the number that the pt gave us, this was the answering service for the Independent Living building she is in and they have no way to contact her unless she comes to the . They have told her in the past that they can not keep receiving phone calls for her as someone is not always at that desk, they took down a message and if they see her will pass the message along to her.     If the pt calls back please try and get as much information as possible for Dr. Strange, if pt is not wanting to leave information with anyone but Dr. Strange then she will need to either provide us a better way to contact her or she will need to make an appointment (she has one on 10/10/23 with Dr. Strange, so she can wait until then unless emergent). #1

## 2023-10-10 ENCOUNTER — OFFICE VISIT (OUTPATIENT)
Dept: FAMILY MEDICINE | Facility: CLINIC | Age: 68
End: 2023-10-10
Payer: COMMERCIAL

## 2023-10-10 VITALS
HEIGHT: 63 IN | DIASTOLIC BLOOD PRESSURE: 72 MMHG | RESPIRATION RATE: 18 BRPM | OXYGEN SATURATION: 97 % | SYSTOLIC BLOOD PRESSURE: 128 MMHG | TEMPERATURE: 97.8 F | HEART RATE: 95 BPM | BODY MASS INDEX: 31.71 KG/M2 | WEIGHT: 179 LBS

## 2023-10-10 DIAGNOSIS — E11.8 TYPE 2 DIABETES MELLITUS WITH COMPLICATION, WITH LONG-TERM CURRENT USE OF INSULIN (H): ICD-10-CM

## 2023-10-10 DIAGNOSIS — Z79.4 TYPE 2 DIABETES MELLITUS WITH COMPLICATION, WITH LONG-TERM CURRENT USE OF INSULIN (H): ICD-10-CM

## 2023-10-10 DIAGNOSIS — F31.4 BIPOLAR 1 DISORDER, DEPRESSED, SEVERE (H): Primary | ICD-10-CM

## 2023-10-10 LAB — LITHIUM SERPL-SCNC: 0.58 MMOL/L (ref 0.6–1.2)

## 2023-10-10 PROCEDURE — 80178 ASSAY OF LITHIUM: CPT | Performed by: FAMILY MEDICINE

## 2023-10-10 PROCEDURE — 96372 THER/PROPH/DIAG INJ SC/IM: CPT | Performed by: FAMILY MEDICINE

## 2023-10-10 PROCEDURE — 36415 COLL VENOUS BLD VENIPUNCTURE: CPT | Performed by: FAMILY MEDICINE

## 2023-10-10 PROCEDURE — 99214 OFFICE O/P EST MOD 30 MIN: CPT | Performed by: FAMILY MEDICINE

## 2023-10-10 RX ORDER — LOSARTAN POTASSIUM 50 MG/1
50 TABLET ORAL DAILY
Qty: 90 TABLET | Refills: 3 | Status: SHIPPED | OUTPATIENT
Start: 2023-10-10

## 2023-10-10 RX ORDER — ROSUVASTATIN CALCIUM 10 MG/1
10 TABLET, COATED ORAL DAILY
Qty: 90 TABLET | Refills: 3 | Status: SHIPPED | OUTPATIENT
Start: 2023-10-10
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
## 2023-10-10 NOTE — ASSESSMENT & PLAN NOTE
Recent A1c 7.2, not repeated.  No change, per [facility] pharmacist who she agreed to continue to see.    Stop Crestor on her own in favor of omega-3 supplements.  Encouraged restarting Crestor and discussed reasons why.

## 2023-10-11 ENCOUNTER — TELEPHONE (OUTPATIENT)
Dept: FAMILY MEDICINE | Facility: CLINIC | Age: 68
End: 2023-10-11
Payer: COMMERCIAL

## 2023-10-11 DIAGNOSIS — F31.4 BIPOLAR 1 DISORDER, DEPRESSED, SEVERE (H): ICD-10-CM

## 2023-10-11 RX ORDER — LITHIUM CARBONATE 450 MG
TABLET, EXTENDED RELEASE ORAL
Qty: 90 TABLET | Refills: 1 | Status: SHIPPED | OUTPATIENT
Start: 2023-10-11 | End: 2023-12-26

## 2023-10-11 NOTE — TELEPHONE ENCOUNTER
----- Message from Bradley Liao PharmD sent at 10/11/2023 10:50 AM CDT -----  2nd subtherapeutic dose - likely does need a dose increase. Lithium follows linear kinetics so 1.5 x current dose should result in 1.5 x current level.       Team please call patient and inform that her lithium level is still low. Recommend increasing her current 450 mg tabs to  2 tabs AM and 1 tab PM (total 1350 mg daily). I sent a new prescription, but she can get started with her current supply. We can recheck the levels at her next [facility] visit in November.

## 2023-10-11 NOTE — LETTER
October 18, 2023      Annel Stoddard  [address]  [place] [address]        Dear Annel,     We have been unable to reach you by telephone as your phone number is not in service.     Your lithium level is still low. We recommend increasing your current 450 mg tabs to 2 tabs in the morning and 1 tab in the evening (total of 1350 mg daily). We sent a new prescription to your pharmacy to reflect this change, but you can get started with your current supply. We can recheck the levels at your next visit in November.        Sincerely,    Bradley Liao, PharmD & clinic RN

## 2023-10-11 NOTE — TELEPHONE ENCOUNTER
Attempted to contact patient, received automated message that patient is not taking calls at this time--please try again later.    Please attempt to contact patient at another time to relay provider message.

## 2023-10-16 DIAGNOSIS — K21.00 GASTROESOPHAGEAL REFLUX DISEASE WITH ESOPHAGITIS, UNSPECIFIED WHETHER HEMORRHAGE: ICD-10-CM

## 2023-10-16 DIAGNOSIS — E11.8 TYPE 2 DIABETES MELLITUS WITH COMPLICATION, WITH LONG-TERM CURRENT USE OF INSULIN (H): ICD-10-CM

## 2023-10-16 DIAGNOSIS — Z79.4 TYPE 2 DIABETES MELLITUS WITH COMPLICATION, WITH LONG-TERM CURRENT USE OF INSULIN (H): ICD-10-CM

## 2023-10-17 RX ORDER — OMEPRAZOLE 40 MG/1
40 CAPSULE, DELAYED RELEASE ORAL DAILY
Qty: 90 CAPSULE | Refills: 0 | Status: SHIPPED | OUTPATIENT
Start: 2023-10-17 | End: 2024-01-16

## 2023-10-17 RX ORDER — OMEPRAZOLE 40 MG/1
40 CAPSULE, DELAYED RELEASE ORAL DAILY
Qty: 90 CAPSULE | Refills: 1 | OUTPATIENT
Start: 2023-10-17

## 2023-10-17 NOTE — TELEPHONE ENCOUNTER
Called pt's number and it was invalid. Called pt's daughter (C2C on file) and left message to call clinic back x 2.    If pt or daughter calls back, please relay provider message. Thanks      Seven Fabian, BSN RN  [facility]

## 2023-10-18 ENCOUNTER — TELEPHONE (OUTPATIENT)
Dept: FAMILY MEDICINE | Facility: CLINIC | Age: 68
End: 2023-10-18
Payer: COMMERCIAL

## 2023-10-18 RX ORDER — LANOLIN, PETROLATUM 15.5; 53.4 G/100G; G/100G
OINTMENT TOPICAL
Qty: 113 G | Refills: 3 | Status: SHIPPED | OUTPATIENT
Start: 2023-10-18 | End: 2023-12-12

## 2023-10-18 NOTE — TELEPHONE ENCOUNTER
Pt's daughter Rita returned call (CTC on file).     Refer to 10/11/23 TE for details.    Zhanna SILVA RN  [facility]

## 2023-10-18 NOTE — TELEPHONE ENCOUNTER
"Pt's daughter Rita (CTC on file) returned call, states she has not been able to reach pt since September 12, 2023 and says \"this is very typical of my mother, she will drop off and not talk to anyone and not pick-up calls if she is upset or not taking her meds. She had a , I don't know what happened, but she doesn't have a  anymore\".     Relayed PharmD messages below. Noted pt was seen in clinic with PCP on 10/10/23 and has upcoming appt with PharmD on 11/21/23. Pt historically has been compliance with follow-up appts.     Zhanna SILVA RN  [facility]      ----- Message from Bradley Liao PharmD sent at 10/11/2023 10:50 AM CDT -----  2nd subtherapeutic dose - likely does need a dose increase. Lithium follows linear kinetics so 1.5 x current dose should result in 1.5 x current level.         Team please call patient and inform that her lithium level is still low. Recommend increasing her current 450 mg tabs to  2 tabs AM and 1 tab PM (total 1350 mg daily). I sent a new prescription, but she can get started with her current supply. We can recheck the levels at her next [facility] visit in November.            "

## 2023-10-18 NOTE — TELEPHONE ENCOUNTER
Made 3rd attempt, patient's number is not in service. Left message for daughter. If either call back, please relay message below from Bradley regarding medication change.     Letter sent to patient's home address.

## 2023-10-23 ENCOUNTER — OFFICE VISIT (OUTPATIENT)
Dept: FAMILY MEDICINE | Facility: CLINIC | Age: 68
End: 2023-10-23
Payer: COMMERCIAL

## 2023-10-23 VITALS
WEIGHT: 180 LBS | OXYGEN SATURATION: 98 % | HEIGHT: 62 IN | BODY MASS INDEX: 33.13 KG/M2 | RESPIRATION RATE: 16 BRPM | HEART RATE: 84 BPM | TEMPERATURE: 98.3 F | DIASTOLIC BLOOD PRESSURE: 69 MMHG | SYSTOLIC BLOOD PRESSURE: 112 MMHG

## 2023-10-23 DIAGNOSIS — E11.69 MIXED DIABETIC HYPERLIPIDEMIA ASSOCIATED WITH TYPE 2 DIABETES MELLITUS (H): ICD-10-CM

## 2023-10-23 DIAGNOSIS — Z79.4 TYPE 2 DIABETES MELLITUS WITH COMPLICATION, WITH LONG-TERM CURRENT USE OF INSULIN (H): ICD-10-CM

## 2023-10-23 DIAGNOSIS — E78.2 MIXED DIABETIC HYPERLIPIDEMIA ASSOCIATED WITH TYPE 2 DIABETES MELLITUS (H): ICD-10-CM

## 2023-10-23 DIAGNOSIS — K74.60 CIRRHOSIS OF LIVER WITHOUT ASCITES, UNSPECIFIED HEPATIC CIRRHOSIS TYPE (H): ICD-10-CM

## 2023-10-23 DIAGNOSIS — E55.9 VITAMIN D DEFICIENCY: ICD-10-CM

## 2023-10-23 DIAGNOSIS — F31.4 BIPOLAR 1 DISORDER, DEPRESSED, SEVERE (H): Primary | ICD-10-CM

## 2023-10-23 DIAGNOSIS — E11.8 TYPE 2 DIABETES MELLITUS WITH COMPLICATION, WITH LONG-TERM CURRENT USE OF INSULIN (H): ICD-10-CM

## 2023-10-23 PROCEDURE — 99213 OFFICE O/P EST LOW 20 MIN: CPT | Performed by: FAMILY MEDICINE

## 2023-10-23 NOTE — PROGRESS NOTES
Assessment & Plan     Bipolar 1 disorder, depressed, severe (H)  She is on Abilify injection, she also takes lithium but does not want to change the current dose.  She will follow-up with Dr. Strange.    Cirrhosis of liver without ascites, unspecified hepatic cirrhosis type (H)  Managed by [facility], she sees Dr. Ashton    Type 2 diabetes mellitus with complication, with long-term current use of insulin (H)  Overall stable, she will continue to follow with Dr. Strange.    Mixed diabetic hyperlipidemia associated with type 2 diabetes mellitus (H)      Review of external notes as documented elsewhere in note  30 minutes spent by me on the date of the encounter doing chart review, review of outside records, review of test results, interpretation of tests, patient visit, and documentation        Work on weight loss  Regular exercise    Reece Raman MD  [facility]    Venkata Johnson is a 68 year old, presenting for the following health issues:  Recheck Medication      10/23/2023     9:43 AM   Additional Questions   Roomed by Davina CAMACHO   Accompanied by self       History of Present Illness       Reason for visit:  Med injection    She eats 0-1 servings of fruits and vegetables daily.She consumes 0 sweetened beverage(s) daily.She exercises with enough effort to increase her heart rate 9 or less minutes per day.  She exercises with enough effort to increase her heart rate 3 or less days per week. She is missing 7 dose(s) of medications per week.  She is not taking prescribed medications regularly due to other.       She has diabetes type 2, most recent A1c was 6.9%, overall doing well, she sees Dr. Strange.  She has bipolar, she gets Abilify monthly injection, her lithium level was low, Bradley our  in-house pharmacist recommended increased her lithium dose, but patient stating that she does not want to, she like to stay on lithium 450 mg p.o. twice daily.  Left facial Warty lesion treated  "with liquid nitrogen but no significant change, discussed dermatology follow-up.  She also has liver cirrhosis and she usually does follow-up with Dr. F[facility].      Review of Systems   Constitutional, HEENT, cardiovascular, pulmonary, gi and gu systems are negative, except as otherwise noted.      Objective    /69 (BP Location: Left arm, Patient Position: Sitting, Cuff Size: Adult Large)   Pulse 84   Temp 98.3  F (36.8  C) (Temporal)   Resp 16   Ht 1.586 m (5' 2.44\")   Wt 81.6 kg (180 lb)   LMP  (LMP Unknown)   SpO2 98%   BMI 32.46 kg/m    Body mass index is 32.46 kg/m .  Physical Exam   GENERAL: healthy, alert and no distress  NECK: no adenopathy, no asymmetry, masses, or scars and thyroid normal to palpation  RESP: lungs clear to auscultation - no rales, rhonchi or wheezes  CV: regular rate and rhythm, normal S1 S2, no S3 or S4, no murmur, click or rub, no peripheral edema and peripheral pulses strong  ABDOMEN: soft, nontender, no hepatosplenomegaly, no masses and bowel sounds normal  MS: no gross musculoskeletal defects noted, no edema    No results found for any visits on 10/23/23.    This note was completed in part using a voice recognition software, any grammatical or context distortion are unintentional and inherent to the software.                "

## 2023-10-23 NOTE — TELEPHONE ENCOUNTER
Medication Question or Refill    Contacts         Type Contact Phone/Fax    10/23/2023 07:24 AM CDT Phone (Incoming) ArlynElizabeth KLAUDIA (Self) [phone] (M)            What medication are you calling about (include dose and sig)?: vitamin D3 (CHOLECALCIFEROL) 125 MCG (5000 UT) tablet     Preferred Pharmacy:   [id number] - [place], [place] - [address] [place] [address]  Phone: [phone] Fax: [phone]      Controlled Substance Agreement on file:   CSA -- Patient Level:    CSA: None found at the patient level.       Who prescribed the medication?: Dr. Strange    Do you need a refill? Yes    When did you use the medication last? NA    Patient offered an appointment? Yes: patient is being seen on 10/23 by Dr. Raman    Do you have any questions or concerns?  No      Okay to leave a detailed message?: No

## 2023-10-24 PROBLEM — E78.2 MIXED DIABETIC HYPERLIPIDEMIA ASSOCIATED WITH TYPE 2 DIABETES MELLITUS (H): Status: ACTIVE | Noted: 2023-10-24

## 2023-10-24 PROBLEM — E11.69 MIXED DIABETIC HYPERLIPIDEMIA ASSOCIATED WITH TYPE 2 DIABETES MELLITUS (H): Status: ACTIVE | Noted: 2023-10-24

## 2023-10-25 DIAGNOSIS — E55.9 VITAMIN D DEFICIENCY: ICD-10-CM

## 2023-10-25 DIAGNOSIS — Z86.14 HISTORY OF MRSA INFECTION: ICD-10-CM

## 2023-10-25 RX ORDER — MUPIROCIN 20 MG/G
OINTMENT TOPICAL 3 TIMES DAILY
Qty: 15 G | Refills: 0 | Status: SHIPPED | OUTPATIENT
Start: 2023-10-25

## 2023-10-25 RX ORDER — RESVER/WINE/BFL/GRPSD/PC/C/POM 200MG-60MG
125 CAPSULE ORAL DAILY
Qty: 90 TABLET | Refills: 1 | OUTPATIENT
Start: 2023-10-25

## 2023-10-25 NOTE — TELEPHONE ENCOUNTER
Pharmacy requested refills that are already active on file. Refused request to pharmacy.   Patient's belongings returned

## 2023-11-06 DIAGNOSIS — E87.1 HYPONATREMIA: Primary | ICD-10-CM

## 2023-11-06 RX ORDER — FUROSEMIDE 20 MG
30 TABLET ORAL DAILY
Qty: 135 TABLET | Refills: 3 | Status: SHIPPED | OUTPATIENT
Start: 2023-11-06 | End: 2023-11-07

## 2023-11-07 ENCOUNTER — OFFICE VISIT (OUTPATIENT)
Dept: FAMILY MEDICINE | Facility: CLINIC | Age: 68
End: 2023-11-07
Payer: COMMERCIAL

## 2023-11-07 VITALS
BODY MASS INDEX: 32.37 KG/M2 | RESPIRATION RATE: 19 BRPM | DIASTOLIC BLOOD PRESSURE: 56 MMHG | SYSTOLIC BLOOD PRESSURE: 114 MMHG | HEART RATE: 91 BPM | TEMPERATURE: 98.7 F | OXYGEN SATURATION: 96 % | WEIGHT: 179.5 LBS

## 2023-11-07 DIAGNOSIS — E11.8 TYPE 2 DIABETES MELLITUS WITH COMPLICATION, WITH LONG-TERM CURRENT USE OF INSULIN (H): ICD-10-CM

## 2023-11-07 DIAGNOSIS — Z79.4 TYPE 2 DIABETES MELLITUS WITH COMPLICATION, WITH LONG-TERM CURRENT USE OF INSULIN (H): ICD-10-CM

## 2023-11-07 DIAGNOSIS — L60.2 ONYCHOGRYPHOSIS: ICD-10-CM

## 2023-11-07 DIAGNOSIS — F31.10 BIPOLAR AFFECTIVE DISORDER, CURRENT EPISODE MANIC, CURRENT EPISODE SEVERITY UNSPECIFIED (H): Primary | ICD-10-CM

## 2023-11-07 DIAGNOSIS — R60.0 LOWER EXTREMITY EDEMA: ICD-10-CM

## 2023-11-07 PROCEDURE — 99214 OFFICE O/P EST MOD 30 MIN: CPT | Mod: 25 | Performed by: FAMILY MEDICINE

## 2023-11-07 PROCEDURE — 11719 TRIM NAIL(S) ANY NUMBER: CPT | Mod: Q8 | Performed by: FAMILY MEDICINE

## 2023-11-07 PROCEDURE — 96372 THER/PROPH/DIAG INJ SC/IM: CPT | Performed by: FAMILY MEDICINE

## 2023-11-07 RX ORDER — FUROSEMIDE 20 MG
30 TABLET ORAL DAILY
Qty: 135 TABLET | Refills: 3 | Status: SHIPPED | OUTPATIENT
Start: 2023-11-07 | End: 2024-07-18

## 2023-11-07 RX ORDER — RESPIRATORY SYNCYTIAL VIRUS VACCINE 120MCG/0.5
0.5 KIT INTRAMUSCULAR ONCE
Qty: 1 EACH | Refills: 0 | Status: CANCELLED | OUTPATIENT
Start: 2023-11-07 | End: 2023-11-07

## 2023-11-07 NOTE — PROGRESS NOTES
Assessment/ Plan  1. Bipolar affective disorder, current episode manic, current episode severity unspecified (H)  Patient is manic and making poor choices but not an imminent danger of harming self or others.  Currently on lithium with borderline low level.  Does not want to change dosage.  Monthly Abilify given today.  Refusing psyche fu    2. Type 2 diabetes mellitus with complication, with long-term current use of insulin (H)  Sugars pretty consistently in 200s.  Would encourage increase in bolus insulin as they gradually climb throughout the day.  Patient declines.  Encouraged healthier choices.    3. Lower extremity edema  Probably multifactorial, combination of cirrhosis and venous stasis.  She does have varicose veins.  Has been taking above prescribed furosemide.  Gastroenterologist has prescribed this in the past.  Written for 30 mg daily, she has been taking 40.  Recommend that she go back down to 30 mg, elevate, wear compression stockings.  - furosemide (LASIX) 20 MG tablet; Take 1.5 tablets (30 mg) by mouth daily  Dispense: 135 tablet; Refill: 3    4. Onychogryphosis  Severe, leading to pain, history of diabetes.  Toenails trimmed today     Body mass index is 32.37 kg/m .    Subjective  CC:  chief complaint  HPI:  68-year-old, history of COPD, cirrhosis, type 2 diabetes, bipolar disorder  Indicates that she no longer has health insurance and does not think she needs it.  Discussed blood sugars, checking 3-4 times a day, generally have been between ridging around 200.  Wakes up around 1 in the morning, blood sugars 1 40-1 60 at that time, 220 by bedtime at 5 or 6.    Admits that she has been having some trouble sleeping lately.  Does not think she has any problems with bipolar or dangelo currently.  Has refused to go up on dose of lithium.  Due for Abilify today.  Patient Active Problem List   Diagnosis    Healthcare maintenance    Type 2 diabetes mellitus with complication, with long-term current use of  insulin (H)    Anatomical narrow angle glaucoma    Hyperactivity of bladder    Bilateral low back pain with left-sided sciatica    Callus of foot    Mixed hyperlipidemia    Simple chronic bronchitis (H)    Pulmonary emphysema (H)    Abnormal cervical Papanicolaou smear    Hernia of anterior abdominal wall    Human papilloma virus infection    Nonalcoholic steatohepatitis    Osteopenia    Thrombophlebitis    Botulism food poisoning    Cervical high risk HPV (human papillomavirus) test positive    History of colonic polyps (colonoscopy due 11/2027)    Hyponatremia    Sleep apnea    LPRD (laryngopharyngeal reflux disease)    Lumbar spine pain    RUQ abdominal pain    Tracheal stenosis following tracheostomy (H)    Cirrhosis of liver without ascites, unspecified hepatic cirrhosis type (H)    Nephrogenic diabetes insipidus (H24)    Other dysphagia    Gastric polyp    Bipolar 1 disorder, depressed, severe (H)    Cervicalgia    Mixed diabetic hyperlipidemia associated with type 2 diabetes mellitus (H)     Current medications reviewed as follows:  acetaminophen (TYLENOL) 500 MG tablet, Take 500 mg by mouth 2 times daily Scheduled.  aspirin (ASA) 81 MG chewable tablet, Take 81 mg by mouth daily  BD GERARDO U/F 32G X 4 MM insulin pen needle, Use as directedUse as directed  Biotin 10 MG CAPS, Take 1 capsule by mouth daily  blood glucose (NO BRAND SPECIFIED) lancets standard, Use to test blood sugar 3 times daily or as directed.  blood glucose (NO BRAND SPECIFIED) test strip, 1 strip by In Vitro route 4 times daily  Calcium Carbonate-Vitamin D 600-5 MG-MCG TABS, Take 2 tablets by mouth daily  fluticasone (FLONASE) 50 MCG/ACT nasal spray, SHAKE LIQUID AND USE 2 SPRAYS IN EACH NOSTRIL DAILY AS NEEDED FOR RHINITIS OR ALLERGIES Strength: 50 MCG/ACT  insulin aspart (NOVOLOG PEN) 100 UNIT/ML pen, Inject 20 Units Subcutaneous 3 times daily (before meals)  insulin glargine U-300 (TOUJEO MAX SOLOSTAR) 300 UNIT/ML (2 units dial) pen, Inject  74 Units Subcutaneous At Bedtime  lithium (ESKALITH CR/LITHOBID) 450 MG CR tablet, Take 2 tablets (900 mg) by mouth every morning AND 1 tablet (450 mg) every evening.  losartan (COZAAR) 50 MG tablet, Take 1 tablet (50 mg) by mouth daily  magnesium oxide 400 MG CAPS, Take 400 mg by mouth daily  montelukast (SINGULAIR) 10 MG tablet, Take 1 tablet (10 mg) by mouth At Bedtime  multivitamin (ONE-DAILY) tablet, Take 1 tablet by mouth daily  mupirocin (BACTROBAN) 2 % external ointment, APPLY TOPICALLY TO THE AFFECTED AREA THREE TIMES DAILY  nystatin (MYCOSTATIN) 795625 UNIT/GM external ointment, Apply topically 2 times daily Apply to affected areas only (right upper arm)  omeprazole (PRILOSEC) 40 MG DR capsule, TAKE 1 CAPSULE(40 MG) BY MOUTH DAILY  polyethylene glycol (MIRALAX) 17 GM/Dose powder, Take 17 g by mouth 2 times daily as needed for constipation  rosuvastatin (CRESTOR) 10 MG tablet, Take 1 tablet (10 mg) by mouth daily  SENNA-docusate sodium (SENNA S) 8.6-50 MG tablet, Take 1-2 tablets by mouth 2 times daily as needed (constipation)  terbinafine (LAMISIL) 1 % external cream, Apply topically 2 times daily  tiotropium-olodaterol 2.5-2.5 MCG/ACT AERS, Inhale 2 puffs into the lungs daily  tirzepatide (MOUNJARO) 15 MG/0.5ML pen, Inject 15 mg Subcutaneous every 7 days  vitamin D3 (CHOLECALCIFEROL) 125 MCG (5000 UT) tablet, Take 1 tablet (125 mcg) by mouth daily  Vitamins A & D (BABY VITAMIN A & D) OINT, APPLY TOPICALLY TO THE AFFECTED AREA FOUR TIMES DAILY AS NEEDED    ARIPiprazole ER (ABILIFY MAINTENA) extended release inj syringe 400 mg       History   Smoking Status    Never   Smokeless Tobacco    Never     Social History     Social History Narrative    Originally from [facility] [place] [facility]. Moved from [place].  4 times . Has 5 children ,9 grandchildren . Her daughters were taken away from her by biological dad when she was ill with botulism . All adults now . All of them are in [facility].  Youngest  daughter is 38 . Initially estranged but has a closer relationship .Hadnt worked for a long time . 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 Is on disability due to  ipolar .       She plays the violin.      Patient Care Team:  Gerald Strange MD as PCP - General (Family Medicine)  Bradley Liao PharmD as Pharmacist (Pharmacist)  Bradley Liao PharmD as Assigned [facility] Pharmacist  Alexus Cherry MD as Assigned PCP  Lele Oliveira MD as Assigned Pulmonology Provider  Garett Archer MD as Assigned Musculoskeletal Provider      Objective  Physical Exam  Vitals:    11/07/23 1510   BP: 114/56   BP Location: Left arm   Patient Position: Sitting   Cuff Size: Adult Regular   Pulse: 91   Resp: 19   Temp: 98.7  F (37.1  C)   TempSrc: Oral   SpO2: 96%   Weight: 81.4 kg (179 lb 8 oz)     Appearance: Well-groomed  Level of alertness: Alert, perhaps slightly hyperalert  Speech: Normal  Behavior: Pleasant  Awareness of environment, also referred to as orientation: Oriented    Affect:(appearance): Happy, interactive  Thought Process: .Comments logical and presented in an organized fashion?  Organized  Thought Content: A description of what the patient is thinking about: For the most part on topic, also discussed how she saw Fermin in a supermarket.  Memory: Assessed?  No  Please note: Voice recognition software was used in this dictation.  It may therefore contain typographical errors.    Toenails with significant curling into the nailbed.  Nothing that shows any sign of infection and they are fairlyWell kept.

## 2023-11-07 NOTE — LETTER
My COPD Action Plan   Name: Annel Stoddard    YOB: 1955   Date: 11/7/2023    My doctor: Gerald Strange    My clinic: [facility] [address] [address] [place] [address] [phone]  My COPD Medicine:      My Controller Medications: {COPD MEDICATION:698151}     My Rescue Medication: {:641865}     Use of Oxygen: {:349086}  My COPD Severity: {:890768}         GREEN ZONE       Doing well today    Usual level of activity and exercise  Usual amount of cough and mucus  No shortness of breath  Can think clearly  Sleep well at night  Feel like eating Actions:    Take daily medicines  Use oxygen as prescribed  Follow regular exercise and diet plan  Avoid cigarette smoke and other irritants that harm the lungs             YELLOW ZONE          Having a bad day or flare up    Short of breath more than usual  Change in color or amount of mucus  More coughing or wheezing  Fever or chills  Less energy; trouble completing activities  Trouble thinking or focusing  Using quick relief inhaler or nebulizer more often  Poor sleep; symptoms wake me up  Do not feel like eating Actions:    Take daily medicines  Use quick relief inhaler every *** hours  If you use oxygen, call you doctor to see if you should adjust your oxygen  Do breathing exercises or other things to help you relax  Let a loved one, friend or neighbor know you are feeling worse  If you have one or more symptoms, consider taking steroids or antibiotics (if they were prescribed)  Call your care team if you have 2 or more symptoms or symptoms don't improve           RED ZONE       Need medical care now    Severe shortness of breath (feel you can't breath)  Not enough breath to do any activity  Trouble walking or talking  Trouble coughing up mucus or blood in mucus  Frequent coughing  Rescue medicines are not working  Not able to sleep because of breathing  Feel confused or drowsy  Chest pain  Actions:    Call 911 or   Have  someone take you to the emergency room if you can't reach your care team        Electronically signed by: Velia Perez MA, November 7, 2023    Annual Reminders:  Meet with Care Team, Flu Shot every Fall and Pneumonia Shot at least once  Pharmacy: [id number] - [place], [place] - [address] [place] [address] AT [facility] & [address]

## 2023-11-10 DIAGNOSIS — K59.00 CONSTIPATION, UNSPECIFIED CONSTIPATION TYPE: ICD-10-CM

## 2023-11-10 RX ORDER — POLYETHYLENE GLYCOL 3350 17 G/17G
POWDER, FOR SOLUTION ORAL
Qty: 510 G | Refills: 1 | Status: SHIPPED | OUTPATIENT
Start: 2023-11-10 | End: 2024-02-05

## 2023-12-12 ENCOUNTER — OFFICE VISIT (OUTPATIENT)
Dept: FAMILY MEDICINE | Facility: CLINIC | Age: 68
End: 2023-12-12
Payer: COMMERCIAL

## 2023-12-12 VITALS
DIASTOLIC BLOOD PRESSURE: 81 MMHG | RESPIRATION RATE: 12 BRPM | BODY MASS INDEX: 33.9 KG/M2 | HEART RATE: 87 BPM | SYSTOLIC BLOOD PRESSURE: 133 MMHG | WEIGHT: 188 LBS | OXYGEN SATURATION: 97 % | TEMPERATURE: 98 F

## 2023-12-12 DIAGNOSIS — K74.60 CIRRHOSIS OF LIVER WITHOUT ASCITES, UNSPECIFIED HEPATIC CIRRHOSIS TYPE (H): ICD-10-CM

## 2023-12-12 DIAGNOSIS — E11.8 TYPE 2 DIABETES MELLITUS WITH COMPLICATION, WITH LONG-TERM CURRENT USE OF INSULIN (H): Primary | ICD-10-CM

## 2023-12-12 DIAGNOSIS — M72.2 PLANTAR FASCIITIS: ICD-10-CM

## 2023-12-12 DIAGNOSIS — D64.9 ANEMIA, UNSPECIFIED TYPE: ICD-10-CM

## 2023-12-12 DIAGNOSIS — F31.4 BIPOLAR 1 DISORDER, DEPRESSED, SEVERE (H): ICD-10-CM

## 2023-12-12 DIAGNOSIS — Z00.00 HEALTHCARE MAINTENANCE: ICD-10-CM

## 2023-12-12 DIAGNOSIS — Z79.4 TYPE 2 DIABETES MELLITUS WITH COMPLICATION, WITH LONG-TERM CURRENT USE OF INSULIN (H): Primary | ICD-10-CM

## 2023-12-12 DIAGNOSIS — E55.9 VITAMIN D DEFICIENCY: ICD-10-CM

## 2023-12-12 LAB
HBA1C MFR BLD: 8.4 % (ref 0–5.6)
HGB BLD-MCNC: 12.6 G/DL (ref 11.7–15.7)

## 2023-12-12 PROCEDURE — 80053 COMPREHEN METABOLIC PANEL: CPT | Performed by: FAMILY MEDICINE

## 2023-12-12 PROCEDURE — 36415 COLL VENOUS BLD VENIPUNCTURE: CPT | Performed by: FAMILY MEDICINE

## 2023-12-12 PROCEDURE — 82248 BILIRUBIN DIRECT: CPT | Performed by: FAMILY MEDICINE

## 2023-12-12 PROCEDURE — 96372 THER/PROPH/DIAG INJ SC/IM: CPT | Performed by: FAMILY MEDICINE

## 2023-12-12 PROCEDURE — 83036 HEMOGLOBIN GLYCOSYLATED A1C: CPT | Performed by: FAMILY MEDICINE

## 2023-12-12 PROCEDURE — 85018 HEMOGLOBIN: CPT | Performed by: FAMILY MEDICINE

## 2023-12-12 PROCEDURE — 82306 VITAMIN D 25 HYDROXY: CPT | Performed by: FAMILY MEDICINE

## 2023-12-12 PROCEDURE — 99214 OFFICE O/P EST MOD 30 MIN: CPT | Performed by: FAMILY MEDICINE

## 2023-12-12 RX ORDER — RESPIRATORY SYNCYTIAL VIRUS VACCINE 120MCG/0.5
0.5 KIT INTRAMUSCULAR ONCE
Qty: 1 EACH | Refills: 0 | Status: CANCELLED | OUTPATIENT
Start: 2023-12-12 | End: 2023-12-12

## 2023-12-12 RX ORDER — LANOLIN, PETROLATUM 15.5; 53.4 G/100G; G/100G
5 OINTMENT TOPICAL 4 TIMES DAILY
Qty: 113 G | Refills: 11 | Status: SHIPPED | OUTPATIENT
Start: 2023-12-12 | End: 2024-02-13

## 2023-12-12 NOTE — PROGRESS NOTES
Assessment/ Plan  Type 2 diabetes mellitus with complication, with long-term current use of insulin (H)  Diabetes poorly controlled, sudden jump in blood sugars, A1c 8.6, will increase Toujeo to 82, continue top dose Mounjaro, NovoLog 20 3 times daily,  Follow-up [facility] pharmacist.    Cirrhosis of liver without ascites, unspecified hepatic cirrhosis type (H)  So to gastroenterology hepatologist.  Referral back, ultrasound to screen for hepatocellular carcinoma, check hepatic panel.    Bipolar 1 disorder, depressed, severe (H)  Less racing thoughts today, seems to be doing better.  Abilify given, never increased lithium, suggest following up with psychiatry which she declines, willing to follow-up with our [facility] pharmacist.  Recheck lithium level, check BMP    Vitamin D deficiency  Renew vitamin D, check level   Subjective  CC:  chief complaint  HPI:  Issues described above, doing a little bit better, talk to her daughter in [facility] who she connected with.  Reports blood sugars have been high as noted above  PFSH:  Patient Active Problem List   Diagnosis    Healthcare maintenance    Type 2 diabetes mellitus with complication, with long-term current use of insulin (H)    Anatomical narrow angle glaucoma    Hyperactivity of bladder    Bilateral low back pain with left-sided sciatica    Callus of foot    Mixed hyperlipidemia    Simple chronic bronchitis (H)    Pulmonary emphysema (H)    Abnormal cervical Papanicolaou smear    Hernia of anterior abdominal wall    Human papilloma virus infection    Nonalcoholic steatohepatitis    Osteopenia    Thrombophlebitis    Botulism food poisoning    Cervical high risk HPV (human papillomavirus) test positive    History of colonic polyps (colonoscopy due 11/2027)    Hyponatremia    Sleep apnea    LPRD (laryngopharyngeal reflux disease)    Lumbar spine pain    RUQ abdominal pain    Tracheal stenosis following tracheostomy (H)    Cirrhosis of liver without ascites, unspecified  hepatic cirrhosis type (H)    Nephrogenic diabetes insipidus (H24)    Other dysphagia    Gastric polyp    Bipolar 1 disorder, depressed, severe (H)    Cervicalgia    Mixed diabetic hyperlipidemia associated with type 2 diabetes mellitus (H)    Vitamin D deficiency     aspirin (ASA) 81 MG chewable tablet, Take 81 mg by mouth daily  BD GERARDO U/F 32G X 4 MM insulin pen needle, Use as directedUse as directed  blood glucose (NO BRAND SPECIFIED) lancets standard, Use to test blood sugar 3 times daily or as directed.  Calcium Carbonate-Vitamin D 600-5 MG-MCG TABS, Take 2 tablets by mouth daily  fluticasone (FLONASE) 50 MCG/ACT nasal spray, SHAKE LIQUID AND USE 2 SPRAYS IN EACH NOSTRIL DAILY AS NEEDED FOR RHINITIS OR ALLERGIES Strength: 50 MCG/ACT  furosemide (LASIX) 20 MG tablet, Take 1.5 tablets (30 mg) by mouth daily  insulin aspart (NOVOLOG PEN) 100 UNIT/ML pen, Inject 20 Units Subcutaneous 3 times daily (before meals) (Patient taking differently: Inject 30 Units Subcutaneous 3 times daily (before meals))  insulin glargine U-300 (TOUJEO MAX SOLOSTAR) 300 UNIT/ML (2 units dial) pen, Inject 74 Units Subcutaneous At Bedtime (Patient taking differently: Inject 78 Units Subcutaneous at bedtime)  lithium (ESKALITH CR/LITHOBID) 450 MG CR tablet, Take 2 tablets (900 mg) by mouth every morning AND 1 tablet (450 mg) every evening.  losartan (COZAAR) 50 MG tablet, Take 1 tablet (50 mg) by mouth daily  magnesium oxide 400 MG CAPS, Take 400 mg by mouth daily  montelukast (SINGULAIR) 10 MG tablet, Take 1 tablet (10 mg) by mouth At Bedtime  multivitamin (ONE-DAILY) tablet, Take 1 tablet by mouth daily  mupirocin (BACTROBAN) 2 % external ointment, APPLY TOPICALLY TO THE AFFECTED AREA THREE TIMES DAILY  nystatin (MYCOSTATIN) 548083 UNIT/GM external ointment, Apply topically 2 times daily Apply to affected areas only (right upper arm)  omeprazole (PRILOSEC) 40 MG DR capsule, TAKE 1 CAPSULE(40 MG) BY MOUTH DAILY  polyethylene glycol  (MIRALAX) 17 GM/Dose powder, TAKE ONE CAPFUL MIXED WITH LIQUID BY MOUTH TWICE DAILY AS NEEDED  rosuvastatin (CRESTOR) 10 MG tablet, Take 1 tablet (10 mg) by mouth daily  SENNA-docusate sodium (SENNA S) 8.6-50 MG tablet, Take 1-2 tablets by mouth 2 times daily as needed (constipation)  terbinafine (LAMISIL) 1 % external cream, Apply topically 2 times daily  tiotropium-olodaterol 2.5-2.5 MCG/ACT AERS, Inhale 2 puffs into the lungs daily  tirzepatide (MOUNJARO) 15 MG/0.5ML pen, Inject 15 mg Subcutaneous every 7 days  acetaminophen (TYLENOL) 500 MG tablet, Take 500 mg by mouth 2 times daily Scheduled. (Patient not taking: Reported on 12/12/2023)  Biotin 10 MG CAPS, Take 1 capsule by mouth daily (Patient not taking: Reported on 12/12/2023)    ARIPiprazole ER (ABILIFY MAINTENA) extended release inj syringe 400 mg         History   Smoking Status    Never   Smokeless Tobacco    Never     Social History     Social History Narrative    Originally from [place]. Moved from [place].  4 times . Has 5 children ,9 grandchildren . Her daughters were taken away from her by biological dad when she was ill with botulism . All adults now . All of them are in [facility].  Youngest daughter is 38 . Initially estranged but has a closer relationship .Hadnt worked for a long time . Is a violinist and plays in [employer] . Wasn't diagnosed with bipolar much later in life . 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 Is on disability due to  ipolar .       She plays the [employer].      Patient Care Team:  Gerald Strange MD as PCP - General (Family Medicine)  Bradlye Liao, PharmD as Pharmacist (Pharmacist)  Bradley Liao, PharmD as Assigned [facility] Pharmacist  Lele Oliveira MD as Assigned Pulmonology Provider  Garett Archer MD as Assigned Musculoskeletal Provider  Gerald Strange MD as Assigned PCP        Objective  Physical Exam  Vitals:    12/12/23 1443   BP:  133/81   BP Location: Right arm   Patient Position: Sitting   Cuff Size: Adult Regular   Pulse: 87   Resp: 12   Temp: 98  F (36.7  C)   SpO2: 97%   Weight: 85.3 kg (188 lb)     Body mass index is 33.9 kg/m .  Gen- alert, oriented/ appropriately responsive  HEENT- normal cephalic, atraumatic.   Chest- Normal inspiration and expiration.    Clear to ascultation.    No chest wall deformity or scar.  CV- Heart regular rate and rhythm  normal tones, no murmurs   No gallops or rubs.  Ext- appear well perfused, no edema  Skin- warm and dry,   no visualized rash    Diagnostics:   None      Please note: Voice recognition software was used in this dictation.  It may therefore contain typographical errors.

## 2023-12-12 NOTE — LETTER
My COPD Action Plan   Name: Annel Stoddard    YOB: 1955   Date: 12/12/2023    My doctor: Gerald Strange    My clinic: [facility] [address] [place] [address] [phone]  My COPD Medicine:      My Controller Medications: {COPD MEDICATION:760169}     My Rescue Medication: {:888200}     Use of Oxygen: {:275739}  My COPD Severity: {:386835}         GREEN ZONE       Doing well today    Usual level of activity and exercise  Usual amount of cough and mucus  No shortness of breath  Can think clearly  Sleep well at night  Feel like eating Actions:    Take daily medicines  Use oxygen as prescribed  Follow regular exercise and diet plan  Avoid cigarette smoke and other irritants that harm the lungs             YELLOW ZONE          Having a bad day or flare up    Short of breath more than usual  Change in color or amount of mucus  More coughing or wheezing  Fever or chills  Less energy; trouble completing activities  Trouble thinking or focusing  Using quick relief inhaler or nebulizer more often  Poor sleep; symptoms wake me up  Do not feel like eating Actions:    Take daily medicines  Use quick relief inhaler every *** hours  If you use oxygen, call you doctor to see if you should adjust your oxygen  Do breathing exercises or other things to help you relax  Let a loved one, friend or neighbor know you are feeling worse  If you have one or more symptoms, consider taking steroids or antibiotics (if they were prescribed)  Call your care team if you have 2 or more symptoms or symptoms don't improve           RED ZONE       Need medical care now    Severe shortness of breath (feel you can't breath)  Not enough breath to do any activity  Trouble walking or talking  Trouble coughing up mucus or blood in mucus  Frequent coughing  Rescue medicines are not working  Not able to sleep because of breathing  Feel confused or drowsy  Chest pain  Actions:    Call 911 or   Have  someone take you to the emergency room if you can't reach your care team        Electronically signed by: Velia Perez MA, December 12, 2023    Annual Reminders:  Meet with Care Team, Flu Shot every Fall and Pneumonia Shot at least once  Pharmacy: [id number] - [place], [place] - [address] [place] [address] AT [facility] & [address]

## 2023-12-12 NOTE — LETTER
2023    Re: [place] patient    Annel Stoddard   1955      Resulted Orders   Hemoglobin A1c   Result Value Ref Range    Hemoglobin A1C 8.4 (H) 0.0 - 5.6 %      Comment:      Normal <5.7%   Prediabetes 5.7-6.4%    Diabetes 6.5% or higher     Note: Adopted from ADA consensus guidelines.   Hemoglobin   Result Value Ref Range    Hemoglobin 12.6 11.7 - 15.7 g/dL   Hepatic panel (Albumin, ALT, AST, Bili, Alk Phos, TP)   Result Value Ref Range    Protein Total 7.5 6.4 - 8.3 g/dL    Albumin 4.4 3.5 - 5.2 g/dL    Bilirubin Total 0.2 <=1.2 mg/dL    Alkaline Phosphatase 139 40 - 150 U/L      Comment:      Reference intervals for this test were updated on 2023 to more accurately reflect our healthy population. There may be differences in the flagging of prior results with similar values performed with this method. Interpretation of those prior results can be made in the context of the updated reference intervals.    AST 59 (H) 0 - 45 U/L      Comment:      Reference intervals for this test were updated on 2023 to more accurately reflect our healthy population. There may be differences in the flagging of prior results with similar values performed with this method. Interpretation of those prior results can be made in the context of the updated reference intervals.    ALT 86 (H) 0 - 50 U/L      Comment:      Reference intervals for this test were updated on 2023 to more accurately reflect our healthy population. There may be differences in the flagging of prior results with similar values performed with this method. Interpretation of those prior results can be made in the context of the updated reference intervals.      Bilirubin Direct <0.20 0.00 - 0.30 mg/dL   Basic metabolic panel  (Ca, Cl, CO2, Creat, Gluc, K, Na, BUN)   Result Value Ref Range    Sodium 136 135 - 145 mmol/L      Comment:      Reference intervals for this test were updated on 2023 to more accurately reflect our healthy  population. There may be differences in the flagging of prior results with similar values performed with this method. Interpretation of those prior results can be made in the context of the updated reference intervals.     Potassium 4.5 3.4 - 5.3 mmol/L    Chloride 100 98 - 107 mmol/L    Carbon Dioxide (CO2) 22 22 - 29 mmol/L    Anion Gap 14 7 - 15 mmol/L    Urea Nitrogen 10.1 8.0 - 23.0 mg/dL    Creatinine 0.50 (L) 0.51 - 0.95 mg/dL    GFR Estimate >90 >60 mL/min/1.73m2    Calcium 9.7 8.8 - 10.2 mg/dL    Glucose 218 (H) 70 - 99 mg/dL   Vitamin D Deficiency   Result Value Ref Range    Vitamin D, Total (25-Hydroxy) 38 20 - 50 ng/mL      Comment:      optimum levels    Narrative    Season, race, dietary intake, and treatment affect the concentration of 25-hydroxy-Vitamin D. Values may decrease during winter months and increase during summer months.    Vitamin D determination is routinely performed by an immunoassay specific for 25 hydroxyvitamin D3.  If an individual is on vitamin D2(ergocalciferol) supplementation, please specify 25 OH vitamin D2 and D3 level determination by LCMSMS test VITD23.         If you have any questions or concerns, please call the clinic at the number listed above.       Sincerely,      Gerald Strange MD

## 2023-12-13 ENCOUNTER — TELEPHONE (OUTPATIENT)
Dept: FAMILY MEDICINE | Facility: CLINIC | Age: 68
End: 2023-12-13
Payer: COMMERCIAL

## 2023-12-13 LAB
ALBUMIN SERPL BCG-MCNC: 4.4 G/DL (ref 3.5–5.2)
ALP SERPL-CCNC: 139 U/L (ref 40–150)
ALT SERPL W P-5'-P-CCNC: 86 U/L (ref 0–50)
ANION GAP SERPL CALCULATED.3IONS-SCNC: 14 MMOL/L (ref 7–15)
AST SERPL W P-5'-P-CCNC: 59 U/L (ref 0–45)
BILIRUB DIRECT SERPL-MCNC: <0.2 MG/DL (ref 0–0.3)
BILIRUB SERPL-MCNC: 0.2 MG/DL
BUN SERPL-MCNC: 10.1 MG/DL (ref 8–23)
CALCIUM SERPL-MCNC: 9.7 MG/DL (ref 8.8–10.2)
CHLORIDE SERPL-SCNC: 100 MMOL/L (ref 98–107)
CREAT SERPL-MCNC: 0.5 MG/DL (ref 0.51–0.95)
DEPRECATED HCO3 PLAS-SCNC: 22 MMOL/L (ref 22–29)
EGFRCR SERPLBLD CKD-EPI 2021: >90 ML/MIN/1.73M2
GLUCOSE SERPL-MCNC: 218 MG/DL (ref 70–99)
POTASSIUM SERPL-SCNC: 4.5 MMOL/L (ref 3.4–5.3)
PROT SERPL-MCNC: 7.5 G/DL (ref 6.4–8.3)
SODIUM SERPL-SCNC: 136 MMOL/L (ref 135–145)

## 2023-12-13 RX ORDER — LORAZEPAM 0.5 MG
TABLET ORAL
Status: CANCELLED | OUTPATIENT
Start: 2023-12-13

## 2023-12-13 NOTE — ASSESSMENT & PLAN NOTE
Less racing thoughts today, seems to be doing better.  Abilify given, never increased lithium, suggest following up with psychiatry which she declines, willing to follow-up with our [facility] pharmacist.  Recheck lithium level, check BMP

## 2023-12-13 NOTE — ASSESSMENT & PLAN NOTE
So to gastroenterology hepatologist.  Referral back, ultrasound to screen for hepatocellular carcinoma, check hepatic panel.

## 2023-12-13 NOTE — TELEPHONE ENCOUNTER
General Call    Contacts         Type Contact Phone/Fax    12/13/2023 10:28 AM CST Phone (Incoming) Fredis Stoddard KLAUDIA (Self) [phone] (M)          Reason for Call:  FYCAROL    What are your questions or concerns:  Patient wants  to know that she has a US scheduled for 01/04/23, and a eye appt scheduled for 02/27/24. Patient stated she's also taking omega 3.     Okay to leave a detailed message?: Yes at Home number on file [phone] (home)

## 2023-12-13 NOTE — ASSESSMENT & PLAN NOTE
Diabetes poorly controlled, sudden jump in blood sugars, A1c 8.6, will increase Toujeo to 82, continue top dose Mounjaro, NovoLog 20 3 times daily,  Follow-up [facility] pharmacist.

## 2023-12-14 ENCOUNTER — TELEPHONE (OUTPATIENT)
Dept: PHARMACY | Facility: CLINIC | Age: 68
End: 2023-12-14
Payer: COMMERCIAL

## 2023-12-14 ENCOUNTER — TELEPHONE (OUTPATIENT)
Dept: FAMILY MEDICINE | Facility: CLINIC | Age: 68
End: 2023-12-14
Payer: COMMERCIAL

## 2023-12-14 LAB — VIT D+METAB SERPL-MCNC: 38 NG/ML (ref 20–50)

## 2023-12-14 NOTE — TELEPHONE ENCOUNTER
Test Results    Contacts         Type Contact Phone/Fax    12/14/2023 03:46 PM CST Phone (Incoming) Fredis Stoddard (Self) [phone] (M)            Who ordered the test:      Type of test: Lab    Date of test:  12/12/23    Where was the test performed:  [facility]    What are your questions/concerns?:  Patient would like to know the results of her lab and she asked if her lab results could be faxed to [facility] at [phone]. Please advise.    Okay to leave a detailed message?: Yes at Home number on file [phone] (home)

## 2023-12-14 NOTE — TELEPHONE ENCOUNTER
4 Eyes Skin Assessment Completed by ORLIN Rodriguez and ORLIN Hager.    Head WDL  Ears Redness and Blanching  Nose WDL  Mouth WDL  Neck WDL  Breast/Chest Abrasion  Shoulder Blades WDL  Spine WDL  (R) Arm/Elbow/Hand Redness, Blanching and Bruising, PICC line  (L) Arm/Elbow/Hand Redness, Blanching and Bruising  Abdomen Abrasion and Bruising  Groin Redness, Blanching and Excoriation  Scrotum/Coccyx/Buttocks Redness, Blanching and Excoriation  (R) Leg Bruising  (L) Leg Bruising  (R) Heel/Foot/Toe WDL, pt has large toenail missing, dressing over toe  (L) Heel/Foot/Toe WDL, patient has large toenail missing, dressing over toe          Devices In Places Tele Box, Blood Pressure Cuff, Pulse Ox, Paiz and Nasal Cannula      Interventions In Place TAP System, Pillows, Q2 Turns, Low Air Loss Mattress and Pressure Redistribution Mattress    Possible Skin Injury No    Pictures Uploaded Into Epic N/A  Wound Consult Placed N/A  RN Wound Prevention Protocol Ordered No      Noted

## 2023-12-14 NOTE — TELEPHONE ENCOUNTER
Pt wanted you to know that she got an advocate at a [employer] and they are helping her get a bed, phone and bus pass. She will be signing a release form so that if you or pcp ever needed to speak with her advocate or vice versa, it can happen.     Pt also wanted me to tell you that she adores you but she has pass trama from a [employer]ist man in college so if she seems scared or put off it's because of that and not because she doesn't like you.

## 2023-12-15 NOTE — TELEPHONE ENCOUNTER
FUTURE VISIT INFORMATION      FUTURE VISIT INFORMATION:  Date: 2/27/24  Time: 9:00AM  Location: [facility]  REFERRAL INFORMATION:  Referring provider:  Gerald Strange MD   Referring providers clinic:  [facility]  Reason for visit/diagnosis  Diabetic eye exam     RECORDS REQUESTED FROM:       Clinic name Comments Records Status Imaging Status   [facility] Ov/referral 12/12/23 EPIC

## 2023-12-18 ENCOUNTER — TELEPHONE (OUTPATIENT)
Dept: FAMILY MEDICINE | Facility: CLINIC | Age: 68
End: 2023-12-18
Payer: COMMERCIAL

## 2023-12-18 NOTE — TELEPHONE ENCOUNTER
Patient calling back for update. Writer stated that a RN will reach out to her about results. Patient wanted to let provider know that she has an appointment with Bradley coming up on 12/26.

## 2023-12-18 NOTE — TELEPHONE ENCOUNTER
Pt calling back wanting to go over her lab results. She said PCP did go over her lab results but not all and would like to go over with all of her lab results. Please call and go over with pt, thanks!

## 2023-12-18 NOTE — TELEPHONE ENCOUNTER
General Call      Reason for Call:  FYI    What are your questions or concerns:  Pt called and would like to inform  PCP that she no longer needs a ride to see Dr. Davenpotr. Pt is also scheduled on the 4th to do US on her liver and Dr. Davenport will go over with pt. Also pt is trying to get a hold o f the foot nurse because her feet is in a lot of pain.     Date of last appointment with provider: 12/12/2023    Okay to leave a detailed message?: Yes at Home number on file [phone] (home)

## 2023-12-21 ENCOUNTER — TELEPHONE (OUTPATIENT)
Dept: FAMILY MEDICINE | Facility: CLINIC | Age: 68
End: 2023-12-21
Payer: COMMERCIAL

## 2023-12-21 NOTE — TELEPHONE ENCOUNTER
Writer called patient and informed of below results per Dr. Strange. Patient does not have any questions or concerns.     Patient wanted to update you and states that she is seeing Bradley on 12/26/23 and a foot nurse on 1/10/24 to have her toenails cut.    Aileen Sanchez RN  [facility]

## 2023-12-21 NOTE — TELEPHONE ENCOUNTER
----- Message from Velia Perez MA sent at 12/18/2023 10:12 AM CST -----    ----- Message -----  From: Gerald Strange MD  Sent: 12/14/2023   5:31 PM CST  To: Velia Perez MA    Also, liver irritation is slightly up from last time but still mild, kidney function looks good, vitamin D level looks perfect so I kept her vitamin D the same-please let her know

## 2023-12-22 ENCOUNTER — TELEPHONE (OUTPATIENT)
Dept: FAMILY MEDICINE | Facility: CLINIC | Age: 68
End: 2023-12-22
Payer: COMMERCIAL

## 2023-12-22 DIAGNOSIS — R05.1 ACUTE COUGH: Primary | ICD-10-CM

## 2023-12-22 RX ORDER — ALBUTEROL SULFATE 90 UG/1
2 AEROSOL, METERED RESPIRATORY (INHALATION) EVERY 6 HOURS PRN
Qty: 18 G | Refills: 1 | Status: SHIPPED | OUTPATIENT
Start: 2023-12-22 | End: 2024-03-20

## 2023-12-22 RX ORDER — DEXTROMETHORPHAN POLISTIREX 30 MG/5ML
60 SUSPENSION ORAL 2 TIMES DAILY
Qty: 148 ML | Refills: 0 | Status: SHIPPED | OUTPATIENT
Start: 2023-12-22 | End: 2024-04-16

## 2023-12-22 NOTE — PROGRESS NOTES
"Medication Therapy Management (MTM) Encounter    ASSESSMENT:                            Medication Adherence/Access: No issues identified          Type 2 Diabetes:  A1C above goal <8%. Home readings typically above goal <180. Getting most of her food from food shelf at this time vs more fruits and veggies at last MTM visit. Will increase basal insulin. Can reduce if diet changes in the future. Also reviewed safety concerns with using Novolog without food.     Hyperlipidemia: Appropriately using statin. LDL at goal <100. Appropriately using aspirin for ASCVD risk score >10%.     Hypertension: BP at goal <140/90. Pulse at goal . Edema may be related to increased sodium intake from processed foods at food shelf. Will hold off on adjusting Lasix dose as we're also increasing Lithium level.       Bipolar Type I:  Describes a period of poor sleep, maybe some delusions, potentially impulsive spending, followed by a \"crash\" now feeling more fatigued, increased appetite. Likely was manic/hypomanic and now in more of a depressed state. Last lithium levels were low. Patient declined increasing lithium to the recommended dose in September, but willing to increase today.       Low Vitamin D: Last Vit D level WNL. Encouraged patient to restart as low levels can affect mood and energy.      Asthma/COPD: shortness of breath well managed. Does have a cough. Reviewed that she can use the prescribed cough syrup or wait for it to pass on its own.     Heartburn: Symptoms well managed with PPI.      Constipation: Reduced frequency of bowel movements due to less fiber intake, which is finance related. However, has been well managed with senna and miralax.     Osteopenia: No clear indication to start treatment for osteporosis. Major FRAX risk <20%; hip risk <3%. Recommend continuing with Calcium and Vit D supplement at this time.       Supplements:   biotin 10 mg daily - mixed evidence. Patient thinks it's helpful. Okay to continue.  "   calcium 500 mg - okay to continue with history of osteopenia.   magnesium oxide 400 mg daily - has been helpful for cramps.    daily multivitamin   fish oil 1000 mg daily - LDL at goal, trigs <300. Likely does not need for heart health. However, may be beneficial for VALE and reducing steatosis. Okay to continue.         PLAN:                              Increase Toujeo to 90 units daily  Do not give Novolog unless you are having a meal.   Do not check blood glucose more than 4 times per day. Medicare will not pay for any more than this, and it's not more helpful.   Increase Lithium 450 mg to 2 tabs AM and 1 tab PM. When you come back for your repeat labs, do not take your morning dose of Lithium.   Restart Vitamin D3 5000 units daily       Follow-up: Return in about 5 weeks (around 1/30/2024) for Medication Management Pharmacist, in person.     SUBJECTIVE/OBJECTIVE:                          Annel Stoddard is a 68 year old female coming for a follow-up visit. She was referred to me from Self and Dr. Sullivan. Now seeing Gerald Strange MD as PCP. Today's visit is a follow-up MTM visit from 09/12/2023.      Reason for visit: Medication follow-up   Needs refills of Mounjaro, Pen Needles,   Having foot pain. Has a foot nurse, but can't be seen until 1/10.   Tired all the time.   Venessa Tapia, at the [facility] who offered to be  - needs resources for food and a bed. Sleeping on an air mattress. Piano tech is going to purchase a bed and small kitchen table.       Allergies/ADRs: Reviewed in chart  Past Medical History: Reviewed in chart  Tobacco: She reports that she has never smoked. She has never been exposed to tobacco smoke. She has never used smokeless tobacco.  Alcohol:  reports that she does not currently use alcohol.       Medication Adherence/Access:     Denies adherence concerns.     Doesn't have any money until the 3rd of January.          Type 2 Diabetes:  Prescribed NovoLog 25 units 3 times  daily, Toujeo U300 - 74 units daily, Mounjaro to 15 mg daily.     Using Toujeo 82 units and Novolog 30 units. Sometimes gives Novolog 5 times per day. Sometimes constantly eating, but also giving Novolog when not eating.     Stopped Jardiance due to ongoing yeast infections.   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   Pioglitazone stopped due to edema.       Blood sugar monitoring: 3 time(s) daily.  Ranges (forgot her monitor today)       Fastin, 189, 201, 275, 238, 248, 162, 163, 211   PM: 123, 237, 274, 194, 275, 213, 276,        Symptoms of low blood sugar? None.    Symptoms of high blood sugar? Polyuria.   Diet/Exercise: Was eating Gelato and Orange juice. Stopped OJ 2 months ago. Gelato 1.5 months ago. Made borsch today. Eats walnuts. Sourdough bread. Last two months has mostly been getting food from the food [employer].     Aspirin: Taking 81mg daily    Statin: Yes: Rosuvastatin 10 mg daily  ACEi/ARB: Yes: Losartan 50 mg daily.     Hemoglobin A1C   Date Value Ref Range Status   2023 8.4 (H) 0.0 - 5.6 % Final     Comment:     Normal <5.7%   Prediabetes 5.7-6.4%    Diabetes 6.5% or higher     Note: Adopted from ADA consensus guidelines.   2023 6.9 (H) 0.0 - 5.6 % Final     Comment:     Normal <5.7%   Prediabetes 5.7-6.4%    Diabetes 6.5% or higher     Note: Adopted from ADA consensus guidelines.   2023 6.9 (H) 0.0 - 5.6 % Final     Comment:     Normal <5.7%   Prediabetes 5.7-6.4%    Diabetes 6.5% or higher     Note: Adopted from ADA consensus guidelines.   2020 8.4 (H) 0 - 5.6 % Final     Comment:     Normal <5.7% Prediabetes 5.7-6.4%  Diabetes 6.5% or higher - adopted from ADA   consensus guidelines.        Microalbumin Urine mg/dL   Date Value Ref Range Status   2021 <0.50 0.00 - 1.99 mg/dL Final      Creatinine Urine mg/dL   Date Value Ref Range Status   2023 29.6 mg/dL Final     Comment:     The reference ranges have not been established in urine  creatinine. The results should be integrated into the clinical context for interpretation.     LDL Cholesterol Calculated   Date Value Ref Range Status   12/06/2022 63 <=100 mg/dL Final     LDL Cholesterol Direct   Date Value Ref Range Status   09/23/2019 48 <=129 mg/dl Final     Creatinine   Date Value Ref Range Status   12/12/2023 0.50 (L) 0.51 - 0.95 mg/dL Final   01/27/2020 0.52 0.52 - 1.04 mg/dL Final     The 10-year ASCVD risk score (Diana SRIVASTAVA, et al., 2019) is: 17.1%    Values used to calculate the score:      Age: 68 years      Sex: Female      Is Non- : No      Diabetic: Yes      Tobacco smoker: No      Systolic Blood Pressure: 129 mmHg      Is BP treated: Yes      HDL Cholesterol: 64 mg/dL      Total Cholesterol: 164 mg/dL         Hypertension/Edema: Prescribed losartan 50 mg daily, Furosemide 20 mg 1.5 tabs daily.     Does not more bilateral edema.        BP Readings from Last 3 Encounters:   12/26/23 129/74   12/12/23 133/81   11/07/23 114/56      Pulse Readings from Last 3 Encounters:   12/26/23 98   12/12/23 87   11/07/23 91     Wt Readings from Last 3 Encounters:   12/26/23 188 lb 8 oz (85.5 kg)   12/12/23 188 lb (85.3 kg)   11/07/23 179 lb 8 oz (81.4 kg)          Bipolar Type I: Prescribed lithium 450 mg ER 2 tabs AM and 1 tab PM, Abilify Maintena 400 mg IM monthly, Vitamin D3 5000 units daily.    Still using old lithium dose of 1 tab twice daily.     Tired all day. Going to bed at 6pm and waking up at 2:30. Has to nap every 1-2 hours. This has been ongoing for about 1.5 months.     Sleeping on an air mattress.      Was going to mass. Describes 2 angels. 2 large men. Two other times the whole [employer] was shaking. Got her violins and was going to give them to these [place] men. Spoke to a person at the [employer] named Jose. He didn't know who these people are, but they took them and placed them to give to the men. Saved $2900 to move to [place]. Gave this to the [employer]. Requested a  receipt and they said she'd only given $22 all year.     Because of this stopped emailing, stopped using her phone.     Now going to the [email or web address]. There is a man there that she really likes, but he ignored her. Tired of being alone.     Spoke to Rita, second daughter.     Has been grieving for her father for 40 years. She feels like she had depression over the last few months. Not currently feeling depressed.    Feels okay right now, just tired.   Eating more. Was having apathy concerns - now getting back to taking care of things. Denies being down/depressed.     Back in September was not sleeping at all.     Vitamin D Deficiency Screening Results:  Lab Results   Component Value Date    VITDT 38 12/12/2023    VITDT 43 04/05/2023    VITDT 35 01/17/2023       Lab Results   Component Value Date    LITHIUM 0.58 (L) 10/10/2023           Asthma/COPD/allergies: Prescribed albuterol HFA 90 mcg 2 puffs every 4 hours as needed, montelukast 10 mg daily, Stiolto (tiotropium-olodaterol) 2.5-2.5 mcg 2 puffs daily, Flonase 50 mcg nasal spray 2 sprays each nostril daily, saline 0.65% nasal spray as needed.     Tracheal stenosis.     Does still have a cough. Didn't  inhaler or cough syrup prescribed last week because she doesn't have any money until the 3rd. No shortness of breath concerns.        Heartburn: Prescribed omeprazole 40 mg daily    No heartburn symptoms with PPI. History of ulcers.       Constipation: Prescribed Senna-docusate 8.6-50 mg 1-2 tabs twice daily as needed, Miralax 17 g daily as needed.  Using senna 2 tabs twice daily and Miralax daily.     A little bit of constipation issues recently. Using Senna twice daily. Miralax has been helpful.       Osteopenia: Currently using Vitamin D 5000 units and Calcium 600 mg twice daily.     Stopped Vitamin D because she was drinking whole milk.     Vitamin D Deficiency Screening Results:  Lab Results   Component Value Date    VITDT 38 12/12/2023    VITDT 43  04/05/2023    VITDT 35 01/17/2023         States Dr. Archer recommended a weekly medicine or yearly injection.     Had Dexa on 7/5       Supplements:   biotin 10 mg daily - for hair and nails   calcium 500 mg - twice daily    magnesium oxide 400 mg daily - leg cramps   daily multivitamin,   fish oil 1000 mg daily - heart health and liver health.     Recent Labs   Lab Test 12/06/22  1105 11/18/20  0731   CHOL 164 115   HDL 64 48*   LDL 63 47   TRIG 183* 102           Today's Vitals: /74   Pulse 98   Wt 188 lb 8 oz (85.5 kg)   LMP  (LMP Unknown)   BMI 33.99 kg/m    ----------------      I spent 60 minutes with this patient today. All changes were made via collaborative practice agreement with Gerald Strange MD. A copy of the visit note was provided to the patient's provider(s).    A summary of these recommendations was given to the patient.    Bradley Liao, RosemarieD  Medication Therapy Management (MTM) Pharmacist  [facility]         Medication Therapy Recommendations  Bipolar 1 disorder, depressed, severe (H)    Current Medication: lithium (ESKALITH CR/LITHOBID) 450 MG CR tablet   Rationale: Dose too low - Dosage too low - Effectiveness   Recommendation: Increase Dose   Status: Accepted per CPA         Type 2 diabetes mellitus with complication, with long-term current use of insulin (H)    Current Medication: insulin aspart (NOVOLOG PEN) 100 UNIT/ML pen   Rationale: Dose too high - Dosage too high - Safety   Recommendation: Decrease Frequency   Status: Accepted per CPA          Current Medication: insulin glargine U-300 (TOUJEO MAX SOLOSTAR) 300 UNIT/ML (2 units dial) pen   Rationale: Dose too low - Dosage too low - Effectiveness   Recommendation: Increase Dose   Status: Accepted per CPA         Vitamin D deficiency    Current Medication: vitamin D3 (CHOLECALCIFEROL) 125 MCG (5000 UT) tablet   Rationale: Does not understand instructions - Adherence - Adherence   Recommendation: Provide  Education   Status: Patient Agreed - Adherence/Education

## 2023-12-22 NOTE — TELEPHONE ENCOUNTER
"Has an \"annoying cough\" that started a few days ago. No fever, headache, chills. Once in a while expectorating some mucous. No increased shortness of breath from baseline.     Discussed that antibiotics likely not indicated. Hesitant to use cough medicine due to drowsiness. Discussed use of dextromethorphan that may be less sedating. Another option would be albuterol. If not covered, cough should likely resolve on its own.       "

## 2023-12-22 NOTE — TELEPHONE ENCOUNTER
General Call      Reason for Call:  RX question    What are your questions or concerns:  Pt called stating that she has a cough and does not have the money to grab any cough medicine over the counter and was wondering if maybe you or PCP can prescribe her some kind of antibiotic. Please call and advise, thanks!    Date of last appointment with provider: 12/12/2023    Okay to leave a detailed message?: Yes at Home number on file [phone] (home)

## 2023-12-26 ENCOUNTER — OFFICE VISIT (OUTPATIENT)
Dept: PHARMACY | Facility: CLINIC | Age: 68
End: 2023-12-26
Payer: COMMERCIAL

## 2023-12-26 VITALS
SYSTOLIC BLOOD PRESSURE: 129 MMHG | HEART RATE: 98 BPM | WEIGHT: 188.5 LBS | BODY MASS INDEX: 33.99 KG/M2 | DIASTOLIC BLOOD PRESSURE: 74 MMHG

## 2023-12-26 DIAGNOSIS — J31.0 CHRONIC RHINITIS: ICD-10-CM

## 2023-12-26 DIAGNOSIS — K21.00 GASTROESOPHAGEAL REFLUX DISEASE WITH ESOPHAGITIS, UNSPECIFIED WHETHER HEMORRHAGE: ICD-10-CM

## 2023-12-26 DIAGNOSIS — K75.81 NONALCOHOLIC STEATOHEPATITIS: ICD-10-CM

## 2023-12-26 DIAGNOSIS — K59.00 CONSTIPATION, UNSPECIFIED CONSTIPATION TYPE: ICD-10-CM

## 2023-12-26 DIAGNOSIS — Z79.4 TYPE 2 DIABETES MELLITUS WITH COMPLICATION, WITH LONG-TERM CURRENT USE OF INSULIN (H): Primary | ICD-10-CM

## 2023-12-26 DIAGNOSIS — M85.80 OSTEOPENIA, UNSPECIFIED LOCATION: ICD-10-CM

## 2023-12-26 DIAGNOSIS — E78.2 MIXED HYPERLIPIDEMIA: ICD-10-CM

## 2023-12-26 DIAGNOSIS — E55.9 VITAMIN D DEFICIENCY: ICD-10-CM

## 2023-12-26 DIAGNOSIS — E11.8 TYPE 2 DIABETES MELLITUS WITH COMPLICATION, WITH LONG-TERM CURRENT USE OF INSULIN (H): Primary | ICD-10-CM

## 2023-12-26 DIAGNOSIS — F31.4 BIPOLAR 1 DISORDER, DEPRESSED, SEVERE (H): ICD-10-CM

## 2023-12-26 DIAGNOSIS — Z78.9 TAKES DIETARY SUPPLEMENTS: ICD-10-CM

## 2023-12-26 PROCEDURE — 99607 MTMS BY PHARM ADDL 15 MIN: CPT | Performed by: PHARMACIST

## 2023-12-26 PROCEDURE — 99606 MTMS BY PHARM EST 15 MIN: CPT | Performed by: PHARMACIST

## 2023-12-26 RX ORDER — TIRZEPATIDE 15 MG/.5ML
15 INJECTION, SOLUTION SUBCUTANEOUS
Qty: 6 ML | Refills: 3 | Status: SHIPPED | OUTPATIENT
Start: 2023-12-26 | End: 2024-03-06

## 2023-12-26 RX ORDER — INSULIN GLARGINE 300 U/ML
90 INJECTION, SOLUTION SUBCUTANEOUS AT BEDTIME
Qty: 18 ML | Refills: 3 | Status: SHIPPED | OUTPATIENT
Start: 2023-12-26 | End: 2024-01-09

## 2023-12-26 RX ORDER — PEN NEEDLE, DIABETIC 32GX 5/32"
NEEDLE, DISPOSABLE MISCELLANEOUS
Qty: 200 EACH | Refills: 3 | Status: SHIPPED | OUTPATIENT
Start: 2023-12-26 | End: 2024-02-13

## 2023-12-26 RX ORDER — LITHIUM CARBONATE 450 MG
TABLET, EXTENDED RELEASE ORAL
Qty: 90 TABLET | Refills: 1 | Status: SHIPPED | OUTPATIENT
Start: 2023-12-26 | End: 2024-02-05

## 2023-12-26 NOTE — PATIENT INSTRUCTIONS
"Recommendations from today's MTM visit:                                                         Increase Toujeo to 90 units daily  Do not give Novolog unless you are having a meal.   Do not check blood glucose more than 4 times per day. Medicare will not pay for any more than this, and it's not more helpful.   Increase Lithium 450 mg to 2 tabs AM and 1 tab PM. When you come back for your repeat labs, do not take your morning dose of Lithium.   Restart Vitamin D3 5000 units daily     Follow-up: Return in about 5 weeks (around 1/30/2024) for Medication Management Pharmacist, in person.    It was great speaking with you today.  I value your experience and would be very thankful for your time in providing feedback in our clinic survey. In the next few days, you may receive an email or text message from [employer] with a link to a survey related to your  clinical pharmacist.\"     To schedule another MTM appointment, please call the clinic directly or you may call the MTM scheduling line at [phone] or toll-free at [phone].     My Clinical Pharmacist's contact information:                                                      Please feel free to contact me with any questions or concerns you have.      Bradley Liao, PharmD  Medication Therapy Management (MTM) Pharmacist  [facility]      "

## 2024-01-04 ENCOUNTER — TELEPHONE (OUTPATIENT)
Dept: FAMILY MEDICINE | Facility: CLINIC | Age: 69
End: 2024-01-04

## 2024-01-04 ENCOUNTER — ANCILLARY PROCEDURE (OUTPATIENT)
Dept: ULTRASOUND IMAGING | Facility: CLINIC | Age: 69
End: 2024-01-04
Attending: FAMILY MEDICINE
Payer: COMMERCIAL

## 2024-01-04 DIAGNOSIS — K74.60 CIRRHOSIS OF LIVER WITHOUT ASCITES, UNSPECIFIED HEPATIC CIRRHOSIS TYPE (H): ICD-10-CM

## 2024-01-04 PROCEDURE — 76705 ECHO EXAM OF ABDOMEN: CPT | Mod: GC | Performed by: RADIOLOGY

## 2024-01-04 NOTE — TELEPHONE ENCOUNTER
General Call      Reason for Call:  Fax lab results    What are your questions or concerns:  Patient is requesting lab results from 12/12 to be faxed to [facility] at [phone] so that  could look at the results.     Okay to leave a detailed message?: Yes at Home number on file [phone] (home)

## 2024-01-09 ENCOUNTER — OFFICE VISIT (OUTPATIENT)
Dept: FAMILY MEDICINE | Facility: CLINIC | Age: 69
End: 2024-01-09
Payer: COMMERCIAL

## 2024-01-09 ENCOUNTER — TRANSFERRED RECORDS (OUTPATIENT)
Dept: HEALTH INFORMATION MANAGEMENT | Facility: CLINIC | Age: 69
End: 2024-01-09

## 2024-01-09 VITALS
HEART RATE: 101 BPM | SYSTOLIC BLOOD PRESSURE: 145 MMHG | OXYGEN SATURATION: 94 % | TEMPERATURE: 97.8 F | DIASTOLIC BLOOD PRESSURE: 76 MMHG

## 2024-01-09 DIAGNOSIS — F31.4 BIPOLAR 1 DISORDER, DEPRESSED, SEVERE (H): Primary | ICD-10-CM

## 2024-01-09 DIAGNOSIS — R05.1 ACUTE COUGH: ICD-10-CM

## 2024-01-09 DIAGNOSIS — E11.69 MIXED DIABETIC HYPERLIPIDEMIA ASSOCIATED WITH TYPE 2 DIABETES MELLITUS (H): ICD-10-CM

## 2024-01-09 DIAGNOSIS — E11.8 TYPE 2 DIABETES MELLITUS WITH COMPLICATION, WITH LONG-TERM CURRENT USE OF INSULIN (H): ICD-10-CM

## 2024-01-09 DIAGNOSIS — E78.2 MIXED DIABETIC HYPERLIPIDEMIA ASSOCIATED WITH TYPE 2 DIABETES MELLITUS (H): ICD-10-CM

## 2024-01-09 DIAGNOSIS — Z79.4 TYPE 2 DIABETES MELLITUS WITH COMPLICATION, WITH LONG-TERM CURRENT USE OF INSULIN (H): ICD-10-CM

## 2024-01-09 PROCEDURE — 99213 OFFICE O/P EST LOW 20 MIN: CPT | Performed by: FAMILY MEDICINE

## 2024-01-09 RX ORDER — BENZONATATE 100 MG/1
100 CAPSULE ORAL 3 TIMES DAILY PRN
Qty: 20 CAPSULE | Refills: 3 | Status: SHIPPED | OUTPATIENT
Start: 2024-01-09 | End: 2024-04-16

## 2024-01-09 RX ORDER — INSULIN GLARGINE 300 U/ML
100 INJECTION, SOLUTION SUBCUTANEOUS AT BEDTIME
Qty: 18 ML | Refills: 3 | Status: SHIPPED | OUTPATIENT
Start: 2024-01-09 | End: 2024-05-21

## 2024-01-09 RX ORDER — RESPIRATORY SYNCYTIAL VIRUS VACCINE 120MCG/0.5
0.5 KIT INTRAMUSCULAR ONCE
Qty: 1 EACH | Refills: 0 | Status: CANCELLED | OUTPATIENT
Start: 2024-01-09 | End: 2024-01-09

## 2024-01-10 ENCOUNTER — TELEPHONE (OUTPATIENT)
Dept: FAMILY MEDICINE | Facility: CLINIC | Age: 69
End: 2024-01-10
Payer: COMMERCIAL

## 2024-01-10 DIAGNOSIS — F31.4 BIPOLAR 1 DISORDER, DEPRESSED, SEVERE (H): Primary | ICD-10-CM

## 2024-01-10 NOTE — PROGRESS NOTES
Assessment/ Plan  Type 2 diabetes mellitus with complication, with long-term current use of insulin (H)  Last A1c 8.4, patient reports a.m. blood sugars remaining between 202 40 despite recent increase in Toujeo to 90 units  We will go to 100, patient not having any low blood sugar readings, continues to take top dose Mounjaro NovoLog also on rosuvastatin.  Follow-up toward the end of the month with MTM    Bipolar 1 disorder, depressed, severe (H)  Seems to have responded nicely to increasing lithium, speech and behavior less pressured today, seems more grounded.  Per patient plans to check lithium level before seeing MTM later this month.  Also awaiting Abilify injection, needs to be approved before we give it, awaiting prior authorization.   Subjective  CC:  chief complaint  HPI:  No new questions today,  Blood sugars remain high, all the time, see discussion  Taking higher dose of lithium.  No problems with this.  PFSH:  Patient Active Problem List   Diagnosis    Healthcare maintenance    Type 2 diabetes mellitus with complication, with long-term current use of insulin (H)    Anatomical narrow angle glaucoma    Hyperactivity of bladder    Bilateral low back pain with left-sided sciatica    Callus of foot    Mixed hyperlipidemia    Simple chronic bronchitis (H)    Pulmonary emphysema (H)    Abnormal cervical Papanicolaou smear    Hernia of anterior abdominal wall    Human papilloma virus infection    Nonalcoholic steatohepatitis    Osteopenia    Thrombophlebitis    Botulism food poisoning    Cervical high risk HPV (human papillomavirus) test positive    History of colonic polyps (colonoscopy due 11/2027)    Hyponatremia    Sleep apnea    LPRD (laryngopharyngeal reflux disease)    Lumbar spine pain    RUQ abdominal pain    Tracheal stenosis following tracheostomy (H)    Cirrhosis of liver without ascites, unspecified hepatic cirrhosis type (H)    Nephrogenic diabetes insipidus (H24)    Other dysphagia    Gastric  polyp    Bipolar 1 disorder, depressed, severe (H)    Cervicalgia    Mixed diabetic hyperlipidemia associated with type 2 diabetes mellitus (H)    Vitamin D deficiency    Acute cough     acetaminophen (TYLENOL) 500 MG tablet, Take 500 mg by mouth 2 times daily Scheduled. (Patient not taking: Reported on 12/12/2023)  albuterol (PROAIR HFA/PROVENTIL HFA/VENTOLIN HFA) 108 (90 Base) MCG/ACT inhaler, Inhale 2 puffs into the lungs every 6 hours as needed for shortness of breath, wheezing or cough  aspirin (ASA) 81 MG chewable tablet, Take 81 mg by mouth daily  BD GERARDO U/F 32G X 4 MM insulin pen needle, Use as directed 4 injections per day.  Biotin 10 MG CAPS, Take 1 capsule by mouth daily (Patient not taking: Reported on 12/12/2023)  blood glucose (NO BRAND SPECIFIED) lancets standard, Use to test blood sugar 3 times daily or as directed.  blood glucose (NO BRAND SPECIFIED) test strip, 1 strip by In Vitro route 4 times daily  Calcium Carbonate-Vitamin D 600-5 MG-MCG TABS, Take 2 tablets by mouth daily  dextromethorphan (DELSYM) 30 MG/5ML liquid, Take 10 mLs (60 mg) by mouth 2 times daily  fluticasone (FLONASE) 50 MCG/ACT nasal spray, SHAKE LIQUID AND USE 2 SPRAYS IN EACH NOSTRIL DAILY AS NEEDED FOR RHINITIS OR ALLERGIES Strength: 50 MCG/ACT  furosemide (LASIX) 20 MG tablet, Take 1.5 tablets (30 mg) by mouth daily  insulin aspart (NOVOLOG PEN) 100 UNIT/ML pen, Inject 30 Units Subcutaneous 3 times daily (before meals)  lithium (ESKALITH CR/LITHOBID) 450 MG CR tablet, Take 2 tablets (900 mg) by mouth every morning AND 1 tablet (450 mg) every evening.  losartan (COZAAR) 50 MG tablet, Take 1 tablet (50 mg) by mouth daily  magnesium oxide 400 MG CAPS, Take 400 mg by mouth daily  montelukast (SINGULAIR) 10 MG tablet, Take 1 tablet (10 mg) by mouth At Bedtime  multivitamin (ONE-DAILY) tablet, Take 1 tablet by mouth daily  mupirocin (BACTROBAN) 2 % external ointment, APPLY TOPICALLY TO THE AFFECTED AREA THREE TIMES DAILY  nystatin  (MYCOSTATIN) 400998 UNIT/GM external ointment, Apply topically 2 times daily Apply to affected areas only (right upper arm)  Omega 3-6-9 Fatty Acids (OMEGA 3-6-9 COMPLEX PO),   omeprazole (PRILOSEC) 40 MG DR capsule, TAKE 1 CAPSULE(40 MG) BY MOUTH DAILY  polyethylene glycol (MIRALAX) 17 GM/Dose powder, TAKE ONE CAPFUL MIXED WITH LIQUID BY MOUTH TWICE DAILY AS NEEDED  rosuvastatin (CRESTOR) 10 MG tablet, Take 1 tablet (10 mg) by mouth daily  SENNA-docusate sodium (SENNA S) 8.6-50 MG tablet, Take 1-2 tablets by mouth 2 times daily as needed (constipation)  terbinafine (LAMISIL) 1 % external cream, Apply topically 2 times daily  tiotropium-olodaterol 2.5-2.5 MCG/ACT AERS, Inhale 2 puffs into the lungs daily  tirzepatide (MOUNJARO) 15 MG/0.5ML pen, Inject 15 mg Subcutaneous every 7 days  vitamin D3 (CHOLECALCIFEROL) 125 MCG (5000 UT) tablet, Take 1 tablet (125 mcg) by mouth daily  Vitamins A & D (BABY VITAMIN A & D) OINT, Apply 5 g topically 4 times daily    No current facility-administered medications on file prior to visit.       History   Smoking Status    Never   Smokeless Tobacco    Never     Social History     Social History Narrative    Originally from [facility]. Moved from [place].  4 times . Has 5 children ,9 grandchildren . Her daughters were taken away from her by biological dad when she was ill with botulism . All adults now . All of them are in [facility].  Youngest daughter is 38 . Initially estranged but has a closer relationship .Hadnt worked for a long time . Is a violinist and plays in [place] . Wasn't diagnosed with bipolar much later in life . 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 Is on disability due to  ipolar .       She plays the violin.      Patient Care Team:  Gerald Strange MD as PCP - General (Family Medicine)  Bradley Liao, PharmD as Pharmacist (Pharmacist)  Bradley Liao, PharmD as Assigned MTM  Pharmacist  Lele Oliveira MD as Assigned Pulmonology Provider  Garett Archer MD as Assigned Musculoskeletal Provider  Gerald Strange MD as Assigned PCP  Fay Holly MD as MD (Ophthalmology)        Objective  Physical Exam  Vitals:    01/09/24 1515   BP: (!) 145/76   BP Location: Left arm   Patient Position: Sitting   Cuff Size: Adult Regular   Pulse: 101   Temp: 97.8  F (36.6  C)   SpO2: 94%     There is no height or weight on file to calculate BMI.  Gen- alert, oriented/ appropriately responsive  HEENT- normal cephalic, atraumatic.   Chest- Normal inspiration and expiration.    Clear to ascultation.    No chest wall deformity or scar.  CV- Heart regular rate and rhythm  normal tones, no murmurs   No gallops or rubs.  Ext- appear well perfused, no edema  Skin- warm and dry,   no visualized rash    Diagnostics:   No results found for any visits on 01/09/24.        Please note: Voice recognition software was used in this dictation.  It may therefore contain typographical errors.

## 2024-01-10 NOTE — ASSESSMENT & PLAN NOTE
Seems to have responded nicely to increasing lithium, speech and behavior less pressured today, seems more grounded.  Per patient plans to check lithium level before seeing MTM later this month.  Also awaiting Abilify injection, needs to be approved before we give it, awaiting prior authorization.

## 2024-01-10 NOTE — ASSESSMENT & PLAN NOTE
Last A1c 8.4, patient reports a.m. blood sugars remaining between 202 40 despite recent increase in Toujeo to 90 units  We will go to 100, patient not having any low blood sugar readings, continues to take top dose Mounjaro NovoLog also on rosuvastatin.  Follow-up toward the end of the month with MTM

## 2024-01-10 NOTE — TELEPHONE ENCOUNTER
Checking with [facility] Team, will see if they can rush the PA. If not, will contact patient at end of week to discuss oral abilify (although clinically shouldn't be needed)

## 2024-01-10 NOTE — TELEPHONE ENCOUNTER
Medication Question or Refill    Contacts         Type Contact Phone/Fax    01/10/2024 08:06 AM CST Phone (Incoming) Fredis Stoddard (Self) [phone] (M)            What medication are you calling about (include dose and sig)?: Abilify Injection    Preferred Pharmacy:   [id number] - [place], [place] - [address]  [address] [place] [address]  Phone: [phone] Fax: [phone]      Controlled Substance Agreement on file:   CSA -- Patient Level:    CSA: None found at the patient level.       Who prescribed the medication?: Dr. Strange    Do you need a refill? Yes, patient waiting on injection    When did you use the medication last? 12/12/2023    Patient offered an appointment? No    Do you have any questions or concerns?  Yes: patient was in office on 1/9/2024 and was not able to get her abilify shot because we are waiting for it to be approved through prior authorization. Patient is wondering what she should do while we are waiting for it to be approved. Patient would like a call back from the care team or pharmacist about what she should do.       Okay to leave a detailed message?: Yes at Home number on file [phone] (home)

## 2024-01-11 ENCOUNTER — ALLIED HEALTH/NURSE VISIT (OUTPATIENT)
Dept: FAMILY MEDICINE | Facility: CLINIC | Age: 69
End: 2024-01-11
Payer: COMMERCIAL

## 2024-01-11 DIAGNOSIS — F31.4 BIPOLAR 1 DISORDER, DEPRESSED, SEVERE (H): Primary | ICD-10-CM

## 2024-01-11 PROCEDURE — 96372 THER/PROPH/DIAG INJ SC/IM: CPT | Performed by: FAMILY MEDICINE

## 2024-01-11 PROCEDURE — 99207 PR NO CHARGE NURSE ONLY: CPT

## 2024-01-11 NOTE — PROGRESS NOTES
Clinic Administered Medication Documentation      Injectable Medication Documentation    Is there an active order (written within the past 365 days, with administrations remaining, not ) in the chart? Yes.     Patient was given  Abilify 400 mg . Prior to medication administration, verified patient's identity using patient s name and date of birth. Please see MAR and medication order for additional information. Patient instructed to remain in clinic for 15 minutes and report any adverse reaction to staff immediately.    Vial/Syringe: Single dose vial. Was entire vial of medication used? Yes  Was this medication supplied by the patient? No  Is this a medication the patient will need to receive again? Yes. Verified that the patient has refills remaining in their prescription.    Julia Gonzalez RN BSN  [facility]

## 2024-01-16 DIAGNOSIS — K21.00 GASTROESOPHAGEAL REFLUX DISEASE WITH ESOPHAGITIS, UNSPECIFIED WHETHER HEMORRHAGE: ICD-10-CM

## 2024-01-16 RX ORDER — OMEPRAZOLE 40 MG/1
40 CAPSULE, DELAYED RELEASE ORAL DAILY
Qty: 90 CAPSULE | Refills: 0 | Status: SHIPPED | OUTPATIENT
Start: 2024-01-16 | End: 2024-04-15

## 2024-01-18 ENCOUNTER — PATIENT OUTREACH (OUTPATIENT)
Dept: CARE COORDINATION | Facility: CLINIC | Age: 69
End: 2024-01-18
Payer: COMMERCIAL

## 2024-01-19 NOTE — TELEPHONE ENCOUNTER
FUTURE VISIT INFORMATION      FUTURE VISIT INFORMATION:  Date: 4/4/24  Time: 8:20am  Location: [facility]  REFERRAL INFORMATION:  Referring provider:  Gerald Strange MD   Referring providers clinic:  [facility]  Reason for visit/diagnosis  Diabetic eye exam      RECORDS REQUESTED FROM:         Clinic name Comments Records Status Imaging Status   [facility] Ov/referral 12/12/23 EPIC

## 2024-01-25 ENCOUNTER — TELEPHONE (OUTPATIENT)
Dept: FAMILY MEDICINE | Facility: CLINIC | Age: 69
End: 2024-01-25
Payer: COMMERCIAL

## 2024-01-25 NOTE — TELEPHONE ENCOUNTER
Pt called in to request to speak with Bradley. Pt states that she went to  her medication from pharmacy and was told that all of her medications would cost over $500 as she stated that she does not qualify for the low income subsidy prescription drug coverage program until after Feb 15th. Please give her a call back at your earliest convenient to discuss.   Thanks!

## 2024-01-25 NOTE — TELEPHONE ENCOUNTER
Called patient. She might run out of Toujeo and test strips before 2/15. Will have enough medicine until next MTM visit on Tuesday. Will apply for safety net program for insulin on Tuesday.

## 2024-01-29 NOTE — PROGRESS NOTES
Medication Therapy Management (MTM) Encounter    ASSESSMENT:                            Medication Adherence/Access: Having cost concerns with medications [facility] Extra Help subsidy ended. Asssisted patient with reapplying for Extra Help during the visit today. Has enough Novolog for another couple of weeks. Will get paid on 2/2. Mounjaro will last through March. Toujeo will be ~$30. Will hold on picking up Stiolto. If Extra Help approved, can reprocess at pharmacy and should be more affordable. If needed, patient will call back to discuss other expensive medications and back up plan. Generally will have patient wait on picking up medications until later in feb with hopes that Extra Help subsidy will be approved by then.     Type 2 Diabetes:  A1C above goal <8%. Home readings typically above goal <180, and actually worsened from last visit despite insulin increase. Watched patient demonstrate Mounjaro technique with a sample and administration technique is okay. Given uncertainty regarding worsening sugars with higher doses of medications, discussed CGM. With financial concerns, will prioritize CGM lower than current medicines.      Hyperlipidemia: Appropriately using statin. LDL at goal <100. Appropriately using aspirin for ASCVD risk score >10%.         Hypertension: BP at  goal <140/90. Pulse at goal . Edema has improved.       Bipolar Type I:  Repeat lithium levels pending. Overall reports improvement in depression, apathy, and fatigue. Ongoing fatigue may be related to elevated blood glucose. Continue to monitor.       Low Vitamin D: Last Vit D level WNL.      Asthma/COPD: Continues to have shortness of breath each night - thinks it may be residual from viral illness earlier this month. Cough improved in the last couple of weeks. Will continue to monitor.       Heartburn: Symptoms well managed with PPI.      Constipation: No constipation concerns at this time.     Osteopenia: No clear indication to start  treatment. Major FRAX risk <20%; hip risk <3%. Recommend continuing with Calcium and Vit D supplement at this time.       Supplements:   biotin 10 mg daily - mixed evidence. Patient thinks it's helpful. Okay to continue.    calcium 500 mg - okay to continue with history of osteopenia.   magnesium oxide 400 mg daily - has been helpful for cramps.    daily multivitamin   fish oil 1000 mg daily - LDL at goal, trigs <300. Likely does not need for heart health. However, may be beneficial for VALE and reducing steatosis. Okay to continue.         PLAN:                              Applied for Extra Help subsidy.   Continue Toujeo 100 units daily and Novolog 30 untis three times daily and Mounjaro doses.   If affordable,  the Freestyle Maxine.     Follow-up: Return in about 22 days (around 2/21/2024) for Medication Management Pharmacist, in person.     SUBJECTIVE/OBJECTIVE:                          Annel Stoddard is a 68 year old female coming for a follow-up visit. She was referred to me from Self and Dr. Sullivan. Now seeing Gerald Strange MD as PCP. Today's visit is a follow-up MTM visit from 12/26/2023.      Reason for visit: Medication follow-up   Wants to get out of her living facility. It's not a good place.   Again considering moving out of [place].   Doesn't have extra help for Medicare. Has to re-apply.       Allergies/ADRs: Reviewed in chart  Past Medical History: Reviewed in chart  Tobacco: She reports that she has never smoked. She has never been exposed to tobacco smoke. She has never used smokeless tobacco.  Alcohol:  reports that she does not currently use alcohol.       Medication Adherence/Access:     Denies adherence concerns.     Cost conerns. No longer has the Extra Help subsidy. Has to re-apply for it and she won't get the form until     12/28 filled:   Mounjaro 90 day supply   Novolog 46 day supply       Toujeo and Stiolto just filled 1/25 - hasn't picked up yet.   Should have enough Stiolto  to get through mid feb.     Type 2 Diabetes:  Prescribed NovoLog 30 units 3 times daily, Toujeo U300 - 100 units daily, Mounjaro to 15 mg daily.      Questions if she's injecting Mounjaro correctly.     Using Toujeo 100 units and Novolog 30 units. Sometimes gives Novolog 5 times per day. Sometimes constantly eating, but also giving Novolog when not eating.     Stopped Jardiance due to ongoing yeast infections.   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   Pioglitazone stopped due to edema.       Blood sugar monitoring: 3 time(s) daily.  Ranges (forgot her monitor today)       Fastin, 312, 365, 280, 316, 273, 313, 300,     PM:  338, 371, 278, 296,        Symptoms of low blood sugar? None.    Symptoms of high blood sugar? Polyuria.   Diet/Exercise: Appetite is about the same. No longer getting food from the food shelf. Bought groceries last month on her own. Eat lunches down at the dining room -hamburger or steak (doesn't eat both pieces of bread), buys a pizza once a week - this lasts 2-3 days. Egg and half cup cereal in the morning. Coffee with Stevia.     Aspirin: Taking 81mg daily    Statin: Yes: Rosuvastatin 10 mg daily  ACEi/ARB: Yes: Losartan 50 mg daily.     Hemoglobin A1C   Date Value Ref Range Status   2023 8.4 (H) 0.0 - 5.6 % Final     Comment:     Normal <5.7%   Prediabetes 5.7-6.4%    Diabetes 6.5% or higher     Note: Adopted from ADA consensus guidelines.   2023 6.9 (H) 0.0 - 5.6 % Final     Comment:     Normal <5.7%   Prediabetes 5.7-6.4%    Diabetes 6.5% or higher     Note: Adopted from ADA consensus guidelines.   2023 6.9 (H) 0.0 - 5.6 % Final     Comment:     Normal <5.7%   Prediabetes 5.7-6.4%    Diabetes 6.5% or higher     Note: Adopted from ADA consensus guidelines.   2020 8.4 (H) 0 - 5.6 % Final     Comment:     Normal <5.7% Prediabetes 5.7-6.4%  Diabetes 6.5% or higher - adopted from ADA   consensus guidelines.        Microalbumin Urine mg/dL   Date  Value Ref Range Status   06/16/2021 <0.50 0.00 - 1.99 mg/dL Final      Creatinine Urine mg/dL   Date Value Ref Range Status   03/08/2023 29.6 mg/dL Final     Comment:     The reference ranges have not been established in urine creatinine. The results should be integrated into the clinical context for interpretation.     LDL Cholesterol Calculated   Date Value Ref Range Status   12/06/2022 63 <=100 mg/dL Final     LDL Cholesterol Direct   Date Value Ref Range Status   09/23/2019 48 <=129 mg/dl Final     Creatinine   Date Value Ref Range Status   12/12/2023 0.50 (L) 0.51 - 0.95 mg/dL Final   01/27/2020 0.52 0.52 - 1.04 mg/dL Final     The 10-year ASCVD risk score (Diana SRIVASTAVA, et al., 2019) is: 12.7%    Values used to calculate the score:      Age: 68 years      Sex: Female      Is Non- : No      Diabetic: Yes      Tobacco smoker: No      Systolic Blood Pressure: 110 mmHg      Is BP treated: Yes      HDL Cholesterol: 64 mg/dL      Total Cholesterol: 164 mg/dL         Hypertension/Edema: Prescribed losartan 50 mg daily, Furosemide 20 mg 1.5 tabs daily.     Reports edema is much better recently.        BP Readings from Last 3 Encounters:   01/30/24 110/69   01/09/24 (!) 145/76   12/26/23 129/74      Pulse Readings from Last 3 Encounters:   01/30/24 77   01/09/24 101   12/26/23 98     Wt Readings from Last 3 Encounters:   01/30/24 187 lb 4 oz (84.9 kg)   12/26/23 188 lb 8 oz (85.5 kg)   12/12/23 188 lb (85.3 kg)          Bipolar Type I: Prescribed lithium 450 mg ER 2 tabs AM and 1 tab PM, Abilify Maintena 400 mg IM monthly, Vitamin D3 5000 units daily.    Increased lithium at last visit.   Energy levels are improving. Still gets tired and needs a nap in the afternoon, but not as bad as before.     Appetite is decreased somewhat.   Hasn't been feeling down/depressed. Feels lonely.   Apathy has improved. Now back to making it through her tasks for the day.        Vitamin D Deficiency Screening  Results:  Lab Results   Component Value Date    VITDT 38 12/12/2023    VITDT 43 04/05/2023    VITDT 35 01/17/2023       Lab Results   Component Value Date    LITHIUM 0.58 (L) 10/10/2023           Asthma/COPD/allergies: Prescribed albuterol HFA 90 mcg 2 puffs every 4 hours as needed, montelukast 10 mg daily, Stiolto (tiotropium-olodaterol) 2.5-2.5 mcg 2 puffs daily, Flonase 50 mcg nasal spray 2 sprays each nostril daily, saline 0.65% nasal spray as needed.     Tracheal stenosis.     Having fairly consistent shortness of breath at night. Using Albuterol daily.   Does have some congestion - uses saline nasal spray. Finds this helpful.     Thinks shortness of breath may be worsened from viral infection earlier this month. Cough has recently improved.      Heartburn: Prescribed omeprazole 40 mg daily    No heartburn symptoms with PPI. History of ulcers.       Constipation: Prescribed Senna-docusate 8.6-50 mg 1-2 tabs twice daily as needed, Miralax 17 g daily as needed.  Using senna 2 tabs twice daily and Miralax daily.     No constipation concerns recently.       Osteopenia: Currently using Vitamin D 5000 units and Calcium 600 mg twice daily.          Vitamin D Deficiency Screening Results:  Lab Results   Component Value Date    VITDT 38 12/12/2023    VITDT 43 04/05/2023    VITDT 35 01/17/2023            Supplements:   biotin 10 mg daily - for hair and nails   calcium 500 mg - twice daily    magnesium oxide 400 mg daily - leg cramps   daily multivitamin,   fish oil 1000 mg daily - heart health and liver health.     Recent Labs   Lab Test 12/06/22  1105 11/18/20  0731   CHOL 164 115   HDL 64 48*   LDL 63 47   TRIG 183* 102           Today's Vitals: /69   Pulse 77   Wt 187 lb 4 oz (84.9 kg)   LMP  (LMP Unknown)   BMI 33.77 kg/m    ----------------      I spent 70 minutes with this patient today. All changes were made via collaborative practice agreement with Gerald Strange MD. A copy of the visit note was  provided to the patient's provider(s).    A summary of these recommendations was given to the patient.    Bradley Liao, PharmD  Medication Therapy Management (MTM) Pharmacist  [facility]         Medication Therapy Recommendations  Type 2 diabetes mellitus with complication, with long-term current use of insulin (H)    Current Medication: insulin aspart (NOVOLOG PEN) 100 UNIT/ML pen   Rationale: Medication requires monitoring - Needs additional monitoring   Recommendation: Self-Monitoring - FreeStyle Maxine 2 [place] Shellie   Status: Accepted per CPA          Current Medication: insulin glargine U-300 (TOUJEO MAX SOLOSTAR) 300 UNIT/ML (2 units dial) pen   Rationale: Cannot afford medication product - Cost - Adherence   Recommendation: Provide Adherence Intervention   Status: Patient Agreed - Adherence/Education

## 2024-01-30 ENCOUNTER — OFFICE VISIT (OUTPATIENT)
Dept: PHARMACY | Facility: CLINIC | Age: 69
End: 2024-01-30
Payer: COMMERCIAL

## 2024-01-30 ENCOUNTER — LAB (OUTPATIENT)
Dept: LAB | Facility: CLINIC | Age: 69
End: 2024-01-30
Payer: COMMERCIAL

## 2024-01-30 ENCOUNTER — TELEPHONE (OUTPATIENT)
Dept: FAMILY MEDICINE | Facility: CLINIC | Age: 69
End: 2024-01-30

## 2024-01-30 VITALS
HEART RATE: 77 BPM | SYSTOLIC BLOOD PRESSURE: 110 MMHG | DIASTOLIC BLOOD PRESSURE: 69 MMHG | BODY MASS INDEX: 33.77 KG/M2 | WEIGHT: 187.25 LBS

## 2024-01-30 DIAGNOSIS — E55.9 VITAMIN D DEFICIENCY: ICD-10-CM

## 2024-01-30 DIAGNOSIS — E78.2 MIXED DIABETIC HYPERLIPIDEMIA ASSOCIATED WITH TYPE 2 DIABETES MELLITUS (H): ICD-10-CM

## 2024-01-30 DIAGNOSIS — K21.00 GASTROESOPHAGEAL REFLUX DISEASE WITH ESOPHAGITIS, UNSPECIFIED WHETHER HEMORRHAGE: ICD-10-CM

## 2024-01-30 DIAGNOSIS — J31.0 CHRONIC RHINITIS: ICD-10-CM

## 2024-01-30 DIAGNOSIS — F31.4 BIPOLAR 1 DISORDER, DEPRESSED, SEVERE (H): ICD-10-CM

## 2024-01-30 DIAGNOSIS — K59.00 CONSTIPATION, UNSPECIFIED CONSTIPATION TYPE: ICD-10-CM

## 2024-01-30 DIAGNOSIS — E11.8 TYPE 2 DIABETES MELLITUS WITH COMPLICATION, WITH LONG-TERM CURRENT USE OF INSULIN (H): Primary | ICD-10-CM

## 2024-01-30 DIAGNOSIS — M85.80 OSTEOPENIA, UNSPECIFIED LOCATION: ICD-10-CM

## 2024-01-30 DIAGNOSIS — E11.69 MIXED DIABETIC HYPERLIPIDEMIA ASSOCIATED WITH TYPE 2 DIABETES MELLITUS (H): ICD-10-CM

## 2024-01-30 DIAGNOSIS — K75.81 NONALCOHOLIC STEATOHEPATITIS: ICD-10-CM

## 2024-01-30 DIAGNOSIS — Z78.9 TAKES DIETARY SUPPLEMENTS: ICD-10-CM

## 2024-01-30 DIAGNOSIS — E78.2 MIXED HYPERLIPIDEMIA: ICD-10-CM

## 2024-01-30 DIAGNOSIS — Z79.4 TYPE 2 DIABETES MELLITUS WITH COMPLICATION, WITH LONG-TERM CURRENT USE OF INSULIN (H): Primary | ICD-10-CM

## 2024-01-30 DIAGNOSIS — R06.02 SOB (SHORTNESS OF BREATH): ICD-10-CM

## 2024-01-30 LAB — LITHIUM SERPL-SCNC: 0.54 MMOL/L (ref 0.6–1.2)

## 2024-01-30 PROCEDURE — 80178 ASSAY OF LITHIUM: CPT

## 2024-01-30 PROCEDURE — 80048 BASIC METABOLIC PNL TOTAL CA: CPT

## 2024-01-30 PROCEDURE — 99607 MTMS BY PHARM ADDL 15 MIN: CPT | Performed by: PHARMACIST

## 2024-01-30 PROCEDURE — 99605 MTMS BY PHARM NP 15 MIN: CPT | Performed by: PHARMACIST

## 2024-01-30 PROCEDURE — 36415 COLL VENOUS BLD VENIPUNCTURE: CPT

## 2024-01-30 PROCEDURE — 80061 LIPID PANEL: CPT

## 2024-01-30 NOTE — LETTER
January 30, 2024  Annel K Arlyn  [address]  [place] [address]    Dear Ms. Stoddard, [facility]     Thank you for talking with me on Jan 30, 2024 about your health and medications. As a follow-up to our conversation, I have included two documents:      Your Recommended To-Do List has steps you should take to get the best results from your medications.  Your Medication List will help you keep track of your medications and how to take them.    If you want to talk about these documents, please call Bradley Liao PharmD at phone: [phone], Monday-Friday 8-4:30pm.    I look forward to working with you and your doctors to make sure your medications work well for you.    Sincerely,  Bradley Liao PharmD  [facility] Pharmacist, [facility]

## 2024-01-30 NOTE — LETTER
"Recommended To-Do List      Prepared on: 01/30/2024       You can get the best results from your medications by completing the items on this \"To-Do List.\"      Bring your To-Do List when you go to your doctor. And, share it with your family or caregivers.    My To-Do List:  What we talked about: What I should do:   Needing additional monitoring    Self-monitor Check your blood sugars using the Freestyle Maxine 2 monitor.            What we talked about: What I should do:   The cost of your medication(s)    Reminder to take your medication as prescribed for Arya Santamaria. We applied for Extra Help subsidy to make this medicine more affordable.           What we talked about: What I should do:                     "

## 2024-01-30 NOTE — PATIENT INSTRUCTIONS
"Recommendations from today's MTM visit:                                                           Applied for Extra Help subsidy.   Continue current insulin and Mounjaro doses.   If affordable,  the Freestyle Maxine.     Follow-up: Return in about 22 days (around 2/21/2024) for Medication Management Pharmacist, in person.    It was great speaking with you today.  I value your experience and would be very thankful for your time in providing feedback in our clinic survey. In the next few days, you may receive an email or text message from [employer] with a link to a survey related to your  clinical pharmacist.\"     To schedule another MTM appointment, please call the clinic directly or you may call the MTM scheduling line at [phone].    My Clinical Pharmacist's contact information:                                                      Please feel free to contact me with any questions or concerns you have.      Bradley Liao, PharmD  Medication Therapy Management (MTM) Pharmacist  [facility]      "

## 2024-01-30 NOTE — LETTER
_  Medication List        Prepared on: 01/30/2024     Bring your Medication List when you go to the doctor, hospital, or   emergency room. And, share it with your family or caregivers.     Note any changes to how you take your medications.  Cross out medications when you no longer use them.    Medication How I take it Why I use it Prescriber   acetaminophen (TYLENOL) 500 MG tablet Take 500 mg by mouth 2 times daily Scheduled.  Pain Patient Reported   albuterol (PROAIR HFA/PROVENTIL HFA/VENTOLIN HFA) 108 (90 Base) MCG/ACT inhaler Inhale 2 puffs into the lungs every 6 hours as needed for shortness of breath, wheezing or cough Acute cough Gerald Strange MD   aspirin (ASA) 81 MG chewable tablet Take 81 mg by mouth daily  Heart Health  Patient Reported   BD GERARDO U/F 32G X 4 MM insulin pen needle Use as directed 4 injections per day. Type 2 diabetes mellitus with complication, with long-term current use of insulin (H) Gerald Strange MD   benzonatate (TESSALON) 100 MG capsule Take 1 capsule (100 mg) by mouth 3 times daily as needed for cough Acute cough Gerald Strange MD   Biotin 10 MG CAPS Take 1 capsule by mouth daily  General Health  Patient Reported   blood glucose (NO BRAND SPECIFIED) lancets standard Use to test blood sugar 3 times daily or as directed. Type 2 diabetes mellitus with complication, with long-term current use of insulin (H) Morena Cisse PA-C   blood glucose (NO BRAND SPECIFIED) test strip 1 strip by In Vitro route 4 times daily Type 2 diabetes mellitus with complication, with long-term current use of insulin (H) Gerald Strange MD   Calcium Carbonate-Vitamin D 600-5 MG-MCG TABS Take 2 tablets by mouth daily  General Health  Patient Reported   Continuous Blood Gluc  (FREESTYLE EDITH 2 READER) KAYA 1 each once for 1 dose Use to read blood sugars per 's instructions. Type 2 diabetes mellitus with complication, with long-term current use of insulin (H) Gerald Strange MD    Continuous Blood Gluc Sensor (FREESTYLE EDITH 2 SENSOR) [facility] 1 each every 14 days Use 1 sensor every 14 days. Use to read blood sugars per 's instructions. Type 2 diabetes mellitus with complication, with long-term current use of insulin (H) Gerald Strange MD   dextromethorphan (DELSYM) 30 MG/5ML liquid Take 10 mLs (60 mg) by mouth 2 times daily Acute cough Gerald Strange MD   fluticasone (FLONASE) 50 MCG/ACT nasal spray SHAKE LIQUID AND USE 2 SPRAYS IN EACH NOSTRIL DAILY AS NEEDED FOR RHINITIS OR ALLERGIES Strength: 50 MCG/ACT Chronic Rhinitis Reece Raman MD   furosemide (LASIX) 20 MG tablet Take 1.5 tablets (30 mg) by mouth daily Lower Extremity Edema Gerald Strange MD   insulin aspart (NOVOLOG PEN) 100 UNIT/ML pen Inject 30 Units Subcutaneous 3 times daily (before meals) Type 2 diabetes mellitus with complication, with long-term current use of insulin (H) Gerald Strange MD   insulin glargine U-300 (TOUJEO MAX SOLOSTAR) 300 UNIT/ML (2 units dial) pen Inject 100 Units Subcutaneous at bedtime Type 2 diabetes mellitus with complication, with long-term current use of insulin (H) Gerald Strange MD   lithium (ESKALITH CR/LITHOBID) 450 MG CR tablet Take 2 tablets (900 mg) by mouth every morning AND 1 tablet (450 mg) every evening. Bipolar 1 disorder, depressed, severe (H) Gerald Strange MD   losartan (COZAAR) 50 MG tablet Take 1 tablet (50 mg) by mouth daily Type 2 diabetes mellitus with complication, with long-term current use of insulin (H) Gerald Strange MD   magnesium oxide 400 MG CAPS Take 400 mg by mouth daily  General Health  Patient Reported   montelukast (SINGULAIR) 10 MG tablet Take 1 tablet (10 mg) by mouth At Bedtime Pulmonary emphysema, unspecified emphysema type (H) Lele Oliveira MD   multivitamin (ONE-DAILY) tablet Take 1 tablet by mouth daily  General Health  Patient Reported   mupirocin (BACTROBAN) 2 % external ointment APPLY TOPICALLY TO THE AFFECTED AREA THREE TIMES DAILY History of  MRSA infection Gerald Strange MD   nystatin (MYCOSTATIN) 152361 UNIT/GM external ointment Apply topically 2 times daily Apply to affected areas only (right upper arm) Dermatitis Alexus Cherry MD   Omega 3-6-9 Fatty Acids (OMEGA 3-6-9 COMPLEX PO)  Take 1 capsule by mouth once daily  General Health  Patient Reported   omeprazole (PRILOSEC) 40 MG DR capsule TAKE 1 CAPSULE(40 MG) BY MOUTH DAILY Gastroesophageal reflux disease with esophagitis, unspecified whether hemorrhage Gerald Strange MD   polyethylene glycol (MIRALAX) 17 GM/Dose powder TAKE ONE CAPFUL MIXED WITH LIQUID BY MOUTH TWICE DAILY AS NEEDED Constipation, unspecified constipation type Gerald Strange MD   rosuvastatin (CRESTOR) 10 MG tablet Take 1 tablet (10 mg) by mouth daily Type 2 diabetes mellitus with complication, with long-term current use of insulin (H) Gerald Strange MD   SENNA-docusate sodium (SENNA S) 8.6-50 MG tablet Take 1-2 tablets by mouth 2 times daily as needed (constipation) Constipation, unspecified constipation type Gerald Strange MD   terbinafine (LAMISIL) 1 % external cream Apply topically 2 times daily Onychomycosis Alexus Cherry MD   tiotropium-olodaterol 2.5-2.5 MCG/ACT AERS Inhale 2 puffs into the lungs daily Pulmonary emphysema, unspecified emphysema type (H) Lele Oliveira MD   tirzepatide (MOUNJARO) 15 MG/0.5ML pen Inject 15 mg Subcutaneous every 7 days Type 2 diabetes mellitus with complication, with long-term current use of insulin (H) Gerald Strange MD   vitamin D3 (CHOLECALCIFEROL) 125 MCG (5000 UT) tablet Take 1 tablet (125 mcg) by mouth daily Vitamin D Deficiency Gerald Strange MD   Vitamins A & D (BABY VITAMIN A & D) OINT Apply 5 g topically 4 times daily Type 2 diabetes mellitus with complication, with long-term current use of insulin (H) Gerald Strange MD         Add new medications, over-the-counter drugs, herbals, vitamins, or  minerals in the blank rows below.    Medication How I take  it Why I use it Prescriber                                      Allergies:      desmopressin; bee venom; estrogens; hydrochlorothiazide; hydrocodone-acetaminophen; jardiance [empagliflozin]; lithium; penicillins; risperidone; shellfish-derived products; aromatic inhalants; latex; niacin; qvar [beclomethasone]; valacyclovir        Side effects I have had:               Other Information:              My notes and questions:

## 2024-01-30 NOTE — TELEPHONE ENCOUNTER
Pt called her Medicare and they will not cover the maxine, however her [employer] will. With that being said, her Pharmacist wants PCP to send in another Rx for her Maxine devices.

## 2024-01-31 ENCOUNTER — TELEPHONE (OUTPATIENT)
Dept: FAMILY MEDICINE | Facility: CLINIC | Age: 69
End: 2024-01-31
Payer: COMMERCIAL

## 2024-01-31 DIAGNOSIS — F31.4 BIPOLAR 1 DISORDER, DEPRESSED, SEVERE (H): Primary | ICD-10-CM

## 2024-01-31 LAB
ANION GAP SERPL CALCULATED.3IONS-SCNC: 11 MMOL/L (ref 7–15)
BUN SERPL-MCNC: 14.3 MG/DL (ref 8–23)
CALCIUM SERPL-MCNC: 9.6 MG/DL (ref 8.8–10.2)
CHLORIDE SERPL-SCNC: 96 MMOL/L (ref 98–107)
CHOLEST SERPL-MCNC: 149 MG/DL
CREAT SERPL-MCNC: 0.46 MG/DL (ref 0.51–0.95)
DEPRECATED HCO3 PLAS-SCNC: 26 MMOL/L (ref 22–29)
EGFRCR SERPLBLD CKD-EPI 2021: >90 ML/MIN/1.73M2
FASTING STATUS PATIENT QL REPORTED: YES
GLUCOSE SERPL-MCNC: 327 MG/DL (ref 70–99)
HDLC SERPL-MCNC: 52 MG/DL
LDLC SERPL CALC-MCNC: 54 MG/DL
NONHDLC SERPL-MCNC: 97 MG/DL
POTASSIUM SERPL-SCNC: 4.3 MMOL/L (ref 3.4–5.3)
SODIUM SERPL-SCNC: 133 MMOL/L (ref 135–145)
TRIGL SERPL-MCNC: 215 MG/DL

## 2024-01-31 NOTE — TELEPHONE ENCOUNTER
Returned call to patient.     Will plan for repeat lithium level as previously discussed. If levels are low, can consider dose increase -reviewed lithium not necessarily harmful on the kidneys but we need to monitor kidney function to ensure patient doesn't build up excess lithium levels.     Lamotrigine can be considered as an alternative. Concern would be that it can take several week to titrate to therapeutic dose and if we stop lithium before that, mood may significantly worsen.     Pt agreeable to having lithium level checked again as previously discussed.

## 2024-01-31 NOTE — TELEPHONE ENCOUNTER
General Call    Contacts         Type Contact Phone/Fax    01/31/2024 01:00 PM CST Phone (Incoming) Arlyn Fredis KLAUDIA (Self) [phone] ()          Reason for Call:  Question about switching medication    What are your questions or concerns:  Pt called and stated she use to take lamotrigine 100mg and Abilify 2 summers ago and at the time wasn't taking lithium she stated she's worried that taking too much lithium isn't good for the kidneys and she wanted to know if she could go back to taking lamotrigine. Please advise and call the pt back.      Okay to leave a detailed message?: Yes at Home number on file [phone] ([place])

## 2024-02-01 ENCOUNTER — LAB (OUTPATIENT)
Dept: LAB | Facility: CLINIC | Age: 69
End: 2024-02-01
Payer: COMMERCIAL

## 2024-02-01 DIAGNOSIS — F31.4 BIPOLAR 1 DISORDER, DEPRESSED, SEVERE (H): ICD-10-CM

## 2024-02-01 DIAGNOSIS — E78.2 MIXED DIABETIC HYPERLIPIDEMIA ASSOCIATED WITH TYPE 2 DIABETES MELLITUS (H): ICD-10-CM

## 2024-02-01 DIAGNOSIS — E11.69 MIXED DIABETIC HYPERLIPIDEMIA ASSOCIATED WITH TYPE 2 DIABETES MELLITUS (H): ICD-10-CM

## 2024-02-01 LAB — LITHIUM SERPL-SCNC: 0.84 MMOL/L (ref 0.6–1.2)

## 2024-02-01 PROCEDURE — 36415 COLL VENOUS BLD VENIPUNCTURE: CPT

## 2024-02-01 PROCEDURE — 80061 LIPID PANEL: CPT

## 2024-02-01 PROCEDURE — 80178 ASSAY OF LITHIUM: CPT

## 2024-02-02 ENCOUNTER — TELEPHONE (OUTPATIENT)
Dept: FAMILY MEDICINE | Facility: CLINIC | Age: 69
End: 2024-02-02
Payer: COMMERCIAL

## 2024-02-02 LAB
CHOLEST SERPL-MCNC: 148 MG/DL
FASTING STATUS PATIENT QL REPORTED: ABNORMAL
HDLC SERPL-MCNC: 52 MG/DL
LDLC SERPL CALC-MCNC: 50 MG/DL
NONHDLC SERPL-MCNC: 96 MG/DL
TRIGL SERPL-MCNC: 230 MG/DL

## 2024-02-02 NOTE — TELEPHONE ENCOUNTER
Bradley is out of the office until 2/5 - forwarded this message for him to see when he returns. Please let us know if we need to assist with anything before then.    Thanks!   Galdino Zimmerman, PharmD, [facility]  Medication Therapy Management Provider

## 2024-02-02 NOTE — TELEPHONE ENCOUNTER
{Screenshot of Product, Insurance, and Cardholder ID)      Please route determinations to the Pharm Diabetes pool ([id number]).      Thank you!    Diabetes Care Services Team   [facility]  [address] [place] [address]

## 2024-02-02 NOTE — TELEPHONE ENCOUNTER
Patient call to give Dr. Strange and Bradley a update that patient went to social security office sat and waited from 10 am - 1pm. Patient state the bank issues has been resolve patient will have to go back to Social security office again on Monday to have medicare fix. Patient will also call [facility] to try to get some help.

## 2024-02-02 NOTE — TELEPHONE ENCOUNTER
General Call      Reason for Call:  FYI    What are your questions or concerns: Pt called and wanted PCP and Pharmacist Bradley to know that she didn't receive her social security check and might not be able to go and get her medications until she receives that so she can pay her rent.

## 2024-02-05 ENCOUNTER — TELEPHONE (OUTPATIENT)
Dept: FAMILY MEDICINE | Facility: CLINIC | Age: 69
End: 2024-02-05
Payer: COMMERCIAL

## 2024-02-05 DIAGNOSIS — K59.00 CONSTIPATION, UNSPECIFIED CONSTIPATION TYPE: ICD-10-CM

## 2024-02-05 DIAGNOSIS — F31.4 BIPOLAR 1 DISORDER, DEPRESSED, SEVERE (H): ICD-10-CM

## 2024-02-05 RX ORDER — POLYETHYLENE GLYCOL 3350 17 G/17G
POWDER, FOR SOLUTION ORAL
Qty: 510 G | Refills: 1 | Status: SHIPPED | OUTPATIENT
Start: 2024-02-05

## 2024-02-05 RX ORDER — LITHIUM CARBONATE 450 MG
TABLET, EXTENDED RELEASE ORAL
Qty: 90 TABLET | Refills: 1 | Status: SHIPPED | OUTPATIENT
Start: 2024-02-05 | End: 2024-04-08

## 2024-02-05 NOTE — TELEPHONE ENCOUNTER
Patient called back. Did get her check from social security. Still waiting on application for Extra Help. Able to get some of her medicines (toujeo, test strips).     Needs lithium refill. Reviewed repeat Lithium level with a 12 hour trough was in the normal range. Refills sent.     Going to [facility] in [place] to apply for a new housing facility.

## 2024-02-05 NOTE — TELEPHONE ENCOUNTER
Medication Question or Refill        What medication are you calling about (include dose and sig)?: polyethylene glycol (MIRALAX) 17 GM/Dose powder     lithium (ESKALITH CR/LITHOBID) 450 MG CR tablet       Preferred Pharmacy:  [id number] - [place], [place] - [address]   [address] [place] [address]  Phone: [phone] Fax: [phone]      Controlled Substance Agreement on file:   CSA -- Patient Level:    CSA: None found at the patient level.       Who prescribed the medication?: PCP    Do you need a refill? Yes    When did you use the medication last? N/A    Patient offered an appointment? No    Do you have any questions or concerns?  No      Okay to leave a detailed message?: Yes at Home number on file [phone] (home)

## 2024-02-05 NOTE — TELEPHONE ENCOUNTER
General Call      Reason for Call:  Medical question    What are your questions or concerns:  pt called in to request to speak to Bradley regarding some of her medications and to discuss housing as she states she is looking into moving to [facility]. She also stated that she is no longer taking Biotin. Please give her a call at your earliest convenient.  Thanks!    Date of last appointment with provider: 1/9/24    Okay to leave a detailed message?: Yes at Home number on file [phone] (home)

## 2024-02-07 ENCOUNTER — ALLIED HEALTH/NURSE VISIT (OUTPATIENT)
Dept: FAMILY MEDICINE | Facility: CLINIC | Age: 69
End: 2024-02-07
Payer: COMMERCIAL

## 2024-02-07 ENCOUNTER — TELEPHONE (OUTPATIENT)
Dept: FAMILY MEDICINE | Facility: CLINIC | Age: 69
End: 2024-02-07

## 2024-02-07 DIAGNOSIS — F31.4 BIPOLAR 1 DISORDER, DEPRESSED, SEVERE (H): Primary | ICD-10-CM

## 2024-02-07 PROCEDURE — 99207 PR NO CHARGE NURSE ONLY: CPT

## 2024-02-07 PROCEDURE — 96372 THER/PROPH/DIAG INJ SC/IM: CPT | Performed by: FAMILY MEDICINE

## 2024-02-07 NOTE — TELEPHONE ENCOUNTER
{Screenshot of Product, Insurance, and Cardholder ID)      Please route determinations to the Pharm Diabetes pool ([id number]).      Thank you!    Diabetes Care Services Team   [facility]  [address] [place] [address]

## 2024-02-07 NOTE — PROGRESS NOTES
Clinic Administered Medication Documentation      Injectable Medication Documentation    Is there an active order (written within the past 365 days, with administrations remaining, not ) in the chart? Yes.     Patient was given  Abilify . Prior to medication administration, verified patient's identity using patient s name and date of birth. Please see MAR and medication order for additional information. Patient instructed to remain in clinic for 15 minutes and report any adverse reaction to staff immediately.    Vial/Syringe: Single dose vial. Was entire vial of medication used? Yes  Was this medication supplied by the patient? No  Is this a medication the patient will need to receive again? Yes. Verified that the patient has refills remaining in their prescription.    Senia Rosenberg RN  [facility]

## 2024-02-08 ENCOUNTER — TELEPHONE (OUTPATIENT)
Dept: PHARMACY | Facility: CLINIC | Age: 69
End: 2024-02-08
Payer: COMMERCIAL

## 2024-02-08 NOTE — TELEPHONE ENCOUNTER
Pen needles and miralax were placed in a box at  for patient to .   Extra Help form was submitted online. I printed the confirmation and gave it to her during the visit. Please refer back to that.

## 2024-02-08 NOTE — TELEPHONE ENCOUNTER
Patient Returning Call    Reason for call:  Pt needs provider to find out if form has been received and accepted.  Pt needs to be given a box of pen needles to be picked up tomorrow @ [facility]    Information relayed to patient:  Message will be sent to provider.    Patient has additional questions:  No but that phone does not have voice mail.      Okay to leave a detailed message?: Yes at Cell number on file:    Telephone Information:   Mobile [phone]

## 2024-02-09 NOTE — TELEPHONE ENCOUNTER
Pt returned call. Provider's response given. Pt states she is not taking nabumetone. She is taking methocarbamol and ibuprofen. She is currently taking methocarbamol 500 mg 1 tablet in the AM and 1 tablet in the PM. She was taking ibuprofen with this but now would like to switch to Etodolac.     Pt was informed that she should not take ibuprofen or any other anti-inflammatories while she is on Etodolac.     Pt inquiring if refill of methocarbamol could be sent to her pharmacy so she can pick this and the Etodolac up at the same time. She will be out of her Methocarbamol on 11/6. Will inform provider of this request.     Provider to address: Please place order for Etodolac and Methocarbamol refill if appropriate. Pharmacy verified.     If call back needed, call pt back at [phone]. Detailed message ok.        (0) No symptoms

## 2024-02-13 ENCOUNTER — OFFICE VISIT (OUTPATIENT)
Dept: FAMILY MEDICINE | Facility: CLINIC | Age: 69
End: 2024-02-13
Payer: COMMERCIAL

## 2024-02-13 ENCOUNTER — TELEPHONE (OUTPATIENT)
Dept: PHARMACY | Facility: CLINIC | Age: 69
End: 2024-02-13

## 2024-02-13 VITALS
OXYGEN SATURATION: 97 % | WEIGHT: 187.75 LBS | RESPIRATION RATE: 22 BRPM | TEMPERATURE: 97.5 F | DIASTOLIC BLOOD PRESSURE: 64 MMHG | SYSTOLIC BLOOD PRESSURE: 124 MMHG | BODY MASS INDEX: 33.27 KG/M2 | HEIGHT: 63 IN | HEART RATE: 106 BPM

## 2024-02-13 DIAGNOSIS — E78.2 MIXED DIABETIC HYPERLIPIDEMIA ASSOCIATED WITH TYPE 2 DIABETES MELLITUS (H): ICD-10-CM

## 2024-02-13 DIAGNOSIS — J95.03 TRACHEAL STENOSIS FOLLOWING TRACHEOSTOMY (H): ICD-10-CM

## 2024-02-13 DIAGNOSIS — K74.60 CIRRHOSIS OF LIVER WITHOUT ASCITES, UNSPECIFIED HEPATIC CIRRHOSIS TYPE (H): ICD-10-CM

## 2024-02-13 DIAGNOSIS — E11.8 TYPE 2 DIABETES MELLITUS WITH COMPLICATION, WITH LONG-TERM CURRENT USE OF INSULIN (H): Primary | ICD-10-CM

## 2024-02-13 DIAGNOSIS — L60.9 NAIL ABNORMALITIES: ICD-10-CM

## 2024-02-13 DIAGNOSIS — E11.69 MIXED DIABETIC HYPERLIPIDEMIA ASSOCIATED WITH TYPE 2 DIABETES MELLITUS (H): ICD-10-CM

## 2024-02-13 DIAGNOSIS — J43.9 PULMONARY EMPHYSEMA, UNSPECIFIED EMPHYSEMA TYPE (H): ICD-10-CM

## 2024-02-13 DIAGNOSIS — Z79.4 TYPE 2 DIABETES MELLITUS WITH COMPLICATION, WITH LONG-TERM CURRENT USE OF INSULIN (H): Primary | ICD-10-CM

## 2024-02-13 DIAGNOSIS — L03.032 PARONYCHIA OF TOE, LEFT: ICD-10-CM

## 2024-02-13 PROBLEM — N25.1 NEPHROGENIC DIABETES INSIPIDUS (H): Status: RESOLVED | Noted: 2022-12-11 | Resolved: 2024-02-13

## 2024-02-13 PROCEDURE — 99207 PR FOOT EXAM NO CHARGE: CPT | Performed by: FAMILY MEDICINE

## 2024-02-13 PROCEDURE — 99214 OFFICE O/P EST MOD 30 MIN: CPT | Performed by: FAMILY MEDICINE

## 2024-02-13 RX ORDER — LANOLIN, PETROLATUM 15.5; 53.4 G/100G; G/100G
5 OINTMENT TOPICAL 4 TIMES DAILY
Qty: 113 G | Refills: 11 | Status: SHIPPED | OUTPATIENT
Start: 2024-02-13 | End: 2024-07-30

## 2024-02-13 RX ORDER — PIOGLITAZONEHYDROCHLORIDE 15 MG/1
15 TABLET ORAL DAILY
Qty: 30 TABLET | Refills: 3 | Status: SHIPPED | OUTPATIENT
Start: 2024-02-13 | End: 2024-03-06

## 2024-02-13 RX ORDER — PEN NEEDLE, DIABETIC 32GX 5/32"
NEEDLE, DISPOSABLE MISCELLANEOUS
Qty: 200 EACH | Refills: 3 | Status: SHIPPED | OUTPATIENT
Start: 2024-02-13 | End: 2024-02-29

## 2024-02-13 RX ORDER — RESPIRATORY SYNCYTIAL VIRUS VACCINE 120MCG/0.5
0.5 KIT INTRAMUSCULAR ONCE
Qty: 1 EACH | Refills: 0 | Status: CANCELLED | OUTPATIENT
Start: 2024-02-13 | End: 2024-02-13

## 2024-02-13 NOTE — ASSESSMENT & PLAN NOTE
Chronically ingrown medial edge of left great toenail.  Not acutely infected but deeply cutting into nailbed and she indicates has been going on for a long time.  Painful.  Recommend partial nail removal and ablation/attempted ablation of nail bed.  Patient will be scheduled here for that.

## 2024-02-13 NOTE — ASSESSMENT & PLAN NOTE
Blood sugars remain elevated, last A1c was 8.4 in early January.  On high-dose Toujeo plus top dose Mounjaro.  Metformin contraindicated because of chronic liver disease, previously on pioglitazone but had swelling.  Discussed with MTM, will try low-dose pioglitazone and follow.  He sees her next week.

## 2024-02-13 NOTE — PROGRESS NOTES
Assessment/ Plan  Nail abnormalities  Patient has abnormal nail, third toe right foot.  Thickened on the end and there is a small area of opacity, the base of the nail appears pretty normal.  There is some chance this is a fungal infection, but really minimal area of opaque thickening and a better guess is simple nailbed abnormality/thickened nail.  Recommend continuing care in cutting nail, she had hers done nicely yesterday.  No intervention for now.    Paronychia of toe, left  Chronically ingrown medial edge of left great toenail.  Not acutely infected but deeply cutting into nailbed and she indicates has been going on for a long time.  Painful.  Recommend partial nail removal and ablation/attempted ablation of nail bed.  Patient will be scheduled here for that.    Type 2 diabetes mellitus with complication, with long-term current use of insulin (H)  Blood sugars remain elevated, last A1c was 8.4 in early January.  On high-dose Toujeo plus top dose Mounjaro.  Metformin contraindicated because of chronic liver disease, previously on pioglitazone but had swelling.  Discussed with MTM, will try low-dose pioglitazone and follow.  He sees her next week.     Other issues briefly touched on today, include tracheal stenosis following tracheostomy which is no longer an issue.  Cirrhosis of liver without ascites, following with hepatology.  Subjective  CC:  chief complaint  HPI:  68-year-old, history of bipolar, COPD, cirrhosis, type 2 diabetes.  Lately blood sugars have been high, on high-dose insulin.  Also Mounjaro.  Working with pharmacist.  Indicates she is always taking her medication.  Blood sugars almost always in the 200s.    Complains about pain medial right great toe.  PFSH:  Patient Active Problem List   Diagnosis    Healthcare maintenance    Type 2 diabetes mellitus with complication, with long-term current use of insulin (H)    Anatomical narrow angle glaucoma    Hyperactivity of bladder    Bilateral low back  pain with left-sided sciatica    Callus of foot    Mixed hyperlipidemia    Simple chronic bronchitis (H)    Pulmonary emphysema (H)    Abnormal cervical Papanicolaou smear    Hernia of anterior abdominal wall    Human papilloma virus infection    Nonalcoholic steatohepatitis    Osteopenia    Thrombophlebitis    Botulism food poisoning    Cervical high risk HPV (human papillomavirus) test positive    History of colonic polyps (colonoscopy due 11/2027)    Hyponatremia    Sleep apnea    LPRD (laryngopharyngeal reflux disease)    Lumbar spine pain    RUQ abdominal pain    Tracheal stenosis following tracheostomy (H)    Cirrhosis of liver without ascites, unspecified hepatic cirrhosis type (H)    Nephrogenic diabetes insipidus (H24)    Other dysphagia    Gastric polyp    Bipolar 1 disorder, depressed, severe (H)    Cervicalgia    Mixed diabetic hyperlipidemia associated with type 2 diabetes mellitus (H)    Vitamin D deficiency    Acute cough    Paronychia of toe, left    Nail abnormalities     albuterol (PROAIR HFA/PROVENTIL HFA/VENTOLIN HFA) 108 (90 Base) MCG/ACT inhaler, Inhale 2 puffs into the lungs every 6 hours as needed for shortness of breath, wheezing or cough  aspirin (ASA) 81 MG chewable tablet, Take 81 mg by mouth daily  benzonatate (TESSALON) 100 MG capsule, Take 1 capsule (100 mg) by mouth 3 times daily as needed for cough  blood glucose (NO BRAND SPECIFIED) lancets standard, Use to test blood sugar 3 times daily or as directed.  blood glucose (NO BRAND SPECIFIED) test strip, 1 strip by In Vitro route 4 times daily  Calcium Carbonate-Vitamin D 600-5 MG-MCG TABS, Take 2 tablets by mouth daily  Continuous Blood Gluc Sensor (FREESTYLE EDITH 2 SENSOR) MISC, 1 each every 14 days Use 1 sensor every 14 days. Use to read blood sugars per 's instructions.  dextromethorphan (DELSYM) 30 MG/5ML liquid, Take 10 mLs (60 mg) by mouth 2 times daily  fluticasone (FLONASE) 50 MCG/ACT nasal spray, SHAKE LIQUID AND  USE 2 SPRAYS IN EACH NOSTRIL DAILY AS NEEDED FOR RHINITIS OR ALLERGIES Strength: 50 MCG/ACT  furosemide (LASIX) 20 MG tablet, Take 1.5 tablets (30 mg) by mouth daily  insulin aspart (NOVOLOG PEN) 100 UNIT/ML pen, Inject 30 Units Subcutaneous 3 times daily (before meals)  insulin glargine U-300 (TOUJEO MAX SOLOSTAR) 300 UNIT/ML (2 units dial) pen, Inject 100 Units Subcutaneous at bedtime  lithium (ESKALITH CR/LITHOBID) 450 MG CR tablet, Take 2 tablets (900 mg) by mouth every morning AND 1 tablet (450 mg) every evening.  losartan (COZAAR) 50 MG tablet, Take 1 tablet (50 mg) by mouth daily  magnesium oxide 400 MG CAPS, Take 400 mg by mouth daily  montelukast (SINGULAIR) 10 MG tablet, Take 1 tablet (10 mg) by mouth At Bedtime  multivitamin (ONE-DAILY) tablet, Take 1 tablet by mouth daily  mupirocin (BACTROBAN) 2 % external ointment, APPLY TOPICALLY TO THE AFFECTED AREA THREE TIMES DAILY  nystatin (MYCOSTATIN) 241596 UNIT/GM external ointment, Apply topically 2 times daily Apply to affected areas only (right upper arm)  Omega 3-6-9 Fatty Acids (OMEGA 3-6-9 COMPLEX PO),   omeprazole (PRILOSEC) 40 MG DR capsule, TAKE 1 CAPSULE(40 MG) BY MOUTH DAILY  polyethylene glycol (MIRALAX) 17 GM/Dose powder, TAKE ONE CAPFUL MIXED WITH LIQUID BY MOUTH TWICE DAILY AS NEEDED  rosuvastatin (CRESTOR) 10 MG tablet, Take 1 tablet (10 mg) by mouth daily  SENNA-docusate sodium (SENNA S) 8.6-50 MG tablet, Take 1-2 tablets by mouth 2 times daily as needed (constipation)  terbinafine (LAMISIL) 1 % external cream, Apply topically 2 times daily  tiotropium-olodaterol 2.5-2.5 MCG/ACT AERS, Inhale 2 puffs into the lungs daily  tirzepatide (MOUNJARO) 15 MG/0.5ML pen, Inject 15 mg Subcutaneous every 7 days  vitamin D3 (CHOLECALCIFEROL) 125 MCG (5000 UT) tablet, Take 1 tablet (125 mcg) by mouth daily  acetaminophen (TYLENOL) 500 MG tablet, Take 500 mg by mouth 2 times daily Scheduled. (Patient not taking: Reported on 12/12/2023)  Biotin 10 MG CAPS,  "Take 1 capsule by mouth daily (Patient not taking: Reported on 12/12/2023)    ARIPiprazole ER (ABILIFY MAINTENA) extended release inj syringe 400 mg  ARIPiprazole ER (ABILIFY MAINTENA) extended release inj syringe 400 mg         History   Smoking Status    Never   Smokeless Tobacco    Never     Social History     Social History Narrative    Originally from [facility]. Moved from [place].  4 times . Has 5 children ,9 grandchildren . Her daughters were taken away from her by biological dad when she was ill with botulism . All adults now . All of them are in [facility].  Youngest daughter is 38 . Initially estranged but has a closer relationship .Hadnt worked for a long time . Is a violinist and plays in [employer] . Wasn't diagnosed with bipolar much later in life . 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 Is on disability due to  ipolar .       She plays the violin.      Patient Care Team:  Gerald Strange MD as PCP - General (Family Medicine)  Bradley Liao, PharmD as Pharmacist (Pharmacist)  Bradley Liao PharmD as Assigned [place] Pharmacist  Lele Oliveira MD as Assigned Pulmonology Provider  Garett Archer MD as Assigned Musculoskeletal Provider  Gerald Strange MD as Assigned PCP  Fay Holly MD as MD (Ophthalmology)        Objective  Physical Exam  Vitals:    02/13/24 0951   BP: 124/64   BP Location: Right arm   Patient Position: Sitting   Cuff Size: Adult Regular   Pulse: 106   Resp: 22   Temp: 97.5  F (36.4  C)   SpO2: 97%   Weight: 85.2 kg (187 lb 12 oz)   Height: 1.591 m (5' 2.64\")     Body mass index is 33.64 kg/m .  Right third toenail has rimmed, thickened and with a small area of opacity but otherwise normal consistency of nail.  She has ingrown left great toenail medial edge.  Cuts deep into distal and, I cannot see the corner of the nail.  No discharge at this point but open area, scabbed.  Chest clear, " cardiac exam normal  Diagnostics:   None      Please note: Voice recognition software was used in this dictation.  It may therefore contain typographical errors.

## 2024-02-13 NOTE — LETTER
My COPD Action Plan   Name: Annel Stoddard    YOB: 1955   Date: 2/13/2024    My doctor: Gerald Strange    My clinic: [facility] [address] [place] [address] [phone]  My COPD Medicine:      My Controller Medications: {COPD MEDICATION:147316}     My Rescue Medication: {:012299}     Use of Oxygen: {:516789}  My COPD Severity: {:543495}         GREEN ZONE       Doing well today    Usual level of activity and exercise  Usual amount of cough and mucus  No shortness of breath  Can think clearly  Sleep well at night  Feel like eating Actions:    Take daily medicines  Use oxygen as prescribed  Follow regular exercise and diet plan  Avoid cigarette smoke and other irritants that harm the lungs             YELLOW ZONE          Having a bad day or flare up    Short of breath more than usual  Change in color or amount of mucus  More coughing or wheezing  Fever or chills  Less energy; trouble completing activities  Trouble thinking or focusing  Using quick relief inhaler or nebulizer more often  Poor sleep; symptoms wake me up  Do not feel like eating Actions:    Take daily medicines  Use quick relief inhaler every *** hours  If you use oxygen, call you doctor to see if you should adjust your oxygen  Do breathing exercises or other things to help you relax  Let a loved one, friend or neighbor know you are feeling worse  If you have one or more symptoms, consider taking steroids or antibiotics (if they were prescribed)  Call your care team if you have 2 or more symptoms or symptoms don't improve           RED ZONE       Need medical care now    Severe shortness of breath (feel you can't breath)  Not enough breath to do any activity  Trouble walking or talking  Trouble coughing up mucus or blood in mucus  Frequent coughing  Rescue medicines are not working  Not able to sleep because of breathing  Feel confused or drowsy  Chest pain  Actions:    Call 911 or   Have  someone take you to the emergency room if you can't reach your care team        Electronically signed by: Velia Perez MA, February 13, 2024    Annual Reminders:  Meet with Care Team, Flu Shot every Fall and Pneumonia Shot at least once  Pharmacy: [id number] - [place], [place] - [address] [place] [address] AT [facility] [address]

## 2024-02-13 NOTE — TELEPHONE ENCOUNTER
Patient called in:     Reports she was denied extra help because she makes too much money.     Recommended connecting with Senior Linkage Line to see if she's still within the period that she can change her medicare part D plan.

## 2024-02-13 NOTE — ASSESSMENT & PLAN NOTE
Patient has abnormal nail, third toe right foot.  Thickened on the end and there is a small area of opacity, the base of the nail appears pretty normal.  There is some chance this is a fungal infection, but really minimal area of opaque thickening and a better guess is simple nailbed abnormality/thickened nail.  Recommend continuing care in cutting nail, she had hers done nicely yesterday.  No intervention for now.

## 2024-02-14 ENCOUNTER — TELEPHONE (OUTPATIENT)
Dept: FAMILY MEDICINE | Facility: CLINIC | Age: 69
End: 2024-02-14
Payer: COMMERCIAL

## 2024-02-14 NOTE — TELEPHONE ENCOUNTER
Patient is scheduled for partial toenail removal/ablation of nail bed of L great toe on 4/10 with Dr. Strange. She is calling today to inquire if Dr. Strange could also perform this procedure on her R great toe as well.     Reports chronic ingrown toenails of bilateral great toes. The right is not nearly as tender/inflamed/red/swollen as the left currently, however would like R toenail removed as well. Would like to know if Dr. Strange would be agreeable to this.

## 2024-02-14 NOTE — TELEPHONE ENCOUNTER
PA Initiation    Medication: FREESTYLE EDITH 2 SENSOR [facility]  Insurance Company: [employer] - Phone [phone] Fax [phone]  Pharmacy Filling the Rx:    Filling Pharmacy Phone:    Filling Pharmacy Fax:    Start Date: 2/13/2024    Key: [id number]

## 2024-02-14 NOTE — TELEPHONE ENCOUNTER
PA Initiation    Medication: FREESTYLE EDITH 2 READER [facility]  Insurance Company: [employer] - Phone [phone] Fax [phone]  Pharmacy Filling the Rx:    Filling Pharmacy Phone:    Filling Pharmacy Fax:    Start Date: 2/13/2024    Key: [id number]

## 2024-02-14 NOTE — TELEPHONE ENCOUNTER
Patient calling back with update for Bradley:    She called the Senior Linkage line as advised and was told they will be sending her an application to see if she qualifies for a free/reduced cost Stiolto inhaler.

## 2024-02-14 NOTE — TELEPHONE ENCOUNTER
"Pt inquired if PCP is willing to removed rt toe ingrown nail at 410 appt to sanders Lt toe ingrown nail removed/ablated. Called pt. LM on vm and relayed PCP message below.     If pt calls back, please relay message below.     Zhanna SILVA RN  [facility]          \"No, I think one at a time -only makes sense to do if there is an intractable problem.\" - Gerald Strange  "

## 2024-02-15 ENCOUNTER — TELEPHONE (OUTPATIENT)
Dept: FAMILY MEDICINE | Facility: CLINIC | Age: 69
End: 2024-02-15
Payer: COMMERCIAL

## 2024-02-15 NOTE — TELEPHONE ENCOUNTER
RN made 1st call to pt to relay provider message re: toe.     No answer. LM on   with instruction to call back and ask for triage nurse and let staff know this is a return call.    If pt  calls back, please relay provider message below.    Will try again.    Zhanna SILVA RN  [facility]       Gerald Strange MD Physician Signed8:06 AM     Fine, can touch the toe too, no Brynn Delgado, RN Registered Nurse Signed7:09 AM       Dr. Strange-please review and advise.     Call received from patient:  On 4/1/23 Dr. Strange is going to do a procedure on her left great toe  2.  The day before she is scheduled for foot care with a foot nurse at a [facility] center  A. Is it okay for her to receive her foot care, which includes toenail trimming, as long as the nurse does not touch the left great toe?         Thank you!  MICHELLE Mcgee, RN-[facility]

## 2024-02-15 NOTE — TELEPHONE ENCOUNTER
Dr. Strange-please review and advise.    Call received from patient:  On 4/1/23 Dr. Strange is going to do a procedure on her left great toe  2.  The day before she is scheduled for foot care with a foot nurse at a [facility]  A. Is it okay for her to receive her foot care, which includes toenail trimming, as long as the nurse does not touch the left great toe?       Thank you!  WHITNEY McgeeN, RN-BC  [facility]

## 2024-02-16 ENCOUNTER — TELEPHONE (OUTPATIENT)
Dept: PHARMACY | Facility: CLINIC | Age: 69
End: 2024-02-16
Payer: COMMERCIAL

## 2024-02-16 ENCOUNTER — PATIENT OUTREACH (OUTPATIENT)
Dept: GASTROENTEROLOGY | Facility: CLINIC | Age: 69
End: 2024-02-16
Payer: COMMERCIAL

## 2024-02-16 NOTE — TELEPHONE ENCOUNTER
Reason for Call:  Appointment Request    Patient requesting this type of appt:  follow up    Requested provider:  Bradley    Reason patient unable to be scheduled:  Pt can't be seen on Wednesday due to other prior engagement.     When does patient want to be seen/preferred time:  Tuesdays AM before 10am    Comments: Pt would like to reschedule her follow up with you on 2/21 to any Tuesdays before 10am but your so far book out. Pt not sure if you would be okay with that and would like for you to call her, thanks!    Okay to leave a detailed message?: Yes at Home number on file [phone] (home)    Call taken on 2/16/2024 at 2:33 PM by Artis Encarnacion

## 2024-02-16 NOTE — TELEPHONE ENCOUNTER
Prior Authorization Approval    Medication: FREESTYLE EDITH 2 READER [facility]  Authorization Effective Date: 2/16/2024  Authorization Expiration Date: 2/16/2027  Approved Dose/Quantity: 1/365 days   Reference #: [id number]   Insurance Company: [employer] - Phone [phone] Fax [phone]  Expected CoPay: $ 13.85  CoPay Card Available:      Financial Assistance Needed: no  Which Pharmacy is filling the prescription:    Pharmacy Notified:  no  Patient Notified: pharmacy will notify patient

## 2024-02-16 NOTE — TELEPHONE ENCOUNTER
Prior Authorization Approval    Medication: FREESTYLE EDITH 2 SENSOR [facility]  Authorization Effective Date: 2/16/2024  Authorization Expiration Date: 2/16/2027  Approved Dose/Quantity: 2/28 days   Reference #: [id number]   Insurance Company: [employer] - Phone [phone] Fax [phone]  Expected CoPay: $ 26.86  CoPay Card Available:      Financial Assistance Needed: no  Which Pharmacy is filling the prescription:    Pharmacy Notified: no  Patient Notified: pharmacy will notify patient

## 2024-02-19 ENCOUNTER — TELEPHONE (OUTPATIENT)
Dept: FAMILY MEDICINE | Facility: CLINIC | Age: 69
End: 2024-02-19

## 2024-02-19 NOTE — TELEPHONE ENCOUNTER
RN made 2nd call to pt to relay provider message re: toe.     No answer. LM on   with instruction to call back and ask for triage nurse and let staff know this is a return call.    If pt  calls back, please relay provider message below.    Will try again.    Zhanna SILVA RN  [facility]       Gerald Strange MD Physician Signed8:06 AM     Fine, can touch the toe too, no Brynn Delgado, RN Registered Nurse Signed7:09 AM       Dr. Strange-please review and advise.     Call received from patient:  On 4/1/23 Dr. Strange is going to do a procedure on her left great toe  2.  The day before she is scheduled for foot care with a foot nurse at a [facility] center  A. Is it okay for her to receive her foot care, which includes toenail trimming, as long as the nurse does not touch the left great toe?         Thank you!  MICHELLE Mcgee, RN-[facility]

## 2024-02-19 NOTE — TELEPHONE ENCOUNTER
FYI - Status Update    Who is Calling: patient    Update: pt called to cancel her toenail procedure as she states her blood sugar is to high and she would like to wait because does not want to end up with an infection and doesn't want to lose her leg.  Thanks!    Does caller want a call/response back: No

## 2024-02-20 NOTE — TELEPHONE ENCOUNTER
Pt return call, stating she had already spoke to Dr Strange.  Appt for 4/1/24 was cancelled due to uncontrolled BS. Pt has appt with PCP on 3/6/24. No further action required.    Zhanna SILVA RN  [facility]

## 2024-02-20 NOTE — TELEPHONE ENCOUNTER
RN made 3rd call to pt to relay provider message re: toe.     No answer. LM on   with instruction to call back and ask for triage nurse and let staff know this is a return call.    If pt  calls back, please relay provider message below.    Will try again.    Zhanna SILVA RN  [facility]       Gerald Strange MD Physician Signed8:06 AM     Fine, can touch the toe too, no Brynn Delgado, RN Registered Nurse Signed7:09 AM       Dr. Strange-please review and advise.     Call received from patient:  On 4/1/23 Dr. Strange is going to do a procedure on her left great toe  2.  The day before she is scheduled for foot care with a foot nurse at a [facility] center  A. Is it okay for her to receive her foot care, which includes toenail trimming, as long as the nurse does not touch the left great toe?         Thank you!  MICHELLE Mcgee, RN-[facility]

## 2024-02-21 ENCOUNTER — TELEPHONE (OUTPATIENT)
Dept: FAMILY MEDICINE | Facility: CLINIC | Age: 69
End: 2024-02-21
Payer: COMMERCIAL

## 2024-02-21 NOTE — TELEPHONE ENCOUNTER
Called patient - she's applying for PAP programs for inhalers (got the forms from [employer] line). Needs a copy of med list attached and forms faxed in.

## 2024-02-21 NOTE — TELEPHONE ENCOUNTER
Medication Question or Refill        What medication are you calling about (include dose and sig)?: Pen Needles and StioltoRespiMat    Preferred Pharmacy:   [id number] - [place], [place] - [address] [facility] [address]   [address] [place] [address]  Phone: [phone] Fax: [phone]      Controlled Substance Agreement on file:   CSA -- Patient Level:    CSA: None found at the patient level.       Who prescribed the medication?: Dr Strange    Do you need a refill? No, Patient is currently unable to afford these prescriptions and is hoping we can find assistance for her    When did you use the medication last? 02/21/2024 (patient is being proactive and reaching out prior to running out of her medication and supplies)    Patient offered an appointment? No    Do you have any questions or concerns?  Yes: Patient is looking for short term financial assistance or samples; she has another encounter to have forms completed for long term prescription assistance      Okay to leave a detailed message?: No at Home number on file [phone] (home)

## 2024-02-23 NOTE — TELEPHONE ENCOUNTER
BI form for stiolto completed. Need patient denial for Extra Help letter. LVM for patient. She can drop off at .

## 2024-02-27 ENCOUNTER — PRE VISIT (OUTPATIENT)
Dept: OPHTHALMOLOGY | Facility: CLINIC | Age: 69
End: 2024-02-27

## 2024-02-29 DIAGNOSIS — E11.8 TYPE 2 DIABETES MELLITUS WITH COMPLICATION, WITH LONG-TERM CURRENT USE OF INSULIN (H): ICD-10-CM

## 2024-02-29 DIAGNOSIS — Z79.4 TYPE 2 DIABETES MELLITUS WITH COMPLICATION, WITH LONG-TERM CURRENT USE OF INSULIN (H): ICD-10-CM

## 2024-02-29 DIAGNOSIS — J43.9 PULMONARY EMPHYSEMA, UNSPECIFIED EMPHYSEMA TYPE (H): ICD-10-CM

## 2024-02-29 RX ORDER — PEN NEEDLE, DIABETIC 32GX 5/32"
NEEDLE, DISPOSABLE MISCELLANEOUS
Qty: 200 EACH | Refills: 3 | Status: SHIPPED | OUTPATIENT
Start: 2024-02-29 | End: 2024-03-06

## 2024-03-06 ENCOUNTER — OFFICE VISIT (OUTPATIENT)
Dept: FAMILY MEDICINE | Facility: CLINIC | Age: 69
End: 2024-03-06
Payer: COMMERCIAL

## 2024-03-06 VITALS
WEIGHT: 188 LBS | RESPIRATION RATE: 16 BRPM | HEART RATE: 74 BPM | DIASTOLIC BLOOD PRESSURE: 60 MMHG | OXYGEN SATURATION: 96 % | SYSTOLIC BLOOD PRESSURE: 110 MMHG | HEIGHT: 63 IN | BODY MASS INDEX: 33.31 KG/M2 | TEMPERATURE: 98.8 F

## 2024-03-06 DIAGNOSIS — Z29.11 NEED FOR VACCINATION AGAINST RESPIRATORY SYNCYTIAL VIRUS: ICD-10-CM

## 2024-03-06 DIAGNOSIS — J45.20 MILD INTERMITTENT ASTHMA WITHOUT COMPLICATION: ICD-10-CM

## 2024-03-06 DIAGNOSIS — F31.4 BIPOLAR 1 DISORDER, DEPRESSED, SEVERE (H): ICD-10-CM

## 2024-03-06 DIAGNOSIS — Z79.4 TYPE 2 DIABETES MELLITUS WITH COMPLICATION, WITH LONG-TERM CURRENT USE OF INSULIN (H): Primary | ICD-10-CM

## 2024-03-06 DIAGNOSIS — E11.8 TYPE 2 DIABETES MELLITUS WITH COMPLICATION, WITH LONG-TERM CURRENT USE OF INSULIN (H): Primary | ICD-10-CM

## 2024-03-06 DIAGNOSIS — J95.03 TRACHEAL STENOSIS FOLLOWING TRACHEOSTOMY (H): ICD-10-CM

## 2024-03-06 DIAGNOSIS — Z00.00 HEALTHCARE MAINTENANCE: ICD-10-CM

## 2024-03-06 DIAGNOSIS — J43.9 PULMONARY EMPHYSEMA, UNSPECIFIED EMPHYSEMA TYPE (H): ICD-10-CM

## 2024-03-06 PROBLEM — J41.0 SIMPLE CHRONIC BRONCHITIS (H): Status: RESOLVED | Noted: 2020-01-14 | Resolved: 2024-03-06

## 2024-03-06 PROCEDURE — 96372 THER/PROPH/DIAG INJ SC/IM: CPT | Performed by: FAMILY MEDICINE

## 2024-03-06 PROCEDURE — 99213 OFFICE O/P EST LOW 20 MIN: CPT | Mod: 25 | Performed by: FAMILY MEDICINE

## 2024-03-06 PROCEDURE — 99397 PER PM REEVAL EST PAT 65+ YR: CPT | Mod: 25 | Performed by: FAMILY MEDICINE

## 2024-03-06 RX ORDER — RESPIRATORY SYNCYTIAL VIRUS VACCINE 120MCG/0.5
0.5 KIT INTRAMUSCULAR ONCE
Qty: 1 EACH | Refills: 0 | Status: CANCELLED | OUTPATIENT
Start: 2024-03-06 | End: 2024-03-06

## 2024-03-06 RX ORDER — PEN NEEDLE, DIABETIC 32GX 5/32"
NEEDLE, DISPOSABLE MISCELLANEOUS
Qty: 200 EACH | Refills: 3 | Status: SHIPPED | OUTPATIENT
Start: 2024-03-06 | End: 2024-05-03

## 2024-03-06 RX ORDER — PIOGLITAZONEHYDROCHLORIDE 30 MG/1
30 TABLET ORAL DAILY
Qty: 90 TABLET | Refills: 2 | Status: SHIPPED | OUTPATIENT
Start: 2024-03-06 | End: 2024-04-16 | Stop reason: SINTOL

## 2024-03-06 RX ORDER — TIRZEPATIDE 15 MG/.5ML
15 INJECTION, SOLUTION SUBCUTANEOUS
Qty: 6 ML | Refills: 3 | Status: SHIPPED | OUTPATIENT
Start: 2024-03-06 | End: 2024-04-16

## 2024-03-06 SDOH — HEALTH STABILITY: PHYSICAL HEALTH: ON AVERAGE, HOW MANY DAYS PER WEEK DO YOU ENGAGE IN MODERATE TO STRENUOUS EXERCISE (LIKE A BRISK WALK)?: 7 DAYS

## 2024-03-06 SDOH — HEALTH STABILITY: PHYSICAL HEALTH: ON AVERAGE, HOW MANY MINUTES DO YOU ENGAGE IN EXERCISE AT THIS LEVEL?: 120 MIN

## 2024-03-06 ASSESSMENT — SOCIAL DETERMINANTS OF HEALTH (SDOH): HOW OFTEN DO YOU GET TOGETHER WITH FRIENDS OR RELATIVES?: NEVER

## 2024-03-06 NOTE — COMMUNITY RESOURCES LIST (ENGLISH)
03/06/2024   [facility]  N/A  For questions about this resource list or additional care needs, please contact your primary care clinic or care manager.  Phone: [phone]   Email: N/A   Address: [address] [place] [address]   Hours: N/A        Food and Nutrition       Food pantry  1  [facility] Distance: 0.91 miles      [facility]   [address] [place] [address]  Language: English  Hours: Mon 9:00 AM - 11:00 AM , Wed 9:00 AM - 11:00 AM , Thu 1:00 PM - 3:00 PM  Fees: Free, Self Pay   Phone: (694) 888-8209 Email: Heron@[facility] Website: http://[facility].org/[facility]/[address]/     2  [facility] Distance: 1.28 miles      [facility]   [address] [place] [address]  Language: English, Bahraini  Hours: Mon 9:00 AM - 6:00 PM Appt. Only, Tue - Fri 9:00 AM - 5:00 PM Appt. Only  Fees: Free   Phone: (616) 106-3898 Email: info@[facility].org Website: http://www.[facility].org/site     SNAP application assistance  3  [facility] Distance: 0.26 miles      In-Person, Phone/Virtual   [address] [place] [address]  Language: English  Hours: Mon - Fri 8:00 AM - 4:30 PM  Fees: Free   Phone: (773) 225-4866 Email: urmila@[facility] CityDRESSBOOM. Website: https://www.[facility] CityGigsWiz./your-government/departments/health-and-wellness     4  [place] Department of Human Services - [place] (SNAP) Distance: 0.47 miles      Phone/Virtual   [address] [address] [place] [address]  Language: English, Hmong, South African, Ugandan, Bahraini, Zambian  Hours: Mon - Fri 9:00 AM - 5:00 PM  Fees: Free   Phone: (538) 835-7769 Website: https://[place].gov/dhs/people-we-serve/adults/economic-assistance/food-nutrition/programs-and-services/supplemental-nutrition-assistance-program.jsp     Soup kitchen or free meals  5   [facility] Office -  [place] Distance: 1.37 miles      [address] [address]  Language: English  Hours: Mon - Fri 5:00 PM - 6:00 PM  Fees: Free   Phone: (782) 837-9433 Email: kenrick@[facility] Website: http://www.[facility] [place] [facility]/     6  [facility] Distance: 1.93 miles      [address] [place] [address]  Language: English  Hours: Mon - Tue 11:45 AM - 12:45 PM , Thu - Fri 11:45 AM - 12:45 PM  Fees: Free   Phone: (420) 397-3622 Email: kyle@[facility]..org Website: http://[facility].org/community/[facility]/          Important Numbers & Websites       Emergency Services   911  [facility]   311  Poison Control   (619) 879-5171  Suicide Prevention Lifeline   (153) 591-7835 (TALK)  Child Abuse Hotline   (728) 288-5499 (4-A-Child)  Sexual Assault Hotline   (939) 626-1062 (HOPE)  National Runaway Safeline   (239) 466-1261 (RUNAWAY)  All-Options Talkline   (450) 269-1312  Substance Abuse Referral   (369) 915-1956 (HELP)

## 2024-03-06 NOTE — CONFIDENTIAL NOTE
Interpersonal Safety (Abuse) Screening Follow Up    Interpersonal Safety Screen  Do you feel physically and emotionally safe where you currently live?: No  Within the past 12 months, have you been hit, slapped, kicked or otherwise physically hurt by someone?: Patient declined  Within the past 12 months, have you been humiliated or emotionally abused in other ways by your patient lives alone but indicates that the building she lives and is not inviting/safe.  Her  is helping her explore housing options elsewhere.

## 2024-03-06 NOTE — ASSESSMENT & PLAN NOTE
Some slightly worse than before, per Dr. Oliveira's note, does not have emphysema.  Not using ICS because of intolerance.  She is on Singulair, Stiolto, albuterol.  Referral back to pulmonary.

## 2024-03-06 NOTE — ASSESSMENT & PLAN NOTE
Up-to-date vaccines  Up-to-date cancer screening, 2015 colonoscopy, mammogram 1 year ago, discussed that this can be every 2 years.  Has had DEXA scan

## 2024-03-06 NOTE — PROGRESS NOTES
Adult Female Physical  A/P  Type 2 diabetes mellitus with complication, with long-term current use of insulin (H)  Last A1c 8.4 in December and it appears diabetes is gotten worse since then.  Not sure why.  On pioglitazone 15, Toujeo 100, NovoLog 30 3 times daily, Mounjaro 15.,  Concerned about supply chain issues with Mounjaro coming up.    Patient with underlying cirrhosis, but she has tolerated the low-dose pioglitazone.  Will increase to 30.  No major problems with fluid retention at this point.  Follow-up with MTM.  Appointment for ophthalmology.    Tracheal stenosis following tracheostomy (H)  Table, referral to pulmonary, saw Dr. Oliveira in the past.    Mild intermittent asthma  Some slightly worse than before, per Dr. Oliveira's note, does not have emphysema.  Not using ICS because of intolerance.  She is on Singulair, Stiolto, albuterol.  Referral back to pulmonary.    Bipolar 1 disorder, depressed, severe (H)  Stable, continue lithium.  Seems more grounded, better.  No PHQ done today.  Abilify injection.    Healthcare maintenance  Up-to-date vaccines  Up-to-date cancer screening, 2015 colonoscopy, mammogram 1 year ago, discussed that this can be every 2 years.  Has had DEXA scan     Patientpassed mini cog.    HPI  Current Concerns/ Questions  Multiple issues discussed.  First discussed her [facility]  Davin Andria was trying to help her with her unsatisfactory living situation.  Does not like the people in their building.  Next, she has an appointment with [place] [facility] to follow-up on diabetes.  Needs to have a great deal of dental work done.  Was quoted $5000 by the [facility].  Has a alternative appointment at [facility] where they will clean her teeth and make her a partial for less money but still will be expensive.  Also needs root canal.  She has had an RSV vaccine.  She has seen Dr. Oliveira in the past for lung disease.  Believes she has COPD though his note indicates that  he believes she has asthma plus tracheal stenosis.  Due to see him back by her account and it appears she is right.  Diabetes has been out of control, blood sugars in the 200s pretty consistently.  Wishes to have a female provider for a Pap smear.  Had a colposcopy 6 months ago, was told to have a Pap in 6 to 12 months.    PFS:  Current medications reviewed as follows:  [unfilled]  Patient Active Problem List   Diagnosis    Healthcare maintenance    Type 2 diabetes mellitus with complication, with long-term current use of insulin (H)    Anatomical narrow angle glaucoma    Hyperactivity of bladder    Bilateral low back pain with left-sided sciatica    Callus of foot    Mixed hyperlipidemia    Abnormal cervical Papanicolaou smear    Hernia of anterior abdominal wall    Human papilloma virus infection    Nonalcoholic steatohepatitis    Osteopenia    Thrombophlebitis    Botulism food poisoning    Cervical high risk HPV (human papillomavirus) test positive    History of colonic polyps (colonoscopy due 11/2027)    Hyponatremia    Sleep apnea    LPRD (laryngopharyngeal reflux disease)    Lumbar spine pain    RUQ abdominal pain    Tracheal stenosis following tracheostomy (H)    Cirrhosis of liver without ascites, unspecified hepatic cirrhosis type (H)    Other dysphagia    Gastric polyp    Bipolar 1 disorder, depressed, severe (H)    Cervicalgia    Mixed diabetic hyperlipidemia associated with type 2 diabetes mellitus (H)    Vitamin D deficiency    Acute cough    Paronychia of toe, left    Nail abnormalities    Mild intermittent asthma     Past Medical History:   Diagnosis Date    Anxiety     Bipolar disorder (H)     Botulism     1980    Cervical high risk HPV (human papillomavirus) test positive     Colon polyps     Depression     Diabetes mellitus (H)     Diabetes type 2, controlled (H)     Gallstones     History of tracheostomy 1/5/2021    Hyperlipidemia     Hyperlipidemia     Hypertension     Hypertension     Low  "back pain with left-sided sciatica     Pneumonia     Urinary incontinence      Past Surgical History:   Procedure Laterality Date    ABDOMINOPLASTY      EYE SURGERY      bilateral with flap    HAND SURGERY Left     \"chipped off bone\"     trachestomy      TUBAL LIGATION       Family History   Problem Relation Age of Onset    Other - See Comments Mother         severe edema in leg, millroid's disease    Cerebrovascular Disease Father     No Known Problems Maternal Grandmother     No Known Problems Maternal Grandfather     Cerebrovascular Disease Other     Lung Cancer Half-Sister         smoking related    Myocardial Infarction Paternal Half-Brother     Esophageal Cancer Sister     Coronary Artery Disease Brother      History   Smoking Status    Never   Smokeless Tobacco    Never     Other Habits:  Alcohol/ Drug use?  No  Social History     Social History Narrative    Originally from [facility]. Moved from [place].  4 times . Has 5 children ,9 grandchildren . Her daughters were taken away from her by biological dad when she was ill with botulism . All adults now . All of them are in [facility].  Youngest daughter is 38 . Initially estranged but has a closer relationship .Hadnt worked for a long time . Is a violinist and plays in [employer] . Wasn't diagnosed with bipolar much later in life . 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 Is on disability due to  ipolar .       She plays the [employer].      Immunization History   Administered Date(s) Administered    COVID-19 Monovalent 18+ (Moderna) 01/29/2021, 02/26/2021, 10/22/2021    Flu, Unspecified 12/02/1994, 11/05/2001, 10/03/2011, 10/02/2012, 09/28/2013, 10/01/2014, 09/01/2015, 09/25/2017, 08/01/2018    C2z4-13 Novel Flu 01/13/2010    Hepatitis B, Peds 01/14/2004    Influenza (High Dose) 3 valent vaccine 09/25/2017, 08/19/2020    Influenza (IIV3) PF 12/07/2005, 11/02/2006, 10/06/2009, 09/25/2010, " "10/03/2011, 09/01/2015    Influenza Vaccine 65+ (FLUAD) 08/23/2021, 08/19/2022, 08/04/2023    Influenza Vaccine 65+ (Fluzone HD) 09/25/2017, 08/19/2020, 08/16/2021, 08/04/2023    Influenza, seasonal, injectable, PF 10/02/2012, 09/28/2013, 10/01/2014, 09/26/2016    Pneumo Conj 13-V (2010&after) 07/02/2010, 01/03/2011, 06/29/2017    Pneumococcal 20 valent Conjugate (Prevnar 20) 09/14/2022    Pneumococcal 23 valent 07/02/2010, 02/03/2011, 03/05/2013, 11/17/2018    Poliovirus, inactivated (IPV) 06/02/1995    TDAP (Adacel,Boostrix) 02/21/2012, 03/05/2013, 09/05/2019    Twinrix A/B 02/09/2023, 03/09/2023, 08/10/2023    Zoster recombinant adjuvanted (SHINGRIX) 05/01/2018, 06/01/2018, 07/02/2018, 09/05/2019    Zoster vaccine, live 06/29/2015       Body mass index is 33.65 kg/m .      Objective  Physical Exam  Vitals:    03/06/24 1428   BP: 110/60   BP Location: Left arm   Patient Position: Sitting   Cuff Size: Adult Large   Pulse: 74   Resp: 16   Temp: 98.8  F (37.1  C)   TempSrc: Temporal   SpO2: 96%   Weight: 85.3 kg (188 lb)   Height: 1.592 m (5' 2.68\")       Gen- alert and oriented x3, no acute distress calm today.  HEENT- Normocephalic atraumatic   pupils equal and reactive, EOMI.    TMs visualized and normal, ear canals normal.    Mouth moist with normal mucosa no ulceration, dentition normal or in good repair.  Neck- supple, no adenopathy or thyromegaly  Chest- Normal chest wall apperance, normal inspiration and expiration.  Clear to asculation.  Breast exam-  was not done  CV- Regular rate and rhythm, normal tones, no murmus, gallops or rubs.  Abd-  Soft, nodistended, nontender.  Normal bowel sounds, no mass or organ enlargement.  Genitalia- was not done  Ext- Atraumatic,  No synovial thickening. Good perfusion, no edema. Periph pulses detected  Skin- warm and dry, no rash  Neuro- Cranial nerves grossly intact.  Normal gait, normal strength. Coordination intact.    Diagnostics  None    "

## 2024-03-06 NOTE — ASSESSMENT & PLAN NOTE
Last A1c 8.4 in December and it appears diabetes is gotten worse since then.  Not sure why.  On pioglitazone 15, Toujeo 100, NovoLog 30 3 times daily, Mounjaro 15.,  Concerned about supply chain issues with Mounjaro coming up.    Patient with underlying cirrhosis, but she has tolerated the low-dose pioglitazone.  Will increase to 30.  No major problems with fluid retention at this point.  Follow-up with MTM.  Appointment for ophthalmology.

## 2024-03-07 ENCOUNTER — TELEPHONE (OUTPATIENT)
Dept: FAMILY MEDICINE | Facility: CLINIC | Age: 69
End: 2024-03-07
Payer: COMMERCIAL

## 2024-03-07 ENCOUNTER — TELEPHONE (OUTPATIENT)
Dept: PULMONOLOGY | Facility: CLINIC | Age: 69
End: 2024-03-07
Payer: COMMERCIAL

## 2024-03-07 DIAGNOSIS — Z98.890 HISTORY OF TRACHEOSTOMY: Primary | ICD-10-CM

## 2024-03-07 DIAGNOSIS — J45.20 MILD INTERMITTENT ASTHMA WITHOUT COMPLICATION: Primary | ICD-10-CM

## 2024-03-07 DIAGNOSIS — J39.8 TRACHEAL STENOSIS: ICD-10-CM

## 2024-03-07 NOTE — TELEPHONE ENCOUNTER
FYI - Status Update    Who is Calling: patient    Update: Pt called in to request to speak with Bradley regarding her medications.  She stated that her medication were very expensive and she cannot afford them. She stated that the pen needles are $113, insulin $70, Mounjaro  $463 and inhaler $500 for one. Please give her a call back at  your earliest convenience to discuss.  Thanks!    Does caller want a call/response back: Yes     Okay to leave a detailed message?: Yes at Home number on file [phone] (home)

## 2024-03-07 NOTE — TELEPHONE ENCOUNTER
Patient Contacted for the patient to call back and schedule the following:    Appointment type: NPULM REFERRAL  Provider: STEPHEN  Return date: 05/28/2024  Specialty phone number: [phone]  Additional appointment(s) needed: N/A  Additonal Notes: N/A

## 2024-03-07 NOTE — TELEPHONE ENCOUNTER
Patient returned call. Patient states that her yearly deductible is $295. Pen needles are $115.17. Tier 1 and 2 are $10.30 a month. Tier 3 is $47 a month. Tier 4 is $100 a month. For insulin, $35 a month. For munjaro, $47 a month. For inhaler, $47 a month. Patient also states that she can not afford this. Patient would like a call back.

## 2024-03-07 NOTE — TELEPHONE ENCOUNTER
General Call    Contacts         Type Contact Phone/Fax    03/07/2024 07:55 AM CST Phone (Incoming) Fredis Stoddard (Self) [phone] (M)          Reason for Call:  PT returning the call, please try to call pt at about 2:20 PM today, if possible. Pt does NOT have a voicemail.     What are your questions or concerns:  Below concerns    Date of last appointment with provider: 1/30/2024    Okay to leave a detailed message?: No at Home number on file [phone] ([place])

## 2024-03-07 NOTE — TELEPHONE ENCOUNTER
Attempted to return call. No answer. LVM for patient. Recommend she call her plan to find out if she has a deductible that needs to be met, and if so how much and where she's at towards that amount.     PAP application was submitted for inhaler. Will reassess meds when patient calls back with plan payment structure.

## 2024-03-11 ENCOUNTER — LAB (OUTPATIENT)
Dept: LAB | Facility: CLINIC | Age: 69
End: 2024-03-11
Payer: COMMERCIAL

## 2024-03-11 DIAGNOSIS — E11.8 TYPE 2 DIABETES MELLITUS WITH COMPLICATION, WITH LONG-TERM CURRENT USE OF INSULIN (H): ICD-10-CM

## 2024-03-11 DIAGNOSIS — Z79.4 TYPE 2 DIABETES MELLITUS WITH COMPLICATION, WITH LONG-TERM CURRENT USE OF INSULIN (H): ICD-10-CM

## 2024-03-11 LAB
CREAT UR-MCNC: 20.5 MG/DL
MICROALBUMIN UR-MCNC: <12 MG/L
MICROALBUMIN/CREAT UR: NORMAL MG/G{CREAT}

## 2024-03-11 PROCEDURE — 82043 UR ALBUMIN QUANTITATIVE: CPT

## 2024-03-11 PROCEDURE — 82570 ASSAY OF URINE CREATININE: CPT

## 2024-03-12 ENCOUNTER — OFFICE VISIT (OUTPATIENT)
Dept: PHARMACY | Facility: CLINIC | Age: 69
End: 2024-03-12
Payer: COMMERCIAL

## 2024-03-12 ENCOUNTER — TELEPHONE (OUTPATIENT)
Dept: PULMONOLOGY | Facility: CLINIC | Age: 69
End: 2024-03-12
Payer: COMMERCIAL

## 2024-03-12 DIAGNOSIS — F31.4 BIPOLAR 1 DISORDER, DEPRESSED, SEVERE (H): Primary | ICD-10-CM

## 2024-03-12 PROCEDURE — 99207 PR NO CHARGE LOS: CPT | Performed by: PHARMACIST

## 2024-03-12 NOTE — TELEPHONE ENCOUNTER
Left Voicemail (1st Attempt) for the patient to call back and schedule the following:    CXR canceled per Felicia prior to Dr. James rojast in May

## 2024-03-12 NOTE — PROGRESS NOTES
Clinical Pharmacy Consult:                                                    Annel Stoddard is a 68 year old female coming in for a clinical pharmacist consult.  She was referred to me from Gerald Strange. Accompanied by Davin, from [employer] who has been helping with cost concerns.       Reason for Consult: Med Cost concerns    Discussion: Unable to afford her prescription medicines with her new [employer] Plan. Is working with a community health worker outside of the system for additional resources.     Davin is a navigator and does some case management with [facility].     Patient states that her yearly deductible is $295. Pen needles are $115.17. After deductible: For insulin, $35 a month. For munjaro, $47 a month. For inhaler, $47 a month.       Other updates:   Going to [facility] for Dental work 4/22   Will be going to [facility] for eye exam. 4/15   Again considering a move to [place] or [place]. - Depends on where she gets housing.   Having anxiety related to moving concerns, cost concerns.                     Plan:   Applied for [employer] PAP for the following medications:   Novolog  Switch from Insulin Glargine to Degludec.   Pen Needles   Switch from Mounjaro to Ozempic 2 mg   Application for tiotropium-olodaterol previously submitted to .         Follow-Up   Return in about 5 weeks (around 4/16/2024) for Medication Management Pharmacist, in person.       Bradley Liao, PharmD  Medication Therapy Management (MTM) Pharmacist  [facility]

## 2024-03-12 NOTE — PATIENT INSTRUCTIONS
"Recommendations from today's MTM visit:                                                          Applied for [employer] Nordisk PAP for the following medications:   Novolog  Switch from Insulin Glargine to Degludec.   Pen Needles   Switch from Mounjaro to Ozempic 2 mg     Follow-up: Return in about 5 weeks (around 4/16/2024) for Medication Management Pharmacist, in person.    It was great speaking with you today.  I value your experience and would be very thankful for your time in providing feedback in our clinic survey. In the next few days, you may receive an email or text message from [employer] with a link to a survey related to your  clinical pharmacist.\"     To schedule another MTM appointment, please call the clinic directly or you may call the MTM scheduling line at [phone].    My Clinical Pharmacist's contact information:                                                      Please feel free to contact me with any questions or concerns you have.      Bradley Liao, PharmD  Medication Therapy Management (MTM) Pharmacist  [facility]      "

## 2024-03-13 ENCOUNTER — TELEPHONE (OUTPATIENT)
Dept: FAMILY MEDICINE | Facility: CLINIC | Age: 69
End: 2024-03-13
Payer: COMMERCIAL

## 2024-03-13 NOTE — TELEPHONE ENCOUNTER
Patient returned call and was given message from below. Patient understands and will follow up at next appointment.

## 2024-03-13 NOTE — TELEPHONE ENCOUNTER
General Call      Reason for Call:  Medical question    What are your questions or concerns:  Pt called stated she was seen yesterday and her sodium was low and wanted to ask Bradley if she should come in to do some lab work. Please advise.  Thanks!    Date of last appointment with provider: 3/13/24    Okay to leave a detailed message?: Yes at Home number on file [phone] (home)

## 2024-03-13 NOTE — TELEPHONE ENCOUNTER
LVM for patient:     Last BMP on 1/30. Sodium was low. Can plan to recheck at future visit. No need for repeat labs now.

## 2024-03-15 ENCOUNTER — TELEPHONE (OUTPATIENT)
Dept: PHARMACY | Facility: CLINIC | Age: 69
End: 2024-03-15
Payer: COMMERCIAL

## 2024-03-15 NOTE — TELEPHONE ENCOUNTER
Adama Huerta,   Patient left voicemail wondering when she is able to  these orders from the clinic. Novolog, Degludec, Pen Needles, and Ozempic. Please give her a call. Thank you!    See Kenny  [facility]   [phone]

## 2024-03-15 NOTE — TELEPHONE ENCOUNTER
Spoke to patient in regards to previous call about Stiolto. (See other encounter). Reviewed we are still waiting to receive med supplies from [facility].

## 2024-03-15 NOTE — TELEPHONE ENCOUNTER
Pt called back stating that she will need you to resend the application for her inhaler. Please do so and call to inform pt, thanks!

## 2024-03-15 NOTE — TELEPHONE ENCOUNTER
FYI - Status Update    Who is Calling: patient    Update: Patient is calling [employer] and is checking on the status of the inhaler (stiolto?) but has not gotten an update or approval yet    Does caller want a call/response back: Yes     Okay to leave a detailed message?: Yes at Cell number on file:    Telephone Information:   Mobile [phone]

## 2024-03-20 ENCOUNTER — TELEPHONE (OUTPATIENT)
Dept: FAMILY MEDICINE | Facility: CLINIC | Age: 69
End: 2024-03-20
Payer: COMMERCIAL

## 2024-03-20 NOTE — TELEPHONE ENCOUNTER
General Call      Reason for Call:  Medical question    What are your questions or concerns:  Pt called and asked if her medication Novolog, Degludec, Pen Needles and Ozempic are available to  here in clinic? She also stated that she hs been having some swelling at night and goes away in the morning. Please give her a call back when you are available.  Thanks!    Date of last appointment with provider: 3/12/24    Okay to leave a detailed message?: Yes at Home number on file [phone] (home)

## 2024-03-20 NOTE — TELEPHONE ENCOUNTER
Called [employer]. They do not have record of the application. Application was resubmitted.   -She will  Novolog for 1 month.   -Still has 2 boxes of Toujeo.       Previously stopped Pioglitazone due to edema concerns. Restarted earlier this month. Swelling does go down overnight. Patient worried about stopping with ongoing issues with access to other diabetes treatment. Okay to continue for a little longer.

## 2024-03-25 ENCOUNTER — TELEPHONE (OUTPATIENT)
Dept: FAMILY MEDICINE | Facility: CLINIC | Age: 69
End: 2024-03-25
Payer: COMMERCIAL

## 2024-03-25 NOTE — TELEPHONE ENCOUNTER
Went to the pharmacy to some medicines, sensors, pen needles. Will be out of Mounjaro and Toujeo soon. 3 pens of Novolog.     Still waiting on [employer]isk PAP.     Feet aren't swelling as much as they were. Would like to stay on Pioglitazone for now.     Decided that she's moving to [place] around October to be near her daughters. Has found a clinic there that she plans to transfer cares to.

## 2024-03-25 NOTE — TELEPHONE ENCOUNTER
Patient returning missed call.  Routed to pharm D. States she will have phone with her this afternoon.    Jenn Luis RN, BSN, PHN  [facility]

## 2024-03-25 NOTE — TELEPHONE ENCOUNTER
General Call      Reason for Call:  Medical question    What are your questions or concerns:  pt called in to request to speak with Bradley regarding multiple medications as she has questions are concerns. Please give her a call back at your earliest convenient.  Thanks!      Date of last appointment with provider: 3/21/24    Okay to leave a detailed message?: Yes at Home number on file [phone] (home)

## 2024-03-26 ENCOUNTER — MEDICAL CORRESPONDENCE (OUTPATIENT)
Dept: SCHEDULING | Facility: CLINIC | Age: 69
End: 2024-03-26
Payer: COMMERCIAL

## 2024-03-26 ENCOUNTER — TELEPHONE (OUTPATIENT)
Dept: FAMILY MEDICINE | Facility: CLINIC | Age: 69
End: 2024-03-26
Payer: COMMERCIAL

## 2024-03-26 DIAGNOSIS — J44.9 CHRONIC OBSTRUCTIVE PULMONARY DISEASE, UNSPECIFIED COPD TYPE (H): Primary | ICD-10-CM

## 2024-03-26 DIAGNOSIS — K74.60 CHRONIC LIVER DISEASE AND CIRRHOSIS (H): ICD-10-CM

## 2024-03-26 DIAGNOSIS — K75.81 NONALCOHOLIC STEATOHEPATITIS: ICD-10-CM

## 2024-03-26 DIAGNOSIS — K76.9 CHRONIC LIVER DISEASE AND CIRRHOSIS (H): ICD-10-CM

## 2024-03-26 DIAGNOSIS — K76.9 CHRONIC LIVER DISEASE: Primary | ICD-10-CM

## 2024-03-26 NOTE — TELEPHONE ENCOUNTER
Patient Returning Call    Reason for call:  MED QUESTION    Information relayed to patient:  WHEN ABLE    Patient has additional questions:  Yes    What are your questions/concerns:  STIOLTO INHALER, PT NOT SURE WHAT SHE SHOULD DO BECAUSE THAT INHALER IS TOO EXPENSIVE. HAS BEEN USING ABUTEROL, BUT PT NOT SURE IF THATS RECOMMENDED FOR NORMAL USE.     Who does the patient want to speak with:  Provider, PHARMACY     Is an  needed?:  No      Okay to leave a detailed message?: Yes at Cell number on file:    Telephone Information:   Mobile [phone]

## 2024-03-26 NOTE — TELEPHONE ENCOUNTER
Okay, we will try to substitute Spiriva for Stiolto.  In the meantime it is absolutely okay to use albuterol.

## 2024-03-27 DIAGNOSIS — R05.1 ACUTE COUGH: ICD-10-CM

## 2024-03-27 RX ORDER — ALBUTEROL SULFATE 90 UG/1
AEROSOL, METERED RESPIRATORY (INHALATION)
Qty: 18 G | Refills: 1 | OUTPATIENT
Start: 2024-03-27

## 2024-03-27 NOTE — TELEPHONE ENCOUNTER
Script sent to the pharmacy on 3/25/24 for 18 grams with 1 additional refill on file. Patient should have refills on file with the pharmacy.     Amira Alva RN

## 2024-03-28 ENCOUNTER — TELEPHONE (OUTPATIENT)
Dept: PHARMACY | Facility: CLINIC | Age: 69
End: 2024-03-28
Payer: COMMERCIAL

## 2024-03-29 NOTE — TELEPHONE ENCOUNTER
Medication Question or Refill    Contacts         Type Contact Phone/Fax    03/28/2024 06:53 PM CDT Phone (Incoming) Arlyn Fredis KLAUDIA (Self) [phone] (M)            What medication are you calling about (include dose and sig)?: Ozempic, Trresiba, Novolog and Hiwot insulin pen needles.    Preferred Pharmacy:     Patient will pick them up at the [facility] [place].    Controlled Substance Agreement on file:   CSA -- Patient Level:    CSA: None found at the patient level.       Who prescribed the medication?: PCP    Do you need a refill? Yes for Novolog and pen needles    When did you use the medication last? 3/28/24    Patient offered an appointment? No    Do you have any questions or concerns?  Yes: Please let patient know when she can pick them up.      Okay to leave a detailed message?: Yes at Cell number on file:    Telephone Information:   Mobile [phone]

## 2024-03-29 NOTE — TELEPHONE ENCOUNTER
Called [employer]   PAP application has been submitted twice online - they stated they have a glitch in their system and they haven't received application.     Papercopy was downloaded. Patient does need to sign the paper copy before it can be faxed. Forms at  pending signature.

## 2024-04-01 ENCOUNTER — TELEPHONE (OUTPATIENT)
Dept: FAMILY MEDICINE | Facility: CLINIC | Age: 69
End: 2024-04-01
Payer: COMMERCIAL

## 2024-04-01 NOTE — TELEPHONE ENCOUNTER
Patient called for status of Albuterol inhaler.  Requested refill.  Per chart review, medication had been filled by provider on 3/25/24.  Advised patient to follow up with the pharmacy and call back with any further questions.  Patient verbalized understood.  No further action needed.    WHITNEY SeymourN, RN  [facility]

## 2024-04-03 ENCOUNTER — ALLIED HEALTH/NURSE VISIT (OUTPATIENT)
Dept: FAMILY MEDICINE | Facility: CLINIC | Age: 69
End: 2024-04-03
Payer: COMMERCIAL

## 2024-04-03 DIAGNOSIS — R05.1 ACUTE COUGH: ICD-10-CM

## 2024-04-03 DIAGNOSIS — F31.4 BIPOLAR 1 DISORDER, DEPRESSED, SEVERE (H): Primary | ICD-10-CM

## 2024-04-03 PROCEDURE — 99207 PR NO CHARGE NURSE ONLY: CPT

## 2024-04-03 PROCEDURE — 96372 THER/PROPH/DIAG INJ SC/IM: CPT | Performed by: FAMILY MEDICINE

## 2024-04-03 RX ORDER — ALBUTEROL SULFATE 90 UG/1
AEROSOL, METERED RESPIRATORY (INHALATION)
Qty: 18 G | Refills: 1 | OUTPATIENT
Start: 2024-04-03

## 2024-04-03 NOTE — PROGRESS NOTES
Clinic Administered Medication Documentation      Injectable Medication Documentation    Is there an active order (written within the past 365 days, with administrations remaining, not ) in the chart? Yes.     Patient was given  Abilify ER 400mg . Prior to medication administration, verified patient's identity using patient s name and date of birth. Please see MAR and medication order for additional information. Patient instructed to report any adverse reaction to staff immediately.    Vial/Syringe: Single dose vial. Was entire vial of medication used? Yes  Was this medication supplied by the patient? No  Is this a medication the patient will need to receive again? Yes. Verified that the patient has refills remaining in their prescription.

## 2024-04-04 ENCOUNTER — OFFICE VISIT (OUTPATIENT)
Dept: FAMILY MEDICINE | Facility: CLINIC | Age: 69
End: 2024-04-04
Payer: COMMERCIAL

## 2024-04-04 ENCOUNTER — PRE VISIT (OUTPATIENT)
Dept: OPHTHALMOLOGY | Facility: CLINIC | Age: 69
End: 2024-04-04

## 2024-04-04 VITALS
DIASTOLIC BLOOD PRESSURE: 76 MMHG | OXYGEN SATURATION: 98 % | SYSTOLIC BLOOD PRESSURE: 124 MMHG | TEMPERATURE: 97.7 F | HEIGHT: 63 IN | WEIGHT: 192 LBS | BODY MASS INDEX: 34.02 KG/M2 | HEART RATE: 78 BPM | RESPIRATION RATE: 18 BRPM

## 2024-04-04 DIAGNOSIS — R87.619 ABNORMAL CERVICAL PAPANICOLAOU SMEAR, UNSPECIFIED ABNORMAL PAP FINDING: ICD-10-CM

## 2024-04-04 DIAGNOSIS — L98.9 SKIN LESION: ICD-10-CM

## 2024-04-04 DIAGNOSIS — R87.810 CERVICAL HIGH RISK HPV (HUMAN PAPILLOMAVIRUS) TEST POSITIVE: Primary | ICD-10-CM

## 2024-04-04 PROCEDURE — G0145 SCR C/V CYTO,THINLAYER,RESCR: HCPCS | Performed by: PHYSICIAN ASSISTANT

## 2024-04-04 PROCEDURE — 17110 DESTRUCTION B9 LES UP TO 14: CPT | Performed by: PHYSICIAN ASSISTANT

## 2024-04-04 PROCEDURE — 87624 HPV HI-RISK TYP POOLED RSLT: CPT | Mod: GZ | Performed by: PHYSICIAN ASSISTANT

## 2024-04-04 PROCEDURE — 99212 OFFICE O/P EST SF 10 MIN: CPT | Mod: 25 | Performed by: PHYSICIAN ASSISTANT

## 2024-04-04 NOTE — PROGRESS NOTES
Subjective:      Annel Stoddard is a 68 year old female with chief complaint of 2 concerns:    1.  Pap smear  She had an abnormal Pap smear with colposcopy in 2013.  She was supposed to follow-up in 12 months for another Pap smear.    2.  Skin lesion  She has what she thinks is a skin tag on the right temple.  It has been there for about a month or so.  Somewhat bothersome and irritating.  Is wondering if she can get this frozen.  She has had skin tags before.      Patient Active Problem List   Diagnosis    Healthcare maintenance    Type 2 diabetes mellitus with complication, with long-term current use of insulin (H)    Anatomical narrow angle glaucoma    Hyperactivity of bladder    Bilateral low back pain with left-sided sciatica    Callus of foot    Mixed hyperlipidemia    Abnormal cervical Papanicolaou smear    Hernia of anterior abdominal wall    Human papilloma virus infection    Nonalcoholic steatohepatitis    Osteopenia    Thrombophlebitis    Botulism food poisoning    Cervical high risk HPV (human papillomavirus) test positive    History of colonic polyps (colonoscopy due 11/2027)    Hyponatremia    Sleep apnea    LPRD (laryngopharyngeal reflux disease)    Lumbar spine pain    RUQ abdominal pain    Tracheal stenosis following tracheostomy (H)    Cirrhosis of liver without ascites, unspecified hepatic cirrhosis type (H)    Other dysphagia    Gastric polyp    Bipolar 1 disorder, depressed, severe (H)    Cervicalgia    Mixed diabetic hyperlipidemia associated with type 2 diabetes mellitus (H)    Vitamin D deficiency    Acute cough    Paronychia of toe, left    Nail abnormalities    Mild intermittent asthma        Current Outpatient Medications:     albuterol (PROAIR HFA/PROVENTIL HFA/VENTOLIN HFA) 108 (90 Base) MCG/ACT inhaler, Inhale 2 puffs into the lungs every 6 hours as needed for shortness of breath, wheezing or cough, Disp: 18 g, Rfl: 1    aspirin (ASA) 81 MG chewable tablet, Take 81 mg by mouth daily,  Disp: , Rfl:     BD GERARDO U/F 32G X 4 MM insulin pen needle, Use as directed 4 injections per day., Disp: 200 each, Rfl: 3    benzonatate (TESSALON) 100 MG capsule, Take 1 capsule (100 mg) by mouth 3 times daily as needed for cough, Disp: 20 capsule, Rfl: 3    blood glucose (NO BRAND SPECIFIED) lancets standard, Use to test blood sugar 3 times daily or as directed., Disp: 3 each, Rfl: 3    blood glucose (NO BRAND SPECIFIED) test strip, 1 strip by In Vitro route 4 times daily, Disp: 200 strip, Rfl: 3    Calcium Carbonate-Vitamin D 600-5 MG-MCG TABS, Take 2 tablets by mouth daily, Disp: , Rfl:     Continuous Blood Gluc Sensor (FREESTYLE EDITH 2 SENSOR) [facility], 1 each every 14 days Use 1 sensor every 14 days. Use to read blood sugars per 's instructions., Disp: 2 each, Rfl: 5    dextromethorphan (DELSYM) 30 MG/5ML liquid, Take 10 mLs (60 mg) by mouth 2 times daily, Disp: 148 mL, Rfl: 0    fluticasone (FLONASE) 50 MCG/ACT nasal spray, SHAKE LIQUID AND USE 2 SPRAYS IN EACH NOSTRIL DAILY AS NEEDED FOR RHINITIS OR ALLERGIES Strength: 50 MCG/ACT, Disp: 48 g, Rfl: 3    furosemide (LASIX) 20 MG tablet, Take 1.5 tablets (30 mg) by mouth daily, Disp: 135 tablet, Rfl: 3    insulin aspart (NOVOLOG PEN) 100 UNIT/ML pen, Inject 30 Units Subcutaneous 3 times daily (before meals), Disp: 45 mL, Rfl: 3    insulin glargine U-300 (TOUJEO MAX SOLOSTAR) 300 UNIT/ML (2 units dial) pen, Inject 100 Units Subcutaneous at bedtime, Disp: 18 mL, Rfl: 3    lithium (ESKALITH CR/LITHOBID) 450 MG CR tablet, Take 2 tablets (900 mg) by mouth every morning AND 1 tablet (450 mg) every evening., Disp: 90 tablet, Rfl: 1    losartan (COZAAR) 50 MG tablet, Take 1 tablet (50 mg) by mouth daily, Disp: 90 tablet, Rfl: 3    magnesium oxide 400 MG CAPS, Take 400 mg by mouth daily, Disp: , Rfl:     montelukast (SINGULAIR) 10 MG tablet, Take 1 tablet (10 mg) by mouth At Bedtime, Disp: 90 tablet, Rfl: 3    multivitamin (ONE-DAILY) tablet, Take 1 tablet by  mouth daily, Disp: , Rfl:     mupirocin (BACTROBAN) 2 % external ointment, APPLY TOPICALLY TO THE AFFECTED AREA THREE TIMES DAILY, Disp: 15 g, Rfl: 0    nystatin (MYCOSTATIN) 635188 UNIT/GM external ointment, Apply topically 2 times daily Apply to affected areas only (right upper arm), Disp: 30 g, Rfl: 1    Omega 3-6-9 Fatty Acids (OMEGA 3-6-9 COMPLEX PO), , Disp: , Rfl:     omeprazole (PRILOSEC) 40 MG DR capsule, TAKE 1 CAPSULE(40 MG) BY MOUTH DAILY, Disp: 90 capsule, Rfl: 0    pioglitazone (ACTOS) 30 MG tablet, Take 1 tablet (30 mg) by mouth daily, Disp: 90 tablet, Rfl: 2    polyethylene glycol (MIRALAX) 17 GM/Dose powder, TAKE ONE CAPFUL MIXED WITH LIQUID BY MOUTH TWICE DAILY AS NEEDED, Disp: 510 g, Rfl: 1    rosuvastatin (CRESTOR) 10 MG tablet, Take 1 tablet (10 mg) by mouth daily, Disp: 90 tablet, Rfl: 3    SENNA-docusate sodium (SENNA S) 8.6-50 MG tablet, Take 1-2 tablets by mouth 2 times daily as needed (constipation), Disp: 360 tablet, Rfl: 3    terbinafine (LAMISIL) 1 % external cream, Apply topically 2 times daily, Disp: 60 g, Rfl: 1    tiotropium (SPIRIVA RESPIMAT) 2.5 MCG/ACT inhaler, Inhale 2 puffs into the lungs daily, Disp: 4 g, Rfl: 3    tiotropium-olodaterol 2.5-2.5 MCG/ACT AERS, Inhale 2 puffs into the lungs daily, Disp: 12 g, Rfl: 3    tirzepatide (MOUNJARO) 15 MG/0.5ML pen, Inject 15 mg Subcutaneous every 7 days, Disp: 6 mL, Rfl: 3    vitamin D3 (CHOLECALCIFEROL) 125 MCG (5000 UT) tablet, Take 1 tablet (125 mcg) by mouth daily, Disp: 90 tablet, Rfl: 1    Vitamins A & D (BABY VITAMIN A & D) OINT, Apply 5 g topically 4 times daily, Disp: 113 g, Rfl: 11    Current Facility-Administered Medications:     ARIPiprazole ER (ABILIFY MAINTENA) extended release inj syringe 400 mg, 400 mg, Intramuscular, Q30 Days, Gerald Strange MD, 400 mg at 04/03/24 0814    ARIPiprazole ER (ABILIFY MAINTENA) extended release inj syringe 400 mg, 400 mg, Intramuscular, Q28 Days, Gerald Strange MD, 400 mg at 01/11/24  "1237      Objective:     Allergies:  Desmopressin, Bee venom, Estrogens, Hydrochlorothiazide, Hydrocodone-acetaminophen, Jardiance [empagliflozin], Lithium, Penicillins, Risperidone, Shellfish-derived products, Aromatic inhalants, Latex, Niacin, Qvar [beclomethasone], and Valacyclovir    /76 (BP Location: Left arm, Patient Position: Sitting, Cuff Size: Adult Regular)   Pulse 78   Temp 97.7  F (36.5  C) (Temporal)   Resp 18   Ht 1.59 m (5' 2.6\")   Wt 87.1 kg (192 lb)   LMP  (LMP Unknown)   SpO2 98%   BMI 34.45 kg/m    Body mass index is 34.45 kg/m .    General: Alert and oriented x 3, in no apparent distress  Skin: Small area of slightly raised rough skin present at the right temple right at the hairline, approximately 1 cm in diameter, the bottom part does possibly have the appearance of a skin tag, the upper part has a warty stuck on type appearance, skin colored  Genitourinary: External genitalia is normal in appearance, vaginal walls are healthy, cervix is well visualized and normal in appearance, no significant discharge noted    Today's Pap results pending.      Procedure  Skin lesion at right temple frozen with liquid nitrogen x 8 using freeze-and-thaw cycle.  Patient tolerated procedure well.  Appropriate aftercare discussed with her.        Assessment and Plan:     1. Cervical high risk HPV (human papillomavirus) test positive  2. Abnormal cervical Papanicolaou smear, unspecified abnormal pap finding  Chronic, stable.  Negative Pap with positive high risk BV in 2023.  Had colposcopy on 8/1/2023 for LGSIL.  She was recommended to have follow-up Pap smear in 6 to 12 months.  Pap RNs to follow-up with results.  - Pap imaged thin layer screen with HPV - recommended age 30 - 65 years    3. Skin lesion  New concern.  Small skin lesion on the right temple area.  Consistent with skin tag or possible seborrheic keratosis.  Lesion frozen as recorded above.  I discussed appropriate aftercare with " patient.  If freezing does not seem to work, or if lesion returns, may recommend referral to dermatology.        This dictation uses voice recognition software, which may contain typographical errors.

## 2024-04-05 ENCOUNTER — ANCILLARY PROCEDURE (OUTPATIENT)
Dept: CT IMAGING | Facility: CLINIC | Age: 69
End: 2024-04-05
Attending: INTERNAL MEDICINE
Payer: COMMERCIAL

## 2024-04-05 ENCOUNTER — TELEPHONE (OUTPATIENT)
Dept: PHARMACY | Facility: CLINIC | Age: 69
End: 2024-04-05

## 2024-04-05 DIAGNOSIS — J39.8 TRACHEAL STENOSIS: ICD-10-CM

## 2024-04-05 PROCEDURE — 70490 CT SOFT TISSUE NECK W/O DYE: CPT | Mod: GC | Performed by: STUDENT IN AN ORGANIZED HEALTH CARE EDUCATION/TRAINING PROGRAM

## 2024-04-05 NOTE — TELEPHONE ENCOUNTER
Called [employer] to follow-up on status.     They require a copy of photo ID (only needed for patients in the state [facility] who are receiving insulin from the PAP program).     Photo ID faxed with program ID# [id number]

## 2024-04-08 DIAGNOSIS — F31.4 BIPOLAR 1 DISORDER, DEPRESSED, SEVERE (H): ICD-10-CM

## 2024-04-08 RX ORDER — LITHIUM CARBONATE 450 MG
TABLET, EXTENDED RELEASE ORAL
Qty: 90 TABLET | Refills: 1 | Status: SHIPPED | OUTPATIENT
Start: 2024-04-08 | End: 2024-06-10

## 2024-04-08 RX ORDER — ECHINACEA PURPUREA EXTRACT 125 MG
TABLET ORAL EVERY 24 HOURS
COMMUNITY
Start: 2023-02-09

## 2024-04-10 ENCOUNTER — TELEPHONE (OUTPATIENT)
Dept: FAMILY MEDICINE | Facility: CLINIC | Age: 69
End: 2024-04-10

## 2024-04-10 ENCOUNTER — ONCOLOGY VISIT (OUTPATIENT)
Dept: PULMONOLOGY | Facility: CLINIC | Age: 69
End: 2024-04-10
Payer: COMMERCIAL

## 2024-04-10 VITALS
OXYGEN SATURATION: 97 % | SYSTOLIC BLOOD PRESSURE: 106 MMHG | BODY MASS INDEX: 34.31 KG/M2 | DIASTOLIC BLOOD PRESSURE: 68 MMHG | WEIGHT: 191.2 LBS | HEART RATE: 73 BPM

## 2024-04-10 DIAGNOSIS — J39.8 TRACHEAL STENOSIS: Primary | ICD-10-CM

## 2024-04-10 DIAGNOSIS — J45.20 MILD INTERMITTENT ASTHMA WITHOUT COMPLICATION: ICD-10-CM

## 2024-04-10 PROCEDURE — 99214 OFFICE O/P EST MOD 30 MIN: CPT | Performed by: INTERNAL MEDICINE

## 2024-04-10 NOTE — PROGRESS NOTES
LUNG NODULE & INTERVENTIONAL PULMONARY CLINIC  [facility]     Annel Stoddard MRN# [id number]   Age: 68 year old YOB: 1955       Requesting Physician: Lele Oliveira MD  [address] [place] [id number]    Assessment and Plan:    1.  Tracheal stenosis: A-frame deformity related to previous tracheostomy.  Stable.  Unfortunately this is not amenable to dilation or debulking.  Tracheal resection is a theoretical option but not something that she would like to consider.  This affects her breathing and therefore makes her asthma difficult to assess.  She does not require specific follow-up for this.    2.  Mild intermittent asthma.  Does not tolerate inhaled corticosteroids.  No hospitalizations.  Is currently on Spiriva.  As needed albuterol.  She occasionally gets a little bit worse during seasonal allergies of unclear type.    3.  History of RANDALL.  Not currently being treated.    She will transfer her care to another pulmonologist after moving to [place].  No further follow-up required here.             History:     Annel Stoddard is a 68 year old female with sig h/o for tracheal stenosis and asthma who is here for evaluation/followup of same.  She has not had any significant changes in her health since her last visit.  She is short of breath with exertion.    Stress and seasonal exposures she gets more short of breath.  Has been on prednisone once or twice in the last few years.  No hospitalizations.    - My interpretation of the images relevant for this visit includes: Stable appearing A-frame stenosis  - My interpretation of the PFT's relevant for this visit includes: Restrictive pattern            Past Medical History:      Past Medical History:   Diagnosis Date    Anxiety     Bipolar disorder (H)     Botulism     1980    Cervical high risk HPV (human papillomavirus) test positive     Colon polyps     Depression      "Diabetes mellitus (H)     Diabetes type 2, controlled (H)     Gallstones     History of tracheostomy 1/5/2021    Hyperlipidemia     Hyperlipidemia     Hypertension     Hypertension     Low back pain with left-sided sciatica     Pneumonia     Urinary incontinence            Past Surgical History:      Past Surgical History:   Procedure Laterality Date    ABDOMINOPLASTY      EYE SURGERY      bilateral with flap    HAND SURGERY Left     \"chipped off bone\"     trachestomy      TUBAL LIGATION            Social History:     Social History     Tobacco Use    Smoking status: Never     Passive exposure: Never    Smokeless tobacco: Never   Substance Use Topics    Alcohol use: Not Currently          Family History:     Family History   Problem Relation Age of Onset    Other - See Comments Mother         severe edema in leg, millroid's disease    Cerebrovascular Disease Father     No Known Problems Maternal Grandmother     No Known Problems Maternal Grandfather     Cerebrovascular Disease Other     Lung Cancer Half-Sister         smoking related    Myocardial Infarction Paternal Half-Brother     Esophageal Cancer Sister     Coronary Artery Disease Brother            Allergies:      Allergies   Allergen Reactions    Desmopressin Swelling     LE        Bee Venom     Estrogens Other (See Comments)    Hydrochlorothiazide Other (See Comments) and Unknown       Patient reports can't take this med due to increased urine output      Hydrocodone-Acetaminophen        Justin change      Jardiance [Empagliflozin] Other (See Comments)     Yeast infection    Lithium Diarrhea         Causing DI      Penicillins Unknown     Has tolerated amoxicillin     Risperidone     Shellfish-Derived Products     Aromatic Inhalants Rash    Latex Other (See Comments) and Rash     rash      Niacin Rash    Qvar [Beclomethasone] Rash    Valacyclovir Rash          Medications:     Current Outpatient Medications   Medication Sig Dispense Refill    albuterol " (PROAIR HFA/PROVENTIL HFA/VENTOLIN HFA) 108 (90 Base) MCG/ACT inhaler Inhale 2 puffs into the lungs every 6 hours as needed for shortness of breath, wheezing or cough 18 g 1    aspirin (ASA) 81 MG chewable tablet Take 81 mg by mouth daily      BD GERARDO U/F 32G X 4 MM insulin pen needle Use as directed 4 injections per day. 200 each 3    benzonatate (TESSALON) 100 MG capsule Take 1 capsule (100 mg) by mouth 3 times daily as needed for cough 20 capsule 3    blood glucose (NO BRAND SPECIFIED) lancets standard Use to test blood sugar 3 times daily or as directed. 3 each 3    blood glucose (NO BRAND SPECIFIED) test strip 1 strip by In Vitro route 4 times daily 200 strip 3    Calcium Carbonate-Vitamin D 600-5 MG-MCG TABS Take 2 tablets by mouth daily      Continuous Blood Gluc  (FREESTYLE EDITH 2 READER) KAYA       Continuous Blood Gluc Sensor (FREESTYLE EDITH 2 SENSOR) MISC 1 each every 14 days Use 1 sensor every 14 days. Use to read blood sugars per 's instructions. 2 each 5    dextromethorphan (DELSYM) 30 MG/5ML liquid Take 10 mLs (60 mg) by mouth 2 times daily 148 mL 0    fluticasone (FLONASE) 50 MCG/ACT nasal spray SHAKE LIQUID AND USE 2 SPRAYS IN EACH NOSTRIL DAILY AS NEEDED FOR RHINITIS OR ALLERGIES Strength: 50 MCG/ACT 48 g 3    furosemide (LASIX) 20 MG tablet Take 1.5 tablets (30 mg) by mouth daily 135 tablet 3    insulin aspart (NOVOLOG PEN) 100 UNIT/ML pen Inject 30 Units Subcutaneous 3 times daily (before meals) 45 mL 3    insulin glargine U-300 (TOUJEO MAX SOLOSTAR) 300 UNIT/ML (2 units dial) pen Inject 100 Units Subcutaneous at bedtime 18 mL 3    lithium (ESKALITH CR/LITHOBID) 450 MG CR tablet TAKE 2 TABLETS BY MOUTH EVERY MORNING AND 1 TABLET EVERY EVENING 90 tablet 1    losartan (COZAAR) 50 MG tablet Take 1 tablet (50 mg) by mouth daily 90 tablet 3    magnesium oxide 400 MG CAPS Take 400 mg by mouth daily      montelukast (SINGULAIR) 10 MG tablet Take 1 tablet (10 mg) by mouth At Bedtime  90 tablet 3    multivitamin (ONE-DAILY) tablet Take 1 tablet by mouth daily      mupirocin (BACTROBAN) 2 % external ointment APPLY TOPICALLY TO THE AFFECTED AREA THREE TIMES DAILY 15 g 0    nystatin (MYCOSTATIN) 546139 UNIT/GM external ointment Apply topically 2 times daily Apply to affected areas only (right upper arm) 30 g 1    Omega 3-6-9 Fatty Acids (OMEGA 3-6-9 COMPLEX PO)       omeprazole (PRILOSEC) 40 MG DR capsule TAKE 1 CAPSULE(40 MG) BY MOUTH DAILY 90 capsule 0    pioglitazone (ACTOS) 30 MG tablet Take 1 tablet (30 mg) by mouth daily 90 tablet 2    polyethylene glycol (MIRALAX) 17 GM/Dose powder TAKE ONE CAPFUL MIXED WITH LIQUID BY MOUTH TWICE DAILY AS NEEDED 510 g 1    rosuvastatin (CRESTOR) 10 MG tablet Take 1 tablet (10 mg) by mouth daily 90 tablet 3    SENNA-docusate sodium (SENNA S) 8.6-50 MG tablet Take 1-2 tablets by mouth 2 times daily as needed (constipation) 360 tablet 3    sodium chloride (OCEAN) 0.65 % nasal spray Spray in nostril every 24 hours      terbinafine (LAMISIL) 1 % external cream Apply topically 2 times daily 60 g 1    tiotropium (SPIRIVA RESPIMAT) 2.5 MCG/ACT inhaler Inhale 2 puffs into the lungs daily 4 g 3    vitamin D3 (CHOLECALCIFEROL) 125 MCG (5000 UT) tablet Take 1 tablet (125 mcg) by mouth daily 90 tablet 1    Vitamins A & D (BABY VITAMIN A & D) OINT Apply 5 g topically 4 times daily 113 g 11    tiotropium-olodaterol 2.5-2.5 MCG/ACT AERS Inhale 2 puffs into the lungs daily 12 g 3    tirzepatide (MOUNJARO) 15 MG/0.5ML pen Inject 15 mg Subcutaneous every 7 days 6 mL 3     Current Facility-Administered Medications   Medication Dose Route Frequency Provider Last Rate Last Admin    ARIPiprazole ER (ABILIFY MAINTENA) extended release inj syringe 400 mg  400 mg Intramuscular Q30 Days Gerald Strange MD   400 mg at 04/03/24 0814    ARIPiprazole ER (ABILIFY MAINTENA) extended release inj syringe 400 mg  400 mg Intramuscular Q28 Days Gerald Strange MD   400 mg at 01/11/24 1237           Review of Systems:     See HPI         Physical Exam:   /68 (BP Location: Left arm, Patient Position: Sitting, Cuff Size: Adult Large)   Pulse 73   Wt 86.7 kg (191 lb 3.2 oz)   LMP  (LMP Unknown)   SpO2 97%   BMI 34.31 kg/m      Constitutional - looks well, in no apparent distress  Eyes - no redness or discharge  Respiratory -breathing appears comfortable. No wheeze or rhonchi.   Cardiac -- Normal rate, rhythm.   Skin - No appreciable discoloration or lesions (very limited exam)  Neurological - No apparent tremors. Speech fluent and articlate  Psychiatric - no signs of delirium or anxiety          Current Laboratory Data:   All laboratory and imaging data reviewed.    No results found for this or any previous visit (from the past 24 hour(s)).

## 2024-04-10 NOTE — TELEPHONE ENCOUNTER
Pt called to let Bradley know that she went to her Pulmonary appt today and wanted to discuss what was discussed at the appt. She also stated that medication pioglitazone (ACTOS) 30 MG tablet  makes her feet swell and wants to know if she should stop taking medication. Please give her a call back at your earliest convenience. Pt also has other questions and concerns to pass along.  Thanks!

## 2024-04-11 ENCOUNTER — PATIENT OUTREACH (OUTPATIENT)
Dept: FAMILY MEDICINE | Facility: CLINIC | Age: 69
End: 2024-04-11
Payer: COMMERCIAL

## 2024-04-11 ENCOUNTER — TELEPHONE (OUTPATIENT)
Dept: FAMILY MEDICINE | Facility: CLINIC | Age: 69
End: 2024-04-11
Payer: COMMERCIAL

## 2024-04-11 NOTE — TELEPHONE ENCOUNTER
Returned call to patient. LVM.     - PharmD has previously recommended holding Actos but patient didn't want to because blood glucose were high. Okay to stop if she would like.

## 2024-04-11 NOTE — TELEPHONE ENCOUNTER
Return called to patient. She's going to stop Pioglitazone tomorrow.   Scheduled with OB in June.

## 2024-04-11 NOTE — TELEPHONE ENCOUNTER
Annel called and would like to speak with you. Can you call her back please?    Thanks,     Delma Read [facility]

## 2024-04-15 ENCOUNTER — TRANSFERRED RECORDS (OUTPATIENT)
Dept: HEALTH INFORMATION MANAGEMENT | Facility: CLINIC | Age: 69
End: 2024-04-15
Payer: COMMERCIAL

## 2024-04-15 DIAGNOSIS — K21.00 GASTROESOPHAGEAL REFLUX DISEASE WITH ESOPHAGITIS, UNSPECIFIED WHETHER HEMORRHAGE: ICD-10-CM

## 2024-04-15 LAB — RETINOPATHY: POSITIVE

## 2024-04-15 RX ORDER — OMEPRAZOLE 40 MG/1
40 CAPSULE, DELAYED RELEASE ORAL DAILY
Qty: 90 CAPSULE | Refills: 2 | Status: SHIPPED | OUTPATIENT
Start: 2024-04-15

## 2024-04-15 NOTE — PROGRESS NOTES
Medication Therapy Management (MTM) Encounter    ASSESSMENT:                            Medication Adherence/Access: Having cost concerns with medications [facility] Extra Help subsidy ended. Have applied for PAP for diabetes medicines and inhaler. See below.     Type 2 Diabetes:  A1C above goal <8%. CGM averages above goal <180. Not yet at time in target >70%, but due to access issues and delay with PAP, hasn't been using any GLP1. Called program during the visit, patient was approved for the program. Novolog, degludec, Novofine Needles, Ozempic order placed 4/8. 10-14 business days before receiving.      Does have cost concerns with healthy foods. Will route to clinic to teams to discuss food access programs.     Hyperlipidemia: Appropriately using statin. LDL at goal <100. Appropriately using aspirin for ASCVD risk score >10%.       Hypertension: BP at  goal <140/90. Pulse at goal . Edema has improved since stopping Actos.       Bipolar Type I:  Lithium levels in therapeutic range. Reports mood is stable.     Low Vitamin D: Last Vit D level WNL.      Asthma/COPD: Still waiting on PAP program for Stiolto inhaler. Application refaxed.     Heartburn: Symptoms well managed with PPI.      Constipation: No constipation concerns at this time.     Osteopenia: No clear indication to start treatment. Major FRAX risk <20%; hip risk <3%. Recommend continuing with Calcium and Vit D supplement at this time.        PLAN:                            No medication changes. The diabetes medicines were approved through the patient assistance program. You should receive them at some point next week.     Follow-up: Return in about 5 weeks (around 5/21/2024) for Medication Management Pharmacist, in person.     SUBJECTIVE/OBJECTIVE:                          Annel Stoddard is a 68 year old female coming for a follow-up visit. She was referred to me from Gerald Strange MD as PCP. Today's visit is a follow-up MTM visit from 12/26/2023.       Reason for visit: Medication follow-up   Thinks she's not going to move out of state anymore, but wants to move out of the facility she's currently in. Working with somebody to apply for elderly waiver.   Going to talk to somebody from [facility].      Allergies/ADRs: Reviewed in chart  Past Medical History: Reviewed in chart  Tobacco: She reports that she has never smoked. She has never been exposed to tobacco smoke. She has never used smokeless tobacco.  Alcohol:  reports that she does not currently use alcohol.       Medication Adherence/Access:     Denies adherence concerns.     Cost conerns. No longer has the Extra Help subsidy. Has to re-apply for it and she won't get the form until     12/28 filled:   Mounjaro 90 day supply   Novolog 46 day supply       Toujeo and Stiolto just filled 1/25 - hasn't picked up yet.   Should have enough Stiolto to get through mid feb.     Type 2 Diabetes:    NovoLog 30 units 3 times daily  Toujeo U300 - 100 units daily   Mounjaro switched back to Ozempic 0.2 mg to get from PAP program (was too expensive to continue Mounjaro).      Hasn't gotten insulin from [employer] yet. Only has two pens of Toujeo. Bought Novolog. Received a letter stating she needs an updated ID.     Hasn't been using any GLP1 because not received from the program and cannot afford.     Stopped Jardiance due to ongoing yeast infections.   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   Pioglitazone stopped due to edema.       Blood sugar monitoring: 3 time(s) daily.  Ranges (forgot her monitor today)                  Symptoms of low blood sugar? Sometimes having symptoms, even without true lows.    Symptoms of high blood sugar? Polyuria.   Diet/Exercise: Not going to food bank anymore. Eating more fruits and veggies. Hasn't had a lot of money to shop.     Aspirin: Taking 81mg daily    Statin: Yes: Rosuvastatin 10 mg daily  ACEi/ARB: Yes: Losartan 50 mg daily.     Hemoglobin A1C   Date Value Ref Range  Status   12/12/2023 8.4 (H) 0.0 - 5.6 % Final     Comment:     Normal <5.7%   Prediabetes 5.7-6.4%    Diabetes 6.5% or higher     Note: Adopted from ADA consensus guidelines.   08/01/2023 6.9 (H) 0.0 - 5.6 % Final     Comment:     Normal <5.7%   Prediabetes 5.7-6.4%    Diabetes 6.5% or higher     Note: Adopted from ADA consensus guidelines.   04/05/2023 6.9 (H) 0.0 - 5.6 % Final     Comment:     Normal <5.7%   Prediabetes 5.7-6.4%    Diabetes 6.5% or higher     Note: Adopted from ADA consensus guidelines.   01/27/2020 8.4 (H) 0 - 5.6 % Final     Comment:     Normal <5.7% Prediabetes 5.7-6.4%  Diabetes 6.5% or higher - adopted from ADA   consensus guidelines.        Microalbumin Urine mg/dL   Date Value Ref Range Status   06/16/2021 <0.50 0.00 - 1.99 mg/dL Final      Creatinine Urine mg/dL   Date Value Ref Range Status   03/11/2024 20.5 mg/dL Final     Comment:     The reference ranges have not been established in urine creatinine. The results should be integrated into the clinical context for interpretation.     LDL Cholesterol Calculated   Date Value Ref Range Status   02/01/2024 50 <=100 mg/dL Final     LDL Cholesterol Direct   Date Value Ref Range Status   09/23/2019 48 <=129 mg/dl Final     Creatinine   Date Value Ref Range Status   01/30/2024 0.46 (L) 0.51 - 0.95 mg/dL Final   01/27/2020 0.52 0.52 - 1.04 mg/dL Final     The 10-year ASCVD risk score (Diana SRIVASTAVA, et al., 2019) is: 11.6%    Values used to calculate the score:      Age: 68 years      Sex: Female      Is Non- : No      Diabetic: Yes      Tobacco smoker: No      Systolic Blood Pressure: 104 mmHg      Is BP treated: Yes      HDL Cholesterol: 52 mg/dL      Total Cholesterol: 148 mg/dL         Hypertension/Edema: Prescribed losartan 50 mg daily, Furosemide 20 mg 1.5 tabs daily.     Edema has considerably improved since stopping Actos. Still some puffiness.        BP Readings from Last 3 Encounters:   04/16/24 104/67   04/10/24  106/68   04/04/24 124/76      Pulse Readings from Last 3 Encounters:   04/16/24 76   04/10/24 73   04/04/24 78     Wt Readings from Last 3 Encounters:   04/16/24 187 lb 8 oz (85 kg)   04/10/24 191 lb 3.2 oz (86.7 kg)   04/04/24 192 lb (87.1 kg)          Bipolar Type I: Prescribed lithium 450 mg ER 2 tabs AM and 1 tab PM, Abilify Maintena 400 mg IM monthly, Vitamin D3 5000 units daily.    Mood stable.  A little stressed thinking about moving.       Energy levels are improving. Still gets tired and needs a nap in the afternoon, but not as bad as before.     Appetite is decreased somewhat.   Hasn't been feeling down/depressed. Feels lonely.   Apathy has improved. Now back to making it through her tasks for the day.        Vitamin D Deficiency Screening Results:  Lab Results   Component Value Date    VITDT 38 12/12/2023    VITDT 43 04/05/2023    VITDT 35 01/17/2023       Lab Results   Component Value Date    LITHIUM 0.84 02/01/2024           Asthma/COPD/allergies: Prescribed albuterol HFA 90 mcg 2 puffs every 4 hours as needed, montelukast 10 mg daily, Stiolto (tiotropium-olodaterol) 2.5-2.5 mcg 2 puffs daily, Flonase 50 mcg nasal spray 2 sprays each nostril daily, saline 0.65% nasal spray as needed.     Tracheal stenosis.     Applied for PAP program for Stiolto. In the meantime, using Spiriva in the meantime.     Patient reports Pulmonologist told her she doesn't have COPD.     Having fairly consistent shortness of breath at night. Using Albuterol daily.   Does have some congestion - uses saline nasal spray. Finds this helpful.        Heartburn: Prescribed omeprazole 40 mg daily    No heartburn symptoms with PPI. History of ulcers.       Constipation: Prescribed Senna-docusate 8.6-50 mg 1-2 tabs twice daily as needed, Miralax 17 g daily as needed.  Using senna 2 tabs twice daily and Miralax daily.     No constipation concerns recently.       Osteopenia: Currently using Vitamin D 5000 units and Calcium 600 mg twice  daily.          Vitamin D Deficiency Screening Results:  Lab Results   Component Value Date    VITDT 38 12/12/2023    VITDT 43 04/05/2023    VITDT 35 01/17/2023                 Today's Vitals: /67   Pulse 76   Wt 187 lb 8 oz (85 kg)   LMP  (LMP Unknown)   BMI 33.64 kg/m    ----------------      I spent 48 minutes with this patient today. All changes were made via collaborative practice agreement with Gerald Strange MD. A copy of the visit note was provided to the patient's provider(s).    A summary of these recommendations was given to the patient.    Bradley Liao, RosemarieD  Medication Therapy Management (MTM) Pharmacist  [facility]         Medication Therapy Recommendations  Type 2 diabetes mellitus with complication, with long-term current use of insulin (H)    Current Medication: Semaglutide (OZEMPIC, 2 MG/DOSE, SC)   Rationale: Medication product not available - Adherence - Adherence   Recommendation: Provide Adherence Intervention   Status: Accepted per CPA

## 2024-04-16 ENCOUNTER — OFFICE VISIT (OUTPATIENT)
Dept: PHARMACY | Facility: CLINIC | Age: 69
End: 2024-04-16
Payer: COMMERCIAL

## 2024-04-16 VITALS
SYSTOLIC BLOOD PRESSURE: 104 MMHG | DIASTOLIC BLOOD PRESSURE: 67 MMHG | BODY MASS INDEX: 33.64 KG/M2 | WEIGHT: 187.5 LBS | HEART RATE: 76 BPM

## 2024-04-16 DIAGNOSIS — K21.00 GASTROESOPHAGEAL REFLUX DISEASE WITH ESOPHAGITIS, UNSPECIFIED WHETHER HEMORRHAGE: ICD-10-CM

## 2024-04-16 DIAGNOSIS — E55.9 VITAMIN D DEFICIENCY: ICD-10-CM

## 2024-04-16 DIAGNOSIS — F31.4 BIPOLAR 1 DISORDER, DEPRESSED, SEVERE (H): ICD-10-CM

## 2024-04-16 DIAGNOSIS — K75.81 NONALCOHOLIC STEATOHEPATITIS: ICD-10-CM

## 2024-04-16 DIAGNOSIS — J31.0 CHRONIC RHINITIS: ICD-10-CM

## 2024-04-16 DIAGNOSIS — E78.2 MIXED HYPERLIPIDEMIA: ICD-10-CM

## 2024-04-16 DIAGNOSIS — Z79.4 TYPE 2 DIABETES MELLITUS WITH COMPLICATION, WITH LONG-TERM CURRENT USE OF INSULIN (H): Primary | ICD-10-CM

## 2024-04-16 DIAGNOSIS — K59.00 CONSTIPATION, UNSPECIFIED CONSTIPATION TYPE: ICD-10-CM

## 2024-04-16 DIAGNOSIS — R06.02 SOB (SHORTNESS OF BREATH): ICD-10-CM

## 2024-04-16 DIAGNOSIS — M85.80 OSTEOPENIA, UNSPECIFIED LOCATION: ICD-10-CM

## 2024-04-16 DIAGNOSIS — E11.8 TYPE 2 DIABETES MELLITUS WITH COMPLICATION, WITH LONG-TERM CURRENT USE OF INSULIN (H): Primary | ICD-10-CM

## 2024-04-16 PROCEDURE — 99607 MTMS BY PHARM ADDL 15 MIN: CPT | Performed by: PHARMACIST

## 2024-04-16 PROCEDURE — 99606 MTMS BY PHARM EST 15 MIN: CPT | Performed by: PHARMACIST

## 2024-04-16 NOTE — PATIENT INSTRUCTIONS
"Recommendations from today's MTM visit:                                                         No medication changes. The diabetes medicines were approved through the patient assistance program. You should receive them at some point next week.     Follow-up: Return in about 5 weeks (around 5/21/2024) for Medication Management Pharmacist, in person.    It was great speaking with you today.  I value your experience and would be very thankful for your time in providing feedback in our clinic survey. In the next few days, you may receive an email or text message from [employer] with a link to a survey related to your  clinical pharmacist.\"     To schedule another MTM appointment, please call the clinic directly or you may call the MTM scheduling line at [phone].    My Clinical Pharmacist's contact information:                                                      Please feel free to contact me with any questions or concerns you have.      Bradley Liao, PharmD  Medication Therapy Management (MTM) Pharmacist  [facility]      "

## 2024-04-16 NOTE — PROGRESS NOTES
Patient is now signed up for [employer]. I did let her know that we have the mobile market here on Wednesdays from 3:30-4:30 and she will be able to spend $80 a month on the market. I also let her know about other food programs that we offer and will call her when sign ups for that is active.

## 2024-04-17 ENCOUNTER — TELEPHONE (OUTPATIENT)
Dept: FAMILY MEDICINE | Facility: CLINIC | Age: 69
End: 2024-04-17
Payer: COMMERCIAL

## 2024-04-17 NOTE — TELEPHONE ENCOUNTER
Called and spoke to patient and let her know insulin was delivered to the clinic. Patient will  today or tomorrow

## 2024-04-18 ENCOUNTER — TELEPHONE (OUTPATIENT)
Dept: NURSING | Facility: CLINIC | Age: 69
End: 2024-04-18
Payer: COMMERCIAL

## 2024-04-18 NOTE — TELEPHONE ENCOUNTER
Telephone Call regarding medications and insurance:     1) Ozempic: Pt calling to report that she was told that she would be started on Ozempic and is asking when she will receive it    2) Box of Miralax from clinic: Clinic gives the patient a box of Miralax packets for the month and she is wondering if she can come get one    3) Insurance change: Pt is considering changing her insurance to [employer] because [facility] does not cover her dental as much as [employer] would. She wants to know if this insurance is accepted at the [facility]. She was given the [facility] billing number to call to discuss this with them.     Routing this message to the clinic nurse pool to follow up with patient    Erin Goldstein RN  [facility] Nurse Advisor 7:38 AM 4/18/2024

## 2024-04-23 ENCOUNTER — TELEPHONE (OUTPATIENT)
Dept: FAMILY MEDICINE | Facility: CLINIC | Age: 69
End: 2024-04-23
Payer: COMMERCIAL

## 2024-04-23 ENCOUNTER — TELEPHONE (OUTPATIENT)
Dept: PHARMACY | Facility: CLINIC | Age: 69
End: 2024-04-23
Payer: COMMERCIAL

## 2024-04-23 NOTE — TELEPHONE ENCOUNTER
Called and spoke to patient and let her know her Ozempic, novolog and needles were delivered to the clinic. Patient will  today after lunch

## 2024-04-23 NOTE — TELEPHONE ENCOUNTER
Medication Question or Refill    Contacts         Type Contact Phone/Fax    04/23/2024 09:21 AM CDT Phone (Incoming) Fredis Stoddard (Self) [phone] (M)            What medication are you calling about (include dose and sig)?: Ozempic    Preferred Pharmacy:    [id number] - [place], [place] - [address] [place] [address] AT [address] [facility] [address] [place] [address]  Phone: [phone] Fax: [phone]      Controlled Substance Agreement on file:   CSA -- Patient Level:    CSA: None found at the patient level.       Who prescribed the medication?: Bradley Liao    Do you need a refill? Yes    When did you use the medication last? Never, will be first time    Patient offered an appointment? No    Do you have any questions or concerns?  Yes: In comments      Okay to leave a detailed message?: Yes at Cell number on file:    Telephone Information:   Mobile [phone]

## 2024-04-25 ENCOUNTER — TELEPHONE (OUTPATIENT)
Dept: FAMILY MEDICINE | Facility: CLINIC | Age: 69
End: 2024-04-25

## 2024-04-25 NOTE — TELEPHONE ENCOUNTER
Hi Tao. I think that this patient would really benefit from the fresh food prescription box. I do have her signed up for [employer], but I know she could use all the additional resources she can get.

## 2024-04-26 ENCOUNTER — TELEPHONE (OUTPATIENT)
Dept: FAMILY MEDICINE | Facility: CLINIC | Age: 69
End: 2024-04-26
Payer: COMMERCIAL

## 2024-04-26 NOTE — TELEPHONE ENCOUNTER
Patient called to inform that the next time she sees Bradley, she will be bringing back one boxes of the 6mm needles as she does not need them.

## 2024-04-29 ENCOUNTER — PATIENT OUTREACH (OUTPATIENT)
Dept: CARE COORDINATION | Facility: CLINIC | Age: 69
End: 2024-04-29
Payer: COMMERCIAL

## 2024-04-29 ENCOUNTER — TELEPHONE (OUTPATIENT)
Dept: FAMILY MEDICINE | Facility: CLINIC | Age: 69
End: 2024-04-29
Payer: COMMERCIAL

## 2024-04-29 DIAGNOSIS — Z71.89 OTHER SPECIFIED COUNSELING: Primary | Chronic | ICD-10-CM

## 2024-04-29 NOTE — PROGRESS NOTES
Clinic Care Coordination Contact    CCC team received message from clinic about pt requesting to apply for etelvina care to assist with medical bills.    CC SW placed an FRW referral for etelvina and for cert pop/[place] application to see if pt is eligible for programs to reduce her medical costs.      Myesha Calabrese, NU   Social Work Care Coordinator   [facility]  [phone]

## 2024-04-29 NOTE — TELEPHONE ENCOUNTER
General Call    Contacts         Type Contact Phone/Fax    04/29/2024 03:00 PM CDT Phone (Incoming) Fredis Stoddard (Self) [phone] ()          Reason for Call:  Financial Help    What are your questions or concerns:  Patient called with concerns with her cost of care. Patient stated that she was enrolled with [facility] through [employer] but has since gotten bills in the mail that she can not afford. Would like any assistance that can be given to get signed up for some kind of assistance to help her pay for her medical bills    Date of last appointment with provider: 4/25/2024    Okay to leave a detailed message?: Yes at Home number on file [phone] (home)

## 2024-04-30 ENCOUNTER — PATIENT OUTREACH (OUTPATIENT)
Dept: CARE COORDINATION | Facility: CLINIC | Age: 69
End: 2024-04-30
Payer: COMMERCIAL

## 2024-04-30 ENCOUNTER — TELEPHONE (OUTPATIENT)
Dept: FAMILY MEDICINE | Facility: CLINIC | Age: 69
End: 2024-04-30
Payer: COMMERCIAL

## 2024-04-30 ENCOUNTER — TELEPHONE (OUTPATIENT)
Dept: PULMONOLOGY | Facility: CLINIC | Age: 69
End: 2024-04-30
Payer: COMMERCIAL

## 2024-04-30 NOTE — TELEPHONE ENCOUNTER
Called and spoke with patient.  Is frustrated with billing, but will call them back again with her issues.

## 2024-04-30 NOTE — TELEPHONE ENCOUNTER
[facility] Call Center    Phone Message    May a detailed message be left on voicemail: yes     Reason for Call: Other: Billing Inquiry   Patient states she uses ARE, Medicare, & [employer]. And she has been having a problem with billing and wanted to let the care team know. Please reach out to follow-up. Thank you.     Action Taken: Other: Pulm    Travel Screening: Not Applicable

## 2024-04-30 NOTE — TELEPHONE ENCOUNTER
Contacted patient and relayed message below from Dr Strange.  Patient agrees with plan.  Reminded patient of appointment tomorrow, 5/1/24    Senia Rosenberg RN  [facility]    Dr Strange  No, I would not dose again- okay to miss one week rather than risk double dosing

## 2024-04-30 NOTE — TELEPHONE ENCOUNTER
General Call      Reason for Call:  Patient called for advice if she should give herself another dose of the weekly Ozempic. Patient did not use the needle included in the packet, instead she uses one of her own and felt not getting the needed dose.  Glucose still high.     What are your questions or concerns:  Using a different needle to administer Ozempic medication. Not receiving the correct dose of medication when not using the right needle.     Date of last appointment with provider: FUENTES BAUGH, PHarmD on 4/16/24.     Okay to leave a detailed message?: Yes at Cell number on file:    Telephone Information:   Mobile [phone]

## 2024-05-01 ENCOUNTER — ALLIED HEALTH/NURSE VISIT (OUTPATIENT)
Dept: FAMILY MEDICINE | Facility: CLINIC | Age: 69
End: 2024-05-01
Payer: COMMERCIAL

## 2024-05-01 DIAGNOSIS — E11.8 TYPE 2 DIABETES MELLITUS WITH COMPLICATION, WITH LONG-TERM CURRENT USE OF INSULIN (H): ICD-10-CM

## 2024-05-01 DIAGNOSIS — Z79.4 TYPE 2 DIABETES MELLITUS WITH COMPLICATION, WITH LONG-TERM CURRENT USE OF INSULIN (H): ICD-10-CM

## 2024-05-01 DIAGNOSIS — F31.4 BIPOLAR 1 DISORDER, DEPRESSED, SEVERE (H): Primary | ICD-10-CM

## 2024-05-01 PROCEDURE — 99207 PR NO CHARGE NURSE ONLY: CPT

## 2024-05-01 PROCEDURE — 96372 THER/PROPH/DIAG INJ SC/IM: CPT | Performed by: FAMILY MEDICINE

## 2024-05-01 NOTE — PROGRESS NOTES
Clinic Administered Medication Documentation      Injectable Medication Documentation    Is there an active order (written within the past 365 days, with administrations remaining, not ) in the chart? Yes.     Patient was given  aripiprazole ER 400mg . Prior to medication administration, verified patient's identity using patient s name and date of birth. Please see MAR and medication order for additional information. Patient instructed to report any adverse reaction to staff immediately.    Vial/Syringe: Single dose vial. Was entire vial of medication used? Yes  Was this medication supplied by the patient? No  Is this a medication the patient will need to receive again? Yes. Verified that the patient has refills remaining in their prescription.

## 2024-05-03 RX ORDER — PEN NEEDLE, DIABETIC 32GX 5/32"
NEEDLE, DISPOSABLE MISCELLANEOUS
Qty: 200 EACH | Refills: 3 | Status: SHIPPED | OUTPATIENT
Start: 2024-05-03 | End: 2024-07-20

## 2024-05-03 NOTE — PROGRESS NOTES
Patient in clinic for monthly Abilify. Requesting 4 mm pen needles. 6mm samples that were provided were too bigh and caused too much bruising.

## 2024-05-13 ENCOUNTER — PATIENT OUTREACH (OUTPATIENT)
Dept: CARE COORDINATION | Facility: CLINIC | Age: 69
End: 2024-05-13
Payer: COMMERCIAL
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
## 2024-05-21 ENCOUNTER — TELEPHONE (OUTPATIENT)
Dept: PHARMACY | Facility: CLINIC | Age: 69
End: 2024-05-21
Payer: COMMERCIAL

## 2024-05-21 ENCOUNTER — OFFICE VISIT (OUTPATIENT)
Dept: PHARMACY | Facility: CLINIC | Age: 69
End: 2024-05-21
Payer: COMMERCIAL

## 2024-05-21 VITALS
WEIGHT: 183 LBS | SYSTOLIC BLOOD PRESSURE: 114 MMHG | HEART RATE: 79 BPM | DIASTOLIC BLOOD PRESSURE: 66 MMHG | BODY MASS INDEX: 32.83 KG/M2

## 2024-05-21 DIAGNOSIS — K75.81 NONALCOHOLIC STEATOHEPATITIS: ICD-10-CM

## 2024-05-21 DIAGNOSIS — K59.00 CONSTIPATION, UNSPECIFIED CONSTIPATION TYPE: ICD-10-CM

## 2024-05-21 DIAGNOSIS — J31.0 CHRONIC RHINITIS: ICD-10-CM

## 2024-05-21 DIAGNOSIS — E55.9 VITAMIN D DEFICIENCY: ICD-10-CM

## 2024-05-21 DIAGNOSIS — K21.00 GASTROESOPHAGEAL REFLUX DISEASE WITH ESOPHAGITIS, UNSPECIFIED WHETHER HEMORRHAGE: ICD-10-CM

## 2024-05-21 DIAGNOSIS — E11.8 TYPE 2 DIABETES MELLITUS WITH COMPLICATION, WITH LONG-TERM CURRENT USE OF INSULIN (H): Primary | ICD-10-CM

## 2024-05-21 DIAGNOSIS — M85.80 OSTEOPENIA, UNSPECIFIED LOCATION: ICD-10-CM

## 2024-05-21 DIAGNOSIS — F31.4 BIPOLAR 1 DISORDER, DEPRESSED, SEVERE (H): ICD-10-CM

## 2024-05-21 DIAGNOSIS — E78.2 MIXED HYPERLIPIDEMIA: ICD-10-CM

## 2024-05-21 DIAGNOSIS — Z79.4 TYPE 2 DIABETES MELLITUS WITH COMPLICATION, WITH LONG-TERM CURRENT USE OF INSULIN (H): Primary | ICD-10-CM

## 2024-05-21 PROCEDURE — 99606 MTMS BY PHARM EST 15 MIN: CPT | Performed by: PHARMACIST

## 2024-05-21 PROCEDURE — 99607 MTMS BY PHARM ADDL 15 MIN: CPT | Performed by: PHARMACIST

## 2024-05-21 NOTE — PATIENT INSTRUCTIONS
"Recommendations from today's MTM visit:                                                         Increase Tresiba to 55 units twice daily (total 110 units for the day).   Increase Novolog to 35 units three times daily with meals.     Follow-up: Return in about 6 weeks (around 7/2/2024) for Medication Management Pharmacist, in person.    It was great speaking with you today.  I value your experience and would be very thankful for your time in providing feedback in our clinic survey. In the next few days, you may receive an email or text message from [employer] with a link to a survey related to your  clinical pharmacist.\"     To schedule another MTM appointment, please call the clinic directly or you may call the MTM scheduling line at [phone].    My Clinical Pharmacist's contact information:                                                      Please feel free to contact me with any questions or concerns you have.      Bradley Liao, PharmD  Medication Therapy Management (MTM) Pharmacist  [facility] and Pain Center      "

## 2024-05-21 NOTE — TELEPHONE ENCOUNTER
Reason for Call:  Appointment Request    Patient requesting this type of appt: Chronic Diease Management/Medication/Follow-Up    Requested provider:  RAMIRO Pendleton, 1 hour appointment     Reason patient unable to be scheduled:  CS is unable to schedule the appointment for 1 hour    When does patient want to be seen/preferred time:  7/2/24    Comments: call patient     Okay to leave a detailed message?: Yes at Cell number on file:    Telephone Information:   Mobile [phone]       Call taken on 5/21/2024 at 12:14 PM by Teresa Almeida

## 2024-05-21 NOTE — TELEPHONE ENCOUNTER
Returned call to patient.   She has decided she wants to continue to pursue a friendship with violinist.   Encouraged patient to be cautious about sharing personal information or providing financial assistance.

## 2024-05-21 NOTE — TELEPHONE ENCOUNTER
Patient would like a call backl    Reason for call:  Discuss about a relationship she was talking to you warner in her visit    Information relayed to patient:  I would give the message to the provider    Patient has additional questions:  No      Okay to leave a detailed message?: Yes at Home number on file [phone] (home)

## 2024-05-22 ENCOUNTER — TELEPHONE (OUTPATIENT)
Dept: PHARMACY | Facility: CLINIC | Age: 69
End: 2024-05-22
Payer: COMMERCIAL

## 2024-05-22 NOTE — TELEPHONE ENCOUNTER
Patient Returning Call    Reason for call:  Patient talked to liver MD Dr. Yousif and MD stated it was ok for her to take a small dose of metformin but patient is questioning if this is ok    Information relayed to patient:      Patient has additional questions:  No      Okay to leave a detailed message?: Yes at Home number on file [phone] (home)

## 2024-05-23 NOTE — TELEPHONE ENCOUNTER
Called patient back today - She states she is doing better since the insulin adjustments Bradley made. She called her liver doctor as requested and metformin would be ok, but she would prefer not to add if she doesn't have to. Asked if this issue could wait for discussion until upcoming appointment on 7/1, but patient is insistent on getting a call back from Bradley when he returns from vacation. Pt informed he is not back until next week.     Miranda Walker, PharmD, [place], BCACP  MTM Pharmacist, [facility]

## 2024-05-24 ENCOUNTER — TELEPHONE (OUTPATIENT)
Dept: FAMILY MEDICINE | Facility: CLINIC | Age: 69
End: 2024-05-24
Payer: COMMERCIAL

## 2024-05-24 NOTE — TELEPHONE ENCOUNTER
Order/Referral Request    Who is requesting: Patient     Orders being requested: Pt  wants to be referred to Garett Archer at [address] [place] [address] clinic     Reason service is needed/diagnosis: sciatica, arthritis      When are orders needed by: 4th of June     Has this been discussed with Provider: No, Pt states provider can call back to discuss.     Does patient have an appointment scheduled?: No    Where to send orders: Fax    Okay to leave a detailed message?: Yes at Cell number on file:    Telephone Information:   Mobile [phone]

## 2024-05-24 NOTE — TELEPHONE ENCOUNTER
Patient calls back inquiring about status of referral request. Patient state patient is in severe pain and would like this referral as soon as possible please. Writer inform caller message was sent to provider- awaiting for response. Writer will send provider another message. Caller verbalizes understanding.     Ayala Almeida,   [facility]  May 24, 2024 1:35 PM

## 2024-05-26 NOTE — TELEPHONE ENCOUNTER
Hmmm- I have never talked to her about her back pain though I know that she has had a problem with it  II think we should do an appt- virtual okay  Okay to use reserved slot  Also, is this provider in out system- if not, Not sure I can for sure refer her- might need to be reviewed by outside reviewer

## 2024-05-28 NOTE — TELEPHONE ENCOUNTER
Went to  urgent care for sciatica pain over the weekend. Was referred to PT. Does plan to schedule. Has started doing some exercises over the weekend.     Blood glucose have been dropping low. Doesn't think she's going to need Metformin at this time. Okay to hold off. Can consider if needed in the future.

## 2024-05-28 NOTE — TELEPHONE ENCOUNTER
Patient was seen in [facility] and has follow up PT scheduled as recommended. New referral request was noted at that time.    MICHELLE Hopper, RN (she/her)  [facility] RN

## 2024-05-29 ENCOUNTER — ALLIED HEALTH/NURSE VISIT (OUTPATIENT)
Dept: FAMILY MEDICINE | Facility: CLINIC | Age: 69
End: 2024-05-29
Payer: COMMERCIAL

## 2024-05-29 DIAGNOSIS — F31.4 BIPOLAR 1 DISORDER, DEPRESSED, SEVERE (H): Primary | ICD-10-CM

## 2024-05-29 PROCEDURE — 99207 PR NO CHARGE NURSE ONLY: CPT

## 2024-05-29 PROCEDURE — 96372 THER/PROPH/DIAG INJ SC/IM: CPT | Performed by: FAMILY MEDICINE

## 2024-06-03 NOTE — TELEPHONE ENCOUNTER
this patient returned call from message you left for her to julissa her appt with Bradley Liao for the [facility] and I thought I was doing that but evidently not, could you please reach out to her again to julissa her appt since I seem not to know what I'm doing.

## 2024-06-10 DIAGNOSIS — Z79.4 TYPE 2 DIABETES MELLITUS WITH COMPLICATION, WITH LONG-TERM CURRENT USE OF INSULIN (H): ICD-10-CM

## 2024-06-10 DIAGNOSIS — F31.4 BIPOLAR 1 DISORDER, DEPRESSED, SEVERE (H): ICD-10-CM

## 2024-06-10 DIAGNOSIS — E11.8 TYPE 2 DIABETES MELLITUS WITH COMPLICATION, WITH LONG-TERM CURRENT USE OF INSULIN (H): ICD-10-CM

## 2024-06-10 RX ORDER — LITHIUM CARBONATE 450 MG
TABLET, EXTENDED RELEASE ORAL
Qty: 90 TABLET | Refills: 1 | Status: SHIPPED | OUTPATIENT
Start: 2024-06-10 | End: 2024-07-30

## 2024-06-10 RX ORDER — PEN NEEDLE, DIABETIC 32GX 5/32"
NEEDLE, DISPOSABLE MISCELLANEOUS
Qty: 200 EACH | Refills: 3 | Status: CANCELLED | OUTPATIENT
Start: 2024-06-10

## 2024-06-10 NOTE — TELEPHONE ENCOUNTER
Medication Question or Refill        What medication are you calling about (include dose and sig)?:   insulin degludec (TRESIBA) 100 UNIT/ML pen   BD GERARDO U/F 32G X 4 MM insulin pen needle     Controlled Substance Agreement on file:   CSA -- Patient Level:    CSA: None found at the patient level.       Who prescribed the medication?: PCP    Do you need a refill? Yes    When did you use the medication last? NA    Patient offered an appointment? No    Do you have any questions or concerns?  Yes: Pt says you usually order them for her and have it delivered to the clinic for pt.

## 2024-06-18 DIAGNOSIS — R05.1 ACUTE COUGH: ICD-10-CM

## 2024-06-18 DIAGNOSIS — Z79.4 TYPE 2 DIABETES MELLITUS WITH COMPLICATION, WITH LONG-TERM CURRENT USE OF INSULIN (H): ICD-10-CM

## 2024-06-18 DIAGNOSIS — E11.8 TYPE 2 DIABETES MELLITUS WITH COMPLICATION, WITH LONG-TERM CURRENT USE OF INSULIN (H): ICD-10-CM

## 2024-06-18 RX ORDER — ALBUTEROL SULFATE 90 UG/1
AEROSOL, METERED RESPIRATORY (INHALATION)
Qty: 18 G | Refills: 1 | Status: SHIPPED | OUTPATIENT
Start: 2024-06-18 | End: 2024-09-17

## 2024-06-18 RX ORDER — ALBUTEROL SULFATE 90 UG/1
2 AEROSOL, METERED RESPIRATORY (INHALATION) EVERY 6 HOURS PRN
Qty: 18 G | Refills: 1 | Status: SHIPPED | OUTPATIENT
Start: 2024-06-18 | End: 2024-09-06

## 2024-06-18 NOTE — TELEPHONE ENCOUNTER
Medication Question or Refill        What medication are you calling about (include dose and sig)?:   Semaglutide (OZEMPIC, 2 MG/DOSE, SC)     insulin degludec (TRESIBA) 100 UNIT/ML pen   albuterol (PROAIR HFA/PROVENTIL HFA/VENTOLIN HFA) 108 (90 Base) MCG/ACT inhaler     Preferred Pharmacy:   [id number] - [place], [place] - [address] [place] [address] & [address] [place] [address]  Phone: [phone] Fax: [phone]      Controlled Substance Agreement on file:   CSA -- Patient Level:    CSA: None found at the patient level.       Who prescribed the medication?: Gerald Strange MD     Do you need a refill? Yes    When did you use the medication last? N/a    Patient offered an appointment? No    Do you have any questions or concerns?  No      Okay to leave a detailed message?: Yes at Cell number on file:    Telephone Information:   Mobile [phone]

## 2024-06-19 ENCOUNTER — TELEPHONE (OUTPATIENT)
Dept: FAMILY MEDICINE | Facility: CLINIC | Age: 69
End: 2024-06-19
Payer: COMMERCIAL

## 2024-06-20 NOTE — PATIENT INSTRUCTIONS - HE
"Occupational Therapy                 Therapy Communication Note    Patient Name: Fidelia Norman  MRN: [id number]  Today's Date: 6/20/2024     Discipline: Occupational Therapy    Missed Visit Reason: Patient refused (Pt refused c/o \"feeling too dizzy\" and \"not sleeping last night.\" Communicated with nurse.)    Missed Time: Attempt @ 1345    " Patient Instructions by Eileen Sanchez CNP at 12/9/2020  5:35 PM     Author: Eileen Sanchez CNP Service: -- Author Type: Nurse Practitioner    Filed: 12/9/2020  5:32 PM Encounter Date: 12/9/2020 Status: Addendum    : Eileen Sanchez CNP (Nurse Practitioner)    Related Notes: Original Note by Eileen Sanchez CNP (Nurse Practitioner) filed at 12/9/2020  5:31 PM         Patient Education     Understanding Carbohydrates    A car needs the right type of fuel to run. And you need the right kind of food to function. To keep your energy level up, your body needs food that has carbohydrates. But carbs raise blood sugar levels higher and faster than other kinds of food. Your dietitian will work with you to figure out the amount of carbs you need. Carbs come in 3 types: starches, sugars, and fiber.   Starches  Starches are found in grains, some vegetables, and beans. Grain products include bread, pasta, cereal, and tortillas. Starchy vegetables include potatoes, peas, corn, lima beans, yams, and squash. Kidney beans, cuevas beans, black beans, garbanzo beans, and lentils also have starches.  Sugars  Sugars are found naturally in many foods. Or they can be added. Foods that contain natural sugar include fruits and fruit juices, dairy products, honey, and molasses. Added sugars are found in most desserts, processed foods, candy, regular soda, and fruit drinks. These are very helpful to treat low blood sugar (hypoglycemia). They give you sugar quickly. Try to keep at least 15 to 20 grams of these simple sugars with you at all times. Eat or drink these if you start to have symptoms of low blood sugar.  Fiber  Fiber comes from plant foods. Your body can't digest most fiber. Instead of raising blood sugar levels like other carbs, fiber stops blood sugar from rising too fast. Fiber is found in fruits, vegetables, whole grains, beans, peas, and many nuts.  Carb counting  Keep track of the amount  of carbs you eat. This can help you keep the right balance of carbs, physical activity, and medicine. The amount of carbs you need will be different from what other people need. How much you need depends on many things. These include your health, the medicines you take, and how active you are. Your healthcare team will help you figure out the right amount of carbs for you. You may start with 45 to 60 grams of carbs per meal, depending on your case. Carb counting is a system that helps you keep track of the carbohydrates you eat at each meal.  Carbs come from a variety of foods. These include grains, starchy vegetables, fruit, milk, beans, and snack foods. You can either count carbohydrate grams or carbohydrate servings. When you count carbohydrate servings, 1 carbohydrate serving = 15 grams of carbohydrates.  Here are some examples of foods that have about 15 grams of carbohydrates (1 serving of carbohydrates):    1/2 cup of canned or frozen fruit    A small piece of fresh fruit (4 ounces)    1 slice of bread    1/2 cup of oatmeal    1/3 cup of rice    4 to 6 crackers    1/2 English muffin    1/2 cup of black beans    1/4 of a large baked potato (3 ounces)    2/3 cup of plain fat-free yogurt    1 cup of soup    1/2 cup of casserole    6 chicken nuggets    2-inch-square brownie or cake without frosting    2 small cookies    1/2 cup of ice cream or sherbet  Carb counting is easier when food labels are available. Look at the label to see how many grams of total carbohydrates per serving the food contains. Then you can figure out how much you should eat. If your food doesn't have a nutrition label, you should be able to get an idea how many carbs there are per serving by using a book or website.   Two very important lines to look at on the label are the serving size and the total carbohydrate amount per serving. Here are some tips for using food labels to count your carbs:    Check the serving size. The information on  the label is based on that serving size. If you eat more than the listed serving size, you may have to double or triple the other information on the label.     Check the total grams of carbohydrates. Total carbohydrate from the label includes sugar, starch, and fiber. Be sure to use the total carbohydrate number and not sugar alone.    Know how many grams of carbs you can have.  Be familiar with the matching portion sizes.    Compare labels. Compare the labels of different products. Look at serving sizes and total carbs to find the products that work best for you.     Don't forget protein and fat. With the focus on carb counting, it might be easy to forget protein and fat in your meals. Don't forget to include sources of protein and healthy fat to balance your meals. Also watch how much salt (sodium) you eat. This is especially true if you have high blood pressure. If you have diabetes, limit the amount of sodium to less than 2,300 mg a day.  Its also important to be consistent with the amount of carbs and time you eat when taking a fixed dose of diabetes medicine. Work with your healthcare provider or dietitian if you need more help. He or she can help you keep track of your carbs. He or she can also help you figure out how many grams of carbs you should have.  Date Last Reviewed: 9/1/2018 2000-2019 [employer]. [address] [place] [address], [place] [id number]. All rights reserved. This information is not intended as a substitute for professional medical care. Always follow your healthcare professional's instructions.           Patient Education     Diet: Diabetes  Food is an important tool that you can use to control diabetes and stay healthy. Eating well-balanced meals in the correct amounts will help you control your blood glucose levels and prevent low blood sugar reactions. It will also help you reduce the health risks of diabetes. There is no one specific diet that is right for everyone with  diabetes. But there are general guidelines to follow. A registered dietitian (RD) will create a tailored diet approach thats just right for you. He or she will also help you plan healthy meals and snacks. If you have any questions, call your dietitian for advice.     Guidelines for success  Talk with your healthcare provider before starting a diabetes diet or weight loss program. If you haven't talked with a dietitian yet, ask your provider for a referral. The following guidelines can help you succeed:    Select foods from the 6 food groups below. Your dietitian will help you find food choices within each group. He or she will also show you serving sizes and how many servings you can have at each meal.  ? Grains, beans, and starchy vegetables  ? Vegetables  ? Fruit  ? Milk or yogurt  ? Meat, poultry, fish, or tofu  ? Healthy fats    Check your blood sugar levels as directed by your provider. Take any medicine as prescribed by your provider.    Learn to read food labels and pick the right portion sizes.    Eat only the amount of food in your meal plan. Eat about the same amount of food at regular times each day. Dont skip meals. Eat meals 4 to 5 hours apart, with snacks in between.    Limit alcohol. It raises blood sugar levels. Drink water or calorie-free diet drinks that use safe sweeteners.    Eat less fat to help lower your risk of heart disease. Use nonfat or low-fat dairy products and lean meats. Avoid fried foods. Use cooking oils that are unsaturated, such as olive, canola, or peanut oil.    Talk with your dietitian about safe sugar substitutes.    Avoid added salt. It can contribute to high blood pressure, which can cause heart disease. People with diabetes already have a risk of high blood pressure and heart disease.    Stay at a healthy weight. If you need to lose weight, cut down on your portion sizes. But dont skip meals. Exercise is an important part of any weight management program. Talk with your  provider about an exercise program thats right for you.    For more information about the best diet plan for you, talk with a registered dietitian (RD). To find an RD in your area, contact:  ? Academy of Nutrition and Dietetics www.eatright.org  ? The American Diabetes Association 417-622-8876 www.diabetes.org  Date Last Reviewed: 8/1/2016 2000-2017 [employer]. [address] [place] [address], [place] [address]. All rights reserved. This information is not intended as a substitute for professional medical care. Always follow your healthcare professional's instructions.           Patient Education     Diabetes: Understanding Carbohydrates, Fats, and Protein  Food is a source of fuel and nourishment for your body. Its also a source of pleasure. Having diabetes doesnt mean you have to eat special foods or give up desserts. Instead, your dietitian can show you how to plan meals to suit your body. To start, learn how different foods affect blood sugar.  Carbohydrates  Carbohydrates (carbs) are the main source of fuel for the body. They raise blood sugar. Many people think carbohydrates are only in pasta or bread. But carbohydrates are in many kinds of foods. Carbs include:    Sugars.These are naturally in foods such as fruit, milk, honey, and molasses. Sugars can also be added to many foods. They may be added to cereals and yogurt to candy and desserts. Sugars raise blood sugar.    Starches. These are in bread, cereals, pasta, and dried beans. Theyre found in corn, peas, potatoes, yam, acorn squash, and butternut squash. Starches raise blood sugar.     Fiber. This is in foods such as vegetables, fruits, beans, and whole grains. Unlike other carbs, fiber isnt digested or absorbed. So it doesnt raise blood sugar. In fact, fiber can help keep blood sugar from rising too fast. It helps keep blood cholesterol at a healthy level.  Did you know?  Even though carbohydrates raise blood sugar, its best to have some in  "every meal. They are an important part of a healthy diet.  Fat  Fat is an energy source that can be stored until needed. Fat does not raise blood sugar. But it can raise blood cholesterol. This increases the risk of heart disease. Fat is high in calories. Eating too many calories can cause weight gain. Not all types of fat are the same.  More healthy:    Monounsaturated fats. These are mostly found in vegetable oils, such as olive, canola, and peanut oils. They are found in avocados and some nuts. Monounsaturated fats are healthy for your heart. Thats because they lower LDL (unhealthy) cholesterol.    Polyunsaturated fats.These are mostly found in vegetable oils, such as corn, safflower, and soybean oils. They are found in some seeds, nuts, and fish. Polyunsaturated fats lower LDL (unhealthy) cholesterol. So, choosing them instead of saturated fats is healthy for your heart. Certain unsaturated fats can help lower triglycerides.   Less healthy:    Saturated fats.These are found in animal products, such as meat, poultry, whole milk, lard, and butter. Saturated fats raise LDL cholesterol.They are not healthy for your heart.    Hydrogenated oilsand trans fats. These are formed when vegetable oils are processed into solid fats. They are found in many processed foods. Hydrogenated oils and trans fats raise LDL (\"bad\") cholesterol and lower HDL (\"good\") cholesterol. They are not healthy for your heart.  Protein  Protein helps the body build and repair muscle and other tissue. Protein has little or no effect on blood sugar. But many foods that have protein also have saturated fat. By choosing low-fat protein sources, you can get the benefits of protein without the extra fat:    Plant protein. This is found in dry beans and peas, nuts, and soy products, such as tofu and soymilk. These foods tend to have no cholesterol.Most are low in saturated fat.    Animal protein. This is found in fish, poultry, meat, cheese, milk, and " eggs. These foods have cholesterol.They can be high in saturated fat. Aim for lean, lower-fat choices. Don't eat fried foods.  Date Last Reviewed: 4/1/2019 2000-2019 [employer]. [address] [place] [address], [place] [address]. All rights reserved. This information is not intended as a substitute for professional medical care. Always follow your healthcare professional's instructions.         Ebony So: Diabetes Educator

## 2024-06-21 ENCOUNTER — TELEPHONE (OUTPATIENT)
Dept: FAMILY MEDICINE | Facility: CLINIC | Age: 69
End: 2024-06-21

## 2024-06-21 NOTE — TELEPHONE ENCOUNTER
Please inform patient:     Ozempic comes through the assistance program. Gets delivered to the clinic. I have already submitted the paperwork for refills so hopefully should receive soon.

## 2024-06-21 NOTE — TELEPHONE ENCOUNTER
Unable to LM at [phone] due to number is not in service . Sending letter out and postponing task out to 2 weeks and will try again if an appointment hasn't been made.

## 2024-06-21 NOTE — TELEPHONE ENCOUNTER
Medication Question or Refill        What medication are you calling about (include dose and sig)?:   Semaglutide (OZEMPIC, 2 MG/DOSE, SC)     Preferred Pharmacy:   [id number] - [place], [place] - [address] [place] [address] AT [facility] & [address] [place] [address]  Phone: [phone] Fax: [phone]  Controlled Substance Agreement on file:   CSA -- Patient Level:    CSA: None found at the patient level.       Who prescribed the medication?: PCP    Do you have any questions or concerns?  Yes: Pt wants to know if this RX will be sent to the pharmacy or clinic. If pharmacy then a prescription needs to be sent over.

## 2024-06-25 NOTE — TELEPHONE ENCOUNTER
Prior Authorization Not Needed per Insurance    Medication:   Insurance Company: [employer] - Phone [phone] Fax [phone]  Expected CoPay:      Pharmacy Filling the Rx: [id number] - [place], [place] - [address] [place] [address] N AT [facility] [address]    Information regarding your request  An active authorization already exists for this patient. Proceed with prescribing the Medication or call Pharmacy Customer Care at [phone]. MPA: [id number] Effective: 4/9/2024 12:00:00 AM Terminate: 12/31/2024 12:00:00 AM

## 2024-06-26 ENCOUNTER — TELEPHONE (OUTPATIENT)
Dept: FAMILY MEDICINE | Facility: CLINIC | Age: 69
End: 2024-06-26

## 2024-06-26 ENCOUNTER — ALLIED HEALTH/NURSE VISIT (OUTPATIENT)
Dept: FAMILY MEDICINE | Facility: CLINIC | Age: 69
End: 2024-06-26
Payer: COMMERCIAL

## 2024-06-26 DIAGNOSIS — Z13.9 ENCOUNTER FOR SCREENING INVOLVING SOCIAL DETERMINANTS OF HEALTH (SDOH): Primary | ICD-10-CM

## 2024-06-26 DIAGNOSIS — F31.4 BIPOLAR 1 DISORDER, DEPRESSED, SEVERE (H): Primary | ICD-10-CM

## 2024-06-26 PROCEDURE — 96372 THER/PROPH/DIAG INJ SC/IM: CPT | Performed by: FAMILY MEDICINE

## 2024-06-26 PROCEDURE — 99207 PR NO CHARGE NURSE ONLY: CPT

## 2024-06-26 NOTE — PROGRESS NOTES
Clinic Administered Medication Documentation      Injectable Medication Documentation    Is there an active order (written within the past 365 days, with administrations remaining, not ) in the chart? Yes.     Patient was given  Abilify ER 400mg . Prior to medication administration, verified patient's identity using patient s name and date of birth. Please see MAR and medication order for additional information. Patient instructed to report any adverse reaction to staff immediately.    Vial/Syringe: Single dose vial. Was entire vial of medication used? Yes  Was this medication supplied by the patient? No  Is this a medication the patient will need to receive again? Yes. Verified that the patient has refills remaining in their prescription.    Sravani Dunham RN  [facility]

## 2024-06-26 NOTE — TELEPHONE ENCOUNTER
"Patient presented to clinic for RN visit and expressed the following concerns:  Need for financial assistance with dental bills--has dental insurance, but reports significant dental bills.  Housing assistance. Patient currently lives in apartment building \"infested with bedbugs\". She is considering relocating, but needs assistance finding a new place to live.    Patient is \"working on getting a phone\", but is currently unreachable by phone. If possible, contact patient by mail or email.    Sravani Dunham RN  [facility]    "

## 2024-06-27 ENCOUNTER — PATIENT OUTREACH (OUTPATIENT)
Dept: CARE COORDINATION | Facility: CLINIC | Age: 69
End: 2024-06-27
Payer: COMMERCIAL

## 2024-06-27 NOTE — PROGRESS NOTES
6/27/2024  [facility]: [place] [facility] Eligibility Response (271)     SUBSCRIBER INFORMATION  Date of Service Subscriber ID Subscriber Name Birthdate Age Gender  06/03/2024  LUISITO FONSECA [id number]      Address  , , ,       PROVIDER INFORMATION  Provider ID Submitter Transaction ID Provider Name Taxonomy Code Qualifier Taxonomy Code  [id number] xx [facility]           Request Validation Messages  Subscriber is inactive.

## 2024-06-27 NOTE — LETTER
[facility] CARE COORDINATION  [address] [facility] [address]    June 27, 2024    Annel Stoddard  [address]  [place] [address]      Dear Annel,    I am a clinic care coordinator who works with Gerald Strange MD with the [facility]. I wanted to introduce myself and provide you with my contact information for you to be able to call me with any questions or concerns. Below is a description of clinic care coordination and how I can further assist you.       The clinic care coordination team is made up of a registered nurse, , financial resource worker and community health worker who understand the health care system. The goal of clinic care coordination is to help you manage your health and improve access to the health care system. Our team works alongside your provider to assist you in determining your health and social needs. We can help you obtain health care and community resources, providing you with necessary information and education. We can work with you through any barriers and develop a care plan that helps coordinate and strengthen the communication between you and your care team.  Our services are voluntary and are offered without charge to you personally.    Please feel free to contact me with any questions or concerns regarding care coordination and what we can offer.      We are focused on providing you with the highest-quality healthcare experience possible.    Sincerely,     Garrick Veras, [facility]  Social Work Care Cooridinator   [facility] [place]   Phone: [phone]

## 2024-06-27 NOTE — PROGRESS NOTES
6/27/2024  Clinic Care Coordination Contact  Care Team Conversations    Consulted with CCC SW and CC Team regarding referral  Per CC SW pt has [facility] worker   EJ Fiore worker from [phone].  Added ARM worker info to care team.  Discussed regarding outreach to patient if pt don't have a phone  Per patient preferred communication through email or mail.    Plan: CC SW Garrick Veras will send a letter with resources and for patient to reach out to CC SW.  No further reach out at this time due to no phone   Routed to CC Sw to review note    George Rollins  Community Health Worker  [facility] Care Coordination  [email or web address]  [email or web address]   Office: [phone]  Fax: [phone]

## 2024-06-27 NOTE — LETTER
Good afternoon Annel.     I understand your phone is not working  at this time however care coordination is primary telephonic support. Please call me the number below whenever your phone is functional. In mean time, below are some resources for support.     Regarding bed buds. Please connect with you building management for support as they can assist with that. If you are interested in moving to different place , you will need to connect with [facility] for transfer. You can also place yourself on waiting list for additional housing such as [facility] housing as there [place] [address] housing is currently open.    [facility] Housing Choice Voucher Waiting List  Housing Choice Voucher Program Waiting List Opening   [facility] Community Development Agency (CDA) will accept online applications for the Section 8 Housing Choice Voucher Program waiting list until further notice.  Apply Online here  Applications are only accepted online.    Get Started  Apply Online Here    Questions? Call [phone].    If you have ARMS services or  through insurance. Please connect with that worker for support if you have one. [employer] worker can help with follow up building if need and placing yourself on wait list for other house.     Regarding dental coverage- I would like some clarification on bill, is it for co-pay. You maybe able to apply for assistance through dental provider system too. You can also call your dental insurance and inquire specific of your coverage.       Thanks     Garrick Veras, [facility]  Social Work Care Cooridinator   [facility]   Phone: [phone]

## 2024-06-27 NOTE — PROGRESS NOTES
Per referral patient phone is working at this time so patient is requesting resources mail out or email. Writer  send mail letter for resources. Writer recommended for patient to connect with building management regarding bed bugs. Writer also provided information to place herself on waiting list for subsidized housing. Writer inquire about dental pill and specific of dental bill. Writer informed patient she can connect with dental provider for financial assistance program. Writer also inquire if patient has services from [facility] or case management through insurance. Writer requested for patient to give me call for additional support and  explained CC role as telephonic.     Garrick Veras [facility]  Social Work Care Cooridinator   [facility]   Phone: [phone]

## 2024-06-27 NOTE — PROGRESS NOTES
6/27/2024  Clinic Care Coordination Contact  Care Team Conversations    Print and mail CC ASHLEY letter to patient.    George Rollins  Community Health Worker  [facility] Care Coordination  [email or web address]   Office: [phone]  Fax: [phone]

## 2024-07-02 ENCOUNTER — TELEPHONE (OUTPATIENT)
Dept: FAMILY MEDICINE | Facility: CLINIC | Age: 69
End: 2024-07-02
Payer: COMMERCIAL

## 2024-07-02 NOTE — TELEPHONE ENCOUNTER
General Call      Reason for Call:  Blood sugar sensor     What are your questions or concerns:  Pt called states that her blood sugar sensor fell off and is requesting a new one. Pt states that she doesn't want it it to be sent to [facility] as she states that she would have to pay a lot of money so she is asking if it could go through [employer]? Pt doesn't have a phone at the moment but she has an appt scheduled on 7/9/2. Please advise.  Thanks!    Date of last appointment with provider: 5/21/24

## 2024-07-02 NOTE — TELEPHONE ENCOUNTER
If patient calls back, inform that insurance will not pay for a new sensor. If she would like to contact [employer] to inform of the issue with her current sensor, they may send her a replacement (I can't guarantee they will). Their number is [phone] patient would need to initiate the call.

## 2024-07-05 ENCOUNTER — ANCILLARY PROCEDURE (OUTPATIENT)
Dept: ULTRASOUND IMAGING | Facility: CLINIC | Age: 69
End: 2024-07-05
Attending: INTERNAL MEDICINE
Payer: COMMERCIAL

## 2024-07-05 ENCOUNTER — TELEPHONE (OUTPATIENT)
Dept: FAMILY MEDICINE | Facility: CLINIC | Age: 69
End: 2024-07-05

## 2024-07-05 ENCOUNTER — LAB (OUTPATIENT)
Dept: LAB | Facility: CLINIC | Age: 69
End: 2024-07-05
Payer: COMMERCIAL

## 2024-07-05 DIAGNOSIS — E78.2 MIXED DIABETIC HYPERLIPIDEMIA ASSOCIATED WITH TYPE 2 DIABETES MELLITUS (H): Primary | ICD-10-CM

## 2024-07-05 DIAGNOSIS — K75.81 NONALCOHOLIC STEATOHEPATITIS: ICD-10-CM

## 2024-07-05 DIAGNOSIS — E11.69 MIXED DIABETIC HYPERLIPIDEMIA ASSOCIATED WITH TYPE 2 DIABETES MELLITUS (H): Primary | ICD-10-CM

## 2024-07-05 DIAGNOSIS — E11.8 TYPE 2 DIABETES MELLITUS WITH COMPLICATION, WITH LONG-TERM CURRENT USE OF INSULIN (H): ICD-10-CM

## 2024-07-05 DIAGNOSIS — Z79.4 TYPE 2 DIABETES MELLITUS WITH COMPLICATION, WITH LONG-TERM CURRENT USE OF INSULIN (H): ICD-10-CM

## 2024-07-05 DIAGNOSIS — K76.9 CHRONIC LIVER DISEASE: ICD-10-CM

## 2024-07-05 LAB
AFP SERPL-MCNC: 1.9 NG/ML
ALBUMIN SERPL BCG-MCNC: 4.5 G/DL (ref 3.5–5.2)
ALP SERPL-CCNC: 123 U/L (ref 40–150)
ALT SERPL W P-5'-P-CCNC: 63 U/L (ref 0–50)
AST SERPL W P-5'-P-CCNC: 67 U/L (ref 0–45)
BILIRUB DIRECT SERPL-MCNC: <0.2 MG/DL (ref 0–0.3)
BILIRUB SERPL-MCNC: 0.5 MG/DL
CREAT SERPL-MCNC: 0.53 MG/DL (ref 0.51–0.95)
EGFRCR SERPLBLD CKD-EPI 2021: >90 ML/MIN/1.73M2
ERYTHROCYTE [DISTWIDTH] IN BLOOD BY AUTOMATED COUNT: 12.9 % (ref 10–15)
HBA1C MFR BLD: 9.4 %
HCT VFR BLD AUTO: 40.2 % (ref 35–47)
HGB BLD-MCNC: 13 G/DL (ref 11.7–15.7)
INR PPP: 1.12 (ref 0.85–1.15)
MCH RBC QN AUTO: 32.1 PG (ref 26.5–33)
MCHC RBC AUTO-ENTMCNC: 32.3 G/DL (ref 31.5–36.5)
MCV RBC AUTO: 99 FL (ref 78–100)
PLATELET # BLD AUTO: 191 10E3/UL (ref 150–450)
PROT SERPL-MCNC: 7.7 G/DL (ref 6.4–8.3)
RBC # BLD AUTO: 4.05 10E6/UL (ref 3.8–5.2)
WBC # BLD AUTO: 5.8 10E3/UL (ref 4–11)

## 2024-07-05 PROCEDURE — 85027 COMPLETE CBC AUTOMATED: CPT | Performed by: PATHOLOGY

## 2024-07-05 PROCEDURE — 85610 PROTHROMBIN TIME: CPT | Performed by: PATHOLOGY

## 2024-07-05 PROCEDURE — 82105 ALPHA-FETOPROTEIN SERUM: CPT | Mod: GA | Performed by: INTERNAL MEDICINE

## 2024-07-05 PROCEDURE — 99000 SPECIMEN HANDLING OFFICE-LAB: CPT | Performed by: PATHOLOGY

## 2024-07-05 PROCEDURE — 36415 COLL VENOUS BLD VENIPUNCTURE: CPT | Performed by: PATHOLOGY

## 2024-07-05 PROCEDURE — 76705 ECHO EXAM OF ABDOMEN: CPT | Mod: GC | Performed by: RADIOLOGY

## 2024-07-05 PROCEDURE — 80076 HEPATIC FUNCTION PANEL: CPT | Performed by: PATHOLOGY

## 2024-07-05 PROCEDURE — 82565 ASSAY OF CREATININE: CPT | Performed by: PATHOLOGY

## 2024-07-05 PROCEDURE — 83036 HEMOGLOBIN GLYCOSYLATED A1C: CPT | Performed by: FAMILY MEDICINE

## 2024-07-05 RX ORDER — SEMAGLUTIDE 2.68 MG/ML
INJECTION, SOLUTION SUBCUTANEOUS
Status: CANCELLED | OUTPATIENT
Start: 2024-07-05

## 2024-07-05 NOTE — TELEPHONE ENCOUNTER
Forms/Letter Request    Type of form/letter: OTHER: [employer] APPLICATION    Do we have the form/letter: Yes: PLACED IN PROVIDER'S IN-BOX    Who is the form from? [employer] (if other please explain)    Where did/will the form come from? form was faxed in    When is form/letter needed by: ASAP    How would you like the form/letter returned: Fax : [phone]    Patient Notified form requests are processed in 5-7 business days:No    Okay to leave a detailed message?: Yes at Other phone number:  [phone]*

## 2024-07-05 NOTE — TELEPHONE ENCOUNTER
Medication Question or Refill        What medication are you calling about (include dose and sig)?: insulin degludec (TRESIBA) 100 UNIT/ML pen     Semaglutide (OZEMPIC, 2 MG/DOSE, SC)       Preferred Pharmacy:    Controlled Substance Agreement on file:   CSA -- Patient Level:    CSA: None found at the patient level.       Who prescribed the medication?: [facility]    Do you need a refill? Yes, pt states she gets medication from [facility] through a pharmaceutical and needs medication asap. Please advise,    When did you use the medication last? N/A    Patient offered an appointment? No    Do you have any questions or concerns?  No      Okay to leave a detailed message?: Yes at Home number on file [phone] (home)

## 2024-07-06 ENCOUNTER — TRANSFERRED RECORDS (OUTPATIENT)
Dept: HEALTH INFORMATION MANAGEMENT | Facility: CLINIC | Age: 69
End: 2024-07-06
Payer: COMMERCIAL
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
## 2024-07-09 ENCOUNTER — TELEPHONE (OUTPATIENT)
Dept: PHARMACY | Facility: CLINIC | Age: 69
End: 2024-07-09
Payer: COMMERCIAL

## 2024-07-09 ENCOUNTER — OFFICE VISIT (OUTPATIENT)
Dept: PHARMACY | Facility: CLINIC | Age: 69
End: 2024-07-09
Payer: COMMERCIAL

## 2024-07-09 VITALS
WEIGHT: 185 LBS | DIASTOLIC BLOOD PRESSURE: 70 MMHG | SYSTOLIC BLOOD PRESSURE: 114 MMHG | BODY MASS INDEX: 33.19 KG/M2 | HEART RATE: 94 BPM

## 2024-07-09 DIAGNOSIS — K59.00 CONSTIPATION, UNSPECIFIED CONSTIPATION TYPE: ICD-10-CM

## 2024-07-09 DIAGNOSIS — E11.8 TYPE 2 DIABETES MELLITUS WITH COMPLICATION, WITH LONG-TERM CURRENT USE OF INSULIN (H): Primary | ICD-10-CM

## 2024-07-09 DIAGNOSIS — J44.9 CHRONIC OBSTRUCTIVE PULMONARY DISEASE, UNSPECIFIED COPD TYPE (H): ICD-10-CM

## 2024-07-09 DIAGNOSIS — M85.80 OSTEOPENIA, UNSPECIFIED LOCATION: ICD-10-CM

## 2024-07-09 DIAGNOSIS — K75.81 NONALCOHOLIC STEATOHEPATITIS: ICD-10-CM

## 2024-07-09 DIAGNOSIS — J31.0 CHRONIC RHINITIS: ICD-10-CM

## 2024-07-09 DIAGNOSIS — Z79.4 TYPE 2 DIABETES MELLITUS WITH COMPLICATION, WITH LONG-TERM CURRENT USE OF INSULIN (H): Primary | ICD-10-CM

## 2024-07-09 DIAGNOSIS — K21.00 GASTROESOPHAGEAL REFLUX DISEASE WITH ESOPHAGITIS, UNSPECIFIED WHETHER HEMORRHAGE: ICD-10-CM

## 2024-07-09 DIAGNOSIS — E78.2 MIXED HYPERLIPIDEMIA: ICD-10-CM

## 2024-07-09 DIAGNOSIS — F31.4 BIPOLAR 1 DISORDER, DEPRESSED, SEVERE (H): ICD-10-CM

## 2024-07-09 DIAGNOSIS — E55.9 VITAMIN D DEFICIENCY: ICD-10-CM

## 2024-07-09 DIAGNOSIS — R06.02 SOB (SHORTNESS OF BREATH): ICD-10-CM

## 2024-07-09 PROCEDURE — 99607 MTMS BY PHARM ADDL 15 MIN: CPT | Performed by: PHARMACIST

## 2024-07-09 PROCEDURE — 99606 MTMS BY PHARM EST 15 MIN: CPT | Performed by: PHARMACIST

## 2024-07-09 RX ORDER — GLYCOPYRROLATE AND FORMOTEROL FUMARATE 9; 4.8 UG/1; UG/1
2 AEROSOL, METERED RESPIRATORY (INHALATION) 2 TIMES DAILY
Qty: 10.7 G | Refills: 11 | Status: SHIPPED | OUTPATIENT
Start: 2024-07-09

## 2024-07-09 RX ORDER — METFORMIN HCL 500 MG
500 TABLET, EXTENDED RELEASE 24 HR ORAL
Qty: 90 TABLET | Refills: 1 | Status: SHIPPED | OUTPATIENT
Start: 2024-07-09 | End: 2024-08-06

## 2024-07-09 NOTE — Clinical Note
Patient's phone is now working. She requested a call to follow-up on the things you mailed to her (she hasn't received them yet).

## 2024-07-09 NOTE — PATIENT INSTRUCTIONS
"Recommendations from today's MTM visit:                                                         Start Metformin 500 mg ER once daily.   I've applied for patient assistance for Bevespi inhaler 9-4.8 mcg. When you receive it, use 2 puffs once daily.   Adult Mental Health Conceirge referral for therapist. You can request to be scheduled with Amanda weiner at [facility].     Follow-up: Return in about 4 weeks (around 8/6/2024) for Medication Management Pharmacist, in person.    It was great speaking with you today.  I value your experience and would be very thankful for your time in providing feedback in our clinic survey. In the next few days, you may receive an email or text message from [employer] with a link to a survey related to your  clinical pharmacist.\"     To schedule another MTM appointment, please call the clinic directly or you may call the MTM scheduling line at [phone].    My Clinical Pharmacist's contact information:                                                      Please feel free to contact me with any questions or concerns you have.      Bradley Liao, PharmD  Medication Therapy Management (MTM) Pharmacist  [facility]      "

## 2024-07-09 NOTE — TELEPHONE ENCOUNTER
Patient tried to call [facility] pharmacy to Bevespi inhaler. Reviewed that we submitted the application and they would call her when it's ready to fill.       Davin suggested a therapist Angelina. Patient elected to try to schedule with Amanda Lawler at [facility].     Did not get any mail from Care Coordinator ([facility] chart note was routed LSW).

## 2024-07-09 NOTE — TELEPHONE ENCOUNTER
Patient Returning Call    Reason for call:  she has some questions for Bradley , she has mutiple issues since your visit today  1- problem getting her meds seems to be a problem ,she was unable to get throough to the company.    2 mental health provider recommended she agreed with      Information relayed to patient:  will have him call    Patient has additional questions:  No      Okay to leave a detailed message?: Yes at Home number on file [phone] (home)    5

## 2024-07-10 NOTE — TELEPHONE ENCOUNTER
Copy of application faxed back, stating that there is a signature mismatch. Per Bradley, he will resign form.     Initial form placed in Dr. Strange's in-box has been shredded.

## 2024-07-10 NOTE — TELEPHONE ENCOUNTER
Patient called back in regards to Bevespi Inhaler. Writer informed patient that it was sent to [facility]. Patient verbalized understanding.

## 2024-07-11 ENCOUNTER — PATIENT OUTREACH (OUTPATIENT)
Dept: CARE COORDINATION | Facility: CLINIC | Age: 69
End: 2024-07-11
Payer: COMMERCIAL

## 2024-07-11 ASSESSMENT — ACTIVITIES OF DAILY LIVING (ADL): DEPENDENT_IADLS:: INDEPENDENT

## 2024-07-11 NOTE — TELEPHONE ENCOUNTER
Patient called to speak with Bradley about patient seeing a therapist. Patient states that she is scheduled to see a therapist that is not with [facility]. Per patient, seeing a therapist is not something she wants to do forever. Patient would like a call back at [phone].

## 2024-07-11 NOTE — LETTER
[facility] CARE COORDINATION  [address] [address]    July 11, 2024    Annel Stoddard  [address]  [place] [address]      Dear Fredis,    I am a clinic care coordinator who works with Gerald Strange MD with the [facility]. I wanted to thank you for spending the time to talk with me.  Below is a description of clinic care coordination and how I can further assist you.       The clinic care coordination team is made up of a registered nurse, , financial resource worker and community health worker who understand the health care system. The goal of clinic care coordination is to help you manage your health and improve access to the health care system. Our team works alongside your provider to assist you in determining your health and social needs. We can help you obtain health care and community resources, providing you with necessary information and education. We can work with you through any barriers and develop a care plan that helps coordinate and strengthen the communication between you and your care team.  Our services are voluntary and are offered without charge to you personally.    Please feel free to contact me with any questions or concerns regarding care coordination and what we can offer.      We are focused on providing you with the highest-quality healthcare experience possible.    Sincerely,     Garrick Veras, [facility]  Social Work Care Cooridinator   [facility]   Phone: [phone]

## 2024-07-11 NOTE — PROGRESS NOTES
Received a call back from patient. Patient was confused with writer calling her. Writer informed referral was initially send due to housing. Patient shared she in subsided housing at [facility]. Patient shared she pays $555 per month. She shared [facility] is bed bug infested and building management shared they treating the bed bug however patient noted she would rather move. Patient noted she placed herself on waiting list chris. Patient she interested in placing herself on additional wait list for housing that independent living that has assisted living. Patient open to writer send her list. Ibrahima inquired if she has to still a therapist even thou she spoke with me. Writer informed writer would still recommend therapist. Patient noted she scheduled with therapist in [facility].     Patient noted her insurance is terrible has it doesn't cover all her medication and no dental. Patient noted she pays $80 per for premium. Writer inquire she enrolled into said insurance. Patient noted senior linkage line has assisted her years ago. Writer encouraged for patient to speak with [employer] during open enrollment and see if she can get on better plan. Patient doesn't want to be [employer] or [employer]. Patient shared her total income is $1883 so she not eligible for MA at this time.    Patient noted she needs dental denture. Writer discussed sliding fee dental clinic. Patient noted she already connect to a dental clinic. She just looking denture. Writer informed patient she likely need to enroll an insurance that cover dental so writer encouraged for patient to connect with [employer] line.     Patient reported no ongoing CC needs at this time so not enrolling patient into CC at this time. Patient will call writer if needs arise.     Garrick Veras [facility]  Social Work Care [facility]   Phone: [phone]

## 2024-07-11 NOTE — PROGRESS NOTES
Clinic Care Coordination Contact  [facility]/Voicemail    Clinical Data: Care Coordinator Outreach        Left message on patient's voicemail with call back information and requested return call.    Plan: Care Coordinator will send care coordination introduction letter with care coordinator contact information and explanation of care coordination services via mail. Care Coordinator will try to reach patient again in 1-2 business days.    Garrick Veras [facility]  Social [facility]   Phone: [phone]
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
## 2024-07-15 ENCOUNTER — TELEPHONE (OUTPATIENT)
Dept: FAMILY MEDICINE | Facility: CLINIC | Age: 69
End: 2024-07-15

## 2024-07-15 NOTE — TELEPHONE ENCOUNTER
Call received from patient who requested writer relay the following to ALISIA Liao, PharmD :  MTM orders Ozempic and Tresiba through pharmaceutical company  Has two injections of Ozempic and one pen of Tresiba remaining  Knows these medications were ordered last week-have they arrived to clinic?  Injects Novolog and Tresiba in AM and at dinnertime, also administers Novolog injection at lunchtime  On Tuesdays also injects Ozempic in AM  Freestyle lore sensor fell off  This is the second time sensor has fallen off in a couple weeks  Going to use glucometer to check blood sugar  Going to see a therapist on 7/18/24      Thank you!  WHITNEY McgeeN, RN-BC  [facility]

## 2024-07-16 ENCOUNTER — TELEPHONE (OUTPATIENT)
Dept: FAMILY MEDICINE | Facility: CLINIC | Age: 69
End: 2024-07-16
Payer: COMMERCIAL

## 2024-07-16 NOTE — TELEPHONE ENCOUNTER
Received Fax from [employer] dated 7/12 - patient approved. Rx [employer] by Guillermo to call patient to schedule delivery of insulin.

## 2024-07-16 NOTE — TELEPHONE ENCOUNTER
Pt called stating that she is changing her pharmacy to [facility] and is having all her prescription transferred there. She was told by [facility] that they do not that the formula for Metformin ER. Pt will need an alternative formula.    Pt stated [facility] will call clinic regarding this issue.      [facility]    - [phone]       WHITNEY Knott CemN RN  [facility]

## 2024-07-17 ENCOUNTER — TELEPHONE (OUTPATIENT)
Dept: FAMILY MEDICINE | Facility: CLINIC | Age: 69
End: 2024-07-17
Payer: COMMERCIAL

## 2024-07-17 NOTE — TELEPHONE ENCOUNTER
General Call    Contacts       Contact Date/Time Type Contact Phone/Fax    07/17/2024 09:33 AM CDT Phone (Incoming) Fredis Stoddard (Self) [phone] (M)          Reason for Call:  Care Coordination    What are your questions or concerns:  Patient calling. She was connected with a  to get some assistance with housing. She stated that she no longer would like this service because the housing she was suggested was too high for her. She would no longer like to work with care coordination at this time.    Date of last appointment with provider: n/a    Okay to leave a detailed message?: No

## 2024-07-18 DIAGNOSIS — R60.0 LOWER EXTREMITY EDEMA: ICD-10-CM

## 2024-07-18 RX ORDER — FUROSEMIDE 20 MG
30 TABLET ORAL DAILY
Qty: 135 TABLET | Refills: 3 | Status: CANCELLED | OUTPATIENT
Start: 2024-07-18

## 2024-07-18 RX ORDER — FUROSEMIDE 20 MG
30 TABLET ORAL DAILY
Qty: 135 TABLET | Refills: 0 | Status: SHIPPED | OUTPATIENT
Start: 2024-07-18

## 2024-07-18 NOTE — TELEPHONE ENCOUNTER
Medication Question or Refill    Contacts       Contact Date/Time Type Contact Phone/Fax    07/18/2024 12:35 PM CDT Phone (Incoming) Fredis Stoddard (Self) [phone] ()            What medication are you calling about (include dose and sig)?: urosemide (LASIX) 20 MG tablet    Preferref d Pharmacy:  [facility] [phone]  Controlled Substance Agreement on file:   CSA -- Patient Level:    CSA: None found at the patient level.       Who prescribed the medication?: pcp    Do you need a refill? Yes    When did you use the medication last? 7/18/2024    Patient offered an appointment? No    Do you have any questions or concerns?  No      Okay to leave a detailed message?: Yes at Home number on file [phone] ([place])

## 2024-07-18 NOTE — TELEPHONE ENCOUNTER
Called [facility] and gave verbal order for medication below that was already ordered.  Patient switching pharmacies  No further action needed    Senia Rosenberg RN  [facility]     Disp Refills Start End CHIQUI   metFORMIN (GLUCOPHAGE XR) 500 MG 24 hr tablet 90 tablet 1 7/9/2024 -- No   Sig - Route: Take 1 tablet (500 mg) by mouth daily (with dinner) - Oral   Sent to pharmacy as: metFORMIN HCl  MG Oral Tablet Extended Release 24 Hour (GLUCOPHAGE XR)

## 2024-07-18 NOTE — TELEPHONE ENCOUNTER
FYI - Status Update    Who is Calling: patient    Update: changed insurance to [facility] on 8/1/2024  Would like to change preferred pharmacy to [facility]  [phone]    Does caller want a call/response back: No

## 2024-07-19 ENCOUNTER — TELEPHONE (OUTPATIENT)
Dept: FAMILY MEDICINE | Facility: CLINIC | Age: 69
End: 2024-07-19
Payer: COMMERCIAL

## 2024-07-19 DIAGNOSIS — E11.8 TYPE 2 DIABETES MELLITUS WITH COMPLICATION, WITH LONG-TERM CURRENT USE OF INSULIN (H): ICD-10-CM

## 2024-07-19 DIAGNOSIS — Z79.4 TYPE 2 DIABETES MELLITUS WITH COMPLICATION, WITH LONG-TERM CURRENT USE OF INSULIN (H): ICD-10-CM

## 2024-07-19 NOTE — TELEPHONE ENCOUNTER
Medication Question or Refill    What medication are you calling about (include dose and sig)?:   ONE TOUCH test strips and lancets   Medication questions    Preferred Pharmacy:  [facility] - [address]  [address]  [place] [address]  Phone: [phone] Fax: [phone]    Controlled Substance Agreement on file:   CSA -- Patient Level:    CSA: None found at the patient level.       Who prescribed the medication?: Dr. Strange     Do you need a refill? Yes    Do you have any questions or concerns?  Freestyle Maxine Sensor keeps falling off. Patient is requesting to go back to ONE TOUCH sensors and lancets.    Per patient when she picked up her METFORMIN it was the ER instead of the XR and would like to confirm if this is ok.     Requesting provider to confirm she should be on 10 MG montelukast, she thought she was on a higher dose.     Requesting all her meds go to:  [facility] [phone]  Patient offered an appointment? No    Okay to leave a detailed message?: Yes at Cell number on file:    Telephone Information:   Mobile [phone]   : [facility] [phone]

## 2024-07-19 NOTE — TELEPHONE ENCOUNTER
Called patient:     XR is same as ER.     Changed insurance to [employer] it will be effective 8/1. Had to [facility] to get meds mailed.

## 2024-07-19 NOTE — TELEPHONE ENCOUNTER
" Pt calling . Two issues    1.Needs Verio one Touch  test strips and lancets as the other brand was falling off    Pt said that pharmacy is asking that clinic staff call them but rather than calling them I have sent new refill for test strips and lancets with specific directions it is to be \"Verio one Touch\"    2. Pt got the metformin but her bottle says ER , not XR  which she has had in the past and the rx that was sent indicated XR  Will ask MTJANICE Liao if this is the same medication.    Dodie Williamson RN, BSN  [facility]         "

## 2024-07-20 DIAGNOSIS — E11.8 TYPE 2 DIABETES MELLITUS WITH COMPLICATION, WITH LONG-TERM CURRENT USE OF INSULIN (H): ICD-10-CM

## 2024-07-20 DIAGNOSIS — Z79.4 TYPE 2 DIABETES MELLITUS WITH COMPLICATION, WITH LONG-TERM CURRENT USE OF INSULIN (H): ICD-10-CM

## 2024-07-20 NOTE — TELEPHONE ENCOUNTER
Medication Question or Refill        What medication are you calling about (include dose and sig)?: Pin needles    Preferred Pharmacy:       [facility] - [address]  [address]  [place] [address]  Phone: [phone] Fax: [phone]    Pt provided this phone number for [facility] - [phone]      Controlled Substance Agreement on file:   CSA -- Patient Level:    CSA: None found at the patient level.       Who prescribed the medication?: Gerald Strange MD    Do you need a refill? Yes    When did you use the medication last? 7/20/2024 pt uses 5 a day and 1 extra one on Tuesday    Patient offered an appointment? Yes: Pt declined    Do you have any questions or concerns?  No      Okay to leave a detailed message?: Yes at Cell number on file:    Telephone Information:   Mobile [phone]

## 2024-07-22 ENCOUNTER — ANCILLARY PROCEDURE (OUTPATIENT)
Dept: MAMMOGRAPHY | Facility: CLINIC | Age: 69
End: 2024-07-22
Attending: FAMILY MEDICINE
Payer: COMMERCIAL

## 2024-07-22 DIAGNOSIS — Z12.31 VISIT FOR SCREENING MAMMOGRAM: ICD-10-CM

## 2024-07-22 PROCEDURE — 77067 SCR MAMMO BI INCL CAD: CPT | Mod: GC | Performed by: RADIOLOGY

## 2024-07-22 PROCEDURE — 77063 BREAST TOMOSYNTHESIS BI: CPT | Mod: GC | Performed by: RADIOLOGY

## 2024-07-22 RX ORDER — PEN NEEDLE, DIABETIC 32GX 5/32"
NEEDLE, DISPOSABLE MISCELLANEOUS
Qty: 200 EACH | Refills: 3 | Status: SHIPPED | OUTPATIENT
Start: 2024-07-22 | End: 2024-07-23

## 2024-07-23 ENCOUNTER — TELEPHONE (OUTPATIENT)
Dept: PHARMACY | Facility: CLINIC | Age: 69
End: 2024-07-23
Payer: COMMERCIAL

## 2024-07-23 ENCOUNTER — TELEPHONE (OUTPATIENT)
Dept: FAMILY MEDICINE | Facility: CLINIC | Age: 69
End: 2024-07-23
Payer: COMMERCIAL

## 2024-07-23 RX ORDER — PEN NEEDLE, DIABETIC 32GX 5/32"
NEEDLE, DISPOSABLE MISCELLANEOUS
Qty: 200 EACH | Refills: 3 | Status: SHIPPED | OUTPATIENT
Start: 2024-07-23

## 2024-07-23 NOTE — TELEPHONE ENCOUNTER
Received call from patient. [facility] is reportedly unable to provide diabetic supplies. Requesting script for pen needles, Verio one touch test strips, and lancets to be re-sent to local [facility] pharmacy instead. Pended.

## 2024-07-23 NOTE — TELEPHONE ENCOUNTER
Received Bevespi inhalers from PAP program. #3 inhalers. Inhalers stored in PharmD office by the printer.       Team: Please call patient to inform that she can come to  inhalers.

## 2024-07-23 NOTE — TELEPHONE ENCOUNTER
Disregard. Patient is enrolled in PAP program for insulin. Called [facility] PAP program. It's in the process of being shipped. Shipping status/tracking number were not available. Told to call back in 1-2 days.     7/11 - Novolog, Tresiba, Ozempic, Needles.   Both insulin orders are processing. No tracking number yet. Asked patient to call the dispensing [facility] at [phone] to set up delivery    Ozempic will get delivered here to the clinic, but they will patient when this happens.

## 2024-07-24 ENCOUNTER — TELEPHONE (OUTPATIENT)
Dept: FAMILY MEDICINE | Facility: CLINIC | Age: 69
End: 2024-07-24

## 2024-07-24 ENCOUNTER — ALLIED HEALTH/NURSE VISIT (OUTPATIENT)
Dept: FAMILY MEDICINE | Facility: CLINIC | Age: 69
End: 2024-07-24
Payer: COMMERCIAL

## 2024-07-24 DIAGNOSIS — F31.10 BIPOLAR AFFECTIVE DISORDER, CURRENT EPISODE MANIC, CURRENT EPISODE SEVERITY UNSPECIFIED (H): Primary | ICD-10-CM

## 2024-07-24 DIAGNOSIS — E55.9 VITAMIN D DEFICIENCY: ICD-10-CM

## 2024-07-24 DIAGNOSIS — R05.1 ACUTE COUGH: ICD-10-CM

## 2024-07-24 DIAGNOSIS — F31.4 BIPOLAR 1 DISORDER, DEPRESSED, SEVERE (H): ICD-10-CM

## 2024-07-24 DIAGNOSIS — J43.9 PULMONARY EMPHYSEMA, UNSPECIFIED EMPHYSEMA TYPE (H): ICD-10-CM

## 2024-07-24 DIAGNOSIS — E11.8 TYPE 2 DIABETES MELLITUS WITH COMPLICATION, WITH LONG-TERM CURRENT USE OF INSULIN (H): ICD-10-CM

## 2024-07-24 DIAGNOSIS — Z79.4 TYPE 2 DIABETES MELLITUS WITH COMPLICATION, WITH LONG-TERM CURRENT USE OF INSULIN (H): ICD-10-CM

## 2024-07-24 DIAGNOSIS — Z86.14 HISTORY OF MRSA INFECTION: ICD-10-CM

## 2024-07-24 DIAGNOSIS — L30.9 DERMATITIS: ICD-10-CM

## 2024-07-24 DIAGNOSIS — K59.00 CONSTIPATION, UNSPECIFIED CONSTIPATION TYPE: ICD-10-CM

## 2024-07-24 DIAGNOSIS — K21.00 GASTROESOPHAGEAL REFLUX DISEASE WITH ESOPHAGITIS, UNSPECIFIED WHETHER HEMORRHAGE: ICD-10-CM

## 2024-07-24 PROCEDURE — 99207 PR NO CHARGE NURSE ONLY: CPT

## 2024-07-24 PROCEDURE — 96372 THER/PROPH/DIAG INJ SC/IM: CPT | Performed by: FAMILY MEDICINE

## 2024-07-24 RX ORDER — LOSARTAN POTASSIUM 50 MG/1
50 TABLET ORAL DAILY
Qty: 90 TABLET | Refills: 3 | Status: CANCELLED | OUTPATIENT
Start: 2024-07-24

## 2024-07-24 RX ORDER — MUPIROCIN 20 MG/G
OINTMENT TOPICAL 3 TIMES DAILY
Qty: 15 G | Refills: 0 | Status: CANCELLED | OUTPATIENT
Start: 2024-07-24

## 2024-07-24 RX ORDER — POLYETHYLENE GLYCOL 3350 17 G/17G
POWDER, FOR SOLUTION ORAL
Qty: 510 G | Refills: 1 | Status: CANCELLED | OUTPATIENT
Start: 2024-07-24

## 2024-07-24 RX ORDER — PEN NEEDLE, DIABETIC 32GX 5/32"
NEEDLE, DISPOSABLE MISCELLANEOUS
Qty: 200 EACH | Refills: 3 | Status: CANCELLED | OUTPATIENT
Start: 2024-07-24

## 2024-07-24 RX ORDER — NYSTATIN 100000 U/G
OINTMENT TOPICAL 2 TIMES DAILY
Qty: 30 G | Refills: 1 | Status: CANCELLED | OUTPATIENT
Start: 2024-07-24

## 2024-07-24 RX ORDER — ALBUTEROL SULFATE 90 UG/1
2 AEROSOL, METERED RESPIRATORY (INHALATION) EVERY 6 HOURS PRN
Qty: 18 G | Refills: 1 | Status: CANCELLED | OUTPATIENT
Start: 2024-07-24

## 2024-07-24 RX ORDER — MONTELUKAST SODIUM 10 MG/1
10 TABLET ORAL AT BEDTIME
Qty: 90 TABLET | Refills: 3 | Status: CANCELLED | OUTPATIENT
Start: 2024-07-24

## 2024-07-24 RX ORDER — LITHIUM CARBONATE 450 MG
450 TABLET, EXTENDED RELEASE ORAL 2 TIMES DAILY
Qty: 90 TABLET | Refills: 1 | Status: CANCELLED | OUTPATIENT
Start: 2024-07-24

## 2024-07-24 RX ORDER — OMEPRAZOLE 40 MG/1
40 CAPSULE, DELAYED RELEASE ORAL DAILY
Qty: 90 CAPSULE | Refills: 2 | Status: CANCELLED | OUTPATIENT
Start: 2024-07-24

## 2024-07-24 RX ORDER — ROSUVASTATIN CALCIUM 10 MG/1
10 TABLET, COATED ORAL DAILY
Qty: 90 TABLET | Refills: 3 | Status: CANCELLED | OUTPATIENT
Start: 2024-07-24

## 2024-07-24 NOTE — PROGRESS NOTES
Clinic Administered Medication Documentation      Injectable Medication Documentation    Is there an active order (written within the past 365 days, with administrations remaining, not ) in the chart? Yes.     Patient was given  Abilify 400mg ER . Prior to medication administration, verified patient's identity using patient s name and date of birth. Please see MAR and medication order for additional information. Patient instructed to report any adverse reaction to staff immediately.    Vial/Syringe: Syringe  Was this medication supplied by the patient? No  Is this a medication the patient will need to receive again? Yes. Verified that the patient has refills remaining in their prescription.     Sravani Dunham RN  [facility]

## 2024-07-24 NOTE — TELEPHONE ENCOUNTER
Medication Question or Refill    Contacts       Contact Date/Time Type Contact Phone/Fax    07/24/2024 09:38 AM CDT Fax (Incoming) HODA Grimes [address] [facility] [address] (Pharmacy) [phone]            What medication are you calling about (include dose and sig)?:   mupirocin (BACTROBAN) 2 % external ointment  albuterol (PROAIR HFA/PROVENTIL HFA/VENTOLIN HFA) 108 (90 Base) MCG/ACT inhaler  BD GERARDO U/F 32G X 4 MM insulin pen needle  insulin aspart (NOVOLOG PEN) 100 UNIT/ML pen  lithium (ESKALITH CR/LITHOBID) 450 MG CR tablet  losartan (COZAAR) 50 MG tablet  montelukast (SINGULAIR) 10 MG tablet  mupirocin (BACTROBAN) 2 % external ointment  nystatin (MYCOSTATIN) 890298 UNIT/GM external ointment  omeprazole (PRILOSEC) 40 MG DR capsule  polyethylene glycol (MIRALAX) 17 GM/Dose powder  rosuvastatin (CRESTOR) 10 MG tablet  vitamin D3 (CHOLECALCIFEROL) 125 MCG (5000 UT) tablet      Preferred Pharmacy:    HODA Grimes [address] [facility] [address]  [address] [facility] [address]  [place] [address]  Phone: [phone] Fax: [phone]      Controlled Substance Agreement on file:   CSA -- Patient Level:    CSA: None found at the patient level.       Who prescribed the medication?: Gerald Strange MD     Do you need a refill? No    When did you use the medication last? N/a    Patient offered an appointment? No    Do you have any questions or concerns?  Yes: Patient changed to pharmacy listed above.     Okay to leave a detailed message?: Yes at Cell number on file:    Telephone Information:   Mobile [phone]

## 2024-07-25 ENCOUNTER — TELEPHONE (OUTPATIENT)
Dept: FAMILY MEDICINE | Facility: CLINIC | Age: 69
End: 2024-07-25
Payer: COMMERCIAL

## 2024-07-25 ENCOUNTER — TRANSFERRED RECORDS (OUTPATIENT)
Dept: HEALTH INFORMATION MANAGEMENT | Facility: CLINIC | Age: 69
End: 2024-07-25
Payer: COMMERCIAL

## 2024-07-25 NOTE — TELEPHONE ENCOUNTER
General Call    Contacts       Contact Date/Time Type Contact Phone/Fax    07/25/2024 10:05 AM CDT Phone (Incoming) Fredis Stoddard (Self) [phone] (M)          Reason for Call:  if Bradley has ordered ozempic for patient    What are your questions or concerns:  Patient called to check if Bradley has ordered her ozempic as patient would like to  med asap. Per patient, she only has one shot left, lasting until Tuesday, 7/30. Patient would like a call back when she can  the ozempic.     Date of last appointment with provider: 7/9/2024    Okay to leave a detailed message?: Yes at Cell number on file:    Telephone Information:   Mobile [phone]

## 2024-07-25 NOTE — TELEPHONE ENCOUNTER
Please call patient and inform that Ozempic was already order on 7/11. Per discussion with PAP program yesterday (documented in previous telephone encounter), Ozempic is in progress, but the program was not able to provide me with a shipping date/tracking number. Ozempic will be delivered to the clinic and clinic will reach out to patient when it has arrived.

## 2024-07-26 NOTE — TELEPHONE ENCOUNTER
General Call    Contacts       Contact Date/Time Type Contact Phone/Fax    07/15/2024 07:19 AM CDT Phone (Incoming) Fredis Stoddard (Self) [phone] (M)     okay to leave detailed message    07/26/2024 06:59 AM CDT Phone (Incoming) Fredis Stoddard (Self) [phone] (M)          Reason for Call:  medication    What are your questions or concerns:  was calling to see if medication was in the clinic because she is out    Date of last appointment with provider: 2024    Okay to leave a detailed message?: Yes at Home number on file [phone] (home)

## 2024-07-30 DIAGNOSIS — E11.8 TYPE 2 DIABETES MELLITUS WITH COMPLICATION, WITH LONG-TERM CURRENT USE OF INSULIN (H): ICD-10-CM

## 2024-07-30 DIAGNOSIS — E55.9 VITAMIN D DEFICIENCY: ICD-10-CM

## 2024-07-30 DIAGNOSIS — Z79.4 TYPE 2 DIABETES MELLITUS WITH COMPLICATION, WITH LONG-TERM CURRENT USE OF INSULIN (H): ICD-10-CM

## 2024-07-30 DIAGNOSIS — F31.4 BIPOLAR 1 DISORDER, DEPRESSED, SEVERE (H): ICD-10-CM

## 2024-07-30 RX ORDER — LANOLIN, PETROLATUM 15.5; 53.4 G/100G; G/100G
5 OINTMENT TOPICAL 4 TIMES DAILY
Qty: 113 G | Refills: 11 | Status: SHIPPED | OUTPATIENT
Start: 2024-07-30

## 2024-07-30 RX ORDER — LITHIUM CARBONATE 450 MG
TABLET, EXTENDED RELEASE ORAL
Qty: 90 TABLET | Refills: 1 | Status: SHIPPED | OUTPATIENT
Start: 2024-07-30

## 2024-07-30 NOTE — TELEPHONE ENCOUNTER
Dr. Bradley Liao --  Patient wanted me to send these to you. Not sure this is the correct route.   Patient requesting that the following three medications be sent to [facility] (aka: [facility]): Pended below.   - Lithium  - Vitamin D  - A & D Ointment    FYI.   Patient wants you to know that she is out of the Tresiba and Ozempic. They have not arrived at her home yet.   Patient will be out of Novolog and pen needles in about 1-2 weeks.     Jayashree Cabello, BSN RN  [facility]

## 2024-07-31 ENCOUNTER — TELEPHONE (OUTPATIENT)
Dept: FAMILY MEDICINE | Facility: CLINIC | Age: 69
End: 2024-07-31
Payer: COMMERCIAL

## 2024-07-31 NOTE — TELEPHONE ENCOUNTER
General Call    Contacts       Contact Date/Time Type Contact Phone/Fax    07/31/2024 07:17 AM CDT Phone (Incoming) Fredis Stoddard (Self) [phone] ()          Reason for Call:  Medical question and requesting pharmacist to call patient back.     What are your questions or concerns:  Patient said she is out of needles, no insulin, and have some medication questions for the pharmacist.     Date of last appointment with provider: 07/09/24    Okay to leave a detailed message?: Yes at Home number on file [phone] ([place])

## 2024-07-31 NOTE — TELEPHONE ENCOUNTER
LVM for patient.     Patient gets insulin through PAP program.   Recommend she call [facility] at [phone]  for delivery.     Patient has MTM follow-up 8/6. Can call back with additional questions or discuss at next visit.

## 2024-07-31 NOTE — TELEPHONE ENCOUNTER
Patient returning called. Patient verbalized that she received her package and stated that she might have any future deliveries come to clinic as it was a large hassle for them to be delivered to her address. Wanted to thank pharmacist for his help and for wishing her a happy birthday.

## 2024-08-05 NOTE — PROGRESS NOTES
Medication Therapy Management (MTM) Encounter    ASSESSMENT:                            Medication Adherence/Access: Having cost concerns with medications [facility] Extra Help subsidy ended.      Back Pain: Declines medications for back pain.     Type 2 Diabetes:  A1C above goal <8%. No longer using CGM. Overall blood glucose decreased from previous. Not longer in the 400s. Fasting sugars still above goal . Post-prandial above goal <180. Already on high doses insulin. Will repeat LFTs with recent Metformin start, but plan to titrate. Cannot use Actos due to edema, SGLT2 due to UTIs. Initiated process of having insulin delivered to the clinic.     Hyperlipidemia: Appropriately using statin. LDL at goal <100. Appropriately using aspirin for ASCVD risk score >10%.     Hypertension: BP at  goal <140/90. Pulse at goal .        Bipolar Type I:  Lithium levels in therapeutic range.  Due for 6 month recheck of levels. Deferred until next visit to get closer to true trough. Continued anxiety over contemplating move. Has established with a therapist.     Low Vitamin D: Last Vit D level WNL.      Asthma/COPD: Reviewed that Bevespi takes up to 4 weeks for full effectiveness. Encouraged patient to continue using.     Heartburn: Symptoms well managed with PPI.      Constipation: No constipation concerns at this time.     Osteopenia: No clear indication to start treatment. Major FRAX risk <20%; hip risk <3%. Recommend continuing with Calcium and Vit D supplement at this time.        PLAN:                            Hepatic Panel   Increase Metformin 500 mg ER to 2 tabs daily x 7 days then 3 tabs daily.   We will defer your repeat Lithium level to the next time you come to see me. That way we can get as close to pre-dose levels as able.       Follow-up: Return in about 4 weeks (around 9/3/2024) for Medication Management Pharmacist, in person.     SUBJECTIVE/OBJECTIVE:                          Annel Stoddard is a 69 year old  female coming for a follow-up visit. She was referred to me from Gerald Strange MD as PCP. Today's visit is a follow-up MTM visit from 2024.      Reason for visit: Medication follow-up    Received a letter from [place] about housing. Wouldn't be able to go before January anyway.   Joined [employer] - a just for fun group to play with.   Would like her insulin delivered to the clinic instead of her facility.   Was on [email or web address] - she posted a picture to the site. There was a man she connected with. Gave him her email. He started posting that she's a scammer.         Allergies/ADRs: Reviewed in chart  Past Medical History: Reviewed in chart  Tobacco: She reports that she has never smoked. She has never been exposed to tobacco smoke. She has never used smokeless tobacco.  Alcohol:  reports that she does not currently use alcohol.       Medication Adherence/Access:     Denies adherence concerns.      Back Pain:   Using baby aspirin twice daily.      Has stenosis and scoliosis. Severe pain with walking - able to a block and a half before pain sets in. Carrying anything aggravates the back as well.     Aching, shooting pain. Sometimes radiates into the legs.     Hasn't really had this addressed with anybody - was trying to be brave.   Declines medicines for pain.       Type 2 Diabetes:    NovoLog 35 units 3 times daily  Tresiba U300 - 110 units daily   Ozempic 2 mg from PAP program (was too expensive to continue Mounjaro).   At last visit, started Metformin 500 mg ER daily.     Stopped Jardiance due to ongoing yeast infections.   Pioglitazone stopped due to edema.        Blood sugar monitoring: Stopped using sensors.     Fastin, 223, 164, 236, 280, 310, 215, 167  PM: 223, 236, 263, 279, 237, 311, 311, 310, 235, 310, 187, 158, 139,     Last month:               Symptoms of low blood sugar? Sometimes having symptoms, even without true lows.    Symptoms of high blood sugar? Polyuria.   Diet/Exercise:       Aspirin: Taking 81mg daily    Statin: Yes: Rosuvastatin 10 mg daily       Hemoglobin A1C   Date Value Ref Range Status   07/05/2024 9.4 (H) <5.7 % Final     Comment:     Normal <5.7%   Prediabetes 5.7-6.4%    Diabetes 6.5% or higher     Note: Adopted from ADA consensus guidelines.   12/12/2023 8.4 (H) 0.0 - 5.6 % Final     Comment:     Normal <5.7%   Prediabetes 5.7-6.4%    Diabetes 6.5% or higher     Note: Adopted from ADA consensus guidelines.   08/01/2023 6.9 (H) 0.0 - 5.6 % Final     Comment:     Normal <5.7%   Prediabetes 5.7-6.4%    Diabetes 6.5% or higher     Note: Adopted from ADA consensus guidelines.   01/27/2020 8.4 (H) 0 - 5.6 % Final     Comment:     Normal <5.7% Prediabetes 5.7-6.4%  Diabetes 6.5% or higher - adopted from ADA   consensus guidelines.        Microalbumin Urine mg/dL   Date Value Ref Range Status   06/16/2021 <0.50 0.00 - 1.99 mg/dL Final      Creatinine Urine mg/dL   Date Value Ref Range Status   03/11/2024 20.5 mg/dL Final     Comment:     The reference ranges have not been established in urine creatinine. The results should be integrated into the clinical context for interpretation.     LDL Cholesterol Calculated   Date Value Ref Range Status   02/01/2024 50 <=100 mg/dL Final     LDL Cholesterol Direct   Date Value Ref Range Status   09/23/2019 48 <=129 mg/dl Final     Creatinine   Date Value Ref Range Status   07/05/2024 0.53 0.51 - 0.95 mg/dL Final   01/27/2020 0.52 0.52 - 1.04 mg/dL Final     The 10-year ASCVD risk score (Diana DK, et al., 2019) is: 10.1%    Values used to calculate the score:      Age: 69 years      Sex: Female      Is Non- : No      Diabetic: Yes      Tobacco smoker: No      Systolic Blood Pressure: 105 mmHg      Is BP treated: No      HDL Cholesterol: 52 mg/dL      Total Cholesterol: 148 mg/dL         Hypertension/Edema:   losartan 50 mg daily, Furosemide 20 mg 1.5 tabs daily.     Edema has considerably improved since stopping Actos.  Still some puffiness.        BP Readings from Last 3 Encounters:   08/06/24 105/65   07/09/24 114/70   05/21/24 114/66      Pulse Readings from Last 3 Encounters:   08/06/24 79   07/09/24 94   05/21/24 79     Wt Readings from Last 3 Encounters:   07/09/24 185 lb (83.9 kg)   05/21/24 183 lb (83 kg)   04/16/24 187 lb 8 oz (85 kg)          Bipolar Type I: Prescribed lithium 450 mg ER 2 tabs AM and 1 tab PM, Abilify Maintena 400 mg IM monthly, Vitamin D3 5000 units daily.     Saw a therapist with [facility] for initial visit.     Ongoing anxiety - getting letters about housing from [place], [place]. Continues to contemplate moving, but not until January at the earliest.     Takes lithium at 4 am and 6 pm.       Vitamin D Deficiency Screening Results:  Lab Results   Component Value Date    VITDT 38 12/12/2023    VITDT 43 04/05/2023    VITDT 35 01/17/2023       Lab Results   Component Value Date    LITHIUM 0.84 02/01/2024           Asthma/COPD/allergies:   Albuterol HFA 90 mcg 2 puffs every 4 hours as needed  Montelukast 10 mg daily  Flonase 50 mcg nasal spray 2 sprays each nostril daily  Saline 0.65% nasal spray as needed.     Tracheal stenosis.     Cannot afford Spiriva - Switched to Bevespi (glycopyrrolate-Formoterol) 9-4.8 mcg 2 puffs twice daily from PAP.     States it doesn't work. Feels she has more trouble breathing after using it.         Heartburn: Prescribed omeprazole 40 mg daily    No heartburn symptoms with PPI. History of ulcers.       Constipation: Prescribed Senna-docusate 8.6-50 mg 1-2 tabs twice daily as needed, Miralax 17 g daily as needed.  Using senna 2 tabs twice daily and Miralax daily.     No constipation concerns recently.       Osteopenia: Currently using Vitamin D 5000 units and Calcium 600 mg twice daily.          Vitamin D Deficiency Screening Results:  Lab Results   Component Value Date    VITDT 38 12/12/2023    VITDT 43 04/05/2023    VITDT 35 01/17/2023            Today's  Vitals: /65   Pulse 79   LMP  (LMP Unknown)   ----------------      I spent 48 minutes with this patient today. All changes were made via collaborative practice agreement with Gerald Strange MD. A copy of the visit note was provided to the patient's provider(s).    A summary of these recommendations was given to the patient.    Rosemarie DayD  Medication Therapy Management (MTM) Pharmacist  [facility]         Medication Therapy Recommendations  Type 2 diabetes mellitus with complication, with long-term current use of insulin (H)    Current Medication: metFORMIN (GLUCOPHAGE XR) 500 MG 24 hr tablet (Discontinued)   Rationale: Dose too low - Dosage too low - Effectiveness   Recommendation: Increase Dose   Status: Accepted per CPA

## 2024-08-06 ENCOUNTER — LAB (OUTPATIENT)
Dept: LAB | Facility: CLINIC | Age: 69
End: 2024-08-06

## 2024-08-06 ENCOUNTER — OFFICE VISIT (OUTPATIENT)
Dept: PHARMACY | Facility: CLINIC | Age: 69
End: 2024-08-06
Payer: COMMERCIAL

## 2024-08-06 VITALS
DIASTOLIC BLOOD PRESSURE: 65 MMHG | BODY MASS INDEX: 33.01 KG/M2 | SYSTOLIC BLOOD PRESSURE: 105 MMHG | HEART RATE: 79 BPM | WEIGHT: 184 LBS

## 2024-08-06 DIAGNOSIS — E78.2 MIXED HYPERLIPIDEMIA: ICD-10-CM

## 2024-08-06 DIAGNOSIS — K75.81 NONALCOHOLIC STEATOHEPATITIS: ICD-10-CM

## 2024-08-06 DIAGNOSIS — R06.02 SOB (SHORTNESS OF BREATH): ICD-10-CM

## 2024-08-06 DIAGNOSIS — K21.00 GASTROESOPHAGEAL REFLUX DISEASE WITH ESOPHAGITIS, UNSPECIFIED WHETHER HEMORRHAGE: ICD-10-CM

## 2024-08-06 DIAGNOSIS — M85.80 OSTEOPENIA, UNSPECIFIED LOCATION: ICD-10-CM

## 2024-08-06 DIAGNOSIS — K59.00 CONSTIPATION, UNSPECIFIED CONSTIPATION TYPE: ICD-10-CM

## 2024-08-06 DIAGNOSIS — Z79.4 TYPE 2 DIABETES MELLITUS WITH COMPLICATION, WITH LONG-TERM CURRENT USE OF INSULIN (H): Primary | ICD-10-CM

## 2024-08-06 DIAGNOSIS — K21.9 LPRD (LARYNGOPHARYNGEAL REFLUX DISEASE): ICD-10-CM

## 2024-08-06 DIAGNOSIS — E55.9 VITAMIN D DEFICIENCY: ICD-10-CM

## 2024-08-06 DIAGNOSIS — F31.4 BIPOLAR 1 DISORDER, DEPRESSED, SEVERE (H): ICD-10-CM

## 2024-08-06 DIAGNOSIS — E11.8 TYPE 2 DIABETES MELLITUS WITH COMPLICATION, WITH LONG-TERM CURRENT USE OF INSULIN (H): Primary | ICD-10-CM

## 2024-08-06 DIAGNOSIS — J31.0 CHRONIC RHINITIS: ICD-10-CM

## 2024-08-06 DIAGNOSIS — Z79.4 TYPE 2 DIABETES MELLITUS WITH COMPLICATION, WITH LONG-TERM CURRENT USE OF INSULIN (H): ICD-10-CM

## 2024-08-06 DIAGNOSIS — E11.8 TYPE 2 DIABETES MELLITUS WITH COMPLICATION, WITH LONG-TERM CURRENT USE OF INSULIN (H): ICD-10-CM

## 2024-08-06 LAB
ALBUMIN SERPL BCG-MCNC: 4.6 G/DL (ref 3.5–5.2)
ALP SERPL-CCNC: 138 U/L (ref 40–150)
ALT SERPL W P-5'-P-CCNC: 67 U/L (ref 0–50)
AST SERPL W P-5'-P-CCNC: 78 U/L (ref 0–45)
BILIRUB DIRECT SERPL-MCNC: <0.2 MG/DL (ref 0–0.3)
BILIRUB SERPL-MCNC: 0.4 MG/DL
PROT SERPL-MCNC: 8.1 G/DL (ref 6.4–8.3)

## 2024-08-06 PROCEDURE — 99207 PR NO CHARGE LOS: CPT | Performed by: PHARMACIST

## 2024-08-06 PROCEDURE — 36415 COLL VENOUS BLD VENIPUNCTURE: CPT

## 2024-08-06 PROCEDURE — 80076 HEPATIC FUNCTION PANEL: CPT

## 2024-08-06 RX ORDER — METFORMIN HCL 500 MG
TABLET, EXTENDED RELEASE 24 HR ORAL
Qty: 270 TABLET | Refills: 1 | Status: SHIPPED | OUTPATIENT
Start: 2024-08-06 | End: 2024-09-04

## 2024-08-06 NOTE — PATIENT INSTRUCTIONS
"Recommendations from today's MTM visit:                                                         Hepatic Panel   Increase Metformin 500 mg ER to 2 tabs daily x 7 days then 3 tabs daily.   We will defer your repeat Lithium level to the next time you come to see me. That way we can get as close to pre-dose levels as able.     Follow-up: Return in about 4 weeks (around 9/3/2024) for Medication Management Pharmacist, in person.    It was great speaking with you today.  I value your experience and would be very thankful for your time in providing feedback in our clinic survey. In the next few days, you may receive an email or text message from [employer] with a link to a survey related to your  clinical pharmacist.\"     To schedule another MTM appointment, please call the clinic directly or you may call the MTM scheduling line at [phone].    My Clinical Pharmacist's contact information:                                                      Please feel free to contact me with any questions or concerns you have.      Bradley Liao, PharmD  Medication Therapy Management (MTM) Pharmacist  [facility]      " Attending Only

## 2024-08-08 ENCOUNTER — TELEPHONE (OUTPATIENT)
Dept: OBGYN | Facility: CLINIC | Age: 69
End: 2024-08-08
Payer: COMMERCIAL

## 2024-08-08 ENCOUNTER — NURSE TRIAGE (OUTPATIENT)
Dept: FAMILY MEDICINE | Facility: CLINIC | Age: 69
End: 2024-08-08
Payer: COMMERCIAL

## 2024-08-08 ENCOUNTER — TELEPHONE (OUTPATIENT)
Dept: PHARMACY | Facility: CLINIC | Age: 69
End: 2024-08-08
Payer: COMMERCIAL

## 2024-08-08 DIAGNOSIS — L98.9 SKIN LESION: Primary | ICD-10-CM

## 2024-08-08 NOTE — TELEPHONE ENCOUNTER
"Dr. Strange-Please review and advise/sign if agree.    Call received from patient:  Wants to ask Dr. Strange about a round spot on her head  It's been burned off before and was told if it recurred, should see Dermatologist  \"Been there forever\"  No bleeding  No itching nor pain  Raised \"spot\"  Also has a similar spot on forearm   Spot on head is same size; not growing  If Dermatology referral is recommended, would like to be referred to [facility] [address] in [place], [place]    Per 4/4/24 office visit note:  \"3. Skin lesion  New concern.  Small skin lesion on the right temple area.  Consistent with skin tag or possible seborrheic keratosis.  Lesion frozen as recorded above.  I discussed appropriate aftercare with patient.  If freezing does not seem to work, or if lesion returns, may recommend referral to dermatology.\"    Informed patient message will be sent to Dr. Strange.    Patient verbalized understanding and in agreement with plan.    Thank you!  WHITNEY McgeeN, RN-[facility]    Additional Information   Negative: Patient sounds very sick or weak to the triager   Negative: Fever and bump is tender to touch    Protocols used: Skin Lesion - Moles or Growths-A-OH    "

## 2024-08-08 NOTE — LETTER
2024      Re: Annel Arlyn   Patient ID: [id number]  : 1955            To Whom It May Concern:      I, Annel Stoddard, am requesting that my insulin be delivered to the [facility] instead of my home address. My facility does not accept deliveries requiring a signature.       Regards,     Annel Stoddard

## 2024-08-08 NOTE — TELEPHONE ENCOUNTER
How about I look at this when she follows up for her next visit- I did not think it was skin cancer

## 2024-08-08 NOTE — LETTER
2024     Re: Annel Stoddard   Patient ID: [id number]  : 1955          To Whom It May Concern:       Annel has request that her insulin from the [employer] patient assistance program be delivered to the clinic instead of her home. She has difficulties receiving packages in her facility. We are agreeable with having her insulin shipped to the [facility] at the address above. We are closed on weekends and major holidays.     Please call with any questions or if you need additional information.     Regards,     Bradley Liao, PharmD   Gerald Strange MD

## 2024-08-08 NOTE — TELEPHONE ENCOUNTER
[facility] Call [facility]    Phone Message    May a detailed message be left on voicemail: yes     Reason for Call: Other: Pt was told to see if she could get the HPV shot at her appt 8/9 - please advise      Action Taken: Message routed to:  Other: FAMILIA GREGG    Travel Screening: Not Applicable     Date of Service:

## 2024-08-08 NOTE — TELEPHONE ENCOUNTER
Patient called back and was informed of message per Dr. Strange.    Patient verbalized understanding and in agreement with plan.    9/23/24 visit time and location confirmed with patient.    Patient then stated at 9/23/24 visit wants foot exam and order for diabetes shoes and walker.    Cane not very helpful any longer.  Someone will come out to her home to do the measurements for walker.    9/23/24 appt note update.    MICHELLE Mcgee, RN-BC  [facility]

## 2024-08-08 NOTE — TELEPHONE ENCOUNTER
Forms/Letter Request    Type of form/letter: OTHER: FUTURE INSULIN ORDERS BE DELIVERED TO HEALTHCARE PROVIDER    Do we have the form/letter: Yes: PLACED IN [place] [employer]ILL[employer]'S MAIL BOX    Who is the form from? [employer] (if other please explain)    Where did/will the form come from? form was faxed in    When is form/letter needed by: ASAP    How would you like the form/letter returned: Fax : [phone]    Patient Notified form requests are processed in 5-7 business days:No    Okay to leave a detailed message?: Yes at Cell number on file:    Telephone Information:   Mobile [phone]

## 2024-08-08 NOTE — TELEPHONE ENCOUNTER
Discussed with pt that the FDA does not recommend HPV vaccine after the age of 45.  Pt verbalized understanding.

## 2024-08-08 NOTE — TELEPHONE ENCOUNTER
RN attempted to contact patient, but no answer. Left message on patient's voice mail to call clinic back.    If patient calls back, please relay message below from DR Alie Rosenberg RN  [facility]    Dr Strange  How about I look at this when she follows up for her next visit- I did not think it was skin cancer  Patient has an appointment scheduled on 9/23/24 with Dr Alie Rosenberg RN  [facility]

## 2024-08-08 NOTE — TELEPHONE ENCOUNTER
Patient Returning Call    Reason for call:  Skin spot / mole that has been burned off before in clinic is still there. Patient is wondering if Dr Strange would see her for this again or if she should see Dermatology.     Information relayed to patient:  message placed    Patient has additional questions:  No      Okay to leave a detailed message?: Yes at Cell number on file:    Telephone Information:   Mobile [phone]

## 2024-08-08 NOTE — TELEPHONE ENCOUNTER
Reason for Disposition    Patient wants to be seen    Additional Information    Negative: Patient sounds very sick or weak to the triager    Negative: Fever and bump is tender to touch    Negative: Looks infected (e.g., spreading redness, red streak, pus)    Negative: Looks like a boil, infected sore, or deep ulcer    Negative: Caller can't describe it clearly    Protocols used: Skin Lesion - Moles or Growths-A-OH

## 2024-08-08 NOTE — TELEPHONE ENCOUNTER
No.  The patient is beyond the age that the vaccine is FDA approved for (approved for ages 9-45 years old).  -Ijeoma Painting MD

## 2024-08-09 ENCOUNTER — OFFICE VISIT (OUTPATIENT)
Dept: OBGYN | Facility: CLINIC | Age: 69
End: 2024-08-09
Attending: FAMILY MEDICINE
Payer: COMMERCIAL

## 2024-08-09 VITALS
DIASTOLIC BLOOD PRESSURE: 63 MMHG | TEMPERATURE: 98.4 F | BODY MASS INDEX: 32.83 KG/M2 | WEIGHT: 183 LBS | HEART RATE: 72 BPM | SYSTOLIC BLOOD PRESSURE: 108 MMHG

## 2024-08-09 DIAGNOSIS — R87.810 CERVICAL HIGH RISK HPV (HUMAN PAPILLOMAVIRUS) TEST POSITIVE: ICD-10-CM

## 2024-08-09 PROCEDURE — 88305 TISSUE EXAM BY PATHOLOGIST: CPT | Performed by: PATHOLOGY

## 2024-08-09 PROCEDURE — 57454 BX/CURETT OF CERVIX W/SCOPE: CPT | Performed by: OBSTETRICS & GYNECOLOGY

## 2024-08-09 NOTE — PROGRESS NOTES
"[facility]- OBGYN    CC: HPV positive other HR types and type 18    S:Annel Stoddard is a 69 year old  who presents today for colposcopy due to HPV positive other HR types and Type 18 on 24.      Pap Hx:  10/22/2015 NIL/HPV+  17 NIL Pap, Neg HR HPV  18 NIL Pap, Neg HR HPV  2019 NIL Pap, + HR HPV type 18  2020 NIL/HPV+  2020 Dexter: Benign, ECC inconclusive Provider plan: LEEP.  See Telephone encounter dated 2020:  \"It came back essentially inconclusive because of atrophy and that I couldn't really get enough tissue.  Her pap came back completely benign but HPV +.  In this case, I had discussed with her that the optimal way to proceed may be to have a leep so that we could get rid of any of the diseased portion of her cervix.\"  11/10/20 LEEP listed in care everywhere surgical section no records to review  21 ASCUS Pap, + HR HPV (not 16 or 18)  22 Dexter Bx benign ECC Focal squamous atypia suggestive of, but not definitively diagnostic for, low grade squamous intraepithelial lesion (KINSEY 1) (care everywhere)  23 NIL Pap, + HR HPV types 18 and other Plan Dexter due bef 23 Pt was advised.  23 Dexter Note says ECC done but I'm not seeing a ECC done per provider lab did not receive sample.  03/3/23 message sent to the provider to inquire about this  23 Dexter ECC KINSEY 1 Plan cotest in 6-12 months due 24 NIL Pap, +HR HPV types 18 and other Plan Dexter per provider due bef 10/13/23  07/21/23 Pt was advised.  23 Dexter ECC KINSEY 1 Plan cotest in 6-12 months due by 24 of note looks like pt may be moving to [place], providers office informed the pt of the results and recommendations from telephone call on 08/3/23.  24 NIL Pap, +HR HPV types 18    No LMP recorded (lmp unknown). Patient is postmenopausal.     OBGYN Hx:   OB History    Para Term  AB Living   5 5 5 0 0 5   SAB IAB Ectopic Multiple " Live Births   0 0 0 0 5      # Outcome Date GA Lbr Chema/2nd Weight Sex Type Anes PTL Lv   5 Term      Vag-Spont   ELLIE   4 Term      Vag-Spont   ELLIE   3 Term      Vag-Spont   ELLIE   2 Term      Vag-Spont   ELLIE   1 Term      Vag-Spont   ELLIE       Menses: menopause in year 2000  Not currently sexually active, but hopes to be happily  again someday2  STD Hx:HPV      O: Patient Vitals for the past 24 hrs:   BP Temp Pulse Weight   08/09/24 1310 108/63 98.4  F (36.9  C) 72 83 kg (183 lb)   ]    PROCEDURE:  Before the procedure, it was ensured that the patient was educated regarding the nature of her findings to date, the implications, and what was to be done. She has been made aware of the role of HPV, the natural history of infection, ways to minimize her future risk, the effect of HPV on the cervix, and treatment options available should they be indicated.  The details of the colposcopic procedure were reviewed. Indications, alternatives, benefits, and risks including bleeding, infection, pain, and injury to surrounding organs were reviewed.  Questions and concerns were addressed.  Consent was signed.     Medium diane speculum placed in vagina and excellent visualization of cervix achieved, cervix swabbed x 3 with acetic acid solution.  Cervix noted to have post LEEP scarring present.   Cervix with patch of acetowhite change at 3 oclock and 10 oclock.  Cervical biopsy taken at 3 and 10 oclock.  Endocervical currettage collected.  Cervix made hemostatic with silver nitrate sticks.  Speculum removed. Patient tolerated procedure well.    FINDINGS:  Cervix:  Cervix with patch of acetowhite change at 3 oclock and 10 oclock.  Cervical biopsy taken at 3 and 10 oclock.    Pap repeated?:  No  SCJ seen?:  no    ECC done?:  Yes   Lugol's solution used?:  No  Satisfactory examination?:  yes      ASSESSMENT: KINSEY 1.    PLAN: specimens labelled and sent to Pathology, will base further treatment on Pathology findings, post  biopsy instructions given to patient, and call to discuss Pathology results    Ijeoma Painting MD

## 2024-08-09 NOTE — LETTER
August 15, 2024      Annel Stoddard  [address]  [place] [address]        Dear ,    We are writing to inform you of your test results.    Test results indicate you may require additional follow up, see comment below.     Plan with this result should be repeat pap in 1 year.     Resulted Orders   SURGICAL PATHOLOGY EXAM   Result Value Ref Range    Case Report       Surgical Pathology Report                         Case: [id number]                                  Authorizing Provider:  Ijeoma Painting MD        Collected:           08/09/2024 01:44 PM          Ordering Location:     [facility]   Received:            08/09/2024 02:38 PM                                 [place]                                                                Pathologist:           Armando Holly MD                                                     Specimens:   A) - Cervix, 10 oclock                                                                              B) - Cervix, 3 oclock                                                                               C) - Endocervix, ECC                                                                       Final Diagnosis       A.  Cervical biopsy at 10:00:  - Low-grade squamous intraepithelial lesion (mild dysplasia, KINSEY-1)    B.  Cervical biopsy at 3:00:  - No diagnostic abnormalities identified, benign ectocervical squamous tissue only without sampling of endocervical glandular tissue from transformation zone.    C.  Endocervical curettings:  - Low-grade squamous intraepithelial lesion (mild dysplasia, KINSEY-1)      Comment       Pro-intradepartmental consultation was obtained.      Clinical Information       69 year old female history of LEEP.  HPV positive other HR types and type 18      Gross Description       A(A). Cervix, 10 oclock:  The specimen is received in formalin, labeled with the patient's name, medical record number and other  "identifying information designated \"10:00 cervix\". It consists of a 0.3 cm fragment of white-tan soft tissue.  The specimen is submitted entirely in formalin in 1 cassette.    B(B). Cervix, 3 oclock:  The specimen is received in formalin, labeled with the patient's name, medical record number and other identifying information designated \"3:00 cervix\". It consists of multiple fragments of white-tan soft tissue.  The intestine 0.2 and 0.3 cm in greatest dimension.  The fragments are submitted entirely in formalin wrapped in lens paper in 1 cassette.    C(C). Endocervix, ECC:  The specimen is received in formalin, labeled with the patient's name, medical record number and other identifying information designated \"ECC\". It consists of a medical collection brush consisting of multiple fragments of brown-tan mucinous material measuring 1.4 cm in aggregate.  The fragments are submitted entirely in formalin wrapped in lens paper in 1 cassette.   HODA Milner([facility]) 8/12/2024 9:14 AM       Microscopic Description       Microscopic examination is performed with findings supportive of the diagnosis as noted.      Performing Labs       The technical component of this testing was completed at [facility].    Stain controls for all stains resulted within this report have been reviewed and show appropriate reactivity.       Case Images         If you have any questions or concerns, please call the clinic at the number listed above.       Sincerely,      Ijeoma Painting MD            "

## 2024-08-09 NOTE — TELEPHONE ENCOUNTER
Per letter from PAP pharmacy, in order to have insulin delivered to clinic, they need a signed and dated letter from patient to request the change and signed/dated letter from clinic personall to accept the change in deliver.       Both letters drafted.     LVM informing patient that she will need to sign the patient letter.

## 2024-08-14 LAB
PATH REPORT.COMMENTS IMP SPEC: NORMAL
PATH REPORT.FINAL DX SPEC: NORMAL
PATH REPORT.GROSS SPEC: NORMAL
PATH REPORT.MICROSCOPIC SPEC OTHER STN: NORMAL
PATH REPORT.RELEVANT HX SPEC: NORMAL
PHOTO IMAGE: NORMAL

## 2024-08-16 ENCOUNTER — DOCUMENTATION ONLY (OUTPATIENT)
Dept: OTHER | Facility: CLINIC | Age: 69
End: 2024-08-16
Payer: COMMERCIAL

## 2024-08-21 ENCOUNTER — ALLIED HEALTH/NURSE VISIT (OUTPATIENT)
Dept: FAMILY MEDICINE | Facility: CLINIC | Age: 69
End: 2024-08-21
Payer: COMMERCIAL

## 2024-08-21 DIAGNOSIS — F31.4 BIPOLAR 1 DISORDER, DEPRESSED, SEVERE (H): Primary | ICD-10-CM

## 2024-08-21 PROCEDURE — 96372 THER/PROPH/DIAG INJ SC/IM: CPT | Performed by: FAMILY MEDICINE

## 2024-08-21 PROCEDURE — 99207 PR NO CHARGE NURSE ONLY: CPT

## 2024-08-21 NOTE — PROGRESS NOTES
Clinic Administered Medication Documentation      Injectable Medication Documentation    Is there an active order (written within the past 365 days, with administrations remaining, not ) in the chart? Yes.     Patient was given  Abilify . Prior to medication administration, verified patient's identity using patient s name and date of birth. Please see MAR and medication order for additional information. Patient instructed to remain in clinic for 15 minutes and report any adverse reaction to staff immediately.    Vial/Syringe: Single dose vial. Was entire vial of medication used? Yes  Was this medication supplied by the patient? No  Is this a medication the patient will need to receive again? Yes. Verified that the patient has refills remaining in their prescription.    Senia Rosenberg RN  [facility]

## 2024-08-28 ENCOUNTER — TELEPHONE (OUTPATIENT)
Dept: FAMILY MEDICINE | Facility: CLINIC | Age: 69
End: 2024-08-28

## 2024-08-28 NOTE — TELEPHONE ENCOUNTER
Patient called back stating that she does not remember if she signed letter. Writer reviewed chart, no copy of signed letter or update. Patient is wondering if Bradley remembers if patient had sign the form.     Please call patient back if letter was signed or not.

## 2024-08-28 NOTE — TELEPHONE ENCOUNTER
Letter still at the . Has not been signed by patient. She can either come in to sign, or at this point, just wait until MTM follow-up visit next week.     Please call patient to inform

## 2024-08-28 NOTE — TELEPHONE ENCOUNTER
S-(situation):   Patient calling regarding paperwork / form for discounted / free medications that are delivered to [facility]  Patient wondering if she needs to do anything regarding the forms?    B-(background):   Patient has an appointment scheduled on 9/3/24 with Bradley Liao Pharm D and can discuss further at appointment     Patient seeing Angelina at [place] on 8/29/24    A-(assessment):   Patient having the shakes / tremors in bilateral wrist and hands for 1 month. Thinks it is her medications  Declined triage and will discuss further at appointment scheduled on 9/3/24    R-(recommendations):   Message sent to Bradley Liao per patient request for FYI  Patient to keep scheduled appointment on 9/3/24 with Bradley Rosenberg RN  [facility]

## 2024-08-29 ENCOUNTER — TELEPHONE (OUTPATIENT)
Dept: PULMONOLOGY | Facility: CLINIC | Age: 69
End: 2024-08-29
Payer: COMMERCIAL

## 2024-08-29 ENCOUNTER — TELEPHONE (OUTPATIENT)
Dept: FAMILY MEDICINE | Facility: CLINIC | Age: 69
End: 2024-08-29
Payer: COMMERCIAL

## 2024-08-29 NOTE — TELEPHONE ENCOUNTER
Spoke with Annel. She complains of shakiness to her hands, fingers, and wrist especially in the morning and evening. No other symptoms reported.She report these symptoms started about a month ago. Per chart review, she started metformin on 8/6/24 and started Bevespi on 7/9/24. Reports her blood sugars have in around 200 in the morning and at night. Advised her to reach out to her PCP to let them know this is happening. Will send to Dr. Oliveira to review/advise if Bevespi should be discontinued/changed.

## 2024-08-29 NOTE — TELEPHONE ENCOUNTER
[facility] Call Center    Phone Message    May a detailed message be left on voicemail: yes     Reason for Call: Medication Question or concern regarding medication   Prescription Clarification  Name of Medication: Glycopyrrolate-Formoterol (BEVESPI AEROSPHERE) 9-4.8 MCG/ACT oral inhaler   Prescribing Provider: Dr. Oliveira   Pharmacy: [facility]. [address] & [facility]   What on the order needs clarification? States the rx is causing shakiness/trembles. Please call to discuss alternative options.       Action Taken: Other: Pulm    Travel Screening: Not Applicable     Date of Service: 8/29/24

## 2024-08-29 NOTE — TELEPHONE ENCOUNTER
FYI - Status Update    Who is Calling: patient    Update: Pt wants to let pcp know that liver doctor wants pcp to know that inhaler and metformin is making the patient the shake when she takes them. The shaking is in hands, fingers, and wrist. Pt said she also has an appt coming up with MTM.     Does caller want a call/response back: No

## 2024-08-30 NOTE — PROGRESS NOTES
Medication Therapy Management (MTM) Encounter    ASSESSMENT:                            Medication Adherence/Access: Having cost concerns with medications [facility] Extra Help subsidy ended.      Back Pain: Declines medications for back pain.     Type 2 Diabetes:  A1C above goal <8%. No longer using CGM. Declines going back to CGM. Overall blood glucose decreased from previous. Fasting sugars above goal , but post-prandial readings below goal <180. Question if patient is having overnight lows, which may also be contributing to tremor. If CMP is stable, can consider increasing Metformin dose to manage AM highs.       Hyperlipidemia: Appropriately using statin. LDL at goal <100. Appropriately using aspirin for ASCVD risk score >10%.     Hypertension: BP at  goal <140/90. Pulse at goal .        Bipolar Type I:  Lithium levels in therapeutic range.  Due for 6 month recheck of levels. Takes evening lithium at around 6pm, so not a true trough.     Low Vitamin D: Last Vit D level WNL.      Asthma/COPD: Tremor is not a reported side effect of Bevespi. As above, question if this is related to blood glucose. Does have a visit with Pulm later this week. Encouraged patient to discuss financial barriers to previous therapies.     Heartburn: Symptoms well managed with PPI.      Constipation: No constipation concerns at this time.     Osteopenia: No clear indication to start treatment. Major FRAX risk <20%; hip risk <3%. Recommend continuing with Calcium and Vit D supplement at this time.        PLAN:                            CMP, Lithium levels.    Consider Metformin increase if labs within normal limits.       Follow-up: Return in about 8 weeks (around 10/29/2024) for Medication Management Pharmacist, in person.   9/23 with PCP     SUBJECTIVE/OBJECTIVE:                          Annel Stoddard is a 69 year old female coming for a follow-up visit. She was referred to me from Gerald Strange MD as PCP. Today's visit is a  follow-up MTM visit from 08/06/2024.      Reason for visit: Medication follow-up   [employer] will be calling next week about income. Frustrated with medicare.   On 2 lists for different housing facilities.   Tremor in her hands. Thinks its from the inhaler.       Allergies/ADRs: Reviewed in chart  Past Medical History: Reviewed in chart  Tobacco: She reports that she has never smoked. She has never been exposed to tobacco smoke. She has never used smokeless tobacco.  Alcohol:  reports that she does not currently use alcohol.       Medication Adherence/Access:     Denies adherence concerns.      Back Pain:   Using baby aspirin twice daily.      Has stenosis and scoliosis. Severe pain with walking - able to a block and a half before pain sets in. Carrying anything aggravates the back as well.     Aching, shooting pain. Sometimes radiates into the legs.     Hasn't really had this addressed with anybody - was trying to be brave.   Declines medicines for pain.       Type 2 Diabetes:    NovoLog 35 units 3 times daily  Tresiba U300 - 110 units daily   Ozempic 2 mg from PAP program     At last visit, increased Metformin 500 mg ER to 3 tabs daily.     Stopped Jardiance due to ongoing yeast infections.   Pioglitazone stopped due to edema.   Mounjaro was too expensive.        Blood sugar monitoring: Stopped using sensors.     Forgot glucometer.   165 this morning. Sometimes 190-200 in the morning. Can be lower.   Before lunch 252 (had orange juice). Usually 150-180 after          Symptoms of low blood sugar? Sometimes having symptoms, even without true lows.    Symptoms of high blood sugar? Polyuria.   Diet/Exercise:      Aspirin: Taking 81mg daily    Statin: Yes: Rosuvastatin 10 mg daily       Hemoglobin A1C   Date Value Ref Range Status   07/05/2024 9.4 (H) <5.7 % Final     Comment:     Normal <5.7%   Prediabetes 5.7-6.4%    Diabetes 6.5% or higher     Note: Adopted from ADA consensus guidelines.   12/12/2023 8.4 (H) 0.0 - 5.6 %  Final     Comment:     Normal <5.7%   Prediabetes 5.7-6.4%    Diabetes 6.5% or higher     Note: Adopted from ADA consensus guidelines.   08/01/2023 6.9 (H) 0.0 - 5.6 % Final     Comment:     Normal <5.7%   Prediabetes 5.7-6.4%    Diabetes 6.5% or higher     Note: Adopted from ADA consensus guidelines.   01/27/2020 8.4 (H) 0 - 5.6 % Final     Comment:     Normal <5.7% Prediabetes 5.7-6.4%  Diabetes 6.5% or higher - adopted from ADA   consensus guidelines.        Microalbumin Urine mg/dL   Date Value Ref Range Status   06/16/2021 <0.50 0.00 - 1.99 mg/dL Final      Creatinine Urine mg/dL   Date Value Ref Range Status   03/11/2024 20.5 mg/dL Final     Comment:     The reference ranges have not been established in urine creatinine. The results should be integrated into the clinical context for interpretation.     LDL Cholesterol Calculated   Date Value Ref Range Status   02/01/2024 50 <=100 mg/dL Final     LDL Cholesterol Direct   Date Value Ref Range Status   09/23/2019 48 <=129 mg/dl Final     Creatinine   Date Value Ref Range Status   07/05/2024 0.53 0.51 - 0.95 mg/dL Final   01/27/2020 0.52 0.52 - 1.04 mg/dL Final     The 10-year ASCVD risk score (Diana SRIVASTAVA, et al., 2019) is: 13.1%    Values used to calculate the score:      Age: 69 years      Sex: Female      Is Non- : No      Diabetic: Yes      Tobacco smoker: No      Systolic Blood Pressure: 121 mmHg      Is BP treated: No      HDL Cholesterol: 52 mg/dL      Total Cholesterol: 148 mg/dL         Hypertension/Edema:   losartan 50 mg daily, Furosemide 20 mg 1.5 tabs daily.     Edema has considerably improved since stopping Actos. Still some puffiness.        BP Readings from Last 3 Encounters:   09/03/24 121/70   08/09/24 108/63   08/06/24 105/65      Pulse Readings from Last 3 Encounters:   08/09/24 72   08/06/24 79   07/09/24 94     Wt Readings from Last 3 Encounters:   09/03/24 187 lb (84.8 kg)   08/09/24 183 lb (83 kg)   08/06/24 184 lb  (83.5 kg)          Bipolar Type I: Prescribed lithium 450 mg ER 2 tabs AM and 1 tab PM, Abilify Maintena 400 mg IM monthly, Vitamin D3 5000 units daily.     Saw a therapist with [facility] 2. Switched to a different practitioner.     Ongoing anxiety - Lots of things going on between Medicare change, housing situation etc.     Takes lithium at 4 am and 6 pm.       Vitamin D Deficiency Screening Results:  Lab Results   Component Value Date    VITDT 38 12/12/2023    VITDT 43 04/05/2023    VITDT 35 01/17/2023       Lab Results   Component Value Date    LITHIUM 0.84 02/01/2024           Asthma/COPD/allergies:   Albuterol HFA 90 mcg 2 puffs every 4 hours as needed  Montelukast 10 mg daily  Flonase 50 mcg nasal spray 2 sprays each nostril daily  Saline 0.65% nasal spray as needed.     Tracheal stenosis.     Could not afford Spiriva. Switched to Bevespi  (glycopyrrolate-Formoterol) 9-4.8 mcg 2 puffs twice daily from PAP.     Has a tremor in the hands. Mainly in the morning and evening. Has continued to use.     Will be seeing Pulm next week.     Was wheezing really bad last.         Heartburn: Prescribed omeprazole 40 mg daily    No heartburn symptoms with PPI. History of ulcers.       Constipation: Prescribed Senna-docusate 8.6-50 mg 1-2 tabs twice daily as needed, Miralax 17 g daily as needed.  Using senna 2 tabs twice daily and Miralax daily.     No constipation concerns recently.       Osteopenia: Currently using Vitamin D 5000 units and Calcium 600 mg twice daily.          Vitamin D Deficiency Screening Results:  Lab Results   Component Value Date    VITDT 38 12/12/2023    VITDT 43 04/05/2023    VITDT 35 01/17/2023            Today's Vitals: /70   Wt 187 lb (84.8 kg)   LMP  (LMP Unknown)   BMI 33.55 kg/m    ----------------      I spent 48 minutes with this patient today. All changes were made via collaborative practice agreement with Gerald Strange MD. A copy of the visit note was provided to the patient's  provider(s).    A summary of these recommendations was given to the patient.    Bradley Liao PharmD  Medication Therapy Management (MTM) Pharmacist  [facility]         Medication Therapy Recommendations  No medication therapy recommendations to display

## 2024-08-30 NOTE — TELEPHONE ENCOUNTER
Pt calling again. Her pulmonologist's nurse told her to call Dr Strange   She has been on the Bevespi  inhaler two months and one month back on the metformin  She took metformin years ago but was taken off due to liver disease     Shaking started one month ago   Tremors fingers , wrist and hands , especially in the morning    Dr Strange is out of office for one week so I did offer to look for sooner appt with another provider but she feels ok to wait and speak to RAMIRO Liao when she has appt on 9/3 with him    Dodie Williamson, RN, BSN  [facility]

## 2024-08-30 NOTE — TELEPHONE ENCOUNTER
Per Dr. Oliveira :  I didn't prescribe the bevespi    She can do a visit with an np       Spoke with Annel. Set up appointment to see Stormy Jones CNP on 9/6/24.

## 2024-09-03 ENCOUNTER — OFFICE VISIT (OUTPATIENT)
Dept: PHARMACY | Facility: CLINIC | Age: 69
End: 2024-09-03
Payer: COMMERCIAL

## 2024-09-03 ENCOUNTER — ALLIED HEALTH/NURSE VISIT (OUTPATIENT)
Dept: FAMILY MEDICINE | Facility: CLINIC | Age: 69
End: 2024-09-03
Payer: COMMERCIAL

## 2024-09-03 ENCOUNTER — LAB (OUTPATIENT)
Dept: LAB | Facility: CLINIC | Age: 69
End: 2024-09-03
Payer: COMMERCIAL

## 2024-09-03 VITALS — SYSTOLIC BLOOD PRESSURE: 121 MMHG | WEIGHT: 187 LBS | DIASTOLIC BLOOD PRESSURE: 70 MMHG | BODY MASS INDEX: 33.55 KG/M2

## 2024-09-03 DIAGNOSIS — Z79.4 TYPE 2 DIABETES MELLITUS WITH COMPLICATION, WITH LONG-TERM CURRENT USE OF INSULIN (H): Primary | ICD-10-CM

## 2024-09-03 DIAGNOSIS — Z23 ENCOUNTER FOR IMMUNIZATION: Primary | ICD-10-CM

## 2024-09-03 DIAGNOSIS — Z79.4 TYPE 2 DIABETES MELLITUS WITH COMPLICATION, WITH LONG-TERM CURRENT USE OF INSULIN (H): ICD-10-CM

## 2024-09-03 DIAGNOSIS — E78.2 MIXED HYPERLIPIDEMIA: ICD-10-CM

## 2024-09-03 DIAGNOSIS — K21.9 LPRD (LARYNGOPHARYNGEAL REFLUX DISEASE): ICD-10-CM

## 2024-09-03 DIAGNOSIS — J31.0 CHRONIC RHINITIS: ICD-10-CM

## 2024-09-03 DIAGNOSIS — K21.00 GASTROESOPHAGEAL REFLUX DISEASE WITH ESOPHAGITIS, UNSPECIFIED WHETHER HEMORRHAGE: ICD-10-CM

## 2024-09-03 DIAGNOSIS — E11.8 TYPE 2 DIABETES MELLITUS WITH COMPLICATION, WITH LONG-TERM CURRENT USE OF INSULIN (H): Primary | ICD-10-CM

## 2024-09-03 DIAGNOSIS — K75.81 NONALCOHOLIC STEATOHEPATITIS: ICD-10-CM

## 2024-09-03 DIAGNOSIS — M85.80 OSTEOPENIA, UNSPECIFIED LOCATION: ICD-10-CM

## 2024-09-03 DIAGNOSIS — F31.4 BIPOLAR 1 DISORDER, DEPRESSED, SEVERE (H): ICD-10-CM

## 2024-09-03 DIAGNOSIS — E11.8 TYPE 2 DIABETES MELLITUS WITH COMPLICATION, WITH LONG-TERM CURRENT USE OF INSULIN (H): ICD-10-CM

## 2024-09-03 DIAGNOSIS — E55.9 VITAMIN D DEFICIENCY: ICD-10-CM

## 2024-09-03 DIAGNOSIS — K59.00 CONSTIPATION, UNSPECIFIED CONSTIPATION TYPE: ICD-10-CM

## 2024-09-03 LAB — LITHIUM SERPL-SCNC: 1.15 MMOL/L (ref 0.6–1.2)

## 2024-09-03 PROCEDURE — 90662 IIV NO PRSV INCREASED AG IM: CPT

## 2024-09-03 PROCEDURE — 99207 PR NO CHARGE LOS: CPT | Performed by: PHARMACIST

## 2024-09-03 PROCEDURE — G0008 ADMIN INFLUENZA VIRUS VAC: HCPCS

## 2024-09-03 PROCEDURE — 99207 PR NO CHARGE NURSE ONLY: CPT

## 2024-09-03 PROCEDURE — 36415 COLL VENOUS BLD VENIPUNCTURE: CPT

## 2024-09-03 PROCEDURE — 80053 COMPREHEN METABOLIC PANEL: CPT

## 2024-09-03 PROCEDURE — 80178 ASSAY OF LITHIUM: CPT

## 2024-09-03 NOTE — PATIENT INSTRUCTIONS
"Recommendations from today's MTM visit:                                                         CMP, Lithium levels.    Consider Metformin increase if labs within normal limits.     Follow-up: Return in about 8 weeks (around 10/29/2024) for Medication Management Pharmacist, in person.    It was great speaking with you today.  I value your experience and would be very thankful for your time in providing feedback in our clinic survey. In the next few days, you may receive an email or text message from [facility] with a link to a survey related to your  clinical pharmacist.\"     To schedule another MTM appointment, please call the clinic directly or you may call the MTM scheduling line at [phone].    My Clinical Pharmacist's contact information:                                                      Please feel free to contact me with any questions or concerns you have.      Bradley Liao, PharmD  Medication Therapy Management (MTM) Pharmacist  [facility]      "

## 2024-09-04 ENCOUNTER — TELEPHONE (OUTPATIENT)
Dept: FAMILY MEDICINE | Facility: CLINIC | Age: 69
End: 2024-09-04

## 2024-09-04 LAB
ALBUMIN SERPL BCG-MCNC: 4.4 G/DL (ref 3.5–5.2)
ALP SERPL-CCNC: 128 U/L (ref 40–150)
ALT SERPL W P-5'-P-CCNC: 49 U/L (ref 0–50)
ANION GAP SERPL CALCULATED.3IONS-SCNC: 9 MMOL/L (ref 7–15)
AST SERPL W P-5'-P-CCNC: 54 U/L (ref 0–45)
BILIRUB SERPL-MCNC: 0.3 MG/DL
BUN SERPL-MCNC: 9.8 MG/DL (ref 8–23)
CALCIUM SERPL-MCNC: 10.1 MG/DL (ref 8.8–10.4)
CHLORIDE SERPL-SCNC: 102 MMOL/L (ref 98–107)
CREAT SERPL-MCNC: 0.52 MG/DL (ref 0.51–0.95)
EGFRCR SERPLBLD CKD-EPI 2021: >90 ML/MIN/1.73M2
GLUCOSE SERPL-MCNC: 178 MG/DL (ref 70–99)
HCO3 SERPL-SCNC: 28 MMOL/L (ref 22–29)
POTASSIUM SERPL-SCNC: 4 MMOL/L (ref 3.4–5.3)
PROT SERPL-MCNC: 7.7 G/DL (ref 6.4–8.3)
SODIUM SERPL-SCNC: 139 MMOL/L (ref 135–145)

## 2024-09-04 RX ORDER — METFORMIN HCL 500 MG
1000 TABLET, EXTENDED RELEASE 24 HR ORAL 2 TIMES DAILY WITH MEALS
Qty: 360 TABLET | Refills: 1 | Status: SHIPPED | OUTPATIENT
Start: 2024-09-04 | End: 2025-03-03

## 2024-09-04 NOTE — TELEPHONE ENCOUNTER
Returned call to patient -     If shaking worsens with higher dose of metformin, it may be likely that low blood glucose are contributing to shaking. Reviewed plan from yesterday to have patient check bedtime and fasting sugars to help assess for this.     Patient comfortable with increasing the dose.

## 2024-09-04 NOTE — TELEPHONE ENCOUNTER
----- Message from Bradley Liao sent at 9/4/2024  9:50 AM CDT -----  Please call patient and inform:     1. Kidney function and liver function stable. Increase Metformin 500 mg to 2 tabs twice daily as discussed yesterday. New prescription sent.   2. Lithium level is a little higher than last time, but still in the normal range. It wasn't a true trough (level drawn just before the next dose), so I'm not worried about it being higher.    Please also fax a copy of results to patients GI doctor.

## 2024-09-04 NOTE — TELEPHONE ENCOUNTER
"AJ,     Please see RN notes and advise.     Spoke to pt and relayed Pharm D message and recommendations. Pt verb she is concern about increasing Metformin 500mg dose to 4 tabs daily due to hand tremors. Feels tremors started when she started taking Metformin a month ago. States BS are better since starting on Metformin however. BS this morning was 165. Prior to starting Metformin, am BS baseline was 300 and over. Pt is requesting \"advisement on tremors S/E with Metformin. Will dose increase make tremors worse?\"    Zhanna SILVA RN  [facility]        ----- Message from Bradley Liao sent at 9/4/2024  9:50 AM CDT -----  Please call patient and inform:     1. Kidney function and liver function stable. Increase Metformin 500 mg to 2 tabs twice daily as discussed yesterday. New prescription sent.   2. Lithium level is a little higher than last time, but still in the normal range. It wasn't a true trough (level drawn just before the next dose), so I'm not worried about it being higher.    Please also fax a copy of results to patients GI doctor.    "

## 2024-09-06 ENCOUNTER — OFFICE VISIT (OUTPATIENT)
Dept: PULMONOLOGY | Facility: CLINIC | Age: 69
End: 2024-09-06
Payer: COMMERCIAL

## 2024-09-06 ENCOUNTER — PATIENT OUTREACH (OUTPATIENT)
Dept: OBGYN | Facility: CLINIC | Age: 69
End: 2024-09-06

## 2024-09-06 ENCOUNTER — TELEPHONE (OUTPATIENT)
Dept: PHARMACY | Facility: CLINIC | Age: 69
End: 2024-09-06

## 2024-09-06 VITALS
OXYGEN SATURATION: 96 % | WEIGHT: 185 LBS | HEART RATE: 78 BPM | BODY MASS INDEX: 33.19 KG/M2 | SYSTOLIC BLOOD PRESSURE: 126 MMHG | DIASTOLIC BLOOD PRESSURE: 74 MMHG

## 2024-09-06 DIAGNOSIS — J39.8 TRACHEAL STENOSIS: ICD-10-CM

## 2024-09-06 DIAGNOSIS — G47.33 OBSTRUCTIVE SLEEP APNEA SYNDROME: ICD-10-CM

## 2024-09-06 DIAGNOSIS — R87.810 CERVICAL HIGH RISK HPV (HUMAN PAPILLOMAVIRUS) TEST POSITIVE: Primary | ICD-10-CM

## 2024-09-06 DIAGNOSIS — J45.20 MILD INTERMITTENT ASTHMA WITHOUT COMPLICATION: Primary | ICD-10-CM

## 2024-09-06 PROCEDURE — 99204 OFFICE O/P NEW MOD 45 MIN: CPT | Performed by: NURSE PRACTITIONER

## 2024-09-06 RX ORDER — MONTELUKAST SODIUM 10 MG/1
10 TABLET ORAL AT BEDTIME
Qty: 90 TABLET | Refills: 3 | Status: SHIPPED | OUTPATIENT
Start: 2024-09-06

## 2024-09-06 NOTE — PATIENT INSTRUCTIONS
It was a pleasure seeing you in clinic today. This is what we discussed:    I will reach out to the pharmacist to see what LAMA is covered. This is similar to Spiriva. If you tremors continue after changing inhalers we can re-start Bevespi if you like it better.   I also recommend see neurology for the tremors.   Use your albuterol every 4 hours as needed for cough, shortness of breath, wheeze, or chest tightness.   Follow up 1 year, sooner if needed   If you have worsening symptoms, questions, or need to speak with the nurse please call [phone].

## 2024-09-06 NOTE — Clinical Note
Adama Huerta,   I saw Annel today. We will trial back on LAMA alone to see if the addition of the LABA in Bevespi is contributing to tremor. Are you able to see which is cheapest? I know she is very limited by out of pockets costs.   Thanks! Stormy

## 2024-09-06 NOTE — PROGRESS NOTES
Pulmonary Outpatient Consult Note  September 6, 2024      Assessment and Plan:   Annel Stoddard is a 69 year old F, never smoker, with history of tracheal stenosis following tracheostomy, asthma, LPRD, DM2, HLD, bipolar 1, and RANDALL (not on pap) presenting today for follow up of asthma.   Previously followed in this clinic by Dr. Oliveira.  She has managed for years on a long-acting bronchodilator with as needed albuterol.  Recently changed to Bevespi via a pharmacy consult as Spiriva was too expensive out-of-pocket.  She started this around 2 months ago along with metformin.  Around that time she noticed some tremor in her hand and is worried this is a medication side effect.  Of note, she is on lithium for bipolar 1.  She does feel this inhaler offers good control for her breathing symptoms.    PFTs in 2020 showed a restrictive process, decreased FEV1, FVC, and TLC.  Ratio was normal and does not support obstruction.    Assessment and Plan:  #. Mild intermittent asthma: Unsure if she actually has asthma and given lack of cough and wheeze.  Her last PFTs in 2020 demonstrated a restrictive process with no obstruction.  This is more consistent with her tracheal stenosis and not asthma.  However, she reports her breathing is significantly worse without daily inhaler use so we can continue a long-acting inhaler. She occasionally gets a little bit worse during seasonal allergies of unclear type. Does not tolerate inhaled corticosteroids (rash and unwilling to try additional). Is currently on Bevespi but feels this may be causing tremor. I cannot say this is not contributing but think it is unlikely. We will go back to LAMA alone and see if this resolves, no tremor with previous Spiriva use. This appears to be an essential tremor and I highly recommend she have this evaluated by neurology given her history of lithium use.   STOP Bevespi.   Continue albuterol prn  Continue Singulair 10mg at bedtime  I will contact  "pharmacy to see what LAMA is covered or can be obtained at no cost as she is prohibited by co-pay. Will add Spiriva back to medication list so the [place] PAP program can help her get this covered.   Recommend neurology referral. Pt declines at this time.   #. Tracheal stenosis: A-frame deformity related to previous tracheostomy.  Stable.  Per previous assessment by Dr. Oliveira, this is not amenable to dilation or debulking. Tracheal resection is a theoretical option but not something that she would like to consider. This affects her breathing and therefore makes her presumed asthma difficult to assess. She does not require specific follow-up for this.  #. History of RANDALL:  Not currently being treated. This could contribute to breathing symptoms. I recommend she seek treatment for this.   #. HCM: UTD with respiratory vaccines.     RTC 1 year, sooner if needed. I will contact her with inhaler change after I hear back from pharmacist.     Stormy Jones, CNP  Pulmonary Medicine  ______________________________________________________________________________    CC:   Chief Complaint   Patient presents with    Follow Up     Bevespi-pt has questions about medication      HPI:   Annel presents today for follow up and to discuss her new medication (Bevespi).   Was switched to Bevespi by PCP because of out-of-pocket cost. Previously on both Stiolto and Spiriva. Reports a \"measles like rash\" when using a ICS in the past. Per pt she only tried one but is willing to trial another.     She has not had any significant changes in her health since her last visit.  She is short of breath with exertion.  Stress and seasonal exposures she gets more short of breath. Has had improvement with prednisone in the past. No hospitalizations.  Denies cough or wheeze.     Previously followed by pulmonology at [facility] for both RANDALL and tracheal stenosis. She is not currently using PAP therapy.         5/5/2021     9:14 AM 6/16/2021     7:46 AM " 1/10/2023     1:00 PM   ACT Total Scores   ACT TOTAL SCORE (Goal Greater than or Equal to 20) 5 6 12   In the past 12 months, how many times did you visit the emergency room for your asthma without being admitted to the hospital? 0 0 0   In the past 12 months, how many times were you hospitalized overnight because of your asthma? 0 0 0     PMH:  Patient Active Problem List    Diagnosis Date Noted    Mild intermittent asthma 03/06/2024     Priority: Medium    Paronychia of toe, left 02/13/2024     Priority: Medium    Nail abnormalities 02/13/2024     Priority: Medium    Acute cough 01/09/2024     Priority: Medium    Vitamin D deficiency 12/12/2023     Priority: Medium    Mixed diabetic hyperlipidemia associated with type 2 diabetes mellitus (H) 10/24/2023     Priority: Medium    Cervicalgia 06/01/2023     Priority: Medium    Bipolar 1 disorder, depressed, severe (H) 01/09/2023     Priority: Medium    Other dysphagia 12/29/2022     Priority: Medium     Seen by [facility] 11/5/22, endoscopy done:        Gastric polyp 12/29/2022     Priority: Medium     Seen on EGD my [facility] on 11/5/22        Tracheal stenosis following tracheostomy (H) 12/11/2022     Priority: Medium    Cirrhosis of liver without ascites, unspecified hepatic cirrhosis type (H) 12/11/2022     Priority: Medium    LPRD (laryngopharyngeal reflux disease) 06/17/2021     Priority: Medium    Lumbar spine pain 06/17/2021     Priority: Medium    RUQ abdominal pain 06/17/2021     Priority: Medium    Abnormal cervical Papanicolaou smear 10/15/2020     Priority: Medium    Human papilloma virus infection 10/15/2020     Priority: Medium    Hernia of anterior abdominal wall 10/14/2020     Priority: Medium    Nonalcoholic steatohepatitis 10/14/2020     Priority: Medium    Osteopenia 10/14/2020     Priority: Medium    Thrombophlebitis 10/14/2020     Priority: Medium    Cervical high risk HPV (human papillomavirus) test positive 08/14/2020     Priority: Medium     10/22/2015  "NIL/HPV+  03/27/17 NIL Pap, Neg HR HPV   08/07/18 NIL Pap, Neg HR HPV   12/16/2019 NIL Pap, + HR HPV type 18   08/14/2020 NIL/HPV+  08/14/2020 Dovray: Benign, ECC inconclusive Provider plan: LEEP.  See Telephone encounter dated 8/24/2020:  \"It came back essentially inconclusive because of atrophy and that I couldn't really get enough tissue.  Her pap came back completely benign but HPV +.  In this case, I had discussed with her that the optimal way to proceed may be to have a leep so that we could get rid of any of the diseased portion of her cervix.\"  11/10/20 LEEP listed in care everywhere surgical section no records to review  11/22/21 ASCUS Pap, + HR HPV (not 16 or 18)   01/11/22 Dovray Bx benign ECC Focal squamous atypia suggestive of, but not definitively diagnostic for, low grade squamous intraepithelial lesion (KINSEY 1) (care everywhere)  01/09/23 NIL Pap, + HR HPV types 18 and other Plan Dovray due bef 04/09/23 01/19/23 Pt was advised.  02/13/23 Dovray Note says ECC done but I'm not seeing a ECC done per provider lab did not receive sample.   03/3/23 message sent to the provider to inquire about this  03/20/23 Dovray ECC KINSEY 1 Plan cotest in 6-12 months due 03/20/24 07/13/23 NIL Pap, +HR HPV types 18 and other Plan Dovray per provider due bef 10/13/23  07/21/23 Pt was advised.  08/1/23 Dovray ECC KINSEY 1 Plan cotest in 6-12 months due by 08/1/24 of note looks like pt may be moving to [place], providers office informed the pt of the results and recommendations from telephone call on 08/3/23.   04/4/24 NIL Pap, +HR HPV types 18 and other Plan Ob/gyn Consult due bef 07/4/24 06/13/24 appt with Ob/Gyn canceled   08/09/24 appt with Ob/gyn for possible Dovray       Type 2 diabetes mellitus with complication, with long-term current use of insulin (H) 09/23/2019     Priority: Medium    Anatomical narrow angle glaucoma 09/23/2019     Priority: Medium    Hyperactivity of bladder 09/23/2019     Priority: Medium     cannot take " "antimuscuranics because of angle closure glaucoma   Formatting of this note might be different from the original.  cannot take antimuscuranics because of angle closure glaucoma        Bilateral low back pain with left-sided sciatica 09/23/2019     Priority: Medium     Only had XRays , mainstay of treatment is PT   Formatting of this note might be different from the original.  Only had XRays , mainstay of treatment is PT        Callus of foot 09/23/2019     Priority: Medium    Mixed hyperlipidemia 09/23/2019     Priority: Medium    Healthcare maintenance 11/04/2011     Priority: Medium     Pap 2018 has a h/o HPV so may need annual paps   Colon 2015 negative    mammo jan 2019    dexa  Formatting of this note might be different from the original.  PSYCHIATRIC        Botulism food poisoning 11/04/2011     Priority: Medium     Formatting of this note might be different from the original.  In 1980 - 6 MO HOSPITALIZED, TRACHE        History of colonic polyps (colonoscopy due 11/2027) 11/04/2011     Priority: Medium     Formatting of this note might be different from the original.  8/2020 - per patient, colonoscopy in 2015 with benign polyp, told it was   next due 2025  Last colonoscoy 11/5/22, normal. Recall in 5 years 11/2027 for h/o colon polyps.         Sleep apnea 11/04/2011     Priority: Medium     Formatting of this note might be different from the original.  Lost 50 lb; taking self off lithium        Hyponatremia 08/05/2011     Priority: Medium     Formatting of this note might be different from the original.  Episode in [place]; etiology; ?lithium         PSH:  Past Surgical History:   Procedure Laterality Date    ABDOMINOPLASTY      EYE SURGERY      bilateral with flap    HAND SURGERY Left     \"chipped off bone\"     trachestomy      TUBAL LIGATION       Current Meds:  Current Outpatient Medications   Medication Sig Dispense Refill    albuterol (PROAIR HFA/PROVENTIL HFA/VENTOLIN HFA) 108 (90 Base) MCG/ACT inhaler " INHALE 2 PUFFS INTO THE LUNGS EVERY 6 HOURS AS NEEDED FOR SHORTNESS OF BREATH OR WHEEZING OR COUGH 18 g 1    aspirin (ASA) 81 MG chewable tablet Take 81 mg by mouth daily      BD GERARDO U/F 32G X 4 MM insulin pen needle Use as directed 4 injections per day. 200 each 3    blood glucose (NO BRAND SPECIFIED) lancets standard Use to test blood sugar 4 times daily or as directed. Verio one Touch 3 each 3    blood glucose (NO BRAND SPECIFIED) lancets standard Use to test blood sugar 4 times daily or as directed. 200 each 3    blood glucose (NO BRAND SPECIFIED) test strip 1 strip by In Vitro route 4 times daily Verio one Touch 200 strip 3    Calcium Carbonate-Vitamin D 600-5 MG-MCG TABS Take 2 tablets by mouth daily      Continuous Blood Gluc  (FREESTYLE EDITH 2 READER) KAYA       Continuous Blood Gluc Sensor (FREESTYLE EDITH 2 SENSOR) MISC 1 each every 14 days Use 1 sensor every 14 days. Use to read blood sugars per 's instructions. 2 each 5    furosemide (LASIX) 20 MG tablet Take 1.5 tablets (30 mg) by mouth daily 135 tablet 0    Glycopyrrolate-Formoterol (BEVESPI AEROSPHERE) 9-4.8 MCG/ACT oral inhaler Inhale 2 puffs into the lungs 2 times daily 10.7 g 11    insulin aspart (NOVOLOG PEN) 100 UNIT/ML pen Inject 35 Units Subcutaneous 3 times daily (before meals) From PAP      insulin degludec (TRESIBA) 100 UNIT/ML pen Inject 55 Units Subcutaneous 2 times daily From PAP 15 mL 2    lithium (ESKALITH CR/LITHOBID) 450 MG CR tablet Take 2 tablets (900 mg) by mouth every morning AND 1 tablet (450 mg) every evening. 90 tablet 1    losartan (COZAAR) 50 MG tablet Take 1 tablet (50 mg) by mouth daily 90 tablet 3    magnesium oxide 400 MG CAPS Take 400 mg by mouth daily      metFORMIN (GLUCOPHAGE XR) 500 MG 24 hr tablet Take 2 tablets (1,000 mg) by mouth 2 times daily (with meals). 360 tablet 1    montelukast (SINGULAIR) 10 MG tablet Take 1 tablet (10 mg) by mouth at bedtime 90 tablet 3    multivitamin (ONE-DAILY)  tablet Take 1 tablet by mouth daily      mupirocin (BACTROBAN) 2 % external ointment APPLY TOPICALLY TO THE AFFECTED AREA THREE TIMES DAILY 15 g 0    nystatin (MYCOSTATIN) 579194 UNIT/GM external ointment Apply topically 2 times daily Apply to affected areas only (right upper arm) 30 g 1    Omega 3-6-9 Fatty Acids (OMEGA 3-6-9 COMPLEX PO)       omeprazole (PRILOSEC) 40 MG DR capsule TAKE 1 CAPSULE(40 MG) BY MOUTH DAILY 90 capsule 2    polyethylene glycol (MIRALAX) 17 GM/Dose powder TAKE ONE CAPFUL MIXED WITH LIQUID BY MOUTH TWICE DAILY AS NEEDED 510 g 1    rosuvastatin (CRESTOR) 10 MG tablet Take 1 tablet (10 mg) by mouth daily 90 tablet 3    Semaglutide (OZEMPIC, 2 MG/DOSE, SC) From PAP      SENNA-docusate sodium (SENNA S) 8.6-50 MG tablet Take 1-2 tablets by mouth 2 times daily as needed (constipation) 360 tablet 3    sodium chloride (OCEAN) 0.65 % nasal spray Spray in nostril every 24 hours      terbinafine (LAMISIL) 1 % external cream Apply topically 2 times daily 60 g 1    tiotropium (SPIRIVA RESPIMAT) 2.5 MCG/ACT inhaler Inhale 2 puffs into the lungs daily 4 g 3    vitamin D3 (CHOLECALCIFEROL) 125 MCG (5000 UT) tablet Take 1 tablet (125 mcg) by mouth daily 90 tablet 1    Vitamins A & D (BABY VITAMIN A & D) OINT Apply 5 g topically 4 times daily 113 g 11    albuterol (PROAIR HFA/PROVENTIL HFA/VENTOLIN HFA) 108 (90 Base) MCG/ACT inhaler Inhale 2 puffs into the lungs every 6 hours as needed for shortness of breath, wheezing or cough 18 g 1    fluticasone (FLONASE) 50 MCG/ACT nasal spray SHAKE LIQUID AND USE 2 SPRAYS IN EACH NOSTRIL DAILY AS NEEDED FOR RHINITIS OR ALLERGIES Strength: 50 MCG/ACT (Patient not taking: Reported on 9/6/2024) 48 g 3     Current Facility-Administered Medications   Medication Dose Route Frequency Provider Last Rate Last Admin    ARIPiprazole ER (ABILIFY MAINTENA) extended release inj syringe 400 mg  400 mg Intramuscular Q30 Days Gerald Strange MD   400 mg at 08/21/24 0757      Allergies:  Allergies   Allergen Reactions    Desmopressin Swelling     LE        Actos [Pioglitazone] Swelling    Bee Venom     Estrogens Other (See Comments)    Hydrochlorothiazide Other (See Comments) and Unknown       Patient reports can't take this med due to increased urine output      Hydrocodone-Acetaminophen        Justin change      Jardiance [Empagliflozin] Other (See Comments)     Yeast infection    Lithium Diarrhea         Causing DI      Penicillins Unknown     Has tolerated amoxicillin     Risperidone     Shellfish-Derived Products     Aromatic Inhalants Rash    Latex Other (See Comments) and Rash     rash      Niacin Rash    Qvar [Beclomethasone] Rash    Valacyclovir Rash     Social Hx:  Social History     Tobacco Use    Smoking status: Never     Passive exposure: Never    Smokeless tobacco: Never   Vaping Use    Vaping status: Never Used   Substance Use Topics    Alcohol use: Not Currently    Drug use: Not Currently     Family HX: family history includes Cerebrovascular Disease in her father and another family member; Coronary Artery Disease in her brother; Esophageal Cancer in her sister; Lung Cancer in her half-sister; Myocardial Infarction in her paternal half-brother; No Known Problems in her maternal grandfather and maternal grandmother; Other - See Comments in her mother.    ROS:   10-point review performed and notable for the above mentioned symptoms. The remainder reviewed and negative.     Physical Exam:  /74   Pulse 78   Wt 83.9 kg (185 lb)   LMP  (LMP Unknown)   SpO2 96%   BMI 33.19 kg/m      Physical Exam  Constitutional:       General: She is not in acute distress.     Appearance: She is not ill-appearing.   Cardiovascular:      Rate and Rhythm: Normal rate and regular rhythm.      Heart sounds: Normal heart sounds.   Pulmonary:      Effort: Pulmonary effort is normal. No respiratory distress.      Breath sounds: No wheezing or rhonchi.   Musculoskeletal:      Right lower  leg: No edema.      Left lower leg: No edema.   Neurological:      Mental Status: She is alert.   Psychiatric:         Behavior: Behavior normal.         PFT's     2020:  FEV1/FVC is 84 and is normal.  FEV1 is 74% predicted and is reduced.  FVC is 69% predicted and is reduced.  There was no improvement in spirometry after a single inhaled dose of bronchodilator.  TLC is 67% predicted and is reduced.  RV is 67% predicted and is reduced.  DLCO is 93% predicted and is normal when it   is corrected for hemoglobin.  The flow volume loop is normal Yes.     Impression:  Full Pulmonary Function Test is abnormal. Spirometry is consistent with moderate restrictive ventilatory defect.  Spirometry is not consistent with reversibility.  There is no hyperinflation.  There is no air-trapping.  Diffusion capacity when corrected for hemoglobin is normal.    MIP/MEP, 03/2021:  Patient had a MEP of 71.  Predicted value was 120, lower limit of normal was 63.  Patient had a MIP of -106  Predicted value was -93, lower limit of normal was -52.  Patient had MIP and MEP lower than predicted but still above the lower limit of normal.     Labs:   personally reviewed in the EMR.    Imaging studies: personally reviewed and interpreted. Below are the Radiology interpretations.    No recent chest imaging.     EXAM: CT SOFT TISSUE NECK W/O CONTRAST  4/5/2024 8:07 AM   HISTORY:  Tracheal stenosis     COMPARISON:  CT chest 2/17/2021        IMPRESSION:   1. Postsurgical changes from prior tracheostomy with unchanged chronic  mild tracheal stenosis at the surgical site.  2. Diffuse thickening of the esophageal wall, which can be seen in  esophagitis. Recommend clinical correlation and consider upper GI  endoscopy if needed.  3. Mild heterogeneous appearance of the thyroid gland. Recommend  clinical and laboratory correlation for thyroiditis and consider  ultrasonography if needed.

## 2024-09-06 NOTE — TELEPHONE ENCOUNTER
Adama Huerta.   Patient would like you to call her back asap. Looks like one of her meds is over $100 and is wanting a change. Thank you!    See Kenny BINGHAM   [phone]

## 2024-09-06 NOTE — TELEPHONE ENCOUNTER
Spoke with pulm this morning after patient's visit, work with PAP to apply for assistance.       Encouraged patient to call [place] Patient Assistance Program   [phone].     Patient notes they did call her and she hasn't called back yet.

## 2024-09-09 ENCOUNTER — TELEPHONE (OUTPATIENT)
Dept: PULMONOLOGY | Facility: CLINIC | Age: 69
End: 2024-09-09
Payer: COMMERCIAL

## 2024-09-09 NOTE — TELEPHONE ENCOUNTER
----- Message from Stormy Jones sent at 9/6/2024 10:00 AM CDT -----  Can you call and let her know the pharmacist has already tried to get Spiriva or something similar with no luck. The Bevespi is the only option to get at no cost. Her options are;  1. Continue with albuterol a few times daily alone. Stop Bevespi and see how things go.   2. See neurology to have the tremor assessed and continue Bevespi in the meantime since it helps her breathing.   3. Pay for the Spiriva. The pharmacist did recommend she talk with the [place] PAP program (they help with cost of medications and programs). She can call them at [phone]. I did reach out to them myself via [place] message but I recommend she talk with them directly as well.     Thanks!  
Per Stormy Jones CNP:  We can repeat them if she is curious but not necessary if she responds well to the inhaler. Let's see how her breathing is after 3 months of Spiriva. If she continues to have significant symptoms we can repeat them.     Spoke with Annel. She will wait and see how the Spiriva works.       
Spoke with Annel in regards to Stormy Jones CNP message below. She has already reached out to the [place] PAP services and they are trying to get the Spiriva covered. She does not want the Bevespi . She will wait and see if they can get the Spiriva covered. Also, she is asking for an order for a PFT. She feels since she has not had one in awhile, she should get this done.   
28-Feb-2022 19:30

## 2024-09-11 ENCOUNTER — TELEPHONE (OUTPATIENT)
Dept: PULMONOLOGY | Facility: CLINIC | Age: 69
End: 2024-09-11
Payer: COMMERCIAL

## 2024-09-11 NOTE — TELEPHONE ENCOUNTER
[facility] Call Center    Phone Message    May a detailed message be left on voicemail: yes     Reason for Call: Other: Angelica- Financial Assistance Navigator at [facility] states that pt had an appt 9/5 w/ Stormy. She states, at check in, pt was requested to pay copay but Angelica reeves pt was approved for 100% etelvina care and there should be no copay for any visit until 11/7. Angelica states pt has plan till Nov 7th and will have to reapply after that. No further action needed at this time.      Action Taken: Other: PULM    Travel Screening: Not Applicable     Date of Service:

## 2024-09-11 NOTE — TELEPHONE ENCOUNTER
9/11 left her a message, to set up a follow up the first available with MD's is not until next year, if she decide to follow up with NP's have a sooner date available.

## 2024-09-11 NOTE — TELEPHONE ENCOUNTER
9/11 left a message Dr. Oliveira schedule for the month of September 2025 is not release yet. Left the phone number to call for next year to schedule.

## 2024-09-16 ENCOUNTER — TELEPHONE (OUTPATIENT)
Dept: PULMONOLOGY | Facility: CLINIC | Age: 69
End: 2024-09-16
Payer: COMMERCIAL

## 2024-09-16 ENCOUNTER — TELEPHONE (OUTPATIENT)
Dept: PHARMACY | Facility: CLINIC | Age: 69
End: 2024-09-16
Payer: COMMERCIAL

## 2024-09-16 DIAGNOSIS — K59.00 CONSTIPATION, UNSPECIFIED CONSTIPATION TYPE: ICD-10-CM

## 2024-09-16 DIAGNOSIS — E11.8 TYPE 2 DIABETES MELLITUS WITH COMPLICATION, WITH LONG-TERM CURRENT USE OF INSULIN (H): ICD-10-CM

## 2024-09-16 DIAGNOSIS — R05.1 ACUTE COUGH: ICD-10-CM

## 2024-09-16 DIAGNOSIS — Z79.4 TYPE 2 DIABETES MELLITUS WITH COMPLICATION, WITH LONG-TERM CURRENT USE OF INSULIN (H): ICD-10-CM

## 2024-09-16 RX ORDER — SENNA AND DOCUSATE SODIUM 50; 8.6 MG/1; MG/1
1-2 TABLET, FILM COATED ORAL 2 TIMES DAILY PRN
Qty: 360 TABLET | Refills: 1 | Status: SHIPPED | OUTPATIENT
Start: 2024-09-16

## 2024-09-16 RX ORDER — PEN NEEDLE, DIABETIC 32GX 5/32"
NEEDLE, DISPOSABLE MISCELLANEOUS
Qty: 200 EACH | Refills: 3 | Status: CANCELLED | OUTPATIENT
Start: 2024-09-16

## 2024-09-16 NOTE — TELEPHONE ENCOUNTER
Medication Question or Refill    Contacts       Contact Date/Time Type Contact Phone/Fax    09/16/2024 11:05 AM CDT Phone (Incoming) Annel Stoddard (Self) [phone] (M)            What medication are you calling about (include dose and sig)?: Semaglutide (OZEMPIC, 2 MG/DOSE, SC)   BD GERARDO U/F 32G X 4 MM insulin pen needle   insulin degludec (TRESIBA) 100 UNIT/ML pen   Preferred Pharmacy:   [facility] - [place], [place] - [address] [place] [address]  Phone: [phone] Fax: [phone]      Controlled Substance Agreement on file:   CSA -- Patient Level:    CSA: None found at the patient level.       Who prescribed the medication?: pcp    Do you need a refill? Yes, she stated Bradley Liao takes care of ordering her medications    When did you use the medication last: today    Patient offered an appointment? No    Do you have any questions or concerns?  No      Okay to leave a detailed message?: Yes at Home number on file [phone] ([place])

## 2024-09-16 NOTE — TELEPHONE ENCOUNTER
[facility] Call Center    Phone Message    May a detailed message be left on voicemail: yes     Reason for Call: Medication Question or concern regarding medication   Prescription Clarification    Name of Medication:   Albuterol inhaler    Prescribing Provider: charis     Pharmacy: [facility] pharmacy Fax: [phone]     What on the order needs clarification? Monique states they are just needing a Ventolin prescription for patient sent to them. Please advise.       Action Taken: Message routed to:  [facility] Lung    Travel Screening: Not Applicable     Date of Service:

## 2024-09-16 NOTE — TELEPHONE ENCOUNTER
[facility] Call Center    Phone Message    May a detailed message be left on voicemail: yes     Reason for Call: Other: Pt called and stated she would like to let care team know that Mary from the [facility] pharmacy will be calling to get a RX for her inhaler. Per pt she also stated to please have her albuterol inhaler refill, if possible please sent it over to the pharmacy on [address] ([address] [place] [address]) per pt it is the [address]. Please follow-up. Thank you        Action Taken: Other: Pulm    Travel Screening: Not Applicable     Date of Service:

## 2024-09-16 NOTE — TELEPHONE ENCOUNTER
Medication Question or Refill    Contacts       Contact Date/Time Type Contact Phone/Fax    09/16/2024 12:16 PM CDT Phone (Incoming) Annel Stoddard (Self) [phone] (M)            What medication are you calling about (include dose and sig)?:     SENNA-docusate sodium (SENNA S) 8.6-50 MG tablet       Preferred Pharmacy:  [facility] - [place], [place] - [address] [place] [address]  Phone: [phone] Fax: [phone]      Controlled Substance Agreement on file:   CSA -- Patient Level:    CSA: None found at the patient level.       Who prescribed the medication?: Dr. Strange    Do you need a refill? Yes    When did you use the medication last? 9/15/2024    Patient offered an appointment? No    Do you have any questions or concerns?  Yes: patient would like this prescription sent to the above pharmacy      Okay to leave a detailed message?: Yes at Home number on file [phone] ([place])

## 2024-09-17 ENCOUNTER — TELEPHONE (OUTPATIENT)
Dept: FAMILY MEDICINE | Facility: CLINIC | Age: 69
End: 2024-09-17
Payer: COMMERCIAL

## 2024-09-17 RX ORDER — ALBUTEROL SULFATE 90 UG/1
2 AEROSOL, METERED RESPIRATORY (INHALATION) EVERY 6 HOURS PRN
Qty: 18 G | Refills: 0 | Status: SHIPPED | OUTPATIENT
Start: 2024-09-17

## 2024-09-17 NOTE — TELEPHONE ENCOUNTER
Medication Question or Refill    Contacts       Contact Date/Time Type Contact Phone/Fax    09/16/2024 11:20 AM CDT Phone (Incoming) Jonny Stoddardis KLAUDIA (Self) [phone] (M)            What medication are you calling about (include dose and sig)?: Senna-docusate    Preferred Pharmacy:   [facility] - [place], [place] - [address]  [address] [place] [address]  Phone: [phone] Fax: [phone]      Controlled Substance Agreement on file:   CSA -- Patient Level:    CSA: None found at the patient level.       Who prescribed the medication?: Dr. Granados    Per chart review, medication has been sent 9/16/24.  Pharmacy medication sent provided.    Boris Oshea RN  [facility]

## 2024-09-17 NOTE — TELEPHONE ENCOUNTER
Sept 16, 2024, I spoke with Annel, she is in need of financial assistance for medication.    We reviewed the Pharmacy Assistance Fund $500, the Prescription Assistance Program for manfacturer assistance programs, gross income, insurance and Rx list.     assistance applications will be completed for: Spiriva Respimat.    When approved, will receive this medication at no cost through December 2024.    We will contact Annel for 2025 enrollment for Castro Nordisk Novolog pens, Tresiba pens & needles, Spiriva Respimat.    Mary Garrison  Prescription Assistance Supervisor  Pharmacy Assistance

## 2024-09-17 NOTE — TELEPHONE ENCOUNTER
Faxed for insulin.     Patient received 120 days of Ozempic at the end of July. Should still have plenty remaining.

## 2024-09-18 ENCOUNTER — ALLIED HEALTH/NURSE VISIT (OUTPATIENT)
Dept: FAMILY MEDICINE | Facility: CLINIC | Age: 69
End: 2024-09-18
Payer: COMMERCIAL

## 2024-09-18 DIAGNOSIS — F31.4 BIPOLAR 1 DISORDER, DEPRESSED, SEVERE (H): Primary | ICD-10-CM

## 2024-09-18 PROCEDURE — 99207 PR NO CHARGE NURSE ONLY: CPT

## 2024-09-18 PROCEDURE — 96372 THER/PROPH/DIAG INJ SC/IM: CPT | Performed by: FAMILY MEDICINE

## 2024-09-18 NOTE — PROGRESS NOTES
Clinic Administered Medication Documentation      Injectable Medication Documentation    Is there an active order (written within the past 365 days, with administrations remaining, not ) in the chart? Yes.     Patient was given  Abilify ER 400mg . Prior to medication administration, verified patient's identity using patient s name and date of birth. Please see MAR and medication order for additional information. Patient instructed to report any adverse reaction to staff immediately.    Vial/Syringe: Single dose vial. Was entire vial of medication used? Yes  Was this medication supplied by the patient? No  Is this a medication the patient will need to receive again? Yes. Verified that the patient has refills remaining in their prescription.    Sravani Dunham RN  [facility]

## 2024-09-19 ENCOUNTER — TELEPHONE (OUTPATIENT)
Dept: FAMILY MEDICINE | Facility: CLINIC | Age: 69
End: 2024-09-19
Payer: COMMERCIAL

## 2024-09-19 NOTE — TELEPHONE ENCOUNTER
Jazzmine, Pharmacist from [employer] calling regarding a form faxed over for Castro nordisk. She stated on the form, the date is kush out and changed so Rx is not valid because clinician did not send it that day. They need a verbal order stating it is okay to fill Rx      Call back: [phone]  Order number: [id number]         WHITNEY Knott CemN DEWAYNE  [facility]

## 2024-09-23 ENCOUNTER — OFFICE VISIT (OUTPATIENT)
Dept: FAMILY MEDICINE | Facility: CLINIC | Age: 69
End: 2024-09-23
Payer: COMMERCIAL

## 2024-09-23 ENCOUNTER — TELEPHONE (OUTPATIENT)
Dept: PHARMACY | Facility: CLINIC | Age: 69
End: 2024-09-23

## 2024-09-23 VITALS
OXYGEN SATURATION: 96 % | WEIGHT: 185 LBS | RESPIRATION RATE: 20 BRPM | HEIGHT: 63 IN | SYSTOLIC BLOOD PRESSURE: 119 MMHG | BODY MASS INDEX: 32.78 KG/M2 | HEART RATE: 84 BPM | DIASTOLIC BLOOD PRESSURE: 69 MMHG

## 2024-09-23 DIAGNOSIS — Z79.4 TYPE 2 DIABETES MELLITUS WITH COMPLICATION, WITH LONG-TERM CURRENT USE OF INSULIN (H): Primary | ICD-10-CM

## 2024-09-23 DIAGNOSIS — E11.8 TYPE 2 DIABETES MELLITUS WITH COMPLICATION, WITH LONG-TERM CURRENT USE OF INSULIN (H): Primary | ICD-10-CM

## 2024-09-23 DIAGNOSIS — K75.81 NONALCOHOLIC STEATOHEPATITIS: ICD-10-CM

## 2024-09-23 DIAGNOSIS — R25.1 TREMOR: ICD-10-CM

## 2024-09-23 DIAGNOSIS — J95.03 TRACHEAL STENOSIS FOLLOWING TRACHEOSTOMY (H): ICD-10-CM

## 2024-09-23 DIAGNOSIS — K74.60 CIRRHOSIS OF LIVER WITHOUT ASCITES, UNSPECIFIED HEPATIC CIRRHOSIS TYPE (H): ICD-10-CM

## 2024-09-23 DIAGNOSIS — J45.20 MILD INTERMITTENT ASTHMA WITHOUT COMPLICATION: ICD-10-CM

## 2024-09-23 DIAGNOSIS — F31.4 BIPOLAR 1 DISORDER, DEPRESSED, SEVERE (H): ICD-10-CM

## 2024-09-23 DIAGNOSIS — Z00.00 HEALTHCARE MAINTENANCE: ICD-10-CM

## 2024-09-23 LAB
EST. AVERAGE GLUCOSE BLD GHB EST-MCNC: 171 MG/DL
HBA1C MFR BLD: 7.6 % (ref 0–5.6)

## 2024-09-23 PROCEDURE — 83036 HEMOGLOBIN GLYCOSYLATED A1C: CPT | Performed by: FAMILY MEDICINE

## 2024-09-23 PROCEDURE — 99214 OFFICE O/P EST MOD 30 MIN: CPT | Performed by: FAMILY MEDICINE

## 2024-09-23 PROCEDURE — G2211 COMPLEX E/M VISIT ADD ON: HCPCS | Performed by: FAMILY MEDICINE

## 2024-09-23 PROCEDURE — 36415 COLL VENOUS BLD VENIPUNCTURE: CPT | Performed by: FAMILY MEDICINE

## 2024-09-23 ASSESSMENT — PAIN SCALES - GENERAL: PAINLEVEL: NO PAIN (0)

## 2024-09-23 NOTE — ASSESSMENT & PLAN NOTE
Reviewed most recent note with pulmonary nurse practitioner, question of whether she really has asthma.  PFTs in 2020 showed restrictive process.  Last note, looking for LAMA that is covered

## 2024-09-23 NOTE — ASSESSMENT & PLAN NOTE
Left, much better diabetes control than recent, A1c today 7.4, down from 9.4 in early July.    Fasting blood sugars are around 140.  Afternoon blood sugars are good per patient.  On statin    Fears that metformin may be causing tremor, I think this is unlikely.  Reassured.  No change.  Follow-up 3 months.

## 2024-09-23 NOTE — ASSESSMENT & PLAN NOTE
Reviewed hepatologist last note.  Recommend better control of diabetes, this is from 8/1/2024.  Fortunately, A1c markedly improved.

## 2024-09-23 NOTE — ASSESSMENT & PLAN NOTE
Good control/stable mood on lithium total of 1350 mg daily as will as Abilify maintain a.    Not seeing psychiatry  Last lithium level in February, due, not done today.    Previously had good results on lamotrigine.  Will refer to psychiatry for evaluation, discussion of tremor, etc.

## 2024-09-23 NOTE — PROGRESS NOTES
Assessment/ Plan  Type 2 diabetes mellitus with complication, with long-term current use of insulin (H)  Left, much better diabetes control than recent, A1c today 7.4, down from 9.4 in early July.    Fasting blood sugars are around 140.  Afternoon blood sugars are good per patient.  On statin    Fears that metformin may be causing tremor, I think this is unlikely.  Reassured.  No change.  Follow-up 3 months.    Bipolar 1 disorder, depressed, severe (H)  Good control/stable mood on lithium total of 1350 mg daily as will as Abilify maintain a.    Not seeing psychiatry  Last lithium level in February, due, not done today.    Previously had good results on lamotrigine.  Will refer to psychiatry for evaluation, discussion of tremor, etc.      Healthcare maintenance  Declines COVID-vaccine.  Has had flu shot.    Nonalcoholic steatohepatitis  Reviewed hepatologist last note.  Recommend better control of diabetes, this is from 8/1/2024.  Fortunately, A1c markedly improved.    Tremor  Patient describes months of fine intention tremor, both arms, can feel it inside of her.  Not tried.  Rest, not noted by other people.  She is pretty sure it is due to one of her medications, has been concerned about her asthma medication causing the problem, also questioning metformin today.    Exam is negative for tremor.    Discussed that if there is an offending medication, lithium is probably most likely.  Would certainly be concerned about Abilify should this be a rest tremor but it does not appear to be parkinsonian.  Referral to psychiatry to discuss bipolar treatment.    Tracheal stenosis following tracheostomy (H)  Tee    Mild intermittent asthma  Reviewed most recent note with pulmonary nurse practitioner, question of whether she really has asthma.  PFTs in 2020 showed restrictive process.  Last note, looking for LAMA that is covered     Body mass index is 33.19 kg/m .  Evaluation today supports mild diabetic peripheral  neuropathy, previous history of callus formation, not currently active.  Subjective  CC:  chief complaint  HPI:  69-year-old, history of bipolar disorder, nonalcoholic steatohepatitis with cirrhosis, type 2 diabetes, obstructive sleep apnea, HPV associated cervical dysplasia.  Patient here for follow-up on medical excuse.  She is due for COVID shot, she had a colposcopy 8/9/2024 which showed low-grade JOVANNY.  She is working with [facility] pharmacist.  Last A1c was 9.4.  Intolerant of continuous glucose monitoring, taking metformin, basal insulin 55 units twice daily, NovoLog 35 3 times daily, semaglutide 2 mg weekly.    Specialists include Dr. Oliveira for asthma/tracheal stenosis recently see GYN, Dr. Mckeon GI-last visit with him 8/1/2024    Reviewed MTM last note,  Last A1c 9.4  Fasting blood sugars are above goal, postprandial blood sugars are below goal of less than 180.      Patient Active Problem List   Diagnosis    Healthcare maintenance    Type 2 diabetes mellitus with complication, with long-term current use of insulin (H)    Anatomical narrow angle glaucoma    Hyperactivity of bladder    Bilateral low back pain with left-sided sciatica    Callus of foot    Mixed hyperlipidemia    Abnormal cervical Papanicolaou smear    Hernia of anterior abdominal wall    Human papilloma virus infection    Nonalcoholic steatohepatitis    Osteopenia    Thrombophlebitis    Botulism food poisoning    Cervical high risk HPV (human papillomavirus) test positive    History of colonic polyps (colonoscopy due 11/2027)    Hyponatremia    Sleep apnea    LPRD (laryngopharyngeal reflux disease)    Lumbar spine pain    RUQ abdominal pain    Tracheal stenosis following tracheostomy (H)    Cirrhosis of liver without ascites, unspecified hepatic cirrhosis type (H)    Other dysphagia    Gastric polyp    Bipolar 1 disorder, depressed, severe (H)    Cervicalgia    Mixed diabetic hyperlipidemia associated with type 2 diabetes mellitus (H)    Vitamin  D deficiency    Acute cough    Paronychia of toe, left    Nail abnormalities    Mild intermittent asthma     Current medications reviewed as follows:  Current Outpatient Medications   Medication Sig Dispense Refill    albuterol (PROAIR HFA/PROVENTIL HFA/VENTOLIN HFA) 108 (90 Base) MCG/ACT inhaler Inhale 2 puffs into the lungs every 6 hours as needed for shortness of breath, wheezing or cough. OFFICE VISIT NEEDED FOR ANY FURTHER REFILLS 18 g 0    aspirin (ASA) 81 MG chewable tablet Take 81 mg by mouth daily      BD GERARDO U/F 32G X 4 MM insulin pen needle Use as directed 4 injections per day. 200 each 3    blood glucose (NO BRAND SPECIFIED) lancets standard Use to test blood sugar 4 times daily or as directed. Verio one Touch 3 each 3    blood glucose (NO BRAND SPECIFIED) lancets standard Use to test blood sugar 4 times daily or as directed. 200 each 3    blood glucose (NO BRAND SPECIFIED) test strip 1 strip by In Vitro route 4 times daily Verio one Touch 200 strip 3    Calcium Carbonate-Vitamin D 600-5 MG-MCG TABS Take 2 tablets by mouth daily      furosemide (LASIX) 20 MG tablet Take 1.5 tablets (30 mg) by mouth daily 135 tablet 0    insulin aspart (NOVOLOG PEN) 100 UNIT/ML pen Inject 35 Units Subcutaneous 3 times daily (before meals) From PAP      insulin degludec (TRESIBA) 100 UNIT/ML pen Inject 55 Units Subcutaneous 2 times daily From PAP 15 mL 2    lithium (ESKALITH CR/LITHOBID) 450 MG CR tablet Take 2 tablets (900 mg) by mouth every morning AND 1 tablet (450 mg) every evening. 90 tablet 1    losartan (COZAAR) 50 MG tablet Take 1 tablet (50 mg) by mouth daily 90 tablet 3    metFORMIN (GLUCOPHAGE XR) 500 MG 24 hr tablet Take 2 tablets (1,000 mg) by mouth 2 times daily (with meals). 360 tablet 1    montelukast (SINGULAIR) 10 MG tablet Take 1 tablet (10 mg) by mouth at bedtime. 90 tablet 3    multivitamin (ONE-DAILY) tablet Take 1 tablet by mouth daily      mupirocin (BACTROBAN) 2 % external ointment APPLY TOPICALLY  TO THE AFFECTED AREA THREE TIMES DAILY 15 g 0    Omega 3-6-9 Fatty Acids (OMEGA 3-6-9 COMPLEX PO)       omeprazole (PRILOSEC) 40 MG DR capsule TAKE 1 CAPSULE(40 MG) BY MOUTH DAILY 90 capsule 2    rosuvastatin (CRESTOR) 10 MG tablet Take 1 tablet (10 mg) by mouth daily 90 tablet 3    Semaglutide (OZEMPIC, 2 MG/DOSE, SC) From PAP      SENNA-docusate sodium (SENNA S) 8.6-50 MG tablet Take 1-2 tablets by mouth 2 times daily as needed (constipation). 360 tablet 1    sodium chloride (OCEAN) 0.65 % nasal spray Spray in nostril every 24 hours      vitamin D3 (CHOLECALCIFEROL) 125 MCG (5000 UT) tablet Take 1 tablet (125 mcg) by mouth daily 90 tablet 1    Vitamins A & D (BABY VITAMIN A & D) OINT Apply 5 g topically 4 times daily 113 g 11    Continuous Blood Gluc  (FREESTYLE EDITH 2 READER) KAYA  (Patient not taking: Reported on 9/23/2024)      Continuous Blood Gluc Sensor (FREESTYLE EDITH 2 SENSOR) MISC 1 each every 14 days Use 1 sensor every 14 days. Use to read blood sugars per 's instructions. (Patient not taking: Reported on 9/23/2024) 2 each 5    fluticasone (FLONASE) 50 MCG/ACT nasal spray SHAKE LIQUID AND USE 2 SPRAYS IN EACH NOSTRIL DAILY AS NEEDED FOR RHINITIS OR ALLERGIES Strength: 50 MCG/ACT (Patient not taking: Reported on 9/6/2024) 48 g 3    Glycopyrrolate-Formoterol (BEVESPI AEROSPHERE) 9-4.8 MCG/ACT oral inhaler Inhale 2 puffs into the lungs 2 times daily (Patient not taking: Reported on 9/23/2024) 10.7 g 11    magnesium oxide 400 MG CAPS Take 400 mg by mouth daily (Patient not taking: Reported on 9/23/2024)      nystatin (MYCOSTATIN) 259804 UNIT/GM external ointment Apply topically 2 times daily Apply to affected areas only (right upper arm) (Patient not taking: Reported on 9/23/2024) 30 g 1    polyethylene glycol (MIRALAX) 17 GM/Dose powder TAKE ONE CAPFUL MIXED WITH LIQUID BY MOUTH TWICE DAILY AS NEEDED (Patient not taking: Reported on 9/23/2024) 510 g 1    terbinafine (LAMISIL) 1 %  external cream Apply topically 2 times daily (Patient not taking: Reported on 9/23/2024) 60 g 1    tiotropium (SPIRIVA RESPIMAT) 2.5 MCG/ACT inhaler Inhale 2 puffs into the lungs daily. (Patient not taking: Reported on 9/23/2024) 4 g 11     Current Facility-Administered Medications   Medication Dose Route Frequency Provider Last Rate Last Admin    ARIPiprazole ER (ABILIFY MAINTENA) extended release inj syringe 400 mg  400 mg Intramuscular Q30 Days Gerald Strange MD   400 mg at 09/18/24 0743      History   Smoking Status    Never   Smokeless Tobacco    Never     Social History     Social History Narrative    Originally from [facility]. Moved from [place].  4 times . Has 5 children ,9 grandchildren . Her daughters were taken away from her by biological dad when she was ill with botulism . All adults now . All of them are in [facility].  Youngest daughter is 38 . Initially estranged but has a closer relationship .Hadnt worked for a long time . Is a violinist and plays in [employer] . Wasn't diagnosed with bipolar much later in life . 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 Is on disability due to  ipolar .       She plays the [employer].      Patient Care Team:  Gerald Strange MD as PCP - General (Family Medicine)  Bradley Liao, PharmD as Pharmacist (Pharmacist)  Bradley Liao, PharmD as Assigned MTM Pharmacist  Lele Oliveira MD as Assigned Pulmonology Provider  Garett Archer MD as Assigned Musculoskeletal Provider  Gerald Strange MD as Assigned PCP  Fay Holly MD as MD (Ophthalmology)  Ijeoma Painting MD as MD (OB/Gyn)  MAHAD Fiore Worker as [facility] worker  Ijeoma Painting MD as Assigned OBGYN Provider      Objective  Physical Exam  Vitals:    09/23/24 1429   BP: 119/69   BP Location: Left arm   Patient Position: Sitting   Cuff Size: Adult Regular   Pulse: 84   Resp: 20   SpO2: 96%   Weight: 83.9 kg (185 lb)  "  Height: 1.59 m (5' 2.6\")     Gen- alert, oriented   No acute distress  Chest- Normal inspiration and expiration.    Clear to ascultation.    No chest wall deformity or scar.  CV- Heart regular rate and rhythm  normal tones   no murmurs   No gallops or rubs.  Ext- warm and dry   no edema  Skin-  no visualized rash  Foot monofilament test performed-  Sensation intact intact to 3 of 5 toes, absent in middle 2 toes.  Currently no callus, patient has previous history of callus formation.    Diagnostics  Results for orders placed or performed in visit on 09/23/24   Hemoglobin A1c     Status: Abnormal   Result Value Ref Range    Estimated Average Glucose 171 (H) <117 mg/dL    Hemoglobin A1C 7.6 (H) 0.0 - 5.6 %         Please note: Voice recognition software was used in this dictation.  It may therefore contain typographical errors.      Screening Questionnaire for Adult Immunization    Are you sick today?   No   Do you have allergies to medications, food, a vaccine component or latex?   Yes   Have you ever had a serious reaction after receiving a vaccination?   Yes   Do you have a long-term health problem with heart, lung, kidney, or metabolic disease (e.g., diabetes), asthma, a blood disorder, no spleen, complement component deficiency, a cochlear implant, or a spinal fluid leak?  Are you on long-term aspirin therapy?   Yes   Do you have cancer, leukemia, HIV/AIDS, or any other immune system problem?   No   Do you have a parent, brother, or sister with an immune system problem?   Don't Know   In the past 3 months, have you taken medications that affect  your immune system, such as prednisone, other steroids, or anticancer drugs; drugs for the treatment of rheumatoid arthritis, Crohn s disease, or psoriasis; or have you had radiation treatments?   No   Have you had a seizure, or a brain or other nervous system problem?   No   During the past year, have you received a transfusion of blood or blood    products, or been " given immune (gamma) globulin or antiviral drug?   No   For women: Are you pregnant or is there a chance you could become       pregnant during the next month?   No   Have you received any vaccinations in the past 4 weeks?   Yes     Immunization questionnaire was positive for at least one answer.  Notified .      Patient instructed to remain in clinic for 15 minutes afterwards, and to report any adverse reactions.     Screening performed by Kyra Judge MA on 9/23/2024 at 2:43 PM.       Signed Electronically by: Gerald Strange MD    Answers submitted by the patient for this visit:  Diabetes Visit (Submitted on 9/23/2024)  Chief Complaint: Chronic problems general questions HPI Form  Frequency of checking blood sugars:: two times daily  What time of day are you checking your blood sugars : before meals, at bedtime  Have you had any blood sugars above 200?: Yes  Have you had any blood sugars below 70?: No  Hypoglycemia symptoms:: shakiness, weakness, lethargy  Diabetic concerns:: other  Paraesthesia present:: numbness in feet  General Questionnaire (Submitted on 9/23/2024)  Chief Complaint: Chronic problems general questions HPI Form  How many servings of fruits and vegetables do you eat daily?: 4 or more  On average, how many sweetened beverages do you drink each day (Examples: soda, juice, sweet tea, etc.  Do NOT count diet or artificially sweetened beverages)?: 0  How many minutes a day do you exercise enough to make your heart beat faster?: 30 to 60  How many days a week do you exercise enough to make your heart beat faster?: 7  How many days per week do you miss taking your medication?: 0

## 2024-09-23 NOTE — TELEPHONE ENCOUNTER
Patient needs to get enrolled for next medications to be filled, Nordisk. This needs to be done before 10/6/24. Also can inhaler come to [facility]. Please call [phone].

## 2024-09-24 ENCOUNTER — TELEPHONE (OUTPATIENT)
Dept: FAMILY MEDICINE | Facility: CLINIC | Age: 69
End: 2024-09-24
Payer: COMMERCIAL

## 2024-09-24 NOTE — TELEPHONE ENCOUNTER
Forms/Letter Request    Type of form/letter: DME    Type of DME requested: DIABETIC SHOES AND INSERTS    Do we have the form/letter: Yes: PLACED IN DR. BYERS'S IN-BOX    Who is the form from? [employer] (if other please explain)    Where did/will the form come from? form was faxed in    When is form/letter needed by: ASAP    How would you like the form/letter returned: Fax : [phone]    Patient Notified form requests are processed in 5-7 business days:No    Okay to leave a detailed message?: Yes at Cell number on file:    Telephone Information:   Mobile [phone]    or Other phone number: [employer], [phone]*

## 2024-09-24 NOTE — TELEPHONE ENCOUNTER
Called and lvm informing patient of message below. If patient calls, back please relay message below. Thanks!

## 2024-09-24 NOTE — TELEPHONE ENCOUNTER
Please inform patient that the [facility] team is now going to be helping her with this paperwork. She can contact them at [phone]

## 2024-09-24 NOTE — TELEPHONE ENCOUNTER
Forms/Letter Request    Type of form/letter: DME    Type of DME requested: 4-WHEELED WALKER WITH SEAT AND BRAKES    Do we have the form/letter: Yes: PLACED IN DR. BYERS'S IN-BOX    Who is the form from? [employer]  (if other please explain)    Where did/will the form come from? form was faxed in    When is form/letter needed by: ASAP    How would you like the form/letter returned: Fax : [phone] and PLACE ORDER IN EPIC    Patient Notified form requests are processed in 5-7 business days:No    Okay to leave a detailed message?: Yes at Cell number on file:    Telephone Information:   Mobile [phone]

## 2024-09-25 ENCOUNTER — MEDICAL CORRESPONDENCE (OUTPATIENT)
Dept: HEALTH INFORMATION MANAGEMENT | Facility: CLINIC | Age: 69
End: 2024-09-25
Payer: COMMERCIAL

## 2024-09-25 NOTE — TELEPHONE ENCOUNTER
We did not discuss this at all during her visit and I would need to put a face-to-face statement in the chart regarding this.  So, lets hold off on this for now.  I will discuss it at the time of her next visit.

## 2024-09-25 NOTE — TELEPHONE ENCOUNTER
Attempt #1. Called and someone answered then hung up call. Postponing task to tomorrow. If patient calls back, please help patient schedule an appt per message below.     Form placed in [facility] folder.

## 2024-09-26 ENCOUNTER — TELEPHONE (OUTPATIENT)
Dept: FAMILY MEDICINE | Facility: CLINIC | Age: 69
End: 2024-09-26
Payer: COMMERCIAL

## 2024-09-26 NOTE — TELEPHONE ENCOUNTER
"Patient call back regarding a call received for \"wheelchair\".  Per chart reviewed, no call were placed regarding \"wheelchair.\"  Reviewed previous phone call regarding referral.  Patient stated received the referral call already and hung up.    Boris Oshea RN  [facility]    "

## 2024-09-26 NOTE — TELEPHONE ENCOUNTER
Attempt #2. Called and spoke with patient. Per patient, they do not need the form from Dr. Strange anymore. Patient is not happy with Dr. Strange as he had referred her to addiction med and psychiatry. When patient went to the appt, the neighborhood was not safe, per patient. Patient goes on to explain that one of her medications is making her shake. Patient also states that they are not happy with Bradley. Writer advised patient to speak with triage RN and patient relations, but patient declined offer.  With that being said, patient states that they will be switching clinics.     Writer discarded form. Completing task.

## 2024-09-27 ENCOUNTER — TELEPHONE (OUTPATIENT)
Dept: PHARMACY | Facility: CLINIC | Age: 69
End: 2024-09-27
Payer: COMMERCIAL

## 2024-09-27 NOTE — TELEPHONE ENCOUNTER
Received call from [facility] for delivery of Novolog and Novofine pens.     This will arrive next week 10/1 - signature will be required.

## 2024-09-30 ENCOUNTER — TELEPHONE (OUTPATIENT)
Dept: PULMONOLOGY | Facility: CLINIC | Age: 69
End: 2024-09-30
Payer: COMMERCIAL

## 2024-09-30 NOTE — TELEPHONE ENCOUNTER
Sept 30, 2024 I have interoffice mailed to Dr Oliveira the [facility] assistance application for Spiriva Respimat. Please complete, sign and return to me in the enclosed return addressed interoffice envelope.    I have mailed to Annel her section of this applicaton.    Thanks so much for your help!    Mary Garrison  Prescription Assistance Supervisor  Pharmacy Assistance

## 2024-10-01 ENCOUNTER — TELEPHONE (OUTPATIENT)
Dept: PULMONOLOGY | Facility: CLINIC | Age: 69
End: 2024-10-01
Payer: COMMERCIAL

## 2024-10-01 NOTE — TELEPHONE ENCOUNTER
Called and lvm for patient that her insulin has arrived in clinic. Insulin in large fridge in supply room. When patient picks up insulin, please complete task.

## 2024-10-02 DIAGNOSIS — Z79.4 TYPE 2 DIABETES MELLITUS WITH COMPLICATION, WITH LONG-TERM CURRENT USE OF INSULIN (H): ICD-10-CM

## 2024-10-02 DIAGNOSIS — E11.8 TYPE 2 DIABETES MELLITUS WITH COMPLICATION, WITH LONG-TERM CURRENT USE OF INSULIN (H): ICD-10-CM

## 2024-10-02 RX ORDER — SEMAGLUTIDE 2.68 MG/ML
2 INJECTION, SOLUTION SUBCUTANEOUS
Qty: 9 ML | Refills: 3 | Status: SHIPPED | OUTPATIENT
Start: 2024-10-02

## 2024-10-02 NOTE — TELEPHONE ENCOUNTER
Medication Question or Refill        What medication are you calling about (include dose and sig)?: insulin degludec (TRESIBA) 100 UNIT/ML pen     Semaglutide (OZEMPIC, 2 MG/DOSE, SC)     Preferred Pharmacy:  [facility] - [place], [place] - [address]  [address] [place] [address]  Phone: [phone] Fax: [phone]      Controlled Substance Agreement on file:   CSA -- Patient Level:    CSA: None found at the patient level.       Who prescribed the medication?: PCP    Do you need a refill? Yes    When did you use the medication last? N/A    Patient offered an appointment? No    Do you have any questions or concerns?  No      Okay to leave a detailed message?: Yes at Home number on file [phone] (home)

## 2024-10-04 ENCOUNTER — TELEPHONE (OUTPATIENT)
Dept: PHARMACY | Facility: CLINIC | Age: 69
End: 2024-10-04
Payer: COMMERCIAL

## 2024-10-04 NOTE — TELEPHONE ENCOUNTER
Forms/Letter Request    Type of form/letter: OTHER: [employer] Patient Assistance Program       Do we have the form/letter: Yes: 10/04/2024    Who is the form from? [employer] (if other please explain)    Where did/will the form come from? form was faxed in    When is form/letter needed by: ASAP    How would you like the form/letter returned: Fax : [phone]    Patient Notified form requests are processed in 5-7 business days:No    Okay to leave a detailed message?: Yes at Home number on file [phone] (home)

## 2024-10-07 NOTE — TELEPHONE ENCOUNTER
I called patient back and left a voicemail. Unsure what exactly she needs, but instructed to contact the [place] [facility] at [phone]. Also will route to them for follow-up since Bradley is out of office until next week.    Simi Fritz, PharmD, [facility]  Medication Therapy Management Pharmacist

## 2024-10-08 NOTE — TELEPHONE ENCOUNTER
[facility] team - Can someone please call patient to see what exactly she needs? If the clinic received a fax from [facility], then maybe Dr. Strange can help fill it out since Bradley is out of office until next week. I had left patient a voicemail telling her to call the [place] assistance program, however they said they aren't managing her [facility] enrollment this year.    Simi Fritz, PharmD, BCACP  Medication Therapy Management Pharmacist

## 2024-10-09 NOTE — TELEPHONE ENCOUNTER
PT called back.  She had a response for MT.      1- BG has been above 200.  2-Please send the medication application to pt. So, she can get the application to her new PCP.  New PCP appt is 10/23/24.      Sending to [facility]- Simi Fritz.    Diana Webber RN-[facility]

## 2024-10-09 NOTE — TELEPHONE ENCOUNTER
I called patient back and let her know Bradley is out of office until next week and I will pass along this message to him. Patient stated she just wanted to let me know blood sugars have been high in 200's. I also informed her all assistance program forms are available online. Patient is transferring care to a different clinic, but doesn't have an appointment until 10/23.    Simi Fritz, PharmD, BCACP  Medication Therapy Management Pharmacist

## 2024-10-09 NOTE — TELEPHONE ENCOUNTER
I called and spoke with patient. She doesn't need this form filled out by us anymore. She is seeing a new doctor at [facility] now and no longer at [facility]. She has an appointment with them in a couple days and will have them fill out her form. Although she's uncertain if will be able to continue going to [facility] as it's not in-network with her insurance. I told patient to call us back if she ends up needing our help. Routing to Bradley as EMMANUEL.    Simi Fritz, PharmD, BCACP  Medication Therapy Management Pharmacist

## 2024-10-09 NOTE — TELEPHONE ENCOUNTER
Can you call the pt and ask?  Pt didn't say a timeline.   She just wanted to get this follow up to you.      Thanks!  Diana Webber, [facility]

## 2024-10-16 ENCOUNTER — ALLIED HEALTH/NURSE VISIT (OUTPATIENT)
Dept: FAMILY MEDICINE | Facility: CLINIC | Age: 69
End: 2024-10-16
Payer: COMMERCIAL

## 2024-10-16 DIAGNOSIS — F31.4 BIPOLAR 1 DISORDER, DEPRESSED, SEVERE (H): Primary | ICD-10-CM

## 2024-10-16 PROCEDURE — 96372 THER/PROPH/DIAG INJ SC/IM: CPT | Performed by: FAMILY MEDICINE

## 2024-10-16 PROCEDURE — 99207 PR NO CHARGE NURSE ONLY: CPT

## 2024-10-16 NOTE — PROGRESS NOTES
Clinic Administered Medication Documentation      Injectable Medication Documentation    Is there an active order (written within the past 365 days, with administrations remaining, not ) in the chart? Yes.     Patient was given  Abilify ER 400mg . Prior to medication administration, verified patient's identity using patient s name and date of birth. Please see MAR and medication order for additional information. Patient instructed to report any adverse reaction to staff immediately.    Vial/Syringe: Single dose vial. Was entire vial of medication used? Yes  Was this medication supplied by the patient? No  Is this a medication the patient will need to receive again? Yes. Verified that the patient has refills remaining in their prescription.     Sravani Dunham RN  [facility]

## 2024-11-19 DIAGNOSIS — R60.0 LOWER EXTREMITY EDEMA: ICD-10-CM

## 2024-11-19 RX ORDER — FUROSEMIDE 20 MG/1
TABLET ORAL
Qty: 135 TABLET | Refills: 2 | Status: SHIPPED | OUTPATIENT
Start: 2024-11-19

## 2025-01-19 DIAGNOSIS — E55.9 VITAMIN D DEFICIENCY: Primary | ICD-10-CM

## 2025-01-20 RX ORDER — CHOLECALCIFEROL (VITAMIN D3) 50 MCG
1 TABLET ORAL DAILY
Qty: 90 TABLET | Refills: 0 | Status: SHIPPED | OUTPATIENT
Start: 2025-01-20

## 2025-01-20 NOTE — TELEPHONE ENCOUNTER
Filled per Medication Therapy Management CPA  Cody Felton, PharmD  Medication Therapy Management Pharmacist

## 2025-01-28 NOTE — TELEPHONE ENCOUNTER
Called [employer] id  [id number]  Ref id [id number]  Called [employer] . They cannot do an exception now because incruse was ordered . incruse has to be discontinued and then they resubmit the PA and then we would have to call abck for the copay exception .    intact

## 2025-02-04 ENCOUNTER — PATIENT OUTREACH (OUTPATIENT)
Dept: CARE COORDINATION | Facility: CLINIC | Age: 70
End: 2025-02-04
Payer: COMMERCIAL

## 2025-02-10 DIAGNOSIS — R60.0 LOWER EXTREMITY EDEMA: ICD-10-CM

## 2025-02-10 RX ORDER — FUROSEMIDE 20 MG/1
TABLET ORAL
Qty: 135 TABLET | Refills: 2 | OUTPATIENT
Start: 2025-02-10